# Patient Record
Sex: MALE | Race: WHITE | Employment: OTHER | URBAN - METROPOLITAN AREA
[De-identification: names, ages, dates, MRNs, and addresses within clinical notes are randomized per-mention and may not be internally consistent; named-entity substitution may affect disease eponyms.]

---

## 2021-01-27 ENCOUNTER — APPOINTMENT (OUTPATIENT)
Dept: CT IMAGING | Age: 86
End: 2021-01-27
Attending: EMERGENCY MEDICINE
Payer: OTHER GOVERNMENT

## 2021-01-27 ENCOUNTER — APPOINTMENT (OUTPATIENT)
Dept: MRI IMAGING | Age: 86
End: 2021-01-27
Attending: EMERGENCY MEDICINE
Payer: OTHER GOVERNMENT

## 2021-01-27 ENCOUNTER — HOSPITAL ENCOUNTER (EMERGENCY)
Age: 86
Discharge: HOME | End: 2021-01-28
Attending: EMERGENCY MEDICINE | Admitting: EMERGENCY MEDICINE
Payer: OTHER GOVERNMENT

## 2021-01-27 DIAGNOSIS — R20.0 LIP NUMBNESS: Primary | ICD-10-CM

## 2021-01-27 LAB
ANION GAP SERPL CALC-SCNC: 10 MMOL/L (ref 5–15)
BASOPHILS # BLD: 0.1 K/UL (ref 0–0.1)
BASOPHILS NFR BLD: 1 % (ref 0–1)
BUN SERPL-MCNC: 19 MG/DL (ref 6–20)
BUN/CREAT SERPL: 13 (ref 12–20)
CALCIUM SERPL-MCNC: 9 MG/DL (ref 8.5–10.1)
CHLORIDE SERPL-SCNC: 103 MMOL/L (ref 97–108)
CO2 SERPL-SCNC: 26 MMOL/L (ref 21–32)
CREAT SERPL-MCNC: 1.47 MG/DL (ref 0.7–1.3)
DIFFERENTIAL METHOD BLD: NORMAL
EOSINOPHIL # BLD: 0.3 K/UL (ref 0–0.4)
EOSINOPHIL NFR BLD: 5 % (ref 0–7)
ERYTHROCYTE [DISTWIDTH] IN BLOOD BY AUTOMATED COUNT: 12.9 % (ref 11.5–14.5)
GLUCOSE SERPL-MCNC: 230 MG/DL (ref 65–100)
HCT VFR BLD AUTO: 39.8 % (ref 36.6–50.3)
HGB BLD-MCNC: 13.2 G/DL (ref 12.1–17)
IMM GRANULOCYTES # BLD AUTO: 0 K/UL (ref 0–0.04)
IMM GRANULOCYTES NFR BLD AUTO: 0 % (ref 0–0.5)
LYMPHOCYTES # BLD: 1.7 K/UL (ref 0.8–3.5)
LYMPHOCYTES NFR BLD: 27 % (ref 12–49)
MCH RBC QN AUTO: 30.8 PG (ref 26–34)
MCHC RBC AUTO-ENTMCNC: 33.2 G/DL (ref 30–36.5)
MCV RBC AUTO: 93 FL (ref 80–99)
MONOCYTES # BLD: 0.5 K/UL (ref 0–1)
MONOCYTES NFR BLD: 8 % (ref 5–13)
NEUTS SEG # BLD: 3.9 K/UL (ref 1.8–8)
NEUTS SEG NFR BLD: 60 % (ref 32–75)
NRBC # BLD: 0 K/UL (ref 0–0.01)
NRBC BLD-RTO: 0 PER 100 WBC
PLATELET # BLD AUTO: 257 K/UL (ref 150–400)
PMV BLD AUTO: 9.7 FL (ref 8.9–12.9)
POTASSIUM SERPL-SCNC: 4.2 MMOL/L (ref 3.5–5.1)
RBC # BLD AUTO: 4.28 M/UL (ref 4.1–5.7)
SODIUM SERPL-SCNC: 139 MMOL/L (ref 136–145)
WBC # BLD AUTO: 6.5 K/UL (ref 4.1–11.1)

## 2021-01-27 PROCEDURE — 85025 COMPLETE CBC W/AUTO DIFF WBC: CPT

## 2021-01-27 PROCEDURE — 36415 COLL VENOUS BLD VENIPUNCTURE: CPT

## 2021-01-27 PROCEDURE — 70544 MR ANGIOGRAPHY HEAD W/O DYE: CPT

## 2021-01-27 PROCEDURE — 99285 EMERGENCY DEPT VISIT HI MDM: CPT

## 2021-01-27 PROCEDURE — 80048 BASIC METABOLIC PNL TOTAL CA: CPT

## 2021-01-27 PROCEDURE — 70547 MR ANGIOGRAPHY NECK W/O DYE: CPT

## 2021-01-27 PROCEDURE — 70551 MRI BRAIN STEM W/O DYE: CPT

## 2021-01-27 PROCEDURE — 70450 CT HEAD/BRAIN W/O DYE: CPT

## 2021-01-28 VITALS
WEIGHT: 206.79 LBS | TEMPERATURE: 97.9 F | RESPIRATION RATE: 19 BRPM | HEART RATE: 75 BPM | DIASTOLIC BLOOD PRESSURE: 55 MMHG | SYSTOLIC BLOOD PRESSURE: 104 MMHG | OXYGEN SATURATION: 95 %

## 2021-01-28 NOTE — ED NOTES
The patient was discharged home by Dr. Shane Kelley and Oc Ha rn in stable condition. The patient is alert and oriented, is in no respiratory distress and has vital signs within normal limits . The patient's diagnosis, condition and treatment were explained to patient. The patient expressed understanding. No prescriptions given to pt. No work/school note given to pt. A discharge plan has been developed. A  was not involved in the process. Aftercare instructions were given to the patient. Pt's saline lock removed without complications. Family will transport pt home.

## 2021-01-28 NOTE — ED PROVIDER NOTES
The history is provided by the patient. Numbness  This is a new problem. The current episode started 12 to 24 hours ago. The problem has been resolved (lasted 1 hour). There was left facial (left side of mouth) focality noted. Pertinent negatives include no focal weakness, no loss of balance, no visual change, no mental status change and no disorientation. There has been no fever. Pertinent negatives include no shortness of breath, no chest pain, no vomiting, no altered mental status, no headaches and no nausea. Meds prior to arrival: took prescribed ASA and Plavix. Associated medical issues include CVA. Past Medical History:   Diagnosis Date    Stroke [facility])        Past Surgical History:   Procedure Laterality Date    [place] CARDIAC SURG PROCEDURE UNLIST      bypass         No family history on file.     Social History     Socioeconomic History    Marital status: SINGLE     Spouse name: Not on file    Number of children: Not on file    Years of education: Not on file    Highest education level: Not on file   Occupational History    Not on file   Social Needs    Financial resource strain: Not on file    Food insecurity     Worry: Not on file     Inability: Not on file    Transportation needs     Medical: Not on file     Non-medical: Not on file   Tobacco Use    Smoking status: Not on file   Substance and Sexual Activity    Alcohol use: Not on file    Drug use: Not on file    Sexual activity: Not on file   Lifestyle    Physical activity     Days per week: Not on file     Minutes per session: Not on file    Stress: Not on file   Relationships    Social connections     Talks on phone: Not on file     Gets together: Not on file     Attends Jew service: Not on file     Active member of club or organization: Not on file     Attends meetings of clubs or organizations: Not on file     Relationship status: Not on file    Intimate partner violence     Fear of current or ex partner: Not on file Emotionally abused: Not on file     Physically abused: Not on file     Forced sexual activity: Not on file   Other Topics Concern    Not on file   Social History Narrative    Not on file         ALLERGIES: Patient has no allergy information on record. Review of Systems   Constitutional: Negative for chills and fever. Respiratory: Negative for shortness of breath. Cardiovascular: Negative for chest pain. Gastrointestinal: Negative for abdominal pain, constipation, diarrhea, nausea and vomiting. Neurological: Positive for numbness. Negative for dizziness, focal weakness, light-headedness, headaches and loss of balance. All other systems reviewed and are negative. There were no vitals filed for this visit. Physical Exam  Vitals signs and nursing note reviewed. Constitutional:       Appearance: He is well-developed. HENT:      Head: Normocephalic and atraumatic. Eyes:      General: No scleral icterus. Neck:      Musculoskeletal: Normal range of motion. Cardiovascular:      Rate and Rhythm: Normal rate and regular rhythm. Pulmonary:      Effort: Pulmonary effort is normal.   Abdominal:      General: There is no distension. Tenderness: There is no abdominal tenderness. Skin:     General: Skin is warm and dry. Findings: No erythema or rash. Neurological:      General: No focal deficit present. Mental Status: He is alert and oriented to person, place, and time. Cranial Nerves: No cranial nerve deficit. Sensory: Sensory deficit (has old left sided decreased sensation from previous CVA) present. Motor: No weakness. Psychiatric:         Mood and Affect: Mood normal.         Behavior: Behavior normal.          MDM       Procedures      The patient is resting comfortably and feels better, is alert, talkative, interactive and in no distress. The repeat examination is unremarkable and benign.  The patient is neurologically intact, has a normal mental status and is ambulatory in the ED. The history, exam, diagnostic testing (if any) and the patient's current condition do not suggest seizure, meningitis, stroke, sepsis, subarachnoid hemorrhage, intracranial bleeding, encephalitis or other significant pathology that would warrant further testing, continued ED treatment, admission, or other specialist evaluation at this point. The vital signs have been stable. Case was discussed with Dr. Aramis Dykes who recommends outpatient follow-up. The patient's condition is stable and appropriate for discharge. The patient will pursue further outpatient evaluation with the primary care physician or other designated or consulting physician as indicated in the discharge instructions.

## 2021-02-16 ENCOUNTER — HOSPITAL ENCOUNTER (EMERGENCY)
Age: 86
Discharge: HOME | End: 2021-02-16
Attending: EMERGENCY MEDICINE | Admitting: EMERGENCY MEDICINE
Payer: OTHER GOVERNMENT

## 2021-02-16 ENCOUNTER — APPOINTMENT (OUTPATIENT)
Dept: CT IMAGING | Age: 86
End: 2021-02-16
Attending: EMERGENCY MEDICINE
Payer: OTHER GOVERNMENT

## 2021-02-16 ENCOUNTER — APPOINTMENT (OUTPATIENT)
Dept: GENERAL RADIOLOGY | Age: 86
End: 2021-02-16
Attending: EMERGENCY MEDICINE
Payer: OTHER GOVERNMENT

## 2021-02-16 VITALS
DIASTOLIC BLOOD PRESSURE: 79 MMHG | RESPIRATION RATE: 16 BRPM | HEART RATE: 60 BPM | TEMPERATURE: 98.1 F | SYSTOLIC BLOOD PRESSURE: 143 MMHG | WEIGHT: 207.23 LBS | OXYGEN SATURATION: 95 %

## 2021-02-16 DIAGNOSIS — M79.642 PAIN OF LEFT HAND: ICD-10-CM

## 2021-02-16 DIAGNOSIS — M25.532 LEFT WRIST PAIN: Primary | ICD-10-CM

## 2021-02-16 PROCEDURE — 99283 EMERGENCY DEPT VISIT LOW MDM: CPT

## 2021-02-16 PROCEDURE — 72125 CT NECK SPINE W/O DYE: CPT

## 2021-02-16 PROCEDURE — 70450 CT HEAD/BRAIN W/O DYE: CPT

## 2021-02-16 PROCEDURE — 73130 X-RAY EXAM OF HAND: CPT

## 2021-02-16 PROCEDURE — 73110 X-RAY EXAM OF WRIST: CPT

## 2021-02-16 RX ORDER — CLOPIDOGREL BISULFATE 75 MG/1
TABLET ORAL
COMMUNITY
End: 2021-11-09

## 2021-02-16 RX ORDER — ASPIRIN 81 MG/1
81 TABLET ORAL DAILY
COMMUNITY
End: 2021-11-09

## 2021-02-16 NOTE — ED NOTES
The patient was discharged home by Dr. Letha Jacobo  in stable condition. The patient is alert and oriented, in no respiratory distress and discharge vital signs obtained. The patient's diagnosis, condition and treatment were explained. The patient expressed understanding. No prescriptions given/e-scribed to pharmacy. No work/school note given. A discharge plan has been developed. A  was not involved in the process. Aftercare instructions were given. Pt ambulatory out of the ED.

## 2021-02-16 NOTE — ED PROVIDER NOTES
71-year-old male presents with left hand and wrist pain after a fall. The patient was walking outside when he slipped on some ice at approximately 4 PM yesterday. Himself with his left hand. He did hit his face slightly but complains of no wounds to the face. He does complain of a slight headache now. He denies any neck or back pain. He is on Plavix but denies any blood thinner use. Past Medical History:   Diagnosis Date    Stroke [facility])        Past Surgical History:   Procedure Laterality Date    [place] CARDIAC SURG PROCEDURE UNLIST      bypass         No family history on file.     Social History     Socioeconomic History    Marital status: SINGLE     Spouse name: Not on file    Number of children: Not on file    Years of education: Not on file    Highest education level: Not on file   Occupational History    Not on file   Social Needs    Financial resource strain: Not on file    Food insecurity     Worry: Not on file     Inability: Not on file    Transportation needs     Medical: Not on file     Non-medical: Not on file   Tobacco Use    Smoking status: Not on file   Substance and Sexual Activity    Alcohol use: Not on file    Drug use: Not on file    Sexual activity: Not on file   Lifestyle    Physical activity     Days per week: Not on file     Minutes per session: Not on file    Stress: Not on file   Relationships    Social connections     Talks on phone: Not on file     Gets together: Not on file     Attends Yazdanism service: Not on file     Active member of club or organization: Not on file     Attends meetings of clubs or organizations: Not on file     Relationship status: Not on file    Intimate partner violence     Fear of current or ex partner: Not on file     Emotionally abused: Not on file     Physically abused: Not on file     Forced sexual activity: Not on file   Other Topics Concern    Not on file   Social History Narrative    Not on file         ALLERGIES: Patient has no known allergies. Review of Systems   Constitutional: Negative for fever. HENT: Negative for rhinorrhea. Respiratory: Negative for cough. Cardiovascular: Negative for chest pain. Gastrointestinal: Negative for abdominal pain. Genitourinary: Negative for dysuria. Musculoskeletal: Positive for arthralgias. Negative for back pain. Skin: Negative for wound. Neurological: Negative for headaches. Psychiatric/Behavioral: Negative for confusion. Vitals:    02/16/21 1606   BP: 115/63   Pulse: 71   Resp: 16   Temp: 98.1 °F (36.7 °C)   SpO2: 95%   Weight: 94 kg (207 lb 3.7 oz)            Physical Exam  Vitals signs and nursing note reviewed. Constitutional:       General: He is not in acute distress. Appearance: Normal appearance. He is not ill-appearing, toxic-appearing or diaphoretic. HENT:      Head: Normocephalic. Eyes:      Extraocular Movements: Extraocular movements intact. Neck:      Musculoskeletal: Normal range of motion. Cardiovascular:      Rate and Rhythm: Normal rate. Pulses: Normal pulses. Heart sounds: Normal heart sounds. No murmur. No friction rub. No gallop. Pulmonary:      Effort: Pulmonary effort is normal. No respiratory distress. Breath sounds: Normal breath sounds. No stridor. No wheezing, rhonchi or rales. Abdominal:      General: Abdomen is flat. Bowel sounds are normal. There is no distension. Palpations: Abdomen is soft. Tenderness: There is no abdominal tenderness. There is no guarding. Musculoskeletal: Normal range of motion. Comments: Left snuffbox tenderness. Decreased range of motion of the left wrist due to pain. Skin:     General: Skin is warm and dry. Capillary Refill: Capillary refill takes less than 2 seconds. Neurological:      Mental Status: He is alert and oriented to person, place, and time.    Psychiatric:         Mood and Affect: Mood normal.          MDM  Number of Diagnoses or Management Options  Left wrist pain  Pain of left hand  Diagnosis management comments: 60-year-old male presents with left wrist and hand pain after a fall yesterday. He does complain of a headache and a CT of the head will be obtained to rule out intracranial hemorrhage along with a CT of the cervical spine. Plain film x-rays of the left hand and wrist were obtained. Imaging is unremarkable. The patient does have left snuffbox tenderness and was placed in a thumb spica splint. I recommended that he follow-up with his primary care physician for abril-ray and 1 week. We discussed the possibility of delayed identification of scaphoid fracture. He is comfortable and agreeable with the plan of care and aware of his return precautions.          Procedures

## 2021-02-16 NOTE — ED TRIAGE NOTES
Patient presents to treatment area via wheelchair. Patient states he slipped on the ice yesterday. Patient states he caught himself with his left hand. Today, has persistent left hand/wrist pain and swelling. Denies striking head.   Patient states he has had a headache since the incident

## 2021-04-05 ENCOUNTER — HOSPITAL ENCOUNTER (EMERGENCY)
Age: 86
Discharge: HOME | End: 2021-04-05
Attending: EMERGENCY MEDICINE
Payer: OTHER GOVERNMENT

## 2021-04-05 ENCOUNTER — APPOINTMENT (OUTPATIENT)
Dept: GENERAL RADIOLOGY | Age: 86
End: 2021-04-05
Attending: EMERGENCY MEDICINE
Payer: OTHER GOVERNMENT

## 2021-04-05 VITALS
BODY MASS INDEX: 28.67 KG/M2 | SYSTOLIC BLOOD PRESSURE: 135 MMHG | HEIGHT: 71 IN | DIASTOLIC BLOOD PRESSURE: 72 MMHG | TEMPERATURE: 97.8 F | HEART RATE: 87 BPM | WEIGHT: 204.81 LBS | RESPIRATION RATE: 16 BRPM | OXYGEN SATURATION: 96 %

## 2021-04-05 DIAGNOSIS — L03.019 FELON OF FINGER: Primary | ICD-10-CM

## 2021-04-05 PROCEDURE — 75810000283 HC INJECTION NERVE BLOCK

## 2021-04-05 PROCEDURE — 73140 X-RAY EXAM OF FINGER(S): CPT

## 2021-04-05 PROCEDURE — 74011250637 HC RX REV CODE- 250/637: Performed by: EMERGENCY MEDICINE

## 2021-04-05 PROCEDURE — 74011000250 HC RX REV CODE- 250

## 2021-04-05 PROCEDURE — 99284 EMERGENCY DEPT VISIT MOD MDM: CPT

## 2021-04-05 RX ORDER — METOPROLOL TARTRATE 25 MG/1
12.5 TABLET, FILM COATED ORAL DAILY
Status: ON HOLD | COMMUNITY
End: 2021-12-11

## 2021-04-05 RX ORDER — ISOSORBIDE MONONITRATE 120 MG/1
120 TABLET, EXTENDED RELEASE ORAL
COMMUNITY

## 2021-04-05 RX ORDER — MELATONIN
2000 2 TIMES DAILY
COMMUNITY

## 2021-04-05 RX ORDER — CEPHALEXIN 500 MG/1
500 CAPSULE ORAL 4 TIMES DAILY
Qty: 28 CAP | Refills: 0 | Status: SHIPPED | OUTPATIENT
Start: 2021-04-05 | End: 2021-04-12

## 2021-04-05 RX ORDER — LOSARTAN POTASSIUM 50 MG/1
50 TABLET ORAL DAILY
COMMUNITY
End: 2021-11-09

## 2021-04-05 RX ORDER — CEPHALEXIN 250 MG/1
500 CAPSULE ORAL
Status: COMPLETED | OUTPATIENT
Start: 2021-04-05 | End: 2021-04-05

## 2021-04-05 RX ORDER — LIDOCAINE HYDROCHLORIDE 10 MG/ML
INJECTION, SOLUTION EPIDURAL; INFILTRATION; INTRACAUDAL; PERINEURAL
Status: COMPLETED
Start: 2021-04-05 | End: 2021-04-05

## 2021-04-05 RX ADMIN — CEPHALEXIN 500 MG: 250 CAPSULE ORAL at 06:10

## 2021-04-05 RX ADMIN — LIDOCAINE HYDROCHLORIDE 5 ML: 10 INJECTION, SOLUTION EPIDURAL; INFILTRATION; INTRACAUDAL; PERINEURAL at 05:25

## 2021-04-05 NOTE — ED TRIAGE NOTES
Puncture wound on right middle finger 6 days ago. Noticed swelling and pain while trying to sleep last night.

## 2021-04-05 NOTE — ED PROVIDER NOTES
The history is provided by the patient. Hand Swelling   This is a new problem. Episode onset: 6 days. The problem occurs constantly. The problem has been gradually worsening. The pain is present in the right fingers (middle). The quality of the pain is described as pounding. The pain is severe. Pertinent negatives include no numbness and full range of motion. The symptoms are aggravated by palpation and movement. He has tried nothing for the symptoms. The treatment provided no relief. There has been no history of extremity trauma (but thinks something may have either bitten him or punctured his finger while he was gardening). Past Medical History:   Diagnosis Date    CAD (coronary artery disease)     Hypercholesteremia     Hypertension     Stroke [facility])        Past Surgical History:   Procedure Laterality Date    HX CORONARY STENT PLACEMENT      [place] CARDIAC SURG PROCEDURE UNLIST      bypass         History reviewed. No pertinent family history.     Social History     Socioeconomic History    Marital status: SINGLE     Spouse name: Not on file    Number of children: Not on file    Years of education: Not on file    Highest education level: Not on file   Occupational History    Not on file   Social Needs    Financial resource strain: Not on file    Food insecurity     Worry: Not on file     Inability: Not on file    Transportation needs     Medical: Not on file     Non-medical: Not on file   Tobacco Use    Smoking status: Never Smoker    Smokeless tobacco: Never Used   Substance and Sexual Activity    Alcohol use: Never     Frequency: Never    Drug use: Never    Sexual activity: Not on file   Lifestyle    Physical activity     Days per week: Not on file     Minutes per session: Not on file    Stress: Not on file   Relationships    Social connections     Talks on phone: Not on file     Gets together: Not on file     Attends Restoration service: Not on file     Active member of club or organization: Not on file     Attends meetings of clubs or organizations: Not on file     Relationship status: Not on file    Intimate partner violence     Fear of current or ex partner: Not on file     Emotionally abused: Not on file     Physically abused: Not on file     Forced sexual activity: Not on file   Other Topics Concern    Not on file   Social History Narrative    Not on file         ALLERGIES: Morphine    Review of Systems   Constitutional: Negative for chills and fever. Respiratory: Negative for shortness of breath. Cardiovascular: Negative for chest pain. Gastrointestinal: Negative for abdominal pain, constipation, diarrhea and vomiting. Musculoskeletal: Positive for myalgias. Skin: Positive for color change. Neurological: Negative for dizziness, light-headedness and numbness. All other systems reviewed and are negative. Vitals:    04/05/21 0516   BP: (!) 180/97   Pulse: (!) 115   Resp: 16   Temp: 97.8 °F (36.6 °C)   SpO2: 96%   Weight: 92.9 kg (204 lb 12.9 oz)   Height: 5' 11\" (1.803 m)            Physical Exam  Vitals signs and nursing note reviewed. Constitutional:       Appearance: He is well-developed. HENT:      Head: Normocephalic and atraumatic. Eyes:      General: No scleral icterus. Neck:      Musculoskeletal: Normal range of motion. Cardiovascular:      Rate and Rhythm: Normal rate. Pulmonary:      Effort: Pulmonary effort is normal.   Abdominal:      General: There is no distension. Skin:     General: Skin is warm and dry. Findings: Erythema present. No rash. Comments: Swelling, erythema, tenderness, and an area of pointing at the tip of his finger   Neurological:      General: No focal deficit present. Mental Status: He is alert and oriented to person, place, and time.    Psychiatric:         Mood and Affect: Mood normal.         Behavior: Behavior normal.          MDM  Number of Diagnoses or Management Options  Felon of finger: new and requires workup  Diagnosis management comments: Pt presents with an infection of this fingertip. No signs/symptoms of sepsis, no clear paronychia, no foreign body. Nerve Block    Date/Time: 4/5/2021 5:53 AM  Performed by: Eliud Nagel MD  Authorized by: Eliud Nagel MD     Consent:     Consent obtained:  Verbal    Consent given by:  Patient    Risks discussed:  Pain    Alternatives discussed:  No treatment  Indications:     Indications:  Pain relief  Location:     Body area:  Upper extremity    Upper extremity nerve:  Metacarpal    Laterality:  Right  Pre-procedure details:     Skin preparation:  Alcohol  Skin anesthesia (see MAR for exact dosages):     Skin anesthesia method:  None  Procedure details (see MAR for exact dosages): Block needle gauge:  25 G    Anesthetic injected:  Lidocaine 1% w/o epi    Steroid injected:  None    Injection procedure:  Anatomic landmarks identified, incremental injection, negative aspiration for blood, anatomic landmarks palpated and introduced needle  Post-procedure details:     Dressing:  None    Outcome:  Pain improved    Patient tolerance of procedure: Tolerated well, no immediate complications            5:59 AM   Case discussed with Dr. Aaron Zamora, orthopedic surgery, who recommends Keflex and follow-up with the office. Patient is to call in 2 hours to schedule his appointment. Pt presents with an extremity injury. No evidence of fracture, dislocation, or other significant musculoskeletal injury. Patient was discharged home with an Rx for Keflex and precautions for returning to the emergency department. No evidence of compartment syndrome on evaluation. Patient will be discharged home to follow-up with orthopedic surgery as instructed in discharge paperwork. Patient and family expressed understanding and agreed with plan.

## 2021-10-11 ENCOUNTER — HOSPITAL ENCOUNTER (EMERGENCY)
Age: 86
Discharge: HOME | End: 2021-10-11
Attending: EMERGENCY MEDICINE
Payer: OTHER GOVERNMENT

## 2021-10-11 VITALS
HEART RATE: 104 BPM | SYSTOLIC BLOOD PRESSURE: 156 MMHG | RESPIRATION RATE: 16 BRPM | TEMPERATURE: 98.2 F | BODY MASS INDEX: 28.4 KG/M2 | WEIGHT: 202.82 LBS | DIASTOLIC BLOOD PRESSURE: 89 MMHG | HEIGHT: 71 IN | OXYGEN SATURATION: 97 %

## 2021-10-11 DIAGNOSIS — S40.021A HEMATOMA OF ARM, RIGHT, INITIAL ENCOUNTER: Primary | ICD-10-CM

## 2021-10-11 PROCEDURE — 74011250637 HC RX REV CODE- 250/637: Performed by: EMERGENCY MEDICINE

## 2021-10-11 PROCEDURE — 99283 EMERGENCY DEPT VISIT LOW MDM: CPT

## 2021-10-11 RX ORDER — TRAMADOL HYDROCHLORIDE 50 MG/1
50 TABLET ORAL
Qty: 9 TABLET | Refills: 0 | Status: SHIPPED | OUTPATIENT
Start: 2021-10-11 | End: 2021-10-14

## 2021-10-11 RX ORDER — TRAMADOL HYDROCHLORIDE 50 MG/1
100 TABLET ORAL
Status: COMPLETED | OUTPATIENT
Start: 2021-10-11 | End: 2021-10-11

## 2021-10-11 RX ADMIN — TRAMADOL HYDROCHLORIDE 100 MG: 50 TABLET, FILM COATED ORAL at 05:27

## 2021-10-11 NOTE — ED NOTES
The patient was discharged home by provider in stable condition. The patient is alert and oriented, in no respiratory distress and discharge vital signs obtained. The patient's diagnosis, condition and treatment were explained. The patient expressed understanding. 1 prescriptions given. No work/school note given. A discharge plan has been developed. A  was not involved in the process. Aftercare instructions were given. Pt ambulatory out of the ED.

## 2021-10-11 NOTE — ED PROVIDER NOTES
The history is provided by the patient. Arm Pain   This is a new problem. The current episode started more than 1 week ago. The problem occurs constantly. The problem has not changed since onset. The pain is present in the right arm. The quality of the pain is described as pounding. The pain is severe. Pertinent negatives include no numbness and full range of motion. The symptoms are aggravated by palpation. Treatments tried: tylenol. The treatment provided no relief. There has been a history of trauma (swelling and pain after IV + hematoma). Past Medical History:   Diagnosis Date    CAD (coronary artery disease)     Hypercholesteremia     Hypertension     Stroke [facility])        Past Surgical History:   Procedure Laterality Date    HX CORONARY STENT PLACEMENT      [place] CARDIAC SURG PROCEDURE UNLIST      bypass         History reviewed. No pertinent family history. Social History     Socioeconomic History    Marital status:      Spouse name: Not on file    Number of children: Not on file    Years of education: Not on file    Highest education level: Not on file   Occupational History    Not on file   Tobacco Use    Smoking status: Never Smoker    Smokeless tobacco: Never Used   Substance and Sexual Activity    Alcohol use: Never    Drug use: Never    Sexual activity: Not on file   Other Topics Concern    Not on file   Social History Narrative    Not on file     Social Determinants of Health     Financial Resource Strain:     Difficulty of Paying Living Expenses:    Food Insecurity:     Worried About Running Out of Food in the Last Year:     [address] in the Last Year:    Transportation Needs:     Lack of Transportation (Medical):      Lack of Transportation (Non-Medical):    Physical Activity:     Days of Exercise per Week:     Minutes of Exercise per Session:    Stress:     Feeling of Stress :    Social Connections:     Frequency of Communication with Friends and Family:  Frequency of Social Gatherings with Friends and Family:     Attends Zoroastrianism Services:     Active Member of Clubs or Organizations:     Attends Club or Organization Meetings:     Marital Status:    Intimate Partner Violence:     Fear of Current or Ex-Partner:     Emotionally Abused:     Physically Abused:     Sexually Abused: ALLERGIES: Morphine    Review of Systems   Constitutional: Negative for chills and fever. Respiratory: Negative for shortness of breath. Cardiovascular: Negative for chest pain. Gastrointestinal: Negative for abdominal pain, constipation, diarrhea and vomiting. Musculoskeletal: Positive for myalgias. Skin: Positive for color change. Neurological: Negative for dizziness, light-headedness and numbness. All other systems reviewed and are negative. Vitals:    10/11/21 0502   BP: (!) (P) 156/89   Pulse: (!) (P) 104   Resp: (P) 16   Temp: (P) 98.2 °F (36.8 °C)   SpO2: (P) 97%            Physical Exam  Vitals and nursing note reviewed. Constitutional:       Appearance: He is well-developed. HENT:      Head: Normocephalic and atraumatic. Eyes:      General: No scleral icterus. Cardiovascular:      Rate and Rhythm: Normal rate. Pulmonary:      Effort: Pulmonary effort is normal.   Abdominal:      General: There is no distension. Musculoskeletal:      Right forearm: Swelling present. No edema, deformity, lacerations or bony tenderness. Cervical back: Normal range of motion. Comments: Large tender hematoma along the ulnar aspect of the right forearm, no warmth or induration,    Skin:     General: Skin is warm and dry. Capillary Refill: Capillary refill takes less than 2 seconds. Findings: No erythema or rash. Neurological:      General: No focal deficit present. Mental Status: He is alert and oriented to person, place, and time.    Psychiatric:         Mood and Affect: Mood normal.         Behavior: Behavior normal. MDM       Procedures      Pt presents with an extremity injury. No evidence of fracture, dislocation, or other significant musculoskeletal injury. Patient was discharged home with a plan for pain control as well as instructions on managing his injuries and precautions for returning to the emergency department. No evidence of compartment syndrome on evaluation. Patient will be discharged home to follow-up with primary care provider as instructed in discharge paperwork. Patient and family expressed understanding and agreed with plan.

## 2021-10-21 ENCOUNTER — APPOINTMENT (OUTPATIENT)
Dept: CT IMAGING | Age: 86
End: 2021-10-21
Attending: EMERGENCY MEDICINE
Payer: OTHER GOVERNMENT

## 2021-10-21 ENCOUNTER — HOSPITAL ENCOUNTER (EMERGENCY)
Age: 86
Discharge: HOME | End: 2021-10-21
Attending: EMERGENCY MEDICINE
Payer: OTHER GOVERNMENT

## 2021-10-21 VITALS
OXYGEN SATURATION: 96 % | HEART RATE: 66 BPM | DIASTOLIC BLOOD PRESSURE: 66 MMHG | RESPIRATION RATE: 21 BRPM | WEIGHT: 201.72 LBS | SYSTOLIC BLOOD PRESSURE: 148 MMHG | BODY MASS INDEX: 28.13 KG/M2

## 2021-10-21 DIAGNOSIS — R20.0 NUMBNESS AND TINGLING: Primary | ICD-10-CM

## 2021-10-21 DIAGNOSIS — R51.9 ACUTE NONINTRACTABLE HEADACHE, UNSPECIFIED HEADACHE TYPE: ICD-10-CM

## 2021-10-21 DIAGNOSIS — I62.03 CHRONIC SUBDURAL HEMATOMA (HCC): ICD-10-CM

## 2021-10-21 DIAGNOSIS — R20.2 NUMBNESS AND TINGLING: Primary | ICD-10-CM

## 2021-10-21 LAB
ALBUMIN SERPL-MCNC: 3.7 G/DL (ref 3.5–5)
ALBUMIN/GLOB SERPL: 1.2 {RATIO} (ref 1.1–2.2)
ALP SERPL-CCNC: 62 U/L (ref 45–117)
ALT SERPL-CCNC: 29 U/L (ref 12–78)
ANION GAP SERPL CALC-SCNC: 9 MMOL/L (ref 5–15)
AST SERPL-CCNC: 20 U/L (ref 15–37)
BASOPHILS # BLD: 0 K/UL (ref 0–0.1)
BASOPHILS NFR BLD: 1 % (ref 0–1)
BILIRUB SERPL-MCNC: 0.8 MG/DL (ref 0.2–1)
BUN SERPL-MCNC: 23 MG/DL (ref 6–20)
BUN/CREAT SERPL: 16 (ref 12–20)
CALCIUM SERPL-MCNC: 9 MG/DL (ref 8.5–10.1)
CHLORIDE SERPL-SCNC: 102 MMOL/L (ref 97–108)
CO2 SERPL-SCNC: 27 MMOL/L (ref 21–32)
CREAT SERPL-MCNC: 1.46 MG/DL (ref 0.7–1.3)
DIFFERENTIAL METHOD BLD: ABNORMAL
EOSINOPHIL # BLD: 0.2 K/UL (ref 0–0.4)
EOSINOPHIL NFR BLD: 2 % (ref 0–7)
ERYTHROCYTE [DISTWIDTH] IN BLOOD BY AUTOMATED COUNT: 12.8 % (ref 11.5–14.5)
GLOBULIN SER CALC-MCNC: 3.1 G/DL (ref 2–4)
GLUCOSE BLD STRIP.AUTO-MCNC: 212 MG/DL (ref 65–117)
GLUCOSE SERPL-MCNC: 193 MG/DL (ref 65–100)
HCT VFR BLD AUTO: 37.4 % (ref 36.6–50.3)
HGB BLD-MCNC: 12.4 G/DL (ref 12.1–17)
IMM GRANULOCYTES # BLD AUTO: 0 K/UL (ref 0–0.04)
IMM GRANULOCYTES NFR BLD AUTO: 0 % (ref 0–0.5)
INR PPP: 1.2 (ref 0.9–1.1)
LYMPHOCYTES # BLD: 2.2 K/UL (ref 0.8–3.5)
LYMPHOCYTES NFR BLD: 26 % (ref 12–49)
MCH RBC QN AUTO: 31.2 PG (ref 26–34)
MCHC RBC AUTO-ENTMCNC: 33.2 G/DL (ref 30–36.5)
MCV RBC AUTO: 94.2 FL (ref 80–99)
MONOCYTES # BLD: 0.7 K/UL (ref 0–1)
MONOCYTES NFR BLD: 8 % (ref 5–13)
NEUTS SEG # BLD: 5.6 K/UL (ref 1.8–8)
NEUTS SEG NFR BLD: 64 % (ref 32–75)
NRBC # BLD: 0 K/UL (ref 0–0.01)
NRBC BLD-RTO: 0 PER 100 WBC
PLATELET # BLD AUTO: 252 K/UL (ref 150–400)
PMV BLD AUTO: 10.2 FL (ref 8.9–12.9)
POTASSIUM SERPL-SCNC: 3.7 MMOL/L (ref 3.5–5.1)
PROT SERPL-MCNC: 6.8 G/DL (ref 6.4–8.2)
PROTHROMBIN TIME: 11.6 SEC (ref 9–11.1)
RBC # BLD AUTO: 3.97 M/UL (ref 4.1–5.7)
SERVICE CMNT-IMP: ABNORMAL
SODIUM SERPL-SCNC: 138 MMOL/L (ref 136–145)
TROPONIN-HIGH SENSITIVITY: 17 NG/L (ref 0–76)
WBC # BLD AUTO: 8.8 K/UL (ref 4.1–11.1)

## 2021-10-21 PROCEDURE — 99285 EMERGENCY DEPT VISIT HI MDM: CPT

## 2021-10-21 PROCEDURE — 80053 COMPREHEN METABOLIC PANEL: CPT

## 2021-10-21 PROCEDURE — 85025 COMPLETE CBC W/AUTO DIFF WBC: CPT

## 2021-10-21 PROCEDURE — 70450 CT HEAD/BRAIN W/O DYE: CPT

## 2021-10-21 PROCEDURE — 36415 COLL VENOUS BLD VENIPUNCTURE: CPT

## 2021-10-21 PROCEDURE — 82962 GLUCOSE BLOOD TEST: CPT

## 2021-10-21 PROCEDURE — 84484 ASSAY OF TROPONIN QUANT: CPT

## 2021-10-21 PROCEDURE — 85610 PROTHROMBIN TIME: CPT

## 2021-10-21 PROCEDURE — 93005 ELECTROCARDIOGRAM TRACING: CPT

## 2021-10-22 ENCOUNTER — APPOINTMENT (OUTPATIENT)
Dept: CT IMAGING | Age: 86
DRG: 082 | End: 2021-10-22
Attending: EMERGENCY MEDICINE
Payer: MEDICARE

## 2021-10-22 ENCOUNTER — HOSPITAL ENCOUNTER (INPATIENT)
Age: 86
LOS: 18 days | Discharge: NURSING, REHAB OR LONG-TERM CARE | DRG: 082 | End: 2021-11-09
Attending: EMERGENCY MEDICINE | Admitting: HOSPITALIST
Payer: MEDICARE

## 2021-10-22 DIAGNOSIS — R53.1 WEAKNESS: ICD-10-CM

## 2021-10-22 DIAGNOSIS — R20.0 LEFT SIDED NUMBNESS: Primary | ICD-10-CM

## 2021-10-22 DIAGNOSIS — R56.9 SEIZURE (HCC): ICD-10-CM

## 2021-10-22 DIAGNOSIS — S06.5XAA SUBDURAL HEMATOMA: ICD-10-CM

## 2021-10-22 DIAGNOSIS — G81.94 LEFT HEMIPARESIS (HCC): ICD-10-CM

## 2021-10-22 PROBLEM — I63.9 CVA (CEREBRAL VASCULAR ACCIDENT) (HCC): Status: ACTIVE | Noted: 2021-10-22

## 2021-10-22 LAB
ALBUMIN SERPL-MCNC: 2.6 G/DL (ref 3.5–5)
ALBUMIN/GLOB SERPL: 0.9 {RATIO} (ref 1.1–2.2)
ALP SERPL-CCNC: 50 U/L (ref 45–117)
ALT SERPL-CCNC: 20 U/L (ref 12–78)
ANION GAP SERPL CALC-SCNC: 6 MMOL/L (ref 5–15)
AST SERPL-CCNC: 16 U/L (ref 15–37)
ATRIAL RATE: 67 BPM
BASOPHILS # BLD: 0 K/UL (ref 0–0.1)
BASOPHILS NFR BLD: 1 % (ref 0–1)
BILIRUB SERPL-MCNC: 0.5 MG/DL (ref 0.2–1)
BUN SERPL-MCNC: 19 MG/DL (ref 6–20)
BUN/CREAT SERPL: 18 (ref 12–20)
CALCIUM SERPL-MCNC: 7.6 MG/DL (ref 8.5–10.1)
CALCULATED P AXIS, ECG09: 6 DEGREES
CALCULATED R AXIS, ECG10: -33 DEGREES
CALCULATED T AXIS, ECG11: 106 DEGREES
CHLORIDE SERPL-SCNC: 104 MMOL/L (ref 97–108)
CO2 SERPL-SCNC: 22 MMOL/L (ref 21–32)
COMMENT, HOLDF: NORMAL
CREAT SERPL-MCNC: 1.05 MG/DL (ref 0.7–1.3)
DIAGNOSIS, 93000: NORMAL
DIFFERENTIAL METHOD BLD: ABNORMAL
EOSINOPHIL # BLD: 0.1 K/UL (ref 0–0.4)
EOSINOPHIL NFR BLD: 2 % (ref 0–7)
ERYTHROCYTE [DISTWIDTH] IN BLOOD BY AUTOMATED COUNT: 12.7 % (ref 11.5–14.5)
GLOBULIN SER CALC-MCNC: 2.9 G/DL (ref 2–4)
GLUCOSE SERPL-MCNC: 95 MG/DL (ref 65–100)
HCT VFR BLD AUTO: 30.5 % (ref 36.6–50.3)
HGB BLD-MCNC: 9.9 G/DL (ref 12.1–17)
IMM GRANULOCYTES # BLD AUTO: 0 K/UL (ref 0–0.04)
IMM GRANULOCYTES NFR BLD AUTO: 0 % (ref 0–0.5)
INR PPP: 1.2 (ref 0.9–1.1)
LYMPHOCYTES # BLD: 1.2 K/UL (ref 0.8–3.5)
LYMPHOCYTES NFR BLD: 19 % (ref 12–49)
MCH RBC QN AUTO: 31.3 PG (ref 26–34)
MCHC RBC AUTO-ENTMCNC: 32.5 G/DL (ref 30–36.5)
MCV RBC AUTO: 96.5 FL (ref 80–99)
MONOCYTES # BLD: 0.5 K/UL (ref 0–1)
MONOCYTES NFR BLD: 8 % (ref 5–13)
NEUTS SEG # BLD: 4.3 K/UL (ref 1.8–8)
NEUTS SEG NFR BLD: 70 % (ref 32–75)
NRBC # BLD: 0 K/UL (ref 0–0.01)
NRBC BLD-RTO: 0 PER 100 WBC
P-R INTERVAL, ECG05: 176 MS
PLATELET # BLD AUTO: 209 K/UL (ref 150–400)
PMV BLD AUTO: 9.8 FL (ref 8.9–12.9)
POTASSIUM SERPL-SCNC: 3.2 MMOL/L (ref 3.5–5.1)
PROT SERPL-MCNC: 5.5 G/DL (ref 6.4–8.2)
PROTHROMBIN TIME: 12.2 SEC (ref 9–11.1)
Q-T INTERVAL, ECG07: 392 MS
QRS DURATION, ECG06: 120 MS
QTC CALCULATION (BEZET), ECG08: 414 MS
RBC # BLD AUTO: 3.16 M/UL (ref 4.1–5.7)
SAMPLES BEING HELD,HOLD: NORMAL
SODIUM SERPL-SCNC: 132 MMOL/L (ref 136–145)
TROPONIN-HIGH SENSITIVITY: 12 NG/L (ref 0–76)
VENTRICULAR RATE, ECG03: 67 BPM
WBC # BLD AUTO: 6.1 K/UL (ref 4.1–11.1)

## 2021-10-22 PROCEDURE — 93005 ELECTROCARDIOGRAM TRACING: CPT

## 2021-10-22 PROCEDURE — 36415 COLL VENOUS BLD VENIPUNCTURE: CPT

## 2021-10-22 PROCEDURE — 85025 COMPLETE CBC W/AUTO DIFF WBC: CPT

## 2021-10-22 PROCEDURE — 84484 ASSAY OF TROPONIN QUANT: CPT

## 2021-10-22 PROCEDURE — 74011000636 HC RX REV CODE- 636: Performed by: EMERGENCY MEDICINE

## 2021-10-22 PROCEDURE — 99284 EMERGENCY DEPT VISIT MOD MDM: CPT

## 2021-10-22 PROCEDURE — 70498 CT ANGIOGRAPHY NECK: CPT

## 2021-10-22 PROCEDURE — 0042T CT CODE NEURO PERF W CBF: CPT

## 2021-10-22 PROCEDURE — 65660000000 HC RM CCU STEPDOWN

## 2021-10-22 PROCEDURE — 80053 COMPREHEN METABOLIC PANEL: CPT

## 2021-10-22 PROCEDURE — 70450 CT HEAD/BRAIN W/O DYE: CPT

## 2021-10-22 PROCEDURE — 85610 PROTHROMBIN TIME: CPT

## 2021-10-22 PROCEDURE — 4A03X5D MEASUREMENT OF ARTERIAL FLOW, INTRACRANIAL, EXTERNAL APPROACH: ICD-10-PCS | Performed by: HOSPITALIST

## 2021-10-22 RX ORDER — LEVETIRACETAM 500 MG/1
500 TABLET ORAL 2 TIMES DAILY
Status: DISCONTINUED | OUTPATIENT
Start: 2021-10-23 | End: 2021-10-23

## 2021-10-22 RX ADMIN — IOPAMIDOL 20 ML: 755 INJECTION, SOLUTION INTRAVENOUS at 16:50

## 2021-10-22 RX ADMIN — IOPAMIDOL 100 ML: 755 INJECTION, SOLUTION INTRAVENOUS at 16:50

## 2021-10-22 NOTE — ED NOTES
The patient was discharged home by Dr. Mary Landry and Nikia Stuart rn in stable condition, accompanied by family. The patient is alert and oriented, is in no respiratory distress and has vital signs within normal limits . The patient's diagnosis, condition and treatment were explained to patient. The patient expressed understanding. No prescriptions given to pt. No work/school note given to pt. A discharge plan has been developed. A  was not involved in the process. Aftercare instructions were given to the patient. Pt's saline lock removed without complications. Family will transport pt home.

## 2021-10-22 NOTE — ED TRIAGE NOTES
Pt arrived with EMS from his doctor's office for L sided numbness,tingling, and weakness. 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 LKW 1530. Hx of Htn, CABG, TIAs.  for EMS. Pt on Aspirin and Plavix.

## 2021-10-22 NOTE — ED NOTES
Per Dr. Alice Quintanilla, pt will be discharged to home and a dysphagia screening is not necessary at this point.

## 2021-10-22 NOTE — ED PROVIDER NOTES
Please note that this dictation was completed with Kase[employer], the computer voice recognition software.  Quite often unanticipated grammatical, syntax, homophones, and other interpretive errors are inadvertently transcribed by the computer software.  Please disregard these errors.  Please excuse any errors that have escaped final proofreading. 54-year-old male past medical history markable for \"fell in the bathtub/hit my head lost consciousness had some bleeding in my brain. I am on Plavix and aspirin, and see Dr. Ramo Nicole. She said due to the position of the blood in my age they would not do surgery but they may be able to do something to help with the intermittent pains and facial numbness. I had had episodes of facial numbness since that time. I was at Dr. Shayla Mcneill office earlier today had seen her was leaving head at home and suddenly developed acute onset of left-sided weakness. Now it feels okay but it still feels slightly weak on the L'; patient denies any overt facial droop vision changes said he was having some left-sided numbness left-sided weakness. He states his occurred approximate hour ago lasted maybe 30 minutes. \"  Said he turned right around went back to Dr. Shayla Mcneill office which point she instructed the patient to come here for further evaluation via EMS. Patient is currently states little bit of a headache on the left side \"where the clot is\" denies interval trauma states his symptoms have since abated.     pt denies interval trauma, , vison changes, diff swallowing, CP, SOB, Abd pain, F/Ch, N/V, D/Cons or other current systemic complaints    Social/ PSH reviewed in EMR    EMR Chart Reviewed           Past Medical History:   Diagnosis Date    CAD (coronary artery disease)     Hypercholesteremia     Hypertension     Stroke [facility])        Past Surgical History:   Procedure Laterality Date    HX CORONARY STENT PLACEMENT      [place] CARDIAC SURG PROCEDURE UNLIST      bypass         No family history on file. Social History     Socioeconomic History    Marital status:      Spouse name: Not on file    Number of children: Not on file    Years of education: Not on file    Highest education level: Not on file   Occupational History    Not on file   Tobacco Use    Smoking status: Never Smoker    Smokeless tobacco: Never Used   Substance and Sexual Activity    Alcohol use: Never    Drug use: Never    Sexual activity: Not on file   Other Topics Concern    Not on file   Social History Narrative    Not on file     Social Determinants of Health     Financial Resource Strain:     Difficulty of Paying Living Expenses:    Food Insecurity:     Worried About Running Out of Food in the Last Year:     [address] in the Last Year:    Transportation Needs:     Lack of Transportation (Medical):  Lack of Transportation (Non-Medical):    Physical Activity:     Days of Exercise per Week:     Minutes of Exercise per Session:    Stress:     Feeling of Stress :    Social Connections:     Frequency of Communication with Friends and Family:     Frequency of Social Gatherings with Friends and Family:     Attends Gnosticist Services:     Active Member of Clubs or Organizations:     Attends Club or Organization Meetings:     Marital Status:    Intimate Partner Violence:     Fear of Current or Ex-Partner:     Emotionally Abused:     Physically Abused:     Sexually Abused: ALLERGIES: Morphine    Review of Systems   Constitutional: Negative for chills and fever. HENT: Negative for drooling, trouble swallowing and voice change. Eyes: Negative for visual disturbance. Respiratory: Negative for cough, chest tightness, shortness of breath and stridor. Cardiovascular: Negative for chest pain, palpitations and leg swelling. Gastrointestinal: Negative for abdominal pain, diarrhea, nausea, rectal pain and vomiting. Genitourinary: Negative for flank pain.    Musculoskeletal: Negative for back pain. Skin: Negative for pallor. Neurological: Positive for weakness, numbness and headaches. Negative for facial asymmetry and speech difficulty. Psychiatric/Behavioral: Negative for confusion. All other systems reviewed and are negative. Vitals:    10/22/21 2100 10/22/21 2200 10/23/21 0200 10/23/21 0600   BP: (!) 177/67 (!) 177/67 (!) 131/98 (!) 145/95   Pulse: 72 73 75 77   Resp: 13 13 14    Temp: 97.9 °F (36.6 °C) 98 °F (36.7 °C) 98 °F (36.7 °C) 98 °F (36.7 °C)   SpO2: 96% 97% 97% 97%   Weight:       Height:                Physical Exam  Vitals and nursing note reviewed. Constitutional:       General: He is not in acute distress. Appearance: Normal appearance. He is well-developed. He is obese. He is not ill-appearing, toxic-appearing or diaphoretic. Comments: NAD, AxOx4, speaking in complete sentences    gcs = 15       HENT:      Head: Normocephalic and atraumatic. Comments: Cn intact    ? Min L facial droop     Right Ear: External ear normal.      Left Ear: External ear normal.      Mouth/Throat:      Pharynx: No oropharyngeal exudate. Eyes:      General: No scleral icterus. Right eye: No discharge. Left eye: No discharge. Extraocular Movements: Extraocular movements intact. Conjunctiva/sclera: Conjunctivae normal.      Pupils: Pupils are equal, round, and reactive to light. Cardiovascular:      Rate and Rhythm: Normal rate and regular rhythm. Pulses: Normal pulses. Heart sounds: Normal heart sounds. No murmur heard. No friction rub. No gallop. Pulmonary:      Effort: Pulmonary effort is normal. No respiratory distress. Breath sounds: Normal breath sounds. No stridor. No wheezing, rhonchi or rales. Chest:      Chest wall: No tenderness. Abdominal:      General: Bowel sounds are normal. There is no distension. Palpations: Abdomen is soft. There is no mass. Tenderness: There is no abdominal tenderness.  There is no guarding or rebound. Hernia: No hernia is present. Comments: nttp       Genitourinary:     Comments: Pt denies urinary/ Testicular/ scrotal or penile  complaints  Musculoskeletal:         General: No swelling, tenderness, deformity or signs of injury. Normal range of motion. Cervical back: Normal range of motion and neck supple. No tenderness. Right lower leg: No edema. Left lower leg: No edema. Lymphadenopathy:      Cervical: No cervical adenopathy. Skin:     General: Skin is warm and dry. Capillary Refill: Capillary refill takes less than 2 seconds. Coloration: Skin is not jaundiced or pale. Findings: No bruising, erythema, lesion or rash. Neurological:      General: No focal deficit present. Mental Status: He is alert and oriented to person, place, and time. Cranial Nerves: No cranial nerve deficit. Motor: Weakness present. Coordination: Coordination normal.      Comments: ? L sided weakness on exam; per pt 'feels heavy' on the L side;           MDM       Procedures      No chief complaint on file. 4:35 PM  The patients presenting problems have been discussed, and they are in agreement with the care plan formulated and outlined with them. I have encouraged them to ask questions as they arise throughout their visit. MEDICATIONS GIVEN:  Medications - No data to display    LABS REVIEWED:  Labs Reviewed - No data to display    RADIOLOGY RESULTS:  The following have been ordered and reviewed:  _____________________________________________________________________  _____________________________________________________________________    EKG interpretation:   Rhythm: normal sinus rhythm; and ir-regular due to PVC's. Rate (approx.): 78;  Axis: normal; P wave: normal; QRS interval: normal ; ST/T wave: normal; Negative acute significant segmental elevations/ compared to study dated yesterday;     PROCEDURES:        CONSULTATIONS:       PROGRESS NOTES:      DIAGNOSIS:    1. Left sided numbness    2. Weakness    3. Subdural hematoma (HCC)        PLAN:  1-code S level 1 - no TPA/ admit/ monitor;       ED COURSE: The patients hospital course has been uncomplicated. CONSULT  NOTE  4:43 PM  Jessica Fernandez MD spoke with Dr Mikayla Rivera. Specialty: Teleneurology  Discussed pt's hx, disposition, and available diagnostic and imaging results. Reviewed care plans. Consulting physician agrees with plans as outlined 'I will see him/ No TPA.      5:16 PM  Spoke with Dr Degroot Son, 'would obtain an MRI brain with and without contrast to rule out a mass on the left side, I would also admit the patient. Patient also needs to be evaluated unfortunately by vascular surgery bilateral ICA stenosis, though they do not have anything to do now but once his head bleed resolves they may want to do surgery. \"  Patient told of these plans and agrees. Perfect Serve Consult for Admission  5:21 PM    ED Room Number: QQ07/99  Patient Name and age: Crys Delgado 80 y.o.  male  Working Diagnosis:   1. Left sided numbness    2. Weakness    3. 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  L brain mass/ needs CV surgery to see Pt for bilat ICA stenosis

## 2021-10-22 NOTE — ROUTINE PROCESS
TRANSFER - OUT REPORT:    Verbal report given to [facility] on Dallin Arredondo  being transferred to  for routine progression of care       Report consisted of patients Situation, Background, Assessment and   Recommendations(SBAR). Information from the following report(s) SBAR, Kardex, ED Summary, [facility] and Recent Results was reviewed with the receiving nurse. Lines:   Peripheral IV 10/22/21 Left Antecubital (Active)   Site Assessment Clean, dry, & intact 10/22/21 1651   Phlebitis Assessment 0 10/22/21 1651   Infiltration Assessment 0 10/22/21 1651   Dressing Status Clean, dry, & intact 10/22/21 1651   Hub Color/Line Status Pink 10/22/21 1651   Action Taken Blood drawn 10/22/21 1651        Opportunity for questions and clarification was provided.       Patient transported with:   Registered Nurse Joel Mendenhall RN

## 2021-10-22 NOTE — ED TRIAGE NOTES
Left upper lip tingling at 2 pm with headache. No tingling present. Previous TIA. Pt took 3 baby aspirin prior to arrival to ED.

## 2021-10-22 NOTE — PROGRESS NOTES
Responded to Code in ER 13. Unable to assess due to medical interventions by staff. Collaborated with staff. Advised nurse to contact [facility] for any further referrals. Visited by: Alie Henderson, [address] [place] [address] Road paging Service [phone] ([phone])

## 2021-10-22 NOTE — H&P
History & Physical    Primary Care Provider: Other, MD Tatyana  Source of Information: Patient     History of Presenting Illness: Stephanie Longoria is a 80 y.o. male who presents with left side weakness and numbness     66-year-old male past medical history with CAD, HTN, HLP and CVA. Pt was seen in ER yesterday due episode of left upper lip tingling, which resolved in several hours. She had CT noticed a large chronic subdural hematoma. ER discussed with Dr. Diandra Colon of neurosurgery and arranged pt to be seen in Dr. Radha Gardner  office at 2:30 PM  Today. Today, pt  was at Dr. Radha Gardner office and after pt was seen and was leaving head at home and suddenly developed acute onset of left-sided weakness. Said he turned right around went back to Dr. Radha Gardner office which instructed the patient to come here for further evaluation via EMS. Patient is currently states little bit of a headache on the left side. His left side weakness and numbness symptoms resolved in 30 mins. pt said he had a fall 6 months ago, and hit his back of head. But had headache after the fall, but he did not seek any medical help for this. denied, vison changes, diff swallowing, CP, SOB, Abd pain, F/Ch, N/V, suria. Review of Systems:  General: ramón magnus   HEENT:+headache, no vision changes, no nose discharge, no hearing changes   RES: no wheezing, no cough, no sob  CVS: no cp, no palpitation.   Muscular: no joint swelling, no muscle pain, no leg swelling  Skin: no rash, no itching   GI: no vomiting, no diarrhea  : no dysuria, no hematuria  Hemo: no gum bleeding, no petechial   Neuro: HPI   Endo: no polydipsia   Psych: denied depression     Past Medical History:   Diagnosis Date    CAD (coronary artery disease)     Hypercholesteremia     Hypertension     Stroke [facility])       Past Surgical History:   Procedure Laterality Date    HX CORONARY STENT PLACEMENT      [place] CARDIAC SURG PROCEDURE UNLIST      bypass Prior to Admission medications    Medication Sig Start Date End Date Taking? Authorizing Provider   metoprolol tartrate (LOPRESSOR) 25 mg tablet Take 12.5 mg by mouth daily. Tatyana Light MD   losartan (COZAAR) 50 mg tablet Take 50 mg by mouth daily. Tatyana Light MD   cholecalciferol (Vitamin D3) (1000 Units /25 mcg) tablet Take 5,000 Units by mouth two (2) times a day. Tatyana Light MD   isosorbide mononitrate ER (IMDUR) 120 mg CR tablet Take 120 mg by mouth every morning. Tatyana Light MD   clopidogreL (Plavix) 75 mg tab Take  by mouth. Tatyana Light MD   aspirin delayed-release 81 mg tablet Take 81 mg by mouth daily. Tatyana Light MD     Allergies   Allergen Reactions    Morphine Unknown (comments)      No family history on file. SOCIAL HISTORY:  Patient resides:  Independently x   Assisted Living    SNF    With family care       Smoking history:   None x   Former    Chronic      Alcohol history:   None x   Social    Chronic      Ambulates:   Independently x   w/cane    w/walker    w/wc    CODE STATUS:  DNR    Full x   Other      Objective:     Physical Exam:     Visit Vitals  BP (!) 155/86 (BP Patient Position: Lying)   Pulse 71   Temp 97.5 °F (36.4 °C)   Resp 16   Ht 5' 11\" (1.803 m)   Wt 90.7 kg (200 lb)   SpO2 98%   BMI 27.89 kg/m²      O2 Device: None (Room air)    General:  Alert, cooperative, no distress, appears stated age. Head:  Normocephalic, without obvious abnormality, atraumatic. Eyes:  Conjunctivae/corneas clear. PERRL, EOMs intact. Nose: Nares normal. Septum midline. Mucosa normal. No drainage or sinus tenderness. Throat: Lips, mucosa, and tongue normal. Teeth and gums normal.   Neck: Supple, symmetrical, trachea midline, no adenopathy, thyroid: no enlargement/tenderness/nodules, no carotid bruit and no JVD. Back:   Symmetric, no curvature. ROM normal. No CVA tenderness. Lungs:   Clear to auscultation bilaterally. Chest wall:  No tenderness or deformity. Heart:  Regular rate and rhythm, S1, S2 normal, no murmur, click, rub or gallop. Abdomen:   Soft, non-tender. Bowel sounds normal. No masses,  No organomegaly. Extremities: Extremities normal, atraumatic, no cyanosis or edema. Pulses: 2+ and symmetric all extremities. Skin: Skin color, texture, turgor normal. No rashes or lesions   Neurologic: CNII-XII intact. Data Review:     Recent Days:  Recent Labs     10/22/21  1705 10/21/21  2202   WBC 6.1 8.8   HGB 9.9* 12.4   HCT 30.5* 37.4    252     Recent Labs     10/22/21  1705 10/21/21  2202   NA  --  138   K  --  3.7   CL  --  102   CO2  --  27   GLU  --  193*   BUN  --  23*   CREA  --  1.46*   CA  --  9.0   ALB  --  3.7   ALT  --  29   INR 1.2* 1.2*     No results for input(s): PH, PCO2, PO2, HCO3, FIO2 in the last 72 hours.     24 Hour Results:  Recent Results (from the past 24 hour(s))   GLUCOSE, POC    Collection Time: 10/21/21  9:07 PM   Result Value Ref Range    Glucose (POC) 212 (H) 65 - 117 mg/dL    Performed by Sherri Kennedy ([employer])    EKG, 12 LEAD, INITIAL    Collection Time: 10/21/21  9:20 PM   Result Value Ref Range    Ventricular Rate 67 BPM    Atrial Rate 67 BPM    P-R Interval 176 ms    QRS Duration 120 ms    Q-T Interval 392 ms    QTC Calculation (Bezet) 414 ms    Calculated P Axis 6 degrees    Calculated R Axis -33 degrees    Calculated T Axis 106 degrees    Diagnosis       Sinus rhythm with premature supraventricular complexes  Left axis deviation  Nonspecific intraventricular conduction delay  Minimal voltage criteria for LVH, may be normal variant ( [employer] product )  Nonspecific ST and T wave abnormality  Abnormal ECG  No previous ECGs available     METABOLIC PANEL, COMPREHENSIVE    Collection Time: 10/21/21 10:02 PM   Result Value Ref Range    Sodium 138 136 - 145 mmol/L    Potassium 3.7 3.5 - 5.1 mmol/L    Chloride 102 97 - 108 mmol/L    CO2 27 21 - 32 mmol/L    Anion gap 9 5 - 15 mmol/L    Glucose 193 (H) 65 - 100 mg/dL    BUN 23 (H) 6 - 20 MG/DL    Creatinine 1.46 (H) 0.70 - 1.30 MG/DL    BUN/Creatinine ratio 16 12 - 20      GFR est AA 55 (L) >60 ml/min/1.73m2    GFR est non-AA 46 (L) >60 ml/min/1.73m2    Calcium 9.0 8.5 - 10.1 MG/DL    Bilirubin, total 0.8 0.2 - 1.0 MG/DL    ALT (SGPT) 29 12 - 78 U/L    AST (SGOT) 20 15 - 37 U/L    Alk. phosphatase 62 45 - 117 U/L    Protein, total 6.8 6.4 - 8.2 g/dL    Albumin 3.7 3.5 - 5.0 g/dL    Globulin 3.1 2.0 - 4.0 g/dL    A-G Ratio 1.2 1.1 - 2.2     CBC WITH AUTOMATED DIFF    Collection Time: 10/21/21 10:02 PM   Result Value Ref Range    WBC 8.8 4.1 - 11.1 K/uL    RBC 3.97 (L) 4.10 - 5.70 M/uL    HGB 12.4 12.1 - 17.0 g/dL    HCT 37.4 36.6 - 50.3 %    MCV 94.2 80.0 - 99.0 FL    MCH 31.2 26.0 - 34.0 PG    MCHC 33.2 30.0 - 36.5 g/dL    RDW 12.8 11.5 - 14.5 %    PLATELET 760 984 - 076 K/uL    MPV 10.2 8.9 - 12.9 FL    NRBC 0.0 0.0  WBC    ABSOLUTE NRBC 0.00 0.00 - 0.01 K/uL    NEUTROPHILS 64 32 - 75 %    LYMPHOCYTES 26 12 - 49 %    MONOCYTES 8 5 - 13 %    EOSINOPHILS 2 0 - 7 %    BASOPHILS 1 0 - 1 %    IMMATURE GRANULOCYTES 0 0 - 0.5 %    ABS. NEUTROPHILS 5.6 1.8 - 8.0 K/UL    ABS. LYMPHOCYTES 2.2 0.8 - 3.5 K/UL    ABS. MONOCYTES 0.7 0.0 - 1.0 K/UL    ABS. EOSINOPHILS 0.2 0.0 - 0.4 K/UL    ABS. BASOPHILS 0.0 0.0 - 0.1 K/UL    ABS. IMM.  GRANS. 0.0 0.00 - 0.04 K/UL    DF AUTOMATED     PROTHROMBIN TIME + INR    Collection Time: 10/21/21 10:02 PM   Result Value Ref Range    INR 1.2 (H) 0.9 - 1.1      Prothrombin time 11.6 (H) 9.0 - 11.1 sec   TROPONIN-HIGH SENSITIVITY    Collection Time: 10/21/21 10:02 PM   Result Value Ref Range    Troponin-High Sensitivity 17 0 - 76 ng/L   CBC WITH AUTOMATED DIFF    Collection Time: 10/22/21  5:05 PM   Result Value Ref Range    WBC 6.1 4.1 - 11.1 K/uL    RBC 3.16 (L) 4.10 - 5.70 M/uL    HGB 9.9 (L) 12.1 - 17.0 g/dL    HCT 30.5 (L) 36.6 - 50.3 %    MCV 96.5 80.0 - 99.0 FL    MCH 31.3 26.0 - 34.0 PG    MCHC 32.5 30.0 - 36.5 g/dL    RDW 12.7 11.5 - 14.5 %    PLATELET 828 710 - 935 K/uL    MPV 9.8 8.9 - 12.9 FL    NRBC 0.0 0  WBC    ABSOLUTE NRBC 0.00 0.00 - 0.01 K/uL    NEUTROPHILS 70 32 - 75 %    LYMPHOCYTES 19 12 - 49 %    MONOCYTES 8 5 - 13 %    EOSINOPHILS 2 0 - 7 %    BASOPHILS 1 0 - 1 %    IMMATURE GRANULOCYTES 0 0.0 - 0.5 %    ABS. NEUTROPHILS 4.3 1.8 - 8.0 K/UL    ABS. LYMPHOCYTES 1.2 0.8 - 3.5 K/UL    ABS. MONOCYTES 0.5 0.0 - 1.0 K/UL    ABS. EOSINOPHILS 0.1 0.0 - 0.4 K/UL    ABS. BASOPHILS 0.0 0.0 - 0.1 K/UL    ABS. IMM. GRANS. 0.0 0.00 - 0.04 K/UL    DF AUTOMATED     PROTHROMBIN TIME + INR    Collection Time: 10/22/21  5:05 PM   Result Value Ref Range    INR 1.2 (H) 0.9 - 1.1      Prothrombin time 12.2 (H) 9.0 - 11.1 sec   SAMPLES BEING HELD    Collection Time: 10/22/21  5:05 PM   Result Value Ref Range    SAMPLES BEING HELD 1red     COMMENT        Add-on orders for these samples will be processed based on acceptable specimen integrity and analyte stability, which may vary by analyte. EKG, 12 LEAD, INITIAL    Collection Time: 10/22/21  5:08 PM   Result Value Ref Range    Ventricular Rate 78 BPM    Atrial Rate 78 BPM    P-R Interval 182 ms    QRS Duration 120 ms    Q-T Interval 384 ms    QTC Calculation (Bezet) 437 ms    Calculated P Axis 22 degrees    Calculated R Axis -31 degrees    Calculated T Axis 94 degrees    Diagnosis       Sinus rhythm with frequent premature ventricular complexes  Left axis deviation  Nonspecific intraventricular conduction delay  Minimal voltage criteria for LVH, may be normal variant ( [place] product )  Abnormal QRS-T angle, consider primary T wave abnormality  Abnormal ECG  When compared with ECG of 21-OCT-2021 21:20,  MANUAL COMPARISON REQUIRED, DATA IS UNCONFIRMED           Imaging:   CTA CODE NEURO HEAD AND NECK W CONT    Result Date: 10/22/2021  No large vessel occlusion, aneurysm, dissection, intraluminal thrombus, or significant stenosis. Mild bilateral carotid cervical disease.     CT CODE NEURO HEAD WO CONTRAST    Result Date: 10/22/2021  1. Stable multilocular septated chronic appearing subdural fluid collection over the right posterior parietal convexity with no new hemorrhage or other acute intracranial abnormality. 2. Stable microvascular ischemic and other age-related change. CT CODE NEURO HEAD WO CONTRAST    Result Date: 10/21/2021  Interval moderate to large loculated subdural collection with appearance most likely to represent chronic hematoma. The findings were called to Dr. Jennifer Mejia on 10/21/2021 at 9:29 PM by myself. [address] [facility]    Result Date: 10/22/2021  No significant perfusion abnormality. Assessment:     Active Problems:    CVA (cerebral vascular accident) ([address] (10/22/2021)           Plan:     1. Left side weakness and numbness: symptoms resolved now, ? TIA vs cva vs other mass effect. He had cva in the past which s/p TPA. No acute bleeding/hemorrhage in CT, will continue asa/plavix. Stroke work up including echo. A1c, flp. Neuro consult. Brain MRI pending . 2. Chronic subdural hematoma: he had a fall 6 months ago, \"hit my back of head\" per pt. Unlikely these tow new episodes of left side tingling/numbness/weak related to this. He did not have any neurological symptom after he had the fall. Brain MRI w/wo pending to r/o underlying mass. NS consult. 3. Mild bilateral carotid disease: 40% stenosis, medical management for now.  Defer to NS for further management        Signed By: Mercedez Kirkpatrick MD     October 22, 2021

## 2021-10-22 NOTE — ADVANCED PRACTICE NURSE
Neurocritical Care Code Stroke Documentation      Symptoms:   Left sided weakness and numbness resolved at time of exam   Last Known Well:  230 pm   Medical hx: Active Problems:    CVA (cerebral vascular accident) ([facility] (10/22/2021)       Anticoagulation:  Aspirin and Plavix    VAN:   Negative   NIHSS:   1a-LOC: 0    1b-Month/Age:0    1c-Open/Close Hand:0    2-Best Gaze:0    3-Visual Fields:0    4-Facial Palsy:0    5a-Left Arm:0    5b-Right Arm:0    6a-Left Le    6b-Right Le    7-Limb Ataxia:0    8-Sensory:0    9-Best Language:0    10-Dysarthria:0    11-Extinction/Inattention:0  TOTAL SCORE:0   Imaging:   CT    CTA    CTP   Plan:   TPA Candidate: NO    Mechanical thrombectomy Candidate: NO     Discussed with: Patient     Arrival time:  ( was admitting patient from outside facility via helicopter at time of arrival)  Time spent: 10 minutes.      Loyda Reinoso NP  Neurocritical Care Nurse Practitioner  [phone]

## 2021-10-22 NOTE — ED PROVIDER NOTES
History of hypertension, hyperlipidemia, stroke, coronary disease. He presents as a code stroke with left upper lip tingling. The tingling began approximately 7 hours ago. It lasted several hours and has since resolved. He states that he had similar lip tingling prior to his previous stroke in 1991. At that time, his whole left side was numb. It resolved within 24 hours apparently. He also states he has had a \"terrible headache\" that began earlier today. He took Tylenol 2 hours ago and it has mostly subsided. He states that he seldom gets headaches. His family member reports that his blood pressures have been elevated recently. He is on aspirin and Plavix. Past Medical History:   Diagnosis Date    CAD (coronary artery disease)     Hypercholesteremia     Hypertension     Stroke [facility])        Past Surgical History:   Procedure Laterality Date    HX CORONARY STENT PLACEMENT      ID CARDIAC SURG PROCEDURE UNLIST      bypass         No family history on file. Social History     Socioeconomic History    Marital status:      Spouse name: Not on file    Number of children: Not on file    Years of education: Not on file    Highest education level: Not on file   Occupational History    Not on file   Tobacco Use    Smoking status: Never Smoker    Smokeless tobacco: Never Used   Substance and Sexual Activity    Alcohol use: Never    Drug use: Never    Sexual activity: Not on file   Other Topics Concern    Not on file   Social History Narrative    Not on file     Social Determinants of Health     Financial Resource Strain:     Difficulty of Paying Living Expenses:    Food Insecurity:     Worried About Running Out of Food in the Last Year:     [address] in the Last Year:    Transportation Needs:     Lack of Transportation (Medical):      Lack of Transportation (Non-Medical):    Physical Activity:     Days of Exercise per Week:     Minutes of Exercise per Session: Stress:     Feeling of Stress :    Social Connections:     Frequency of Communication with Friends and Family:     Frequency of Social Gatherings with Friends and Family:     Attends Taoist Services:     Active Member of Clubs or Organizations:     Attends Club or Organization Meetings:     Marital Status:    Intimate Partner Violence:     Fear of Current or Ex-Partner:     Emotionally Abused:     Physically Abused:     Sexually Abused: ALLERGIES: Morphine    Review of Systems   All other systems reviewed and are negative. There were no vitals filed for this visit. Physical Exam  Vitals and nursing note reviewed. Constitutional:       Appearance: He is well-developed. HENT:      Head: Normocephalic and atraumatic. Eyes:      Conjunctiva/sclera: Conjunctivae normal.   Neck:      Trachea: No tracheal deviation. Cardiovascular:      Rate and Rhythm: Normal rate and regular rhythm. Heart sounds: Normal heart sounds. No murmur heard. No friction rub. No gallop. Pulmonary:      Effort: Pulmonary effort is normal.      Breath sounds: Normal breath sounds. Abdominal:      Palpations: Abdomen is soft. Tenderness: There is no abdominal tenderness. Musculoskeletal:         General: No deformity. Cervical back: Neck supple. Skin:     General: Skin is warm and dry. Neurological:      Mental Status: He is alert. Comments: oriented. Normal strength and sensation. No facial droop. MDM       Procedures    EKG: Normal sinus rhythm; PACs; rate of 67; left axis deviation; nonspecific ST, T wave abnormalities. Joby Umana MD  9:28 PM    Consult note: Dr. Zen Davis (neurosurgery). She reviewed the CT scans and agrees with the radiology assessment that the patient has a chronic subdural.  She feels like it has likely been there for a few months.   She can see him tomorrow in the office at 2:30 PM.  Joby Umana MD    Progress Note:  Results, treatment, and follow up plan have been discussed with patient/family. Questions were answered. Francesco Zuniga MD    Assessment/plan: He presented after an episode of left upper lip tingling and headache. 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 I spoke with Dr. Abebe Melendez of neurosurgery who can see him in the office at 2:30 PM tomorrow. She plans to discuss treatment options at that time. He is comfortable with that plan. Reassuring appearance/exam with stable vital signs. CBC, CMP okay or at baseline. Home with close neurosurgery follow-up. Return precautions discussed.   Francesco Zuniga MD

## 2021-10-23 ENCOUNTER — APPOINTMENT (OUTPATIENT)
Dept: CT IMAGING | Age: 86
DRG: 082 | End: 2021-10-23
Attending: NURSE PRACTITIONER
Payer: MEDICARE

## 2021-10-23 ENCOUNTER — APPOINTMENT (OUTPATIENT)
Dept: MRI IMAGING | Age: 86
DRG: 082 | End: 2021-10-23
Attending: HOSPITALIST
Payer: MEDICARE

## 2021-10-23 PROBLEM — S06.5XAA SUBDURAL HEMATOMA: Status: ACTIVE | Noted: 2021-10-22

## 2021-10-23 PROBLEM — G81.94 LEFT HEMIPARESIS (HCC): Status: ACTIVE | Noted: 2021-10-23

## 2021-10-23 LAB
ALBUMIN SERPL-MCNC: 3.4 G/DL (ref 3.5–5)
ALBUMIN/GLOB SERPL: 1.1 {RATIO} (ref 1.1–2.2)
ALP SERPL-CCNC: 68 U/L (ref 45–117)
ALT SERPL-CCNC: 23 U/L (ref 12–78)
ANION GAP SERPL CALC-SCNC: 4 MMOL/L (ref 5–15)
AST SERPL-CCNC: 19 U/L (ref 15–37)
BASOPHILS # BLD: 0.1 K/UL (ref 0–0.1)
BASOPHILS NFR BLD: 1 % (ref 0–1)
BILIRUB SERPL-MCNC: 0.9 MG/DL (ref 0.2–1)
BUN SERPL-MCNC: 17 MG/DL (ref 6–20)
BUN/CREAT SERPL: 14 (ref 12–20)
CALCIUM SERPL-MCNC: 9.3 MG/DL (ref 8.5–10.1)
CHLORIDE SERPL-SCNC: 106 MMOL/L (ref 97–108)
CHOLEST SERPL-MCNC: 169 MG/DL
CO2 SERPL-SCNC: 26 MMOL/L (ref 21–32)
CREAT SERPL-MCNC: 1.21 MG/DL (ref 0.7–1.3)
DIFFERENTIAL METHOD BLD: ABNORMAL
EOSINOPHIL # BLD: 0.2 K/UL (ref 0–0.4)
EOSINOPHIL NFR BLD: 2 % (ref 0–7)
ERYTHROCYTE [DISTWIDTH] IN BLOOD BY AUTOMATED COUNT: 12.7 % (ref 11.5–14.5)
EST. AVERAGE GLUCOSE BLD GHB EST-MCNC: 120 MG/DL
GLOBULIN SER CALC-MCNC: 3.2 G/DL (ref 2–4)
GLUCOSE BLD STRIP.AUTO-MCNC: 122 MG/DL (ref 65–117)
GLUCOSE BLD STRIP.AUTO-MCNC: 228 MG/DL (ref 65–117)
GLUCOSE BLD STRIP.AUTO-MCNC: 96 MG/DL (ref 65–117)
GLUCOSE SERPL-MCNC: 129 MG/DL (ref 65–100)
HBA1C MFR BLD: 5.8 % (ref 4–5.6)
HCT VFR BLD AUTO: 36.5 % (ref 36.6–50.3)
HDLC SERPL-MCNC: 42 MG/DL
HDLC SERPL: 4 {RATIO} (ref 0–5)
HGB BLD-MCNC: 12.7 G/DL (ref 12.1–17)
IMM GRANULOCYTES # BLD AUTO: 0 K/UL (ref 0–0.04)
IMM GRANULOCYTES NFR BLD AUTO: 0 % (ref 0–0.5)
LDLC SERPL CALC-MCNC: 102.6 MG/DL (ref 0–100)
LYMPHOCYTES # BLD: 1.8 K/UL (ref 0.8–3.5)
LYMPHOCYTES NFR BLD: 23 % (ref 12–49)
MAGNESIUM SERPL-MCNC: 1.6 MG/DL (ref 1.6–2.4)
MCH RBC QN AUTO: 32.1 PG (ref 26–34)
MCHC RBC AUTO-ENTMCNC: 34.8 G/DL (ref 30–36.5)
MCV RBC AUTO: 92.2 FL (ref 80–99)
MONOCYTES # BLD: 0.6 K/UL (ref 0–1)
MONOCYTES NFR BLD: 8 % (ref 5–13)
NEUTS SEG # BLD: 5 K/UL (ref 1.8–8)
NEUTS SEG NFR BLD: 65 % (ref 32–75)
NRBC # BLD: 0 K/UL (ref 0–0.01)
NRBC BLD-RTO: 0 PER 100 WBC
PHOSPHATE SERPL-MCNC: 2.4 MG/DL (ref 2.6–4.7)
PLATELET # BLD AUTO: 249 K/UL (ref 150–400)
PMV BLD AUTO: 9.9 FL (ref 8.9–12.9)
POTASSIUM SERPL-SCNC: 3.8 MMOL/L (ref 3.5–5.1)
PROT SERPL-MCNC: 6.6 G/DL (ref 6.4–8.2)
RBC # BLD AUTO: 3.96 M/UL (ref 4.1–5.7)
SERVICE CMNT-IMP: ABNORMAL
SERVICE CMNT-IMP: ABNORMAL
SERVICE CMNT-IMP: NORMAL
SODIUM SERPL-SCNC: 136 MMOL/L (ref 136–145)
TRIGL SERPL-MCNC: 122 MG/DL (ref ?–150)
VLDLC SERPL CALC-MCNC: 24.4 MG/DL
WBC # BLD AUTO: 7.6 K/UL (ref 4.1–11.1)

## 2021-10-23 PROCEDURE — 80053 COMPREHEN METABOLIC PANEL: CPT

## 2021-10-23 PROCEDURE — A9576 INJ PROHANCE MULTIPACK: HCPCS

## 2021-10-23 PROCEDURE — 74011000258 HC RX REV CODE- 258: Performed by: PSYCHIATRY & NEUROLOGY

## 2021-10-23 PROCEDURE — 74011250636 HC RX REV CODE- 250/636

## 2021-10-23 PROCEDURE — 83036 HEMOGLOBIN GLYCOSYLATED A1C: CPT

## 2021-10-23 PROCEDURE — 97165 OT EVAL LOW COMPLEX 30 MIN: CPT

## 2021-10-23 PROCEDURE — 70496 CT ANGIOGRAPHY HEAD: CPT

## 2021-10-23 PROCEDURE — 70450 CT HEAD/BRAIN W/O DYE: CPT

## 2021-10-23 PROCEDURE — 65660000000 HC RM CCU STEPDOWN

## 2021-10-23 PROCEDURE — 74011250636 HC RX REV CODE- 250/636: Performed by: PSYCHIATRY & NEUROLOGY

## 2021-10-23 PROCEDURE — 84100 ASSAY OF PHOSPHORUS: CPT

## 2021-10-23 PROCEDURE — 97530 THERAPEUTIC ACTIVITIES: CPT

## 2021-10-23 PROCEDURE — 85025 COMPLETE CBC W/AUTO DIFF WBC: CPT

## 2021-10-23 PROCEDURE — 74011000636 HC RX REV CODE- 636: Performed by: RADIOLOGY

## 2021-10-23 PROCEDURE — 74011250637 HC RX REV CODE- 250/637: Performed by: HOSPITALIST

## 2021-10-23 PROCEDURE — 97161 PT EVAL LOW COMPLEX 20 MIN: CPT

## 2021-10-23 PROCEDURE — 97535 SELF CARE MNGMENT TRAINING: CPT

## 2021-10-23 PROCEDURE — 82962 GLUCOSE BLOOD TEST: CPT

## 2021-10-23 PROCEDURE — 36415 COLL VENOUS BLD VENIPUNCTURE: CPT

## 2021-10-23 PROCEDURE — 83735 ASSAY OF MAGNESIUM: CPT

## 2021-10-23 PROCEDURE — 80061 LIPID PANEL: CPT

## 2021-10-23 PROCEDURE — 74011636637 HC RX REV CODE- 636/637: Performed by: HOSPITALIST

## 2021-10-23 PROCEDURE — 99223 1ST HOSP IP/OBS HIGH 75: CPT | Performed by: PSYCHIATRY & NEUROLOGY

## 2021-10-23 PROCEDURE — 0042T CT CODE NEURO PERF W CBF: CPT

## 2021-10-23 PROCEDURE — 95816 EEG AWAKE AND DROWSY: CPT | Performed by: PSYCHIATRY & NEUROLOGY

## 2021-10-23 PROCEDURE — 70553 MRI BRAIN STEM W/O & W/DYE: CPT

## 2021-10-23 PROCEDURE — 74011250637 HC RX REV CODE- 250/637: Performed by: SPECIALIST

## 2021-10-23 RX ORDER — METOPROLOL TARTRATE 25 MG/1
12.5 TABLET, FILM COATED ORAL DAILY
Status: DISCONTINUED | OUTPATIENT
Start: 2021-10-23 | End: 2021-10-29

## 2021-10-23 RX ORDER — ASPIRIN 81 MG/1
81 TABLET ORAL DAILY
Status: DISCONTINUED | OUTPATIENT
Start: 2021-10-23 | End: 2021-10-29

## 2021-10-23 RX ORDER — ACETAMINOPHEN 325 MG/1
650 TABLET ORAL
Status: DISCONTINUED | OUTPATIENT
Start: 2021-10-23 | End: 2021-11-09 | Stop reason: HOSPADM

## 2021-10-23 RX ORDER — DEXTROSE 50 % IN WATER (D50W) INTRAVENOUS SYRINGE
25-50 AS NEEDED
Status: DISCONTINUED | OUTPATIENT
Start: 2021-10-23 | End: 2021-11-09 | Stop reason: HOSPADM

## 2021-10-23 RX ORDER — ACETAMINOPHEN 650 MG/1
650 SUPPOSITORY RECTAL
Status: DISCONTINUED | OUTPATIENT
Start: 2021-10-23 | End: 2021-11-09 | Stop reason: HOSPADM

## 2021-10-23 RX ORDER — INSULIN LISPRO 100 [IU]/ML
INJECTION, SOLUTION INTRAVENOUS; SUBCUTANEOUS
Status: DISCONTINUED | OUTPATIENT
Start: 2021-10-23 | End: 2021-11-09 | Stop reason: HOSPADM

## 2021-10-23 RX ORDER — CLOPIDOGREL BISULFATE 75 MG/1
75 TABLET ORAL DAILY
Status: DISCONTINUED | OUTPATIENT
Start: 2021-10-23 | End: 2021-10-29

## 2021-10-23 RX ORDER — MELATONIN
5000 2 TIMES DAILY
Status: DISCONTINUED | OUTPATIENT
Start: 2021-10-23 | End: 2021-11-09 | Stop reason: HOSPADM

## 2021-10-23 RX ORDER — MAGNESIUM SULFATE 100 %
4 CRYSTALS MISCELLANEOUS AS NEEDED
Status: DISCONTINUED | OUTPATIENT
Start: 2021-10-23 | End: 2021-11-09 | Stop reason: HOSPADM

## 2021-10-23 RX ORDER — ISOSORBIDE MONONITRATE 60 MG/1
120 TABLET, EXTENDED RELEASE ORAL
Status: DISCONTINUED | OUTPATIENT
Start: 2021-10-23 | End: 2021-11-05

## 2021-10-23 RX ORDER — LEVETIRACETAM 500 MG/1
1000 TABLET ORAL 2 TIMES DAILY
Status: DISCONTINUED | OUTPATIENT
Start: 2021-10-24 | End: 2021-10-25

## 2021-10-23 RX ORDER — SODIUM CHLORIDE 0.9 % (FLUSH) 0.9 %
10 SYRINGE (ML) INJECTION
Status: COMPLETED | OUTPATIENT
Start: 2021-10-23 | End: 2021-10-23

## 2021-10-23 RX ADMIN — ASPIRIN 81 MG: 81 TABLET, COATED ORAL at 11:03

## 2021-10-23 RX ADMIN — INSULIN LISPRO 2 UNITS: 100 INJECTION, SOLUTION INTRAVENOUS; SUBCUTANEOUS at 12:46

## 2021-10-23 RX ADMIN — ISOSORBIDE MONONITRATE 120 MG: 60 TABLET ORAL at 11:47

## 2021-10-23 RX ADMIN — Medication 10 ML: at 10:00

## 2021-10-23 RX ADMIN — METOPROLOL TARTRATE 12.5 MG: 25 TABLET, FILM COATED ORAL at 11:47

## 2021-10-23 RX ADMIN — IOPAMIDOL 80 ML: 755 INJECTION, SOLUTION INTRAVENOUS at 16:59

## 2021-10-23 RX ADMIN — Medication 5000 UNITS: at 22:15

## 2021-10-23 RX ADMIN — LEVETIRACETAM 500 MG: 500 TABLET, FILM COATED ORAL at 11:03

## 2021-10-23 RX ADMIN — Medication 5000 UNITS: at 11:03

## 2021-10-23 RX ADMIN — CLOPIDOGREL BISULFATE 75 MG: 75 TABLET ORAL at 11:47

## 2021-10-23 RX ADMIN — IOPAMIDOL 40 ML: 755 INJECTION, SOLUTION INTRAVENOUS at 16:57

## 2021-10-23 RX ADMIN — GADOTERIDOL 18 ML: 279.3 INJECTION, SOLUTION INTRAVENOUS at 09:55

## 2021-10-23 RX ADMIN — LEVETIRACETAM 1000 MG: 100 INJECTION, SOLUTION INTRAVENOUS at 17:54

## 2021-10-23 NOTE — PROGRESS NOTES
+                                                                                                                                                                                     Critical Care Documentation    Name: Zulema Yan  YOB: 1935  MRN: [id number]  Admission Date: 10/22/2021  4:43 PM    Date of service: 10/23/2021    Active Diagnoses:    Hospital Problems  Never Reviewed        Codes Class Noted POA    Left hemiparesis ([facility] ICD-10-CM: G81.94  ICD-9-CM: 342.90  10/23/2021 Unknown        Subdural hematoma ([facility] ICD-10-CM: O51.4V8Q  ICD-9-CM: 432.1  10/22/2021               Chief Complaint:  L sided weakness  Code stroke    Clinical Presentation:  79y/o male with pmh of CAD, HTN, CVA with recent diagnosis of subacute R parietal subdural who presented to the ER directly from [address] [place] [address] office for L sided weakness. Pt's initial sympotms did resolve. MRI this am showed unchanged subdural. Neurology evaluated and recommended EEG, as this may be related to possible seizure. This afternoon around 4pm, started having increased L sided weakness again, with pronator drift. Code stroke was called. Physical Exam:   Visit Vitals  /70 (BP 1 Location: Left arm, BP Patient Position: Supine)   Pulse 71   Temp 98.1 °F (36.7 °C) Comment: Back from MRI   Resp 20   Ht 5' 11\" (1.803 m)   Wt 90.7 kg (200 lb)   SpO2 97%   BMI 27.89 kg/m²     Gen: NAD, awake in bed  HEENT: NC/AT, sclera anicteric, PERRL, EOMI  CV: RRR no m/r/g, normal S1 and S2, no pedal edema   Resp: CTA b/l no increased work of breathing, no wheezing or rhonchi, speaking in full sentences   Abd: NT/ND, normal bowel sounds, no rebound or guarding  Ext: 2+ pulses, no edema  Neuro: L sided upper extremity weakness 4/5, + L sided drift. CN otherwise intact. Skin: No rashes or lesions      Data Reviewed: All diagnostic labs and studies have been reviewed.       Assessment and Plan:  79y/o male with pmh of CAD, HTN, CVA, newly diagnosed subacute subdural hematoma experiencing intermittent L sided numbness  - STAT CT head, CTA, and CT perfusion   - NSGY paged, and aware. Follow CT images. Will notify NSGY if there is worsening bleed. - Agree with holding ASA, plavix. Pt last got doses this am. Per family he was previously on plavix prior to admission  - Discussed with Dr. Julio C Jarvis, and with Dr. Leighton Zapata (tele-neurology), we will initiate Keppra load, and increase maintenance   - STAT EEG now     Will follow on studies as above. Medications Administered:   Sedation: [ ] yes [ ] no   Anxiolytics: [ ] yes [ ] no   Antiarrhythmics: [ ] yes [ ] no   Antihypertensives: [ ] yes [ ] no   Pressors: [ ] yes [ ] no   IVF's: [ ] yes [ ] no       Critical Care Attestation: This patient is unstable and critically ill. Due to a high probability of clinically significant, life threatening deterioration, the patient required my highest level of preparedness to intervene emergently and I personally spent this critical care time directly and personally managing the patient. This critical care time included obtaining a history; examining the patient; pulse oximetry; ordering and review of studies; arranging urgent treatment with development of a management plan; evaluation of patient's response to treatment; frequent reassessment; and, discussions with other providers and/or family. This critical care time was performed to assess and manage the high probability of imminent, life-threatening deterioration that could result in multi-organ failure and death. It was exclusive of separately billable procedures, treating other patients, and teaching time. Time Spent:     I personally spent 45 minutes in providing critical care time.     Sincere Gama MD  10/23/2021  4:38 PM

## 2021-10-23 NOTE — PROGRESS NOTES
TRANSFER - IN REPORT:    Verbal report received from [address] [place] [address] (name) on Maia Cuevas  being received from [facility] (unit) for routine progression of care       Report consisted of patients Situation, Background, Assessment and   Recommendations(SBAR). Information from the following report(s) SBAR, Kardex, ED Summary, Intake/Output, MAR, Cardiac Rhythm NSR, Alarm Parameters , Quality Measures and Dual Neuro Assessment was reviewed with the receiving nurse. Opportunity for questions and clarification was provided. Assessment completed upon patients arrival to unit and care assumed.

## 2021-10-23 NOTE — PROGRESS NOTES
Spiritual Care Assessment/Progress Note  [facility]      NAME: Hunter Aguilar      MRN: [id number]  AGE: 80 y.o. SEX: male  Catholic Affiliation: Orthodoxy   Language: English     10/23/2021     Total Time (in minutes): 15     Spiritual Assessment begun in [address] through conversation with:         []Patient        [x] Family    [] Friend(s)        Reason for Consult: Other (comment) (Code Stroke)     Spiritual beliefs: (Please include comment if needed)     [x] Identifies with a louis tradition:         [] Supported by a louis community:            [] Claims no spiritual orientation:           [] Seeking spiritual identity:                [] Adheres to an individual form of spirituality:           [] Not able to assess:                           Identified resources for coping:      [x] Prayer                               [] Music                  [] Guided Imagery     [x] Family/friends                 [] Pet visits     [] Devotional reading                         [] Unknown     [] Other:                                               Interventions offered during this visit: (See comments for more details)    Patient Interventions: Crisis, Initial/Spiritual assessment, patient floor     Family/Friend(s):  Affirmation of emotions/emotional suffering, Catharsis/review of pertinent events in supportive environment, Prayer (assurance of), Initial Assessment     Plan of Care:     [x] Support spiritual and/or cultural needs    [] Support AMD and/or advance care planning process      [] Support grieving process   [] Coordinate Rites and/or Rituals    [] Coordination with community clergy   [] No spiritual needs identified at this time   [] Detailed Plan of Care below (See Comments)  [] Make referral to Music Therapy  [] Make referral to Pet Therapy     [] Make referral to Addiction services  [] Make referral to [facility]  [] Make referral to Spiritual Care Partner  [] No future visits requested [x] Follow up upon further referrals     Comments: Responded to Code Stroke called for Mr Annie Long in room 678. Multiple staff members were attending to patient. Provided emotional support to patient's daughter, who was standing outside his room. Offered active listening as daughter shared about his current health issues and symptoms. Acknowledged her concerns and offered words of support. Daughter accepted 's offer to keep them in prayer; she denied having any other needs/concerns at that time. Assured her of ongoing  availability for support. : . Domingo Middleton.  Cas Carr; [facility], to contact [phone] call: [phone]

## 2021-10-23 NOTE — CONSULTS
Neurocritical Care Code Stroke Documentation      Symptoms:   left arm weakness, slurred speech   Last Known Well: 4:15pm   Medical hx: Active Problems:    Subdural hematoma ([facility] (10/22/2021)      Left hemiparesis ([facility] (10/23/2021)       Anticoagulation: ASA and Plavix at home- last dose 11am today    VAN:   Positive   NIHSS:   1a-LOC: 0    1b-Month/Age:0    1c-Open/Close Hand: 0    2-Best Gaze:0    3-Visual Fields: 0    4-Facial Palsy: 1    5a-Left Arm: 2    5b-Right Arm:0    6a-Left Le    6b-Right Le    7-Limb Ataxia: 0    8-Sensory: 0    9-Best Language: 0    10-Dysarthria:1    11-Extinction/Inattention:2  TOTAL SCORE: 7   Imaging:   CT- Chronic right SDH unchanged. CTA- No LVO. CTP- Perfusion defects in the previously demonstrated chronic right SDH and chronic right cerebellar infarct- no acute perfusion abnormality. Plan:   TPA Candidate: NO    Mechanical thrombectomy Candidate: NO     Discussed with: Dr. Corrine Eng, Dr. Jennifer Mehta,, bedside RN. Stable appearance of chronic right SDH. By the end of code stroke at 1700 he had returned to baseline and was without deficit. He also was very tired at the conclusion of imaging. This event was likely seizure. Stat EEG ordered. Tech on the way in. Load with 1gm keppra now and increase to 1gm BID. Dr. Corrine Eng with hospitalist team speaking with Josesito Ng during code stroke to collaborate.        Abhishek Gaines NP  Neurocritical Care Nurse Practitioner  [phone]

## 2021-10-23 NOTE — PROGRESS NOTES
Problem: Mobility Impaired (Adult and Pediatric)  Goal: *Acute Goals and Plan of Care (Insert Text)  Description:   FUNCTIONAL STATUS PRIOR TO ADMISSION: Patient was independent and active without use of DME.    HOME SUPPORT PRIOR TO ADMISSION: The patient lived with wife but did not require assist.    Physical Therapy Goals  Initiated 10/23/2021  1. Patient will move from supine to sit and sit to supine  in bed with modified independence within 7 day(s). 2.  Patient will transfer from bed to chair and chair to bed with modified independence using the least restrictive device within 7 day(s). 3.  Patient will perform sit to stand with modified independence within 7 day(s). 4.  Patient will ambulate with modified independence for 200 feet with the least restrictive device within 7 day(s). 5.  Patient will ascend/descend 4 stairs with 1 handrail(s) with modified independence within 7 day(s). 6.  Patient will improve Avalos Balance score by 7 points within 7 days. Outcome: Progressing Towards Goal   PHYSICAL THERAPY EVALUATION- NEURO POPULATION  Patient: Sherice Gallegos (25 y.o. male)  Date: 10/23/2021  Primary Diagnosis: CVA (cerebral vascular accident) [facility]) [I63.9]        Precautions:   Fall (a little impulsive)      ASSESSMENT  Based on the objective data described below, the patient presents with decreased balance and mobility after being admitted with L side tingling and weakness with history of SDH after a fall in the bathroom several months ago. His symptoms have largely resolved, though he is still limited with higher level balance tasks and single leg standing. He also reports chronic back pain with radiating pain on the L and some giving way of his L knee for the last 2 years after striking it on a door, with some patellar instability noted and use of a brace ordinarily. Neurosurgery is following and plan for surgery Monday tentatively.   Educated him at length about safety considerations and avoiding any attempts at mobility on his own at this time. We will follow up Monday or post op as ordered  by neurosurgery. Current Level of Function Impacting Discharge (mobility/balance): contact guard assist for standing and walking     Functional Outcome Measure: The patient scored Total: 42/56 on the Avalos Balance Assessment which is indicative of low fall risk. Other factors to consider for discharge: possible OR Monday, wife is also in hospital at this time     Patient will benefit from skilled therapy intervention to address the above noted impairments. PLAN :  Recommendations and Planned Interventions: gait training, therapeutic exercises, neuromuscular re-education, patient and family training/education, and therapeutic activities      Frequency/Duration: Patient will be followed by physical therapy:  5 times a week to address goals. Recommendation for discharge: (in order for the patient to meet his/her long term goals)  To be determined: pending progress    This discharge recommendation:  Has been made in collaboration with the attending provider and/or case management    IF patient discharges home will need the following DME: to be determined (TBD)         SUBJECTIVE:   Patient stated I just needed some food, I was starving.     OBJECTIVE DATA SUMMARY:   HISTORY:    Past Medical History:   Diagnosis Date    CAD (coronary artery disease)     Hypercholesteremia     Hypertension     Stroke [facility])      Past Surgical History:   Procedure Laterality Date    HX CORONARY STENT PLACEMENT      [facility] CARDI[facility] SUR[facility] PROCEDURE UNLIST      bypass       Personal factors and/or comorbidities impacting plan of care: as noted above    Home Situation  Home Environment: Private residence  # Steps to Enter: 4  Rails to Enter: No  One/Two Story Residence: One story  Living Alone: No  Support Systems: Spouse/Significant Other, Other Family Member(s) (son lives with patient and wife)  Patient Expects to be Discharged to[de-identified] House  Current DME Used/Available at Home: Grab bars  Tub or Shower Type: Tub/Shower combination    EXAMINATION/PRESENTATION/DECISION MAKING:   Critical Behavior:  Neurologic State: Alert  Orientation Level: Oriented to person, Oriented to place, Oriented to situation (oriented to month/year/ge, not date)  Cognition: Follows commands  Safety/Judgement: Insight into deficits (slightly impulsive)  Hearing:     Skin:  intact  Edema: none  Range Of Motion:  AROM: Within functional limits           PROM: Within functional limits           Strength:    Strength: Within functional limits                    Tone & Sensation:   Tone: Normal              Sensation: Intact (had tingling in L arm, but this has resolved)               Coordination:  Coordination: Within functional limits  Vision:      Functional Mobility:  Bed Mobility:     Supine to Sit: Modified independent  Sit to Supine: Modified independent     Transfers:  Sit to Stand: Contact guard assistance  Stand to Sit: Contact guard assistance        Bed to Chair: Contact guard assistance              Balance:   Sitting: Intact  Standing: Impaired; Without support  Standing - Static: Good  Standing - Dynamic : Fair;Good  Ambulation/Gait Training:  Distance (ft): 10 Feet (ft)     Ambulation - Level of Assistance: Contact guard assistance; Additional time        Gait Abnormalities: Trunk sway increased (mildly)              Speed/Denisha: Pace decreased (<100 feet/min)  Step Length: Right shortened;Left shortened                     Stairs:               Therapeutic Exercises:       Functional Measure  Avalos Balance Test:    Sitting to Standin  Standing Unsupported: 4  Sitting with Back Unsupported: 4  Standing to Sittin  Transfers: 4  Standing Unsupported with Eyes Closed: 3  Standing Unsupported with Feet Together: 1  Reach Forward with Outstretched Arm: 4   Object: 4  Turn to Look Over Shoulders: 4  Turn 360 Degrees: 4  Alternate Foot on Step/Stool: 1  Standing Unsupported One Foot in Front: 1  Stand on One Le  Total: 42/56         56=Maximum possible score;   0-20=High fall risk  21-40=Moderate fall risk   41-56=Low fall risk        Physical Therapy Evaluation Charge Determination   History Examination Presentation Decision-Making   HIGH Complexity :3+ comorbidities / personal factors will impact the outcome/ POC  MEDIUM Complexity : 3 Standardized tests and measures addressing body structure, function, activity limitation and / or participation in recreation  MEDIUM Complexity : Evolving with changing characteristics  LOW Complexity : FOTO score of       Based on the above components, the patient evaluation is determined to be of the following complexity level: LOW     Pain Rating:  Back pain and tenderness at the L knee suprapatellar region medially    Activity Tolerance:   Good      After treatment patient left in no apparent distress:   Supine in bed, Call bell within reach, Bed / chair alarm activated, and Side rails x 3    COMMUNICATION/EDUCATION:   The patients plan of care was discussed with: Registered nurse and Physician. Patient was educated regarding his deficit(s) of L side weakness and decreased balance as this relates to his diagnosis of SDH. He demonstrated Excellent understanding as evidenced by asking appropriate questions. Fall prevention education was provided and the patient/caregiver indicated understanding., Patient/family have participated as able in goal setting and plan of care. , and Patient/family agree to work toward stated goals and plan of care.     Thank you for this referral.  Leonela Alas, PT   Time Calculation: 25 mins

## 2021-10-23 NOTE — PROGRESS NOTES
Problem: Self Care Deficits Care Plan (Adult)  Goal: *Acute Goals and Plan of Care (Insert Text)  10/23/2021 1248 by ANTHONY Arechiga/L  Outcome: Not Met  10/23/2021 1244 by ANTHONY Arechiga/L  Note:   FUNCTIONAL STATUS PRIOR TO ADMISSION: Patient was independent and active without use of DME. He drives and performs all ADL and I-ADL without AD. He also mows his grass unless it's too hot then he asks son or someone else to mow it. Wife was visiting him in hospital on 10/22/21 and now she is in hospital on same unit. HOME SUPPORT: The patient lived with wife and son but did not require assist.    Occupational Therapy Goals  Initiated 10/23/2021  1. Patient will perform standing bathing task sitting to bathe knees and distal only without LOB with modified independence within 7 day(s). 2.  Patient will perform item retrieval in prep for upper body dressing and lower body dressing with modified independence within 7 day(s). 3.  Patient will perform simple home management with modified independence within 7 day(s). 4.  Patient will perform toilet transfers with modified independence within 7 day(s). 5.  Patient will perform all aspects of toileting with independence within 7 day(s). 6. Patient will complete all functional mobility during OT session without LOB or cues for safety or sequencing tasks within 7 days.   10/23/2021 1244 by ANTHONY Arechiga/L  Outcome: Not Met    OCCUPATIONAL THERAPY EVALUATION  Patient: Feli Chakraborty (52 y.o. male)  Date: 10/23/2021  Primary Diagnosis: CVA (cerebral vascular accident) [facility]) [I63.9]        Precautions:   Fall (a little impulsive)    ASSESSMENT  Based on the objective data described below, the patient presents with close to baseline level for simple ADL with impulsivity, decreased ADL, I-ADL, standing balance, safety, slight issue with sequencing ADL (required cue to pull up underpants after toileting) with symmetrical UE strength and only very slight deficit in L proximal UE strength and coordination as he is R hand dominant. He demonstrates functional strength/coordination for tasks today with very very slight L UE deficits noted, one slight LOB that he self corrected with amb in room without AD. Neurologist came in to assess patient during session, so session was terminated after patient returned to chair. Patient to potentially have chronic SDH removed early next week. Will continue to follow and assess needs. He would benefit from acute care OT currently for stated goals with presumed no needs at home vs [facility] with [facility] OT at d/c pending progress. Current Level of Function Impacting Discharge (ADLs/self-care): see below    Functional Outcome Measure: The patient scored Total: 70/100 on the Barthel Index outcome measure which is indicative of being 30% impaired in basic self-care. Other factors to consider for discharge: pending SDH evacuation     Patient will benefit from skilled therapy intervention to address the above noted impairments. PLAN :  Recommendations and Planned Interventions: self care training, functional mobility training, therapeutic exercise, balance training, therapeutic activities, cognitive retraining, endurance activities, neuromuscular re-education, patient education, home safety training, and family training/education    Frequency/Duration: Patient will be followed by occupational therapy 5 times a week to address goals. Recommendation for discharge: (in order for the patient to meet his/her long term goals)  Occupational therapy at least 2 days/week in the home     This discharge recommendation:  Has not yet been discussed the attending provider and/or case management    IF patient discharges home will need the following DME: needs rail at steps into house, maybe shower chair in tub/shower to decrease fall risk       SUBJECTIVE:   Patient stated I do everything around the house. . 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.    OBJECTIVE DATA SUMMARY:   HISTORY:   Past Medical History:   Diagnosis Date    CAD (coronary artery disease)     Hypercholesteremia     Hypertension     Stroke [facility])      Past Surgical History:   Procedure Laterality Date    HX CORONARY STENT PLACEMENT      MA CARDIAC SURG PROCEDURE UNLIST      bypass       Expanded or extensive additional review of patient history:     Home Situation  Home Environment: Private residence  # Steps to Enter: 4  Rails to Enter: No  One/Two Story Residence: One story  Living Alone: No  Support Systems: Spouse/Significant Other, Other Family Member(s) (son lives with patient and wife)  Patient Expects to be Discharged to[de-identified] House  Current DME Used/Available at Home: Grab bars  Tub or Shower Type: Tub/Shower combination    Hand dominance: Right    EXAMINATION OF PERFORMANCE DEFICITS:  Cognitive/Behavioral Status:     Orientation Level: Oriented to person;Oriented to place;Oriented to situation (oriented to month/year/ge, not date)           Safety/Judgement: Insight into deficits (slightly impulsive)        Hearing:       Vision/Perceptual:                                     Range of Motion:  BUE grossly WFL  AROM: Within functional limits  PROM: Within functional limits                      Strength:  BUE R UE 5/5, L elbow and distal 5/5, L shoulder 4+/5  Strength: Within functional limits                Coordination:  Coordination: Within functional limits  Fine Motor Skills-Upper: Left Intact; Right Intact    Gross Motor Skills-Upper: Left Intact; Right Intact    Tone & Sensation:  Patient reported decreased blood flow in L arm with issues, did not formally assess   Tone: Normal  Sensation:  (DNT, neurologist can in and session terminated)                      Balance:  Sitting: Intact  Standing: Impaired; Without support  Standing - Static: Good  Standing - Dynamic : Fair;Good    Functional Mobility and Transfers for ADLs:  Bed Mobility:  Supine to Sit: Modified independent    Transfers:  Sit to Stand: Contact guard assistance  Stand to Sit: Contact guard assistance  Bed to Chair: Contact guard assistance  Bathroom Mobility: Contact guard assistance  Toilet Transfer : Contact guard assistance    ADL Assessment:  Feeding: Independent    Oral Facial Hygiene/Grooming: Setup    Bathing: Contact guard assistance    Upper Body Dressing: Setup    Lower Body Dressing: Contact guard assistance    Toileting: Contact guard assistance                ADL Intervention and task modifications:       Grooming  Grooming Assistance: Stand-by assistance  Position Performed: Standing  Washing Face: Stand-by assistance  Washing Hands: Stand-by assistance  Brushing Teeth: Stand-by assistance  Brushing/Combing Hair: Stand-by assistance  Cues: Verbal cues provided                   Lower Body Dressing Assistance  Socks: Set-up  Leg Crossed Method Used: Yes  Position Performed: Seated edge of bed    Toileting  Clothing Management: Contact guard assistance (cue to pull up underpants after toilet tx)  Cues: Verbal cues provided    Cognitive Retraining  Safety/Judgement: Insight into deficits (slightly impulsive)    Therapeutic Exercise:     Functional Measure:    Barthel Index:  Bathin  Bladder: 10  Bowels: 10  Groomin  Dressin  Feeding: 10  Mobility: 10  Stairs: 5  Toilet Use: 5  Transfer (Bed to Chair and Back): 10  Total: 70/100      The Barthel ADL Index: Guidelines  1. The index should be used as a record of what a patient does, not as a record of what a patient could do. 2. The main aim is to establish degree of independence from any help, physical or verbal, however minor and for whatever reason. 3. The need for supervision renders the patient not independent. 4. A patient's performance should be established using the best available evidence. Asking the patient, friends/relatives and nurses are the usual sources, but direct observation and common sense are also important.  However direct testing is not needed. 5. Usually the patient's performance over the preceding 24-48 hours is important, but occasionally longer periods will be relevant. 6. Middle categories imply that the patient supplies over 50 per cent of the effort. 7. Use of aids to be independent is allowed. Score Interpretation (from [address])    Independent   60-79 Minimally independent   40-59 Partially dependent   20-39 Very dependent   <20 Totally dependent     -Anshu Martinez., Barthel, DGENE. (1965). Functional evaluation: the Barthel Index. [address] ([address] (1997). The Barthel activities of daily living index: self-reporting versus actual performance in the old (> or = 75 years). Journal [address] 45(7), [address] [facility], J.JDIANA.F, Edward Freeman., Staci Hanson (1999). Measuring the change in disability after inpatient rehabilitation; comparison of the responsiveness of the Barthel Index and Functional [place] Measure. Journal of Neurology, Neurosurgery, and Psychiatry, 66(4), 628-644. Carrillo Jean, N.J.A, MARY JO Rosa, & Lizbet Guan M.A. (2004) Assessment of post-stroke quality of life in cost-effectiveness studies: The usefulness of the Barthel Index and the EuroQoL-5D.  Quality of Life Research, 15, 414-00     Occupational Therapy Evaluation Charge Determination   History Examination Decision-Making   LOW Complexity : Brief history review  LOW Complexity : 1-3 performance deficits relating to physical, cognitive , or psychosocial skils that result in activity limitations and / or participation restrictions  LOW Complexity : No comorbidities that affect functional and no verbal or physical assistance needed to complete eval tasks       Based on the above components, the patient evaluation is determined to be of the following complexity level: LOW   Pain Rating:  No c/o    Activity Tolerance:   Good and cues for impulsivity/sequencing    After treatment patient left in no apparent distress:    Sitting in chair, Call bell within reach, and Caregiver / family present    COMMUNICATION/EDUCATION:   The patients plan of care was discussed with: Registered nurse and Physician. Home safety education was provided and the patient/caregiver indicated understanding., Patient/family have participated as able in goal setting and plan of care. , and Patient/family agree to work toward stated goals and plan of care. This patients plan of care is appropriate for delegation to [facility].     Thank you for this referral.  Jana Hidalgo OTR/L    30 minutes

## 2021-10-23 NOTE — CONSULTS
NEUROLOGY CONSULT NOTE    Name Hunter Aguilar Age 80 y.o. MRN [id number]  1935     Consulting Physician: Emilee Aguilar DO      Chief Complaint:  L-HP/numbness     Assessment/Plan:     Active Problems:    Subdural hematoma ([address] (10/22/2021)      Left hemiparesis ([address] (10/23/2021)      80year old male with a h/o CAD, HTN, HPL, remote R pontine, cerebellar and L parietal infarcts, recently diagnosed R parietal SDH with subacute fall/head injury several weeks ago admitted due to L-HP/paresthesias 10/22/21 lasting approximately 30 minutes with resolution. He is presently back to baseline. MRI Brain this AM revealed multiseptated chronic R SDH without acute ischemia. Presentation possibly related to seizure associated with cortical irritation in the setting of SDH vs TIA. Will continue LEV for empiric seizure prophylaxis. Obtain baseline EEG. Noted plans for possible embolization per NSGY. Thank you very much for this referral. I appreciate the opportunity to participate in this patient's care. History of Present Illness: This is a 80 y.o.   male, we were asked to see for L-HP/numbness. PMH ntoable for CAD, HTN, HPL, remote R pontine, cerebellar and L parietal infarcts, recent syncopal event 10/2/21 admitted due to recurrent L hemisensory deficit and more recent L-HP. Patient reports falling in his bathtub approximately 6 weeks ago with head injury. He was seen in the ED 10/21/21 due to L lip tingling/numbness and headache. Head CT revealed moderate to large loculated R parietal SDH. He was scheduled to f/u with NSGY yesterday in the office. On the drive home from this appointment, the patient developed acute onset LUE/LLE weakness/numbness without associated speech/vision deficits or AMS. This persisted for approximately 30 minutes. He denies recurrence since this time. Head CT was repeated and stable. CTA H/N was also performed without vascular malformation.  MRI Brain this AM revealed multiseptated chronic R SDH without acute ischemia. He is maintained on ASA/Plavix PTA. Allergies   Allergen Reactions    Morphine Unknown (comments)        Prior to Admission medications    Medication Sig Start Date End Date Taking? Authorizing Provider   metoprolol tartrate (LOPRESSOR) 25 mg tablet Take 12.5 mg by mouth daily. Tatyana Light MD   losartan (COZAAR) 50 mg tablet Take 50 mg by mouth daily. Tatyana Light MD   cholecalciferol (Vitamin D3) (1000 Units /25 mcg) tablet Take 5,000 Units by mouth two (2) times a day. Tatyana Light MD   isosorbide mononitrate ER (IMDUR) 120 mg CR tablet Take 120 mg by mouth every morning. Tatyana Light MD   clopidogreL (Plavix) 75 mg tab Take  by mouth. Tatyana Light MD   aspirin delayed-release 81 mg tablet Take 81 mg by mouth daily. Tatyana Light MD       Past Medical History:   Diagnosis Date    CAD (coronary artery disease)     Hypercholesteremia     Hypertension     Stroke [facility])         Past Surgical History:   Procedure Laterality Date    HX CORONARY STENT PLACEMENT      AL CARDIAC SURG PROCEDURE UNLIST      bypass        Social History     Tobacco Use    Smoking status: Never Smoker    Smokeless tobacco: Never Used   Substance Use Topics    Alcohol use: Never      FHx reviewed and non-contributory. Review of Systems:   Comprehensive review of systems performed and negative except for as listed above. Exam:     Visit Vitals  /70 (BP 1 Location: Left arm, BP Patient Position: Supine)   Pulse 71   Temp 98.1 °F (36.7 °C) Comment: Back from [facility]   Resp 20   Ht 5' 11\" (1.803 m)   Wt 200 lb (90.7 kg)   SpO2 97%   BMI 27.89 kg/m²        General: Well developed, well nourished. Patient in no apparent distress   Head: Normocephalic, atraumatic, anicteric sclera   Lungs:  Clear to auscultation bilaterally, No wheezes or rubs   Cardiac: Regular rate and rhythm with no murmurs. Abd: Bowel sounds were audible.  No tenderness on palpation   Ext: No pedal edema   Skin: No overt signs of rash     Neurological Exam:  Mental Status: Alert and oriented to person place and time   Speech: Fluent no aphasia or dysarthria. Cranial Nerves:   Intact visual fields. Facial sensation is normal. Facial movement is symmetric. Palate is midline. Normal sternocleidomastoid strength. Tongue is midline. Hearing is intact bilaterally. Eyes: PERRL, EOM's full, no nystagmus, no ptosis. Motor:  5/5 except for L FDI atrophy 4-/5   Reflexes:   Deep tendon reflexes 3+/4 and symmetrical.  Plantar response is downgoing b/l. Sensory:   Symmetrically intact  with no perceived deficits modalities involving small or large fibers. Gait:  Gait is balanced     Tremor:   No tremor noted. Cerebellar:  Finger to nose and heel over shin to knee was demonstrated competently. Neurovascular: No carotid bruits. Imaging  CT Results (maximum last 3): Results from [place] encounter on 10/22/21    CT CODE NEURO PERF W CBF    Narrative  INDICATION: Stroke    EXAM: CT Perfusion with CBF. TECHNIQUE: During uneventful IV rapid bolus infusion of 40 mL Isovue-370, CT  brain perfusion was performed with generation of hemodynamic maps of multiple  parameters, including cerebral blood flow, cerebral blood volume and MTT (mean  transit time). Also TMAX. CT dose reduction was achieved through use of a  standardized protocol tailored for this examination and automatic exposure  control for dose modulation. This study was analyzed by the [address] ai algorithm. FINDINGS:  There are no significant regional areas of elevated Tmax, decreased cerebral  blood flow or blood volume. A small matched focus on the left corresponds with  remote left cerebral infarct. A small matched focus on the right corresponds  with a stable hypodense subdural hematoma. Tmax >6 s = 5 cc  rCBF <30% = 5 cc. Impression  No significant perfusion abnormality.       CTA CODE NEURO HEAD AND NECK W CONT    Narrative  INDICATION:  Code Stroke    CTA Head and CTA Neck performed with helical axial imaging with bolus IV  injection of 80 mL Isovue 370 contrast with 3D post processing performed, with  sagittal and coronal MIPS provided. Postenhanced Head CT images provided. CT  dose reduction was achieved through the use of a standardized protocol tailored  for this examination and automatic exposure control for dose modulation. This study was analyzed by the [address] ai algorithm. NECK:  Common carotid arteries show no significant stenosis. External carotid arteries are patent. ICA Stenosis Assessment (NASCET criteria) Right 40%   Left:40%    Origin of the major brachiocephalic arteries from the aortic arch show no  significant stenosis. Vertebral arteries are patent, left dominant and without  dissection or significant stenosis. Thyroid and neck soft tissues are  unremarkable for age. HEAD:  Intracranial circulation shows no major vessel occlusion, intraluminal thrombus,  aneurysm, AVM or evidence of irregularity suggestive of vasculitis. 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 Images of the brain show no bleed, mass, shift, hydrocephalus, or abnormal  enhancement. Impression  No large vessel occlusion, aneurysm, dissection, intraluminal  thrombus, or significant stenosis. Mild bilateral carotid cervical disease. CT CODE NEURO HEAD WO CONTRAST    Narrative  EXAM: CT CODE NEURO HEAD WO CONTRAST    INDICATION: Code Stroke. New onset left arm and leg numbness and tingling since previous visit  10/21/2021, where assessment revealed a probably chronic subdural collection  over the right parietal convexity. Under the care of Dr. Zen Davis. .    COMPARISON: 10/21/2021    CONTRAST: None. TECHNIQUE: Unenhanced CT of the head was performed using 5 mm images. Brain and  bone windows were generated. Coronal and sagittal reformats.  CT dose reduction  was achieved through use of a standardized protocol tailored for this  examination and automatic exposure control for dose modulation. FINDINGS:  Generalized prominence of cerebral sulci and ventricles is increased but stable  and commensurate with age. . Periventricular white matter low-density is  moderately severe and also stable. Focal low densities in the basal ganglia  regions may represent old lacunar ischemia. Focal encephalomalacia in the left  posterior parietal convexity is stable and a small area suggesting old ischemia. The previously described loculated subdural collection over the right posterior  parietal convexity is stable in appearance, with septations, hyper attenuating  and hypoattenuating areas which have not changed. . There is no new intracranial  hemorrhage, new extra-axial collection, or mass effect. The basilar cisterns are  open. No CT evidence of acute infarct. The bone windows demonstrate no abnormalities. The visualized portions of the  paranasal sinuses and mastoid air cells are clear. Impression  1. Stable multilocular septated chronic appearing subdural fluid collection over  the right posterior parietal convexity with no new hemorrhage or other acute  intracranial abnormality. 2. Stable microvascular ischemic and other age-related change. MRI Results (maximum last 3): Results from [place] encounter on 10/22/21    MRI BRAIN W WO CONT    Narrative  EXAM:  MRI BRAIN W WO CONT    INDICATION:    ? L sided tumor per teleneurology    COMPARISON:  None. CONTRAST: 18 cc IV ProHance. TECHNIQUE:  Multiplanar multisequence acquisition without and with contrast of the brain. FINDINGS:  There is a septated multiloculated extra-axial likely subdural fluid collection  on the right with some mild mass effect on the adjacent brain but no definite  shift of the midline. This is likely a chronic subdural with some chronic,  subacute and possibly acute components. Maximal width is 24 mm. Ventricles are  normal in size with no significant shift. No enhancing intracranial lesion or  masses. There is some mild atrophy and nonspecific white matter changes. Impression  impression: Multiseptated  chronic right subdural hematoma. Results from [facility] encounter on 01/27/21    MRA NECK WO CONT    Narrative  INDICATION: left lip numbness now resolved similar to previous CVA    COMPARISON: CT head earlier today    TECHNIQUE:  Multiplanar MR imaging of the brain performed without IV contrast.  3-D time-of-flight MRA of the brain was performed. Multiplanar reconstructions  were obtained. 2D time of flight MRA of the neck was performed. Multiplanar  reconstructions were obtained. FINDINGS:    MRI Brain:    Ventricles: Midline, no hydrocephalus. Brain Parenchyma/Brainstem: Extensive chronic white matter disease in the  supratentorial brain. Small chronic right pontine and cerebellar infarctions. Focal area of encephalomalacia left parietal lobe. No acute infarction. Intracranial Hemorrhage: None. Basal Cisterns: Normal.  Flow Voids: Normal.  Additional Comments: N/A. MRA NECK:    Carotid Arteries: No significant stenosis by NASCET criteria. Vertebral Arteries: Left vertebral artery is patent and dominant, with at least  mild areas of irregularity versus artifact. Hypoplastic but patent right  vertebral artery  Additional Comments: N/A. Carotid stenosis determined using NASCET criteria. MRA HEAD:    Posterior Circulation: No flow limiting stenosis or occlusion. Mild diffuse  irregularity bilateral posterior cerebral arteries. Anterior Circulation: No flow limiting stenosis or occlusion. Additional Comments: No evidence of aneurysm or vascular malformation. Persistent left trigeminal artery, a congenital variant. Impression  1. Chronic white matter disease and areas of remote infarction as above, with no  acute process.   2. No flow limiting stenosis or arterial occlusion. MRA BRAIN WO CONT    Narrative  INDICATION: left lip numbness now resolved similar to previous CVA    COMPARISON: CT head earlier today    TECHNIQUE:  Multiplanar MR imaging of the brain performed without IV contrast.  3-D time-of-flight MRA of the brain was performed. Multiplanar reconstructions  were obtained. 2D time of flight MRA of the neck was performed. Multiplanar  reconstructions were obtained. FINDINGS:    MRI Brain:    Ventricles: Midline, no hydrocephalus. Brain Parenchyma/Brainstem: Extensive chronic white matter disease in the  supratentorial brain. Small chronic right pontine and cerebellar infarctions. Focal area of encephalomalacia left parietal lobe. No acute infarction. Intracranial Hemorrhage: None. Basal Cisterns: Normal.  Flow Voids: Normal.  Additional Comments: N/A. MRA NECK:    Carotid Arteries: No significant stenosis by NASCET criteria. Vertebral Arteries: Left vertebral artery is patent and dominant, with at least  mild areas of irregularity versus artifact. Hypoplastic but patent right  vertebral artery  Additional Comments: N/A. Carotid stenosis determined using NASCET criteria. MRA HEAD:    Posterior Circulation: No flow limiting stenosis or occlusion. Mild diffuse  irregularity bilateral posterior cerebral arteries. Anterior Circulation: No flow limiting stenosis or occlusion. Additional Comments: No evidence of aneurysm or vascular malformation. Persistent left trigeminal artery, a congenital variant. Impression  1. Chronic white matter disease and areas of remote infarction as above, with no  acute process. 2. No flow limiting stenosis or arterial occlusion.       Lab Review  Lab Results   Component Value Date/Time    WBC 7.6 10/23/2021 08:16 AM    HCT 36.5 (L) 10/23/2021 08:16 AM    HGB 12.7 10/23/2021 08:16 AM    PLATELET 261 68/61/2050 08:16 AM     Lab Results   Component Value Date/Time    Sodium 136 10/23/2021 08:16 AM    Potassium 3.8 10/23/2021 08:16 AM    Chloride 106 10/23/2021 08:16 AM    CO2 26 10/23/2021 08:16 AM    Glucose 129 (H) 10/23/2021 08:16 AM    BUN 17 10/23/2021 08:16 AM    Creatinine 1.21 10/23/2021 08:16 AM    Calcium 9.3 10/23/2021 08:16 AM     No components found for: TROPQUANT  No results found for: NIRAV    Signed:  Yung Sebastian.  Crow Wellington DO  10/23/2021  4:07 PM

## 2021-10-23 NOTE — PROGRESS NOTES
[facility]  Hospitalist Group                                                                                          Hospitalist Progress Note  Bhargavi OlveraDO  Answering service: [phone] OR [phone] from in house phone        Date of Service:  10/23/2021  NAME:  Eros Langley  :  1935  MRN:  [id number]      Admission Summary:   80 y.o. male who presents with left side weakness and numbness   80year-old male past medical history with CAD, HTN, HLP and CVA. Pt was seen in ER yesterday due episode of left upper lip tingling, which resolved in several hours. She had CT noticed a large chronic subdural hematoma.   ER discussed with Dr. René Hernandez of neurosurgery and arranged pt to be seen in Dr. Harjit Simeon  office at 2:30 PM  Today. Today, pt  was at Dr. Harjit Simeon office and after pt was seen and was leaving head at home and suddenly developed acute onset of left-sided weakness.   Said he turned right around went back to Dr. Harjit Simeon office which instructed the patient to come here for further evaluation via EMS. Guero Fnotaine is currently states little bit of a headache on the left side. His left side weakness and numbness symptoms resolved in 30 mins. Interval history / Subjective: Follow up chronic subdural. Patient seen and examined. Denies left sided symptoms. MRI consistent with chronic changes. Assessment & Plan:     Chronic subdural hematoma:  Intermittent left sided weakness:   -MRI redemonstrated chronic left subdural  -keppra added  -neurosurgery following  -suspect will need intervention in hospital due to symptoms recurring    Mild bilateral carotid disease: 40% stenosis, medical management for now.  Defer to NS for further management     HTN: home imdur, metoprolol     Hold home asa, plavix     Code status: full   DVT prophylaxis: SCDs    Care Plan discussed with: Patient/Family  Anticipated Disposition: Home w/Family  Anticipated Discharge: Greater than 48 hours Hospital Problems  Never Reviewed        Codes Class Noted POA    CVA (cerebral vascular accident) [facility]) ICD-10-CM: I63.9  ICD-9-CM: 434.91  10/22/2021 Unknown                Review of Systems:   Negative unless stated above       Vital Signs:    Last 24hrs VS reviewed since prior progress note. Most recent are:  Visit Vitals  /70 (BP 1 Location: Left arm, BP Patient Position: Supine)   Pulse 71   Temp 98.1 °F (36.7 °C)   Resp 20   Ht 5' 11\" (1.803 m)   Wt 90.7 kg (200 lb)   SpO2 97%   BMI 27.89 kg/m²       No intake or output data in the 24 hours ending 10/23/21 1507     Physical Examination:     I had a face to face encounter with this patient and independently examined them on 10/23/2021 as outlined below:          Constitutional:  No acute distress, cooperative, pleasant    ENT:  Oral mucosa moist, oropharynx benign. Resp:  CTA bilaterally. No wheezing/rhonchi/rales. No accessory muscle use   CV:  Regular rhythm, normal rate, no murmurs, gallops, rubs    GI:  Soft, non distended, non tender.  normoactive bowel sounds, no hepatosplenomegaly     Musculoskeletal:  No edema, warm, 2+ pulses throughout    Neurologic:  Moves all extremities            Data Review:    Review and/or order of clinical lab test  Review and/or order of tests in the radiology section of CPT  Review and/or order of tests in the medicine section of CPT      Labs:     Recent Labs     10/23/21  0816 10/22/21  1705   WBC 7.6 6.1   HGB 12.7 9.9*   HCT 36.5* 30.5*    209     Recent Labs     10/23/21  0816 10/22/21  1705 10/21/21  2202    132* 138   K 3.8 3.2* 3.7    104 102   CO2 26 22 27   BUN 17 19 23*   CREA 1.21 1.05 1.46*   * 95 193*   CA 9.3 7.6* 9.0   MG 1.6  --   --    PHOS 2.4*  --   --      Recent Labs     10/23/21  0816 10/22/21  1705 10/21/21  2202   ALT 23 20 29   AP 68 50 62   TBILI 0.9 0.5 0.8   TP 6.6 5.5* 6.8   ALB 3.4* 2.6* 3.7   GLOB 3.2 2.9 3.1     Recent Labs     10/22/21  2155 10/21/21  2202   INR 1.2* 1.2*   PTP 12.2* 11.6*      No results for input(s): FE, TIBC, PSAT, FERR in the last 72 hours. No results found for: FOL, RBCF   No results for input(s): PH, PCO2, PO2 in the last 72 hours. No results for input(s): CPK, CKNDX, TROIQ in the last 72 hours.     No lab exists for component: CPKMB  Lab Results   Component Value Date/Time    Cholesterol, total 169 10/23/2021 08:16 AM    HDL Cholesterol 42 10/23/2021 08:16 AM    LDL, calculated 102.6 (H) 10/23/2021 08:16 AM    Triglyceride 122 10/23/2021 08:16 AM    CHOL/HDL Ratio 4.0 10/23/2021 08:16 AM     Lab Results   Component Value Date/Time    Glucose (POC) 228 (H) 10/23/2021 12:42 PM    Glucose (POC) 212 (H) 10/21/2021 09:07 PM     No results found for: COLOR, APPRN, SPGRU, REFSG, ALFONSO, PROTU, GLUCU, KETU, BILU, UROU, AMALIA, LEUKU, GLUKE, EPSU, BACTU, WBCU, RBCU, CASTS, UCRY      Medications Reviewed:     Current Facility-Administered Medications   Medication Dose Route Frequency    aspirin delayed-release tablet 81 mg  81 mg Oral DAILY    cholecalciferol (VITAMIN D3) (1000 Units /25 mcg) tablet 5,000 Units  5,000 Units Oral BID    clopidogreL (PLAVIX) tablet 75 mg  75 mg Oral DAILY    isosorbide mononitrate ER (IMDUR) tablet 120 mg  120 mg Oral 7am    metoprolol tartrate (LOPRESSOR) tablet 12.5 mg  12.5 mg Oral DAILY    glucose chewable tablet 16 g  4 Tablet Oral PRN    dextrose (D50W) injection syrg 12.5-25 g  25-50 mL IntraVENous PRN    glucagon (GLUCAGEN) injection 1 mg  1 mg IntraMUSCular PRN    insulin lispro (HUMALOG) injection   SubCUTAneous AC&HS    acetaminophen (TYLENOL) tablet 650 mg  650 mg Oral Q4H PRN    Or    acetaminophen (TYLENOL) solution 650 mg  650 mg Per NG tube Q4H PRN    Or    acetaminophen (TYLENOL) suppository 650 mg  650 mg Rectal Q4H PRN    levETIRAcetam (KEPPRA) tablet 500 mg  500 mg Oral BID     ______________________________________________________________________  EXPECTED LENGTH OF STAY: - - -  ACTUAL LENGTH OF STAY:          [address] [facility], DO

## 2021-10-23 NOTE — PROGRESS NOTES
Neurosurgery    Episode of speech arrest and tingling/numbness of arm    No completely resolved. Speech fluent, ox3, no drift     MRI pending reading, no obvious stroke. Discussed with Dr. Crow Aaron about options. Possible embolization of MMA was discussed     Will see how he does   I have added keppra for brain irritation.     If worsens, repeat scan and discuss surgery, o/w watch closely

## 2021-10-24 LAB
ATRIAL RATE: 78 BPM
CALCULATED P AXIS, ECG09: 22 DEGREES
CALCULATED R AXIS, ECG10: -31 DEGREES
CALCULATED T AXIS, ECG11: 94 DEGREES
DIAGNOSIS, 93000: NORMAL
GLUCOSE BLD STRIP.AUTO-MCNC: 114 MG/DL (ref 65–117)
GLUCOSE BLD STRIP.AUTO-MCNC: 117 MG/DL (ref 65–117)
GLUCOSE BLD STRIP.AUTO-MCNC: 149 MG/DL (ref 65–117)
GLUCOSE BLD STRIP.AUTO-MCNC: 226 MG/DL (ref 65–117)
P-R INTERVAL, ECG05: 182 MS
Q-T INTERVAL, ECG07: 384 MS
QRS DURATION, ECG06: 120 MS
QTC CALCULATION (BEZET), ECG08: 437 MS
SERVICE CMNT-IMP: ABNORMAL
SERVICE CMNT-IMP: ABNORMAL
SERVICE CMNT-IMP: NORMAL
SERVICE CMNT-IMP: NORMAL
VENTRICULAR RATE, ECG03: 78 BPM

## 2021-10-24 PROCEDURE — 82962 GLUCOSE BLOOD TEST: CPT

## 2021-10-24 PROCEDURE — 99232 SBSQ HOSP IP/OBS MODERATE 35: CPT | Performed by: PSYCHIATRY & NEUROLOGY

## 2021-10-24 PROCEDURE — 95816 EEG AWAKE AND DROWSY: CPT | Performed by: PSYCHIATRY & NEUROLOGY

## 2021-10-24 PROCEDURE — 74011250637 HC RX REV CODE- 250/637: Performed by: PSYCHIATRY & NEUROLOGY

## 2021-10-24 PROCEDURE — 74011636637 HC RX REV CODE- 636/637: Performed by: HOSPITALIST

## 2021-10-24 PROCEDURE — 65660000000 HC RM CCU STEPDOWN

## 2021-10-24 PROCEDURE — 74011250637 HC RX REV CODE- 250/637: Performed by: HOSPITALIST

## 2021-10-24 RX ADMIN — ACETAMINOPHEN 650 MG: 325 TABLET ORAL at 22:47

## 2021-10-24 RX ADMIN — LEVETIRACETAM 1000 MG: 500 TABLET, FILM COATED ORAL at 06:41

## 2021-10-24 RX ADMIN — Medication 5000 UNITS: at 22:43

## 2021-10-24 RX ADMIN — ACETAMINOPHEN 650 MG: 325 TABLET ORAL at 02:22

## 2021-10-24 RX ADMIN — LEVETIRACETAM 1000 MG: 500 TABLET, FILM COATED ORAL at 17:44

## 2021-10-24 RX ADMIN — ISOSORBIDE MONONITRATE 120 MG: 60 TABLET ORAL at 06:41

## 2021-10-24 RX ADMIN — Medication 5000 UNITS: at 08:27

## 2021-10-24 RX ADMIN — INSULIN LISPRO 2 UNITS: 100 INJECTION, SOLUTION INTRAVENOUS; SUBCUTANEOUS at 11:53

## 2021-10-24 NOTE — PROGRESS NOTES
[facility] Adult  Hospitalist Group                                                                                          Hospitalist Progress Note  Renetta Corrales MD  Answering service: [phone] OR [phone] from in house phone        Date of Service:  10/24/2021  NAME:  Brad Lemos  :  1935  MRN:  [id number]      Admission Summary:   80 y.o. male who presents with left side weakness and numbness   80year-old male past medical history with CAD, HTN, HLP and CVA. Pt was seen in ER yesterday due episode of left upper lip tingling, which resolved in several hours. She had CT noticed a large chronic subdural hematoma.   ER discussed with Dr. Dwaine Smith of neurosurgery and arranged pt to be seen in Dr. Renzo Santizo  office at 2:30 PM  Today. Today, pt  was at Dr. Renzo Santizo office and after pt was seen and was leaving head at home and suddenly developed acute onset of left-sided weakness.   Said he turned right around went back to Dr. Renzo Santizo office which instructed the patient to come here for further evaluation via EMS. Elvia Koo is currently states little bit of a headache on the left side. His left side weakness and numbness symptoms resolved in 30 mins. Interval history / Subjective: Follow up chronic subdural. Yesterday had recurrence of symptoms. Code stroke was called. Went for [employer]. Unchanged SDH. Neurology and tele-neurology consulted. Recommended keppra load and increased maintannce. Pt had no recurrence of symptoms. Today he is anxious to go to surgery tomorrow     Assessment & Plan:     Chronic subdural hematoma:  Intermittent left sided weakness:   -MRI redemonstrated chronic left subdural  -Holding ASA, plavix  -keppra loaded 10/23, increased keppra maintenance 1gm BID  -neurosurgery following, neurology following  -plan for possible OR tomorrow with NSGY     Mild bilateral carotid disease: 40% stenosis, medical management for now.  Defer to NS for further management HTN: home imdur, metoprolol     Hold home asa, plavix     Code status: full   DVT prophylaxis: SCDs    Care Plan discussed with: Patient/Family  Anticipated Disposition: Home w/Family  Anticipated Discharge: Greater than 48 hours     Hospital Problems  Never Reviewed        Codes Class Noted POA    Left hemiparesis ([facility] [address] ICD-10-CM: G81.94  ICD-9-CM: 342.90  10/23/2021 Unknown        Subdural hematoma ([facility] [address] ICD-10-CM: F26.6I8H  ICD-9-CM: 432.1  10/22/2021                 Review of Systems:   Negative unless stated above       Vital Signs:    Last 24hrs VS reviewed since prior progress note. Most recent are:  Visit Vitals  /79   Pulse 74   Temp 97.7 °F (36.5 °C)   Resp 23   Ht 5' 11\" (1.803 m)   Wt 90.7 kg (200 lb)   SpO2 95%   BMI 27.89 kg/m²       No intake or output data in the 24 hours ending 10/24/21 1530     Physical Examination:     I had a face to face encounter with this patient and independently examined them on 10/24/2021 as outlined below:          Constitutional:  No acute distress, cooperative, pleasant    ENT:  Oral mucosa moist, oropharynx benign. Resp:  CTA bilaterally. No wheezing/rhonchi/rales. No accessory muscle use   CV:  Regular rhythm, normal rate, no murmurs, gallops, rubs    GI:  Soft, non distended, non tender.  normoactive bowel sounds, no hepatosplenomegaly     Musculoskeletal:  No edema, warm, 2+ pulses throughout    Neurologic:  Moves all extremities            Data Review:    Review and/or order of clinical lab test  Review and/or order of tests in the radiology section of CPT  Review and/or order of tests in the medicine section of CPT      Labs:     Recent Labs     10/23/21  0816 10/22/21  1705   WBC 7.6 6.1   HGB 12.7 9.9*   HCT 36.5* 30.5*    209     Recent Labs     10/23/21  0816 10/22/21  1705 10/21/21  2202    132* 138   K 3.8 3.2* 3.7    104 102   CO2 26 22 27   BUN 17 19 23*   CREA 1.21 1.05 1.46*   * 95 193*   CA 9.3 7.6* 9.0   MG 1.6  -- --    PHOS 2.4*  --   --      Recent Labs     10/23/21  0816 10/22/21  1705 10/21/21  2202   ALT 23 20 29   AP 68 50 62   TBILI 0.9 0.5 0.8   TP 6.6 5.5* 6.8   ALB 3.4* 2.6* 3.7   GLOB 3.2 2.9 3.1     Recent Labs     10/22/21  1705 10/21/21  2202   INR 1.2* 1.2*   PTP 12.2* 11.6*      No results for input(s): FE, TIBC, PSAT, FERR in the last 72 hours. No results found for: FOL, RBCF   No results for input(s): PH, PCO2, PO2 in the last 72 hours. No results for input(s): CPK, CKNDX, TROIQ in the last 72 hours.     No lab exists for component: CPKMB  Lab Results   Component Value Date/Time    Cholesterol, total 169 10/23/2021 08:16 AM    HDL Cholesterol 42 10/23/2021 08:16 AM    LDL, calculated 102.6 (H) 10/23/2021 08:16 AM    Triglyceride 122 10/23/2021 08:16 AM    CHOL/HDL Ratio 4.0 10/23/2021 08:16 AM     Lab Results   Component Value Date/Time    Glucose (POC) 226 (H) 10/24/2021 11:11 AM    Glucose (POC) 114 10/24/2021 07:45 AM    Glucose (POC) 122 (H) 10/23/2021 10:12 PM    Glucose (POC) 96 10/23/2021 04:20 PM    Glucose (POC) 228 (H) 10/23/2021 12:42 PM     No results found for: COLOR, APPRN, SPGRU, REFSG, ALFONSO, PROTU, GLUCU, KETU, BILU, UROU, AMALIA, LEUKU, GLUKE, EPSU, BACTU, WBCU, RBCU, CASTS, UCRY      Medications Reviewed:     Current Facility-Administered Medications   Medication Dose Route Frequency    [Held by provider] aspirin delayed-release tablet 81 mg  81 mg Oral DAILY    cholecalciferol (VITAMIN D3) (1000 Units /25 mcg) tablet 5,000 Units  5,000 Units Oral BID    [Held by provider] clopidogreL (PLAVIX) tablet 75 mg  75 mg Oral DAILY    isosorbide mononitrate ER (IMDUR) tablet 120 mg  120 mg Oral 7am    metoprolol tartrate (LOPRESSOR) tablet 12.5 mg  12.5 mg Oral DAILY    glucose chewable tablet 16 g  4 Tablet Oral PRN    dextrose (D50W) injection syrg 12.5-25 g  25-50 mL IntraVENous PRN    glucagon (GLUCAGEN) injection 1 mg  1 mg IntraMUSCular PRN    insulin lispro (HUMALOG) injection SubCUTAneous AC&HS    acetaminophen (TYLENOL) tablet 650 mg  650 mg Oral Q4H PRN    Or    acetaminophen (TYLENOL) solution 650 mg  650 mg Per NG tube Q4H PRN    Or    acetaminophen (TYLENOL) suppository 650 mg  650 mg Rectal Q4H PRN    levETIRAcetam (KEPPRA) tablet 1,000 mg  1,000 mg Oral BID     ______________________________________________________________________  EXPECTED LENGTH OF STAY: - - -  ACTUAL LENGTH OF STAY:          2                 Bruna Zamora MD

## 2021-10-24 NOTE — PROGRESS NOTES
Transition of Care Plan  RUR 10%    Neurology following  Neurosurgery following   Possible Surgery tomorrow (embolization)    Disposition   TBD pending medical and therapy progress and recommendations     Transportation  TBD pending medical and therapy recommendations    Home Health/ Rehab-- Cm will follow and make referrals as appropriate. Patient has no history of rehab or [facility] follow up PCP [facility]) and specialist    Contact  Wife Roslyn Page [phone]  Nils Collins [phone]  Daughter Casie Rubin [phone]    Reason for Admission:  Left hemiparesis   Subdural hematoma   Hx of CVA, CAD, hypertension                     RUR Score:    10%                 Plan for utilizing home health:     TBD  Will arrange if needed     PCP: First and Last name:  Other, MD DR Rj Joe      Name of Practice: [facility]    Are you a current patient: Yes/No: yes   Approximate date of last visit: this past week    Can you participate in a virtual visit with your PCP: no                    Current Advanced Directive/Advance Care Plan: Full Code      Healthcare Decision Maker:   Click here to complete [address] including selection of the Healthcare Decision Maker Relationship (ie \"Primary\")                           Transition of Care Plan:  TBD pending medical and therapy progress and recommendations       CM met with patient in his room to introduce self and explain role. Patient was alert and oriented. Confirmed demographics, PCP (Dr Rj Wynne at [facility]) and insurance Medicare and [employer]  Secures medications at [facility].       Patient lives in one level home with his wife, Roslyn Page. .(she is in [place] [employer] [place] as well)   Patient and wife have two adult children-- son lives with patient and wife  Daughter, Casie Rubin lives  close by. Good support. Son does work during the day. Patient was self care, independent driving and using no dme prior to admission.       Patient is retired from [employer] where he worked for 30 years. He is a . Patient has no experience with [facility] nor Rehab   CM discussed transition of care options and he said he would rather go home when discharged but will be open to recommendations. Medicare pt has received, reviewed, and signed 1st IM letter informing them of their right to appeal the discharge. Signed copy has been placed on pt bedside chart. CM will follow patient's medical and therapy progress and assist with transition of care planning. Care Management Interventions  PCP Verified by CM: Yes  Mode of Transport at Discharge:  (TBD)  Transition of Care Consult (CM Consult):  Other  Discharge Durable Medical Equipment: No  Physical Therapy Consult: Yes  Occupational Therapy Consult: Yes  Speech Therapy Consult: Yes  Support Systems: Spouse/Significant Other, Child(kim) (lives at home with wife and son  self care and independent prior to admission    No AMD)  Confirm Follow Up Transport: Family  Discharge Location  Discharge Placement:  (TBD pending medical and therapy progress and recommendations)

## 2021-10-24 NOTE — PROGRESS NOTES
Bedside shift change report given to [facility]  (oncoming nurse) by Christopher (offgoing nurse). Report included the following information SBAR, Kardex, Procedure Summary, Intake/Output, Cardiac Rhythm NSR, Quality Measures and Dual Neuro Assessment.

## 2021-10-24 NOTE — PROGRESS NOTES
RN was returning patient to room after he was visiting his wife in another room. Pt returned to room at 1415 and started to develop L arm numbness, tingling and weakness, and L facial droop. Code stroke was called at [phone]. NIH 7. RN accompanied pt to CT, Telenurology was called but signed off. Symptoms resolved by the time he exited the CT scanner. NIH 0 upon return to the floor. RN received orders for a loading dose of keppra. Will continue to monitor.

## 2021-10-24 NOTE — PROGRESS NOTES
Neurosurgery    Another episode of aphasia, eft sided numbness, completely resolved. Awake, alert, talkative, no drift. CT unchanged. Appreciate neurology input. Keppra load, EEG.     Will discuss with Dr. Chaitanya Jay re: plans

## 2021-10-24 NOTE — PROGRESS NOTES
Problem: Hemorrhagic Stroke:  3-24 hours  Goal: Consults, if ordered  Outcome: Progressing Towards Goal  Goal: Diagnostic Test/Procedures  Outcome: Progressing Towards Goal  Goal: Discharge Planning  Outcome: Progressing Towards Goal  Goal: Medications  Outcome: Progressing Towards Goal  Goal: *Verbalizes anxiety and depression are reduced or absent  Outcome: Progressing Towards Goal  Goal: *Absence of aspiration  Outcome: Progressing Towards Goal  Goal: *Absence of signs and symptoms of DVT  Outcome: Progressing Towards Goal

## 2021-10-24 NOTE — CONSULTS
NEUROLOGY CONSULT NOTE    Name Amanda Rowe Age 80 y.o. MRN [id number]  1935     Consulting Physician: Romeo LAURNET      Chief Complaint:  L-HP/numbness     Assessment/Plan:     Active Problems:    Subdural hematoma ([address] (10/22/2021)      Left hemiparesis ([address] (10/23/2021)      80year old male with a h/o CAD, HTN, HPL, remote R pontine, cerebellar and L parietal infarcts, recently diagnosed R parietal SDH with subacute fall/head injury several weeks ago admitted due to L-HP/paresthesias 10/22/21 lasting approximately 30 minutes with resolution. He had a recurrent event 10/23 in the afternoon with return to baseline shortly thereafter. CTH/CTA were repeated and largely unchanged with stable chronic R parietal SDH. MRI Brain revealed multiseptated chronic R SDH without acute ischemia. Suspect possible seizure associated with cortical irritation in the setting of SDH. Will continue LEV for empiric seizure prophylaxis. No further events observed after titration of LEV 10/23. Thank you very much for this referral. I appreciate the opportunity to participate in this patient's care. History of Present Illness: This is a 80 y.o.   male, we were asked to see for L-HP/numbness. PMH ntoable for CAD, HTN, HPL, remote R pontine, cerebellar and L parietal infarcts, recent syncopal event 10/2/21 admitted due to recurrent L hemisensory deficit and more recent L-HP. Patient reports falling in his bathtub approximately 6 weeks ago with head injury. He was seen in the ED 10/21/21 due to L lip tingling/numbness and headache. Head CT revealed moderate to large loculated R parietal SDH. He was scheduled to f/u with NSGY yesterday in the office. On the drive home from this appointment, the patient developed acute onset LUE/LLE weakness/numbness without associated speech/vision deficits or AMS. This persisted for approximately 30 minutes. He denies recurrence since this time.  Head CT was repeated and stable. CTA H/N was also performed without vascular malformation. MRI Brain this AM revealed multiseptated chronic R SDH without acute ischemia. He is maintained on ASA/Plavix PTA. Interval History:    Recurrent episode of transient LUE weakness, slurred speech yesterday afternoon s/p loading dose of LEV and titration to 1g BID. Allergies   Allergen Reactions    Morphine Unknown (comments)        Prior to Admission medications    Medication Sig Start Date End Date Taking? Authorizing Provider   metoprolol tartrate (LOPRESSOR) 25 mg tablet Take 12.5 mg by mouth daily. Tatyana Light MD   losartan (COZAAR) 50 mg tablet Take 50 mg by mouth daily. Tatyana Light MD   cholecalciferol (Vitamin D3) (1000 Units /25 mcg) tablet Take 5,000 Units by mouth two (2) times a day. Tatyana Light MD   isosorbide mononitrate ER (IMDUR) 120 mg CR tablet Take 120 mg by mouth every morning. Tatyana Light MD   clopidogreL (Plavix) 75 mg tab Take  by mouth. Tatyana Light MD   aspirin delayed-release 81 mg tablet Take 81 mg by mouth daily. Tatyana Light MD       Past Medical History:   Diagnosis Date    CAD (coronary artery disease)     Hypercholesteremia     Hypertension     Stroke [facility])         Past Surgical History:   Procedure Laterality Date    HX CORONARY STENT PLACEMENT      NM CARDIAC SURG PROCEDURE UNLIST      bypass        Social History     Tobacco Use    Smoking status: Never Smoker    Smokeless tobacco: Never Used   Substance Use Topics    Alcohol use: Never      FHx reviewed and non-contributory. Review of Systems:   Comprehensive review of systems performed and negative except for as listed above. Exam:     Visit Vitals  /74   Pulse 76   Temp 97.5 °F (36.4 °C)   Resp 16   Ht 5' 11\" (1.803 m)   Wt 200 lb (90.7 kg)   SpO2 97%   BMI 27.89 kg/m²        General: Well developed, well nourished.  Patient in no apparent distress   Head: Normocephalic, atraumatic, anicteric sclera   Lungs:  Clear to auscultation bilaterally, No wheezes or rubs   Cardiac: Regular rate and rhythm with no murmurs. Abd: Bowel sounds were audible. No tenderness on palpation   Ext: No pedal edema   Skin: No overt signs of rash     Neurological Exam:  Mental Status: Alert and oriented to person place and time   Speech: Fluent no aphasia or dysarthria. Cranial Nerves:   Intact visual fields. Facial sensation is normal. Facial movement is symmetric. Palate is midline. Normal sternocleidomastoid strength. Tongue is midline. Hearing is intact bilaterally. Eyes: PERRL, EOM's full, no nystagmus, no ptosis. Motor:  5/5 except for L FDI atrophy 4-/5   Reflexes:   Deep tendon reflexes 3+/4 and symmetrical.  Plantar response is downgoing b/l. Sensory:   Symmetrically intact  with no perceived deficits modalities involving small or large fibers. Gait:  Gait is balanced     Tremor:   No tremor noted. Cerebellar:  Finger to nose and heel over shin to knee was demonstrated competently. Neurovascular: No carotid bruits. Imaging  CT Results (maximum last 3): Results from [place] encounter on 10/22/21    CTA CODE NEURO HEAD AND NECK W CONT    Narrative  EXAM: CTA CODE NEURO HEAD AND NECK W CONT    INDICATION: slurred speech    COMPARISON: October 22. CONTRAST: 100 mL of Isovue-370. TECHNIQUE:  Unenhanced  images were obtained to localize the volume for  acquisition. Multislice helical axial CT angiography was performed from the  aortic arch to the top of the head during uneventful rapid bolus intravenous  contrast administration. Coronal and sagittal reformations and 3D post  processing was performed. CT dose reduction was achieved through use of a  standardized protocol tailored for this examination and automatic exposure  control for dose modulation. This study was analyzed by the [address] ai algorithm.     FINDINGS:    Head CT obtained after intravenous contrast show no enhancing lesion. NASCET  method was utilized for calculating stenosis. Vertebral arteries in the neck are patent. Carotid bifurcation show no change. Basilar artery is patent. There is no large vessel occlusion intracranially. Next    Impression  No change since yesterday's study without acute findings. CT CODE NEURO HEAD WO CONTRAST    Narrative  EXAM: CT CODE NEURO HEAD WO CONTRAST    INDICATION: left sided weakness, dysarthria    COMPARISON: October 22    CONTRAST: None. TECHNIQUE: Unenhanced CT of the head was performed using 5 mm images. Brain and  bone windows were generated. Coronal and sagittal reformats. CT dose reduction  was achieved through use of a standardized protocol tailored for this  examination and automatic exposure control for dose modulation. FINDINGS:  Chronic right subdural unchanged, diffuse atrophy and white matter disease. Impression  No acute changes. CT CODE NEURO PERF W CBF    Narrative  INDICATION: Stroke    EXAM: CT Perfusion with CBF. TECHNIQUE: During uneventful IV rapid bolus infusion of 40 mL Isovue-370, CT  brain perfusion was performed with generation of hemodynamic maps of multiple  parameters, including cerebral blood flow, cerebral blood volume and MTT (mean  transit time). Also TMAX. CT dose reduction was achieved through use of a  standardized protocol tailored for this examination and automatic exposure  control for dose modulation. This study was analyzed by the [address] ai algorithm. FINDINGS:  There are no significant regional areas of elevated Tmax, decreased cerebral  blood flow or blood volume. A small matched focus on the left corresponds with  remote left cerebral infarct. A small matched focus on the right corresponds  with a stable hypodense subdural hematoma. Tmax >6 s = 5 cc  rCBF <30% = 5 cc. Impression  No significant perfusion abnormality. MRI Results (maximum last 3):   Results from [place] encounter on 10/22/21    MRI BRAIN W WO CONT    Narrative  EXAM:  MRI BRAIN W WO CONT    INDICATION:    ? L sided tumor per teleneurology    COMPARISON:  None. CONTRAST: 18 cc IV ProHance. TECHNIQUE:  Multiplanar multisequence acquisition without and with contrast of the brain. FINDINGS:  There is a septated multiloculated extra-axial likely subdural fluid collection  on the right with some mild mass effect on the adjacent brain but no definite  shift of the midline. This is likely a chronic subdural with some chronic,  subacute and possibly acute components. Maximal width is 24 mm. Ventricles are  normal in size with no significant shift. No enhancing intracranial lesion or  masses. There is some mild atrophy and nonspecific white matter changes. Impression  impression: Multiseptated  chronic right subdural hematoma. Results from [place] encounter on 01/27/21    MRA NECK WO CONT    Narrative  INDICATION: left lip numbness now resolved similar to previous CVA    COMPARISON: CT head earlier today    TECHNIQUE:  Multiplanar MR imaging of the brain performed without IV contrast.  3-D time-of-flight MRA of the brain was performed. Multiplanar reconstructions  were obtained. 2D time of flight MRA of the neck was performed. Multiplanar  reconstructions were obtained. FINDINGS:    MRI Brain:    Ventricles: Midline, no hydrocephalus. Brain Parenchyma/Brainstem: Extensive chronic white matter disease in the  supratentorial brain. Small chronic right pontine and cerebellar infarctions. Focal area of encephalomalacia left parietal lobe. No acute infarction. Intracranial Hemorrhage: None. Basal Cisterns: Normal.  Flow Voids: Normal.  Additional Comments: N/A. MRA NECK:    Carotid Arteries: No significant stenosis by NASCET criteria. Vertebral Arteries: Left vertebral artery is patent and dominant, with at least  mild areas of irregularity versus artifact.  Hypoplastic but patent right  vertebral artery  Additional Comments: N/A. Carotid stenosis determined using NASCET criteria. MRA HEAD:    Posterior Circulation: No flow limiting stenosis or occlusion. Mild diffuse  irregularity bilateral posterior cerebral arteries. Anterior Circulation: No flow limiting stenosis or occlusion. Additional Comments: No evidence of aneurysm or vascular malformation. Persistent left trigeminal artery, a congenital variant. Impression  1. Chronic white matter disease and areas of remote infarction as above, with no  acute process. 2. No flow limiting stenosis or arterial occlusion. MRA BRAIN WO CONT    Narrative  INDICATION: left lip numbness now resolved similar to previous CVA    COMPARISON: CT head earlier today    TECHNIQUE:  Multiplanar MR imaging of the brain performed without IV contrast.  3-D time-of-flight MRA of the brain was performed. Multiplanar reconstructions  were obtained. 2D time of flight MRA of the neck was performed. Multiplanar  reconstructions were obtained. FINDINGS:    MRI Brain:    Ventricles: Midline, no hydrocephalus. Brain Parenchyma/Brainstem: Extensive chronic white matter disease in the  supratentorial brain. Small chronic right pontine and cerebellar infarctions. Focal area of encephalomalacia left parietal lobe. No acute infarction. Intracranial Hemorrhage: None. Basal Cisterns: Normal.  Flow Voids: Normal.  Additional Comments: N/A. MRA NECK:    Carotid Arteries: No significant stenosis by NASCET criteria. Vertebral Arteries: Left vertebral artery is patent and dominant, with at least  mild areas of irregularity versus artifact. Hypoplastic but patent right  vertebral artery  Additional Comments: N/A. Carotid stenosis determined using NASCET criteria. MRA HEAD:    Posterior Circulation: No flow limiting stenosis or occlusion. Mild diffuse  irregularity bilateral posterior cerebral arteries.   Anterior Circulation: No flow limiting stenosis or occlusion. Additional Comments: No evidence of aneurysm or vascular malformation. Persistent left trigeminal artery, a congenital variant. Impression  1. Chronic white matter disease and areas of remote infarction as above, with no  acute process. 2. No flow limiting stenosis or arterial occlusion. Lab Review  Lab Results   Component Value Date/Time    WBC 7.6 10/23/2021 08:16 AM    HCT 36.5 (L) 10/23/2021 08:16 AM    HGB 12.7 10/23/2021 08:16 AM    PLATELET 385 98/51/5674 08:16 AM     Lab Results   Component Value Date/Time    Sodium 136 10/23/2021 08:16 AM    Potassium 3.8 10/23/2021 08:16 AM    Chloride 106 10/23/2021 08:16 AM    CO2 26 10/23/2021 08:16 AM    Glucose 129 (H) 10/23/2021 08:16 AM    BUN 17 10/23/2021 08:16 AM    Creatinine 1.21 10/23/2021 08:16 AM    Calcium 9.3 10/23/2021 08:16 AM     No components found for: TROPQUANT  No results found for: NIRAV    Signed:  Piter Grijalva.  Jone Douglas DO  10/24/2021

## 2021-10-24 NOTE — PROCEDURES
Patient Name: Sherin Streeter  : 1935  Age: 80 y.o. Ordering physician: No ref. provider found  Interpreting physician: Jessee Mills DO      ELECTROENCEPHALOGRAM REPORT     PROCEDURE: EEG. CLINICAL INDICATION: The patient is a 80 y.o. male who is being evaluated for baseline electro cerebral activities and to rule out seizure focus. DESCRIPTION OF THE RECORD:   There is no dominant background rhythm during this recording. Throughout the recording, there were no clear areas of focal slowing nor spike or spike-and-wave discharges seen. Hyperventilation was not performed. Photic stimulation did not produce a significant driving response in the posterior head regions. During the recording the patient did not achieve stage II sleep    INTERPRETATION: This is a normal electroencephalogram with the patient awake, showing no clear focal abnormalities or epileptiform   activity.        Jessee Mills DO  Diplomate, [employer] (Neurology)

## 2021-10-24 NOTE — PROGRESS NOTES
Problem: Falls - Risk of  Goal: *Absence of Falls  Description: Document Parris Agudelo Fall Risk and appropriate interventions in the flowsheet.   Outcome: Progressing Towards Goal  Note: Fall Risk Interventions:            Medication Interventions: Bed/chair exit alarm, Patient to call before getting OOB    Elimination Interventions: Call light in reach, Patient to call for help with toileting needs    History of Falls Interventions: Consult care management for discharge planning, Bed/chair exit alarm         Problem: Patient Education: Go to Patient Education Activity  Goal: Patient/Family Education  Outcome: Progressing Towards Goal     Problem: Patient Education: Go to Patient Education Activity  Goal: Patient/Family Education  Outcome: Progressing Towards Goal     Problem: Patient Education: Go to Patient Education Activity  Goal: Patient/Family Education  Outcome: Progressing Towards Goal     Problem: Patient Education: Go to Patient Education Activity  Goal: Patient/Family Education  Outcome: Progressing Towards Goal     Problem: Hemorrhagic Stroke: Admission Day (0-3 hours)  Goal: Off Pathway (Use only if patient is Off Pathway)  Outcome: Progressing Towards Goal  Goal: Activity/Safety  Outcome: Progressing Towards Goal  Goal: Consults, if ordered  Outcome: Progressing Towards Goal  Goal: Diagnostic Test/Procedures  Outcome: Progressing Towards Goal  Goal: Nutrition/Diet  Outcome: Progressing Towards Goal  Goal: Medications  Outcome: Progressing Towards Goal  Goal: Respiratory  Outcome: Progressing Towards Goal  Goal: Treatments/Interventions/Procedures  Outcome: Progressing Towards Goal  Goal: Psychosocial  Outcome: Progressing Towards Goal  Goal: *Establish/diagnose type of stroke  Outcome: Progressing Towards Goal  Goal: *Patient maintains clear airway/free of aspiration  Outcome: Progressing Towards Goal  Goal: *Hemodynamically stable  Outcome: Progressing Towards Goal     Problem: Hemorrhagic Stroke:  3-24 hours  Goal: Off Pathway (Use only if patient is Off Pathway)  Outcome: Progressing Towards Goal  Goal: Activity/Safety  Outcome: Progressing Towards Goal  Goal: Consults, if ordered  Outcome: Progressing Towards Goal  Goal: Diagnostic Test/Procedures  Outcome: Progressing Towards Goal  Goal: Nutrition/Diet  Outcome: Progressing Towards Goal  Goal: Discharge Planning  Outcome: Progressing Towards Goal  Goal: Medications  Outcome: Progressing Towards Goal  Goal: Respiratory  Outcome: Progressing Towards Goal  Goal: Treatments/Interventions/Procedures  Outcome: Progressing Towards Goal  Goal: Psychosocial  Outcome: Progressing Towards Goal  Goal: *Hemodynamically stable  Outcome: Progressing Towards Goal  Goal: *Verbalizes anxiety and depression are reduced or absent  Outcome: Progressing Towards Goal  Goal: *Absence of aspiration  Outcome: Progressing Towards Goal  Goal: *Absence of signs and symptoms of DVT  Outcome: Progressing Towards Goal  Goal: *Optimal pain control at patient's stated goal  Outcome: Progressing Towards Goal  Goal: *Tolerating diet  Outcome: Progressing Towards Goal  Goal: *Progressive mobility and function (eg: ADL's)  Outcome: Progressing Towards Goal  Goal: *Rehabilitation readiness  Outcome: Progressing Towards Goal     Problem: Hemorrhagic Stroke: Day 2  Goal: Off Pathway (Use only if patient is Off Pathway)  Outcome: Progressing Towards Goal  Goal: Activity/Safety  Outcome: Progressing Towards Goal  Goal: Consults, if ordered  Outcome: Progressing Towards Goal  Goal: Diagnostic Test/Procedures  Outcome: Progressing Towards Goal  Goal: Nutrition/Diet  Outcome: Progressing Towards Goal  Goal: Medications  Outcome: Progressing Towards Goal  Goal: Respiratory  Outcome: Progressing Towards Goal  Goal: Treatments/Interventions/Procedures  Outcome: Progressing Towards Goal  Goal: Psychosocial  Outcome: Progressing Towards Goal  Goal: *Hemodynamically stable  Outcome: Progressing Towards Goal  Goal: *Verbalizes anxiety and depression are reduced or absent  Outcome: Progressing Towards Goal  Goal: *Absence of aspiration  Outcome: Progressing Towards Goal  Goal: *Absence of signs and symptoms of DVT  Outcome: Progressing Towards Goal  Goal: *Optimal pain control at patient's stated goal  Outcome: Progressing Towards Goal  Goal: *Tolerating diet  Outcome: Progressing Towards Goal  Goal: *Progressive mobility and function  Outcome: Progressing Towards Goal  Goal: *Rehabilitation readiness  Outcome: Progressing Towards Goal     Problem: Hemorrhagic Stroke: Day 3  Goal: Off Pathway (Use only if patient is Off Pathway)  Outcome: Progressing Towards Goal  Goal: Activity/Safety  Outcome: Progressing Towards Goal  Goal: Consults, if ordered  Outcome: Progressing Towards Goal  Goal: Diagnostic Test/Procedures  Outcome: Progressing Towards Goal  Goal: Nutrition/Diet  Outcome: Progressing Towards Goal  Goal: Medications  Outcome: Progressing Towards Goal  Goal: Respiratory  Outcome: Progressing Towards Goal  Goal: Treatments/Interventions/Procedures  Outcome: Progressing Towards Goal  Goal: Psychosocial  Outcome: Progressing Towards Goal  Goal: *Hemodynamically stable  Outcome: Progressing Towards Goal  Goal: *Verbalizes anxiety and depression are reduced or absent  Outcome: Progressing Towards Goal  Goal: *Absence of aspiration  Outcome: Progressing Towards Goal  Goal: *Absence of signs and symptoms of DVT  Outcome: Progressing Towards Goal  Goal: *Optimal pain control at patient's stated goal  Outcome: Progressing Towards Goal  Goal: *Tolerating diet  Outcome: Progressing Towards Goal  Goal: *Progressive mobility and function  Outcome: Progressing Towards Goal  Goal: *Rehabilitation readiness  Outcome: Progressing Towards Goal     Problem: Hemorrhagic Stroke: Day 4  Goal: Off Pathway (Use only if patient is Off Pathway)  Outcome: Progressing Towards Goal  Goal: Activity/Safety  Outcome: Progressing Towards Goal  Goal: Consults, if ordered  Outcome: Progressing Towards Goal  Goal: Diagnostic Test/Procedures  Outcome: Progressing Towards Goal  Goal: Nutrition/Diet  Outcome: Progressing Towards Goal  Goal: Medications  Outcome: Progressing Towards Goal  Goal: Respiratory  Outcome: Progressing Towards Goal  Goal: Treatments/Interventions/Procedures  Outcome: Progressing Towards Goal  Goal: Psychosocial  Outcome: Progressing Towards Goal  Goal: *Hemodynamically stable  Outcome: Progressing Towards Goal  Goal: *Verbalizes anxiety and depression are reduced or absent  Outcome: Progressing Towards Goal  Goal: *Absence of aspiration  Outcome: Progressing Towards Goal  Goal: *Absence of signs and symptoms of DVT  Outcome: Progressing Towards Goal  Goal: *Optimal pain control at patient's stated goal  Outcome: Progressing Towards Goal  Goal: *Tolerating diet  Outcome: Progressing Towards Goal  Goal: *Progressive mobility and function  Outcome: Progressing Towards Goal  Goal: *Rehabilitation readiness  Outcome: Progressing Towards Goal     Problem: Hemorrhagic Stroke: Day 5 through Discharge  Goal: Off Pathway (Use only if patient is Off Pathway)  Outcome: Progressing Towards Goal  Goal: Activity/Safety  Outcome: Progressing Towards Goal  Goal: Consults, if ordered  Outcome: Progressing Towards Goal  Goal: Diagnostic Test/Procedures  Outcome: Progressing Towards Goal  Goal: Nutrition/Diet  Outcome: Progressing Towards Goal  Goal: Medications  Outcome: Progressing Towards Goal  Goal: Respiratory  Outcome: Progressing Towards Goal  Goal: Treatments/Interventions/Procedures  Outcome: Progressing Towards Goal  Goal: Psychosocial  Outcome: Progressing Towards Goal  Goal: *Hemodynamically stable  Outcome: Progressing Towards Goal  Goal: *Verbalizes anxiety and depression are reduced or absent  Outcome: Progressing Towards Goal  Goal: *Absence of aspiration  Outcome: Progressing Towards Goal  Goal: *Absence of signs and symptoms of DVT  Outcome: Progressing Towards Goal  Goal: *Optimal pain control at patient's stated goal  Outcome: Progressing Towards Goal  Goal: *Tolerating diet  Outcome: Progressing Towards Goal  Goal: *Progressive mobility and function  Outcome: Progressing Towards Goal  Goal: *Rehabilitation readiness  Outcome: Progressing Towards Goal     Problem: Hemorrhagic Stroke: Discharge Outcomes  Goal: *Verbalizes anxiety and depression are reduced or absent  Outcome: Progressing Towards Goal  Goal: *Verbalize understanding of risk factor modification(Stroke Metric)  Outcome: Progressing Towards Goal  Goal: *Optimal pain control at patient's stated goal  Outcome: Progressing Towards Goal  Goal: *Hemodynamically stable  Outcome: Progressing Towards Goal  Goal: *Absence of aspiration pneumonia  Outcome: Progressing Towards Goal  Goal: *Aware of needed dietary changes  Outcome: Progressing Towards Goal  Goal: *Verbalizes understanding and describes medication purposes and frequencies  Outcome: Progressing Towards Goal  Goal: *Tolerating diet  Outcome: Progressing Towards Goal  Goal: *Absence of signs and symptoms of DVT  Outcome: Progressing Towards Goal  Goal: *Absence of aspiration  Outcome: Progressing Towards Goal  Goal: *Progressive mobility and function  Outcome: Progressing Towards Goal  Goal: *Home safety concerns addressed  Outcome: Progressing Towards Goal

## 2021-10-25 ENCOUNTER — APPOINTMENT (OUTPATIENT)
Dept: NON INVASIVE DIAGNOSTICS | Age: 86
DRG: 082 | End: 2021-10-25
Attending: HOSPITALIST
Payer: MEDICARE

## 2021-10-25 ENCOUNTER — APPOINTMENT (OUTPATIENT)
Dept: CT IMAGING | Age: 86
DRG: 082 | End: 2021-10-25
Payer: MEDICARE

## 2021-10-25 LAB
ANION GAP SERPL CALC-SCNC: 8 MMOL/L (ref 5–15)
BUN SERPL-MCNC: 22 MG/DL (ref 6–20)
BUN/CREAT SERPL: 18 (ref 12–20)
CALCIUM SERPL-MCNC: 9 MG/DL (ref 8.5–10.1)
CHLORIDE SERPL-SCNC: 106 MMOL/L (ref 97–108)
CO2 SERPL-SCNC: 23 MMOL/L (ref 21–32)
COMMENT, HOLDF: NORMAL
CREAT SERPL-MCNC: 1.21 MG/DL (ref 0.7–1.3)
ECHO AO ROOT DIAM: 3.81 CM
ECHO AV AREA PEAK VELOCITY: 2.65 CM2
ECHO AV AREA/BSA PEAK VELOCITY: 1.3 CM2/M2
ECHO AV PEAK GRADIENT: 7.53 MMHG
ECHO AV PEAK VELOCITY: 137.2 CM/S
ECHO LA MAJOR AXIS: 4.82 CM
ECHO LA MINOR AXIS: 2.28 CM
ECHO LV INTERNAL DIMENSION DIASTOLIC: 3.53 CM (ref 4.2–5.9)
ECHO LV INTERNAL DIMENSION SYSTOLIC: 3.58 CM
ECHO LV IVSD: 1.48 CM (ref 0.6–1)
ECHO LV MASS 2D: 193.5 G (ref 88–224)
ECHO LV MASS INDEX 2D: 91.7 G/M2 (ref 49–115)
ECHO LV POSTERIOR WALL DIASTOLIC: 1.5 CM (ref 0.6–1)
ECHO LVOT DIAM: 2.43 CM
ECHO LVOT PEAK GRADIENT: 2.46 MMHG
ECHO LVOT PEAK VELOCITY: 78.43 CM/S
ECHO MV A VELOCITY: 90.76 CM/S
ECHO MV AREA PHT: 1.85 CM2
ECHO MV E DECELERATION TIME (DT): 410.38 MS
ECHO MV E VELOCITY: 52.62 CM/S
ECHO MV E/A RATIO: 0.58
ECHO MV PRESSURE HALF TIME (PHT): 119.01 MS
ECHO PV PEAK INSTANTANEOUS GRADIENT SYSTOLIC: 2.19 MMHG
ECHO TV REGURGITANT MAX VELOCITY: 189.97 CM/S
ECHO TV REGURGITANT PEAK GRADIENT: 14.44 MMHG
GLUCOSE BLD STRIP.AUTO-MCNC: 105 MG/DL (ref 65–117)
GLUCOSE BLD STRIP.AUTO-MCNC: 116 MG/DL (ref 65–117)
GLUCOSE BLD STRIP.AUTO-MCNC: 122 MG/DL (ref 65–117)
GLUCOSE BLD STRIP.AUTO-MCNC: 128 MG/DL (ref 65–117)
GLUCOSE BLD STRIP.AUTO-MCNC: 137 MG/DL (ref 65–117)
GLUCOSE SERPL-MCNC: 120 MG/DL (ref 65–100)
MAGNESIUM SERPL-MCNC: 1.9 MG/DL (ref 1.6–2.4)
PHOSPHATE SERPL-MCNC: 3.3 MG/DL (ref 2.6–4.7)
POTASSIUM SERPL-SCNC: 3.9 MMOL/L (ref 3.5–5.1)
SAMPLES BEING HELD,HOLD: NORMAL
SERVICE CMNT-IMP: ABNORMAL
SERVICE CMNT-IMP: NORMAL
SERVICE CMNT-IMP: NORMAL
SODIUM SERPL-SCNC: 137 MMOL/L (ref 136–145)

## 2021-10-25 PROCEDURE — 74011250637 HC RX REV CODE- 250/637: Performed by: HOSPITALIST

## 2021-10-25 PROCEDURE — 65660000000 HC RM CCU STEPDOWN

## 2021-10-25 PROCEDURE — 97535 SELF CARE MNGMENT TRAINING: CPT

## 2021-10-25 PROCEDURE — 83735 ASSAY OF MAGNESIUM: CPT

## 2021-10-25 PROCEDURE — 74011250637 HC RX REV CODE- 250/637: Performed by: NURSE PRACTITIONER

## 2021-10-25 PROCEDURE — 84100 ASSAY OF PHOSPHORUS: CPT

## 2021-10-25 PROCEDURE — 82962 GLUCOSE BLOOD TEST: CPT

## 2021-10-25 PROCEDURE — 99233 SBSQ HOSP IP/OBS HIGH 50: CPT | Performed by: NURSE PRACTITIONER

## 2021-10-25 PROCEDURE — 36415 COLL VENOUS BLD VENIPUNCTURE: CPT

## 2021-10-25 PROCEDURE — 74011250637 HC RX REV CODE- 250/637: Performed by: PSYCHIATRY & NEUROLOGY

## 2021-10-25 PROCEDURE — 80048 BASIC METABOLIC PNL TOTAL CA: CPT

## 2021-10-25 PROCEDURE — 92610 EVALUATE SWALLOWING FUNCTION: CPT

## 2021-10-25 PROCEDURE — 93306 TTE W/DOPPLER COMPLETE: CPT

## 2021-10-25 PROCEDURE — 70450 CT HEAD/BRAIN W/O DYE: CPT

## 2021-10-25 RX ORDER — LEVETIRACETAM 500 MG/1
1500 TABLET ORAL 2 TIMES DAILY
Status: DISCONTINUED | OUTPATIENT
Start: 2021-10-25 | End: 2021-11-09 | Stop reason: HOSPADM

## 2021-10-25 RX ORDER — LEVETIRACETAM 500 MG/1
500 TABLET ORAL ONCE
Status: COMPLETED | OUTPATIENT
Start: 2021-10-25 | End: 2021-10-25

## 2021-10-25 RX ORDER — POLYETHYLENE GLYCOL 3350 17 G/17G
17 POWDER, FOR SOLUTION ORAL DAILY
Status: DISCONTINUED | OUTPATIENT
Start: 2021-10-25 | End: 2021-11-05

## 2021-10-25 RX ADMIN — METOPROLOL TARTRATE 12.5 MG: 25 TABLET, FILM COATED ORAL at 09:22

## 2021-10-25 RX ADMIN — LEVETIRACETAM 500 MG: 500 TABLET, FILM COATED ORAL at 05:41

## 2021-10-25 RX ADMIN — LEVETIRACETAM 1500 MG: 500 TABLET, FILM COATED ORAL at 18:48

## 2021-10-25 RX ADMIN — ACETAMINOPHEN 650 MG: 325 TABLET ORAL at 02:06

## 2021-10-25 RX ADMIN — LEVETIRACETAM 1000 MG: 500 TABLET, FILM COATED ORAL at 05:05

## 2021-10-25 RX ADMIN — Medication 5000 UNITS: at 21:07

## 2021-10-25 RX ADMIN — Medication 5000 UNITS: at 14:48

## 2021-10-25 RX ADMIN — POLYETHYLENE GLYCOL 3350 17 G: 17 POWDER, FOR SOLUTION ORAL at 14:45

## 2021-10-25 RX ADMIN — ISOSORBIDE MONONITRATE 120 MG: 60 TABLET ORAL at 08:15

## 2021-10-25 NOTE — PROGRESS NOTES
Overnight Hospitalist Progress Note    Name: Kelly Bedoya  YOB: 1935  MRN: [id number]  Admission Date: 10/22/2021    Date of service: 10/25/21, 5:25 AM          ____________________________________________________________________________    Sadiq Mingle stroke\" called and 0451 for patient complaint of sudden onset of worsening numbness on the left side of his face at approximately 0420. Patient was originally admitted 10/22/2021 with left-sided weakness and numbness in his left upper lip that resolved several hours after admission. He had a similar episode on 10/23 around 4 PM but returned to baseline shortly thereafter. 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 MRI showed unchanged subdural hematoma. Neurology evaluated and recommended EEG. · On exam, patient reports decreased sensation on the left side of his face. PERRLA, EOMI. Face symmetrical and tongue midline  · Moves all extremities, decreased  strength L hand w some arm weakness  · Chest clear, no rales, rhonchi or wheezing  · Abdomen soft, nontender not distended  · Extremities as above, no edema    NIHSS 4: L facial palsy, L arm weakness, sensory, dysarthria    Recurrence left sensory deficit, slight left-sided weakness  · Symptoms with relatively rapid resolution.  NIHSS 4  · CT repeated per neurology recs: No acute changes  · Increase Keppra to 1.5 g twice daily  · Cancel culture, patient is not a candidate for TPA  · Continue current plan of care    Appreciate evaluation, input and assistance by neuro NP  ____________________________________________________________________________    Jovanna Roy, LEEANNE, RN, NP-C  [phone] or via [address] [place] [address]

## 2021-10-25 NOTE — PROGRESS NOTES
Problem: Self Care Deficits Care Plan (Adult)  Goal: *Acute Goals and Plan of Care (Insert Text)  Description: FUNCTIONAL STATUS PRIOR TO ADMISSION: Patient was independent and active without use of DME. He drives and performs all ADL and I-ADL without AD. He also mows his grass unless it's too hot then he asks son or someone else to mow it. Wife was visiting him in hospital on 10/22/21 and now she is in hospital on same unit. HOME SUPPORT: The patient lived with wife and son but did not require assist.     Occupational Therapy Goals  Initiated 10/23/2021  1. Patient will perform standing bathing task sitting to bathe knees and distal only without LOB with modified independence within 7 day(s). 2.  Patient will perform item retrieval in prep for upper body dressing and lower body dressing with modified independence within 7 day(s). 3.  Patient will perform simple home management with modified independence within 7 day(s). 4.  Patient will perform toilet transfers with modified independence within 7 day(s). 5.  Patient will perform all aspects of toileting with independence within 7 day(s). 6. Patient will complete all functional mobility during OT session without LOB or cues for safety or sequencing tasks within 7 days. 10/25/2021 1257 by Aminah Rodrigues OT  Outcome: Progressing Towards Goal     OCCUPATIONAL THERAPY TREATMENT  Patient: Magda eMrcer (24 y.o. male)  Date: 10/25/2021  Diagnosis: CVA (cerebral vascular accident) [facility]) [I63.9] <principal problem not specified>       Precautions: Fall (a little impulsive)  Chart, occupational therapy assessment, plan of care, and goals were reviewed. ASSESSMENT  Patient continues with skilled OT services and is progressing towards goals however remains limited by decreased balance, safety awareness, attention to task and L environment, and strength noted with grooming, OOB mobility, and lower body dressing tasks this session.  He is mobilizing constant CGA, however did have 1 episode of running into the bed on the L side despite cues, requiring Min A to steady. Cognition is quite limiting requiring near-constant cues for redirection to task with fair carryover, would benefit from [facility] to further assess cognition. Per chart review and neurology & neurosurgery NPs, potential for removal of SDH but plans unclear at this time (hopeful for conservative management). Considering his current level of function & cognition, and that his wife is also hospitalized on the same unit, he would require assist and 24/7 supervision/assist if he were to return home with [address] [place] [address]. However, pending progress and surgical plans, he may benefit from a brief IPR stay to maximize safety & functional independence if he doesn't have the appropriate supports at home. Current Level of Function Impacting Discharge (ADLs): CGA-Min A for ADLs and mobility    Other factors to consider for discharge: fall risk, acute neuro deficits, ?family able to provide 24/7 for both patient and his spouse? PLAN :  Patient continues to benefit from skilled intervention to address the above impairments. Continue treatment per established plan of care to address goals. Recommend with staff: Recommend with nursing, ADLs with assist, OOB to chair 3x/day and toileting via functional mobility to and from bathroom. Thank you for completing as able in order to maintain patient strength, endurance and independence. Recommend next OT session: MOCA, standing ADLs & environmental awareness    Recommendation for discharge: (in order for the patient to meet his/her long term goals)  To be determined: HHOT with 24/7 vs IPR    This discharge recommendation:  Has been made in collaboration with the attending provider and/or case management    IF patient discharges home will need the following DME:  To be determined (TBD)--shower chair at the least         SUBJECTIVE:   Patient stated Gabino Carpio had a real big stroke this morning and they told me I was having surgery today.  re: seizure this morning    OBJECTIVE DATA SUMMARY:   Cognitive/Behavioral Status:  Neurologic State: Alert  Orientation Level: Oriented X4 (periods of confusion)  Cognition: Decreased attention/concentration; Follows commands; Impaired decision making; Impulsive;Poor safety awareness  Perception: Cues to attend left visual field  Perseveration: Perseverates during conversation  Safety/Judgement: Decreased awareness of environment;Decreased awareness of need for assistance;Decreased awareness of need for safety;Decreased insight into deficits    Functional Mobility and Transfers for ADLs:  Bed Mobility:  Supine to Sit: Modified independent  Scooting: Stand-by assistance    Transfers:  Sit to Stand: Contact guard assistance  Functional Transfers  Bathroom Mobility: Minimum assistance (1 major LOB requiring Min A, missed obstacle on L side)  Cues: Physical assistance; Tactile cues provided;Verbal cues provided;Visual cues provided  Bed to Chair: Contact guard assistance    Balance:  Sitting: Intact  Standing: Impaired; Without support  Standing - Static: Good  Standing - Dynamic : Fair    ADL Intervention:       Grooming  Grooming Assistance: Contact guard assistance  Position Performed: Standing (at sink)  Washing Face: Contact guard assistance  Cues: Verbal cues provided (in cues for attention to L)      Lower Body Dressing Assistance  Dressing Assistance: Contact guard assistance  Pants With Elastic Waist: Contact guard assistance (simulated)  Socks: Set-up  Slip on Shoes with Back: Set-up  Leg Crossed Method Used: Yes  Position Performed: Seated edge of bed;Standing  Cues: Tactile cues provided;Verbal cues provided;Visual cues provided         Cognitive Retraining  Safety/Judgement: Decreased awareness of environment;Decreased awareness of need for assistance;Decreased awareness of need for safety;Decreased insight into deficits    Pain:  None    Activity Tolerance:   Good    After treatment patient left in no apparent distress:   Sitting in chair, Call bell within reach, and Bed / chair alarm activated    COMMUNICATION/COLLABORATION:   The patients plan of care was discussed with: Physical therapist and Registered nurse.      NICOLE Price, OTR/L  Time Calculation: 23 mins

## 2021-10-25 NOTE — PROGRESS NOTES
HD 4  Continues to have partial seizures  afeb  VSS  Alert  Oriented  Face symmetric  Fluent speech  DUARTE  A/P: 79 yo with right loculated subdural collection. He is adamant that he does not want surgery and I agree that at his age surgery comes with significant risk  In addition, given that this is a loculated collection with membranes surgery cannot be a estefania hole but would need to be a craniotomy   He continues to have complex partial seizures related to this collection.    The keppra dose was increased to 1500 BID this am  We will see if this is effective in stopping the seizures and if he tolerates this dosage  Recommend keppra level in a few days  If he continues to have seizures perhaps other medications directed to complex partial seizures could be tried ( tegretol, lamictal, depakote, trileptal, topamax)  Will follow with you  Please continue to hold plavix and ASA

## 2021-10-25 NOTE — PROGRESS NOTES
Neurosurgery Progress Note  Glo Wallace ACNP-BC          Admit Date: 10/22/2021   LOS: 3 days        Daily Progress Note: 10/25/2021      Subjective: The patient has a history of a stroke in  and is on ASA and Plavix. The patient had a fall in his bathtub 6 months ago and hit his head. He has had recurrent numbness and tingling events on the left side of his body. He has been followed by Dr. Saida Srinivasan with CT scans for a chronic right-sided SDH. He was at Dr. Marlon Novoa office on Friday when he developed sudden onset of left sided weakness and numbness when walking to his car. He went back into the office and EMS was called. He presented to [facility] and has been evaluated by neurology. He numbness and tingling resolved. He has had multiple episodes of these sensory changes with weakness and it is thought they are complex partial seizures. He had another on this morning around 0400. His keppra was increased to 1500 mg bid. Dr. Saida Srinivasan spoke with him about surgery, but due to his age and medical co-morbidities, we are planning currently for medical treatment. Denies chest pain, leg pain, nausea, vomiting, difficulty swallowing, and dyspnea. Objective:     Vital signs  Temp (24hrs), Av.2 °F (36.8 °C), Min:97.7 °F (36.5 °C), Max:98.5 °F (36.9 °C)   No intake/output data recorded. No intake/output data recorded. Visit Vitals  BP (!) 143/71   Pulse 68   Temp 98.2 °F (36.8 °C)   Resp 16   Ht 5' 11\" (1.803 m)   Wt 90.7 kg (200 lb)   SpO2 94%   BMI 27.89 kg/m²      O2 Device: None (Room air)     Pain control  Pain Assessment  Pain Scale 1: Numeric (0 - 10)  Pain Intensity 1: 0    PT/OT  Gait     Gait  Speed/Denisha: Pace decreased (<100 feet/min)  Step Length: Right shortened, Left shortened  Gait Abnormalities: Trunk sway increased (mildly)  Ambulation - Level of Assistance: Contact guard assistance, Additional time  Distance (ft): 10 Feet (ft)           Physical Exam:  Gen:NAD. Neuro: A&Ox3.  Follows commands. Speech clear. Affect normal.  PERRL. EOMI. Face symmetric. Tongue midline. DUARTE. Strength 5/5 in UE and LE BL. Negative drift. Gait deferred. CT head without contrast on 10/21/21 shows interval moderate to large loculated subdural collection with appearance most likely to represent chronic hematoma    CT head without contrast on 10/25/21 shows overall no significant change. 24 hour results:    Recent Results (from the past 24 hour(s))   GLUCOSE, POC    Collection Time: 10/24/21  4:12 PM   Result Value Ref Range    Glucose (POC) 117 65 - 117 mg/dL    Performed by Sena Wyatt    GLUCOSE, POC    Collection Time: 10/24/21 10:36 PM   Result Value Ref Range    Glucose (POC) 149 (H) 65 - 117 mg/dL    Performed by [place]    Collection Time: 10/25/21  2:07 AM   Result Value Ref Range    Magnesium 1.9 1.6 - 2.4 mg/dL   METABOLIC PANEL, BASIC    Collection Time: 10/25/21  2:07 AM   Result Value Ref Range    Sodium 137 136 - 145 mmol/L    Potassium 3.9 3.5 - 5.1 mmol/L    Chloride 106 97 - 108 mmol/L    CO2 23 21 - 32 mmol/L    Anion gap 8 5 - 15 mmol/L    Glucose 120 (H) 65 - 100 mg/dL    BUN 22 (H) 6 - 20 MG/DL    Creatinine 1.21 0.70 - 1.30 MG/DL    BUN/Creatinine ratio 18 12 - 20      GFR est AA >60 >60 ml/min/1.73m2    GFR est non-AA 57 (L) >60 ml/min/1.73m2    Calcium 9.0 8.5 - 10.1 MG/DL   PHOSPHORUS    Collection Time: 10/25/21  2:07 AM   Result Value Ref Range    Phosphorus 3.3 2.6 - 4.7 MG/DL   SAMPLES BEING HELD    Collection Time: 10/25/21  2:07 AM   Result Value Ref Range    SAMPLES BEING HELD 1lav     COMMENT        Add-on orders for these samples will be processed based on acceptable specimen integrity and analyte stability, which may vary by analyte.    GLUCOSE, POC    Collection Time: 10/25/21  4:53 AM   Result Value Ref Range    Glucose (POC) 128 (H) 65 - 117 mg/dL    Performed by Debbi Galvin    GLUCOSE, POC    Collection Time: 10/25/21  8:59 AM   Result Value Ref Range    Glucose (POC) 137 (H) 65 - 117 mg/dL    Performed by Nicole Scruggs    ECHO ADULT COMPLETE    Collection Time: 10/25/21 10:52 AM   Result Value Ref Range    IVSd 1.48 (A) 0.60 - 1.00 cm    LVIDd 3.53 (A) 4.20 - 5.90 cm    LVIDs 3.58 cm    LVOT d 2.43 cm    LVPWd 1.50 (A) 0.60 - 1.00 cm    LVOT Peak Gradient 2.46 mmHg    LVOT Peak Velocity 78.43 cm/s    Left Atrium Major Axis 4.82 cm    Aortic Valve Area by Continuity of Peak Velocity 2.65 cm2    AoV PG 7.53 mmHg    Aortic Valve Systolic Peak Velocity 350.74 cm/s    MV A Juan Manuel 90.76 cm/s    Mitral Valve E Wave Deceleration Time 410.38 ms    MV E Juan Manuel 52.62 cm/s    Mitral Valve Pressure Half-time 119.01 ms    MVA (PHT) 1.85 cm2    Pulmonic Valve Systolic Peak Instantaneous Gradient 2.19 mmHg    Triscuspid Valve Regurgitation Peak Gradient 14.44 mmHg    TR Max Velocity 189.97 cm/s    Ao Root D 3.81 cm    MV E/A 0.58     LV Mass .5 88.0 - 224.0 g    LV Mass AL Index 91.7 49.0 - 115.0 g/m2    Left Atrium Minor Axis 2.28 cm    AMBER/BSA Pk Juan Manuel 1.3 cm2/m2   GLUCOSE, POC    Collection Time: 10/25/21 11:15 AM   Result Value Ref Range    Glucose (POC) 105 65 - 117 mg/dL    Performed by Emigdio Yang           Assessment:     Active Problems:    Subdural hematoma ([address] (10/22/2021)      Left hemiparesis ([address] (10/23/2021)        Plan:   1. Right chronic subdural hematoma   - no plans for surgical intervention at this time as it would require a full craniotomy and not just a estefania hole   - neuro checks   - ok to eat today   - PT/OT evals  2. Brain compression   - due to #1   - plans as above  3. Partial complex seizures   - due to #1, 2   - Keppra 1500 mg bid   - Neurology following   - May end up needing an additional agent  4. Hx of CAD, CVA   - ASA and Plavix on hold   - Last dose was given on Saturday am 10/23     Activity: up with assist  DVT ppx: SCDs  Dispo: tbd    Plan d/w Dr. Diandra Colon, hospitalist, neurology NP, nurse.  Wife is currently hospitalized with stroke on the same floor.       Jared Vo NP

## 2021-10-25 NOTE — PROGRESS NOTES
[address] [facility] Adult  Hospitalist Group                                                                                          Hospitalist Progress Note  Martin De La Garza MD  Answering service: [phone] OR 36 from in house phone        Date of Service:  10/25/2021  NAME:  Dallin Arredondo  :  1935  MRN:  [id number]      Admission Summary:   80 y.o. male who presents with left side weakness and numbness   80year-old male past medical history with CAD, HTN, HLP and CVA. Pt was seen in ER yesterday due episode of left upper lip tingling, which resolved in several hours. She had CT noticed a large chronic subdural hematoma.   ER discussed with Dr. Baron Alexis of neurosurgery and arranged pt to be seen in Dr. Aracelis Manrique  office at 2:30 PM  Today. Today, pt  was at Dr. Aracelis Manrique office and after pt was seen and was leaving head at home and suddenly developed acute onset of left-sided weakness.   Said he turned right around went back to Dr. Aracelis Manrique office which instructed the patient to come here for further evaluation via EMS. Anisha Barton is currently states little bit of a headache on the left side. His left side weakness and numbness symptoms resolved in 30 mins. Interval history / Subjective: Follow up chronic subdural.     He had another episode of seizure this morning. During my eval, he was sitting in the chair-alert and oriented X 3,  States that he is feeling better compared to morning. Understands that surgery is cancelled today         Assessment & Plan:     Chronic right subdural hematoma:  Intermittent left sided weakness:   Complex partial Seizure  -MRI redemonstrated Multiseptated  chronic right subdural hematoma  -Holding ASA, plavix  -keppra dose increased  -neurosurgery following, neurology following  -surgery held for now    Mild bilateral carotid disease: 40% stenosis, medical management for now.  Defer to NS for further management     HTN: home imdur, metoprolol     Hold home asa, plavix     Code status: full   DVT prophylaxis: SCDs    Care Plan discussed with: Patient/Family  Anticipated Disposition: Home w/Family  Anticipated Discharge: Greater than 48 hours     Hospital Problems  Never Reviewed        Codes Class Noted POA    Left hemiparesis ([facility] ICD-10-CM: G81.94  ICD-9-CM: 342.90  10/23/2021 Unknown        Subdural hematoma ([facility] [facility] ICD-10-CM: Z73.3H0A  ICD-9-CM: 432.1  10/22/2021                 Review of Systems:   Negative unless stated above       Vital Signs:    Last 24hrs VS reviewed since prior progress note. Most recent are:  Visit Vitals  BP (!) 106/57 (BP 1 Location: Right upper arm, BP Patient Position: At rest)   Pulse 79   Temp 98.7 °F (37.1 °C)   Resp 19   Ht 5' 11\" (1.803 m)   Wt 90.7 kg (200 lb)   SpO2 94%   BMI 27.89 kg/m²       No intake or output data in the 24 hours ending 10/25/21 1707     Physical Examination:     I had a face to face encounter with this patient and independently examined them on 10/25/2021 as outlined below:          Constitutional:  No acute distress, cooperative, pleasant    ENT:  Oral mucosa moist, oropharynx benign. Resp:  CTA bilaterally. No wheezing/rhonchi/rales. No accessory muscle use   CV:  Regular rhythm, normal rate, no murmurs, gallops, rubs    GI:  Soft, non distended, non tender.  normoactive bowel sounds, no hepatosplenomegaly     Musculoskeletal:  No edema, warm, 2+ pulses throughout    Neurologic:  Moves all extremities            Data Review:    Review and/or order of clinical lab test  Review and/or order of tests in the radiology section of CPT  Review and/or order of tests in the medicine section of CPT      Labs:     Recent Labs     10/23/21  0816   WBC 7.6   HGB 12.7   HCT 36.5*        Recent Labs     10/25/21  0207 10/23/21  0816    136   K 3.9 3.8    106   CO2 23 26   BUN 22* 17   CREA 1.21 1.21   * 129*   CA 9.0 9.3   MG 1.9 1.6   PHOS 3.3 2.4*     Recent Labs     10/23/21  0816   ALT 23   AP 68 TBILI 0.9   TP 6.6   ALB 3.4*   GLOB 3.2     No results for input(s): INR, PTP, APTT, INREXT, INREXT in the last 72 hours. No results for input(s): FE, TIBC, PSAT, FERR in the last 72 hours. No results found for: FOL, RBCF   No results for input(s): PH, PCO2, PO2 in the last 72 hours. No results for input(s): CPK, CKNDX, TROIQ in the last 72 hours.     No lab exists for component: CPKMB  Lab Results   Component Value Date/Time    Cholesterol, total 169 10/23/2021 08:16 AM    HDL Cholesterol 42 10/23/2021 08:16 AM    LDL, calculated 102.6 (H) 10/23/2021 08:16 AM    Triglyceride 122 10/23/2021 08:16 AM    CHOL/HDL Ratio 4.0 10/23/2021 08:16 AM     Lab Results   Component Value Date/Time    Glucose (POC) 116 10/25/2021 04:43 PM    Glucose (POC) 105 10/25/2021 11:15 AM    Glucose (POC) 137 (H) 10/25/2021 08:59 AM    Glucose (POC) 128 (H) 10/25/2021 04:53 AM    Glucose (POC) 149 (H) 10/24/2021 10:36 PM     No results found for: COLOR, APPRN, SPGRU, REFSG, ALFONSO, PROTU, GLUCU, KETU, BILU, UROU, AMALIA, LEUKU, GLUKE, EPSU, BACTU, WBCU, RBCU, CASTS, UCRY      Medications Reviewed:     Current Facility-Administered Medications   Medication Dose Route Frequency    levETIRAcetam (KEPPRA) tablet 1,500 mg  1,500 mg Oral BID    polyethylene glycol (MIRALAX) packet 17 g  17 g Oral DAILY    [Held by provider] aspirin delayed-release tablet 81 mg  81 mg Oral DAILY    cholecalciferol (VITAMIN D3) (1000 Units /25 mcg) tablet 5,000 Units  5,000 Units Oral BID    [Held by provider] clopidogreL (PLAVIX) tablet 75 mg  75 mg Oral DAILY    isosorbide mononitrate ER (IMDUR) tablet 120 mg  120 mg Oral 7am    metoprolol tartrate (LOPRESSOR) tablet 12.5 mg  12.5 mg Oral DAILY    glucose chewable tablet 16 g  4 Tablet Oral PRN    dextrose (D50W) injection syrg 12.5-25 g  25-50 mL IntraVENous PRN    glucagon (GLUCAGEN) injection 1 mg  1 mg IntraMUSCular PRN    insulin lispro (HUMALOG) injection   SubCUTAneous AC&HS    acetaminophen (TYLENOL) tablet 650 mg  650 mg Oral Q4H PRN    Or    acetaminophen (TYLENOL) solution 650 mg  650 mg Per NG tube Q4H PRN    Or    acetaminophen (TYLENOL) suppository 650 mg  650 mg Rectal Q4H PRN     ______________________________________________________________________  EXPECTED LENGTH OF STAY: 3d 7h  ACTUAL LENGTH OF STAY:          3                 Anayeli Benedict MD

## 2021-10-25 NOTE — PROGRESS NOTES
SPEECH PATHOLOGY BEDSIDE SWALLOW EVALUATION/DISCHARGE  Patient: Magda Mercer (35 y.o. male)  Date: 10/25/2021  Primary Diagnosis: CVA (cerebral vascular accident) [facility]) [I63.9]       Precautions:   Fall (a little impulsive)    ASSESSMENT :  Based on the objective data described below, the patient presents with functional oropharyngeal swallow with no difficulties or s/s of aspiration appreciated at bedside with any consistencies. Patient presents with R sided SDH after a fall he reports happened \"over a week ago\" and now with seizures. He has been tolerating a regular diet and thin liquids and denies any speech or swallowing concerns. Given strong bedside presentation and diet tolerance, recommend continue regular diet/thin liquids with general aspiration precautions. Hold PO with any seizure activity or decrease in mentation. Suspect pt is at baseline swallow function and therefore, skilled acute therapy provided by a speech-language pathologist is not indicated at this time.     Speech and language were informally assessed during this evaluation. Patient able to carry on fluent and appropriate conversation with SLP. Patient presents with functional language to access environment and express wants and needs. Therefore full language evaluation not indicated at this time. SLP will sign off. Please reconsult with any changes or if SLP can be of any further assistance.      PLAN :  Recommendations:  -- regular diet/ thin liquids  -- general aspiration precautions  -- Hold PO with any seizure activity or decrease in mentation  -- SLP will sign off     Discharge Recommendations: None for swallowing     SUBJECTIVE:   Patient stated You won't believe the craziest thing. My wife was coming to visit me when she started to feel bad and weak on one side. Now she's just across the marks!  What are the odds of that!?.    OBJECTIVE:     Past Medical History:   Diagnosis Date    CAD (coronary artery disease)     Hypercholesteremia     Hypertension     Stroke [facility])      Past Surgical History:   Procedure Laterality Date    HX CORONARY STENT PLACEMENT      GA CARDIAC SURG PROCEDURE UNLIST      bypass     Prior Level of Function/Home Situation:   Home Situation  Home Environment: Private residence  # Steps to Enter: 4  Rails to Enter: No  One/Two Story Residence: One story  Living Alone: No  Support Systems: Spouse/Significant Other, Child(kim) (lives at home with wife and son  self care and independent prior to admission    No AMD)  Patient Expects to be Discharged to[de-identified] House  Current DME Used/Available at Home: Grab bars  Tub or Shower Type: Tub/Shower combination  Diet prior to admission: regular diet/thin liquids  Current Diet:  Regular diet/thin liquids   Cognitive and Communication Status:  Neurologic State: Alert  Orientation Level: Oriented X4  Cognition: Follows commands, Appropriate for age attention/concentration  Perception: Appears intact  Perseveration: No perseveration noted  Safety/Judgement: Awareness of environment  Oral Assessment:  Oral Assessment  Labial: No impairment  Dentition: Intact; Limited  Oral Hygiene: moist oral mucosa free of secretions  Lingual: No impairment  Velum: No impairment  Mandible: No impairment  P.O. Trials:  Patient Position: upright in bed  Vocal quality prior to P.O.: No impairment  Consistency Presented: Thin liquid;Puree; Solid  How Presented: Self-fed/presented     Bolus Acceptance: No impairment  Bolus Formation/Control: No impairment     Propulsion: No impairment  Oral Residue: None  Initiation of Swallow: No impairment  Laryngeal Elevation: Functional  Aspiration Signs/Symptoms: None  Pharyngeal Phase Characteristics: No impairment, issues, or problems              Oral Phase Severity: No impairment  Pharyngeal Phase Severity : No impairment  NOMS:   The NOMS functional outcome measure was used to quantify this patient's level of swallowing impairment.   Based on the NOMS, the patient was determined to be at level 7  for swallow function     NOMS Swallowing Levels:  Level 1 (CN): NPO  Level 2 (CM): NPO but takes consistency in therapy  Level 3 (CL): Takes less than 50% of nutrition p.o. and continues with nonoral feedings; and/or safe with mod cues; and/or max diet restriction  Level 4 (CK): Safe swallow but needs mod cues; and/or mod diet restriction; and/or still requires some nonoral feeding/supplements  Level 5 (CJ): Safe swallow with min diet restriction; and/or needs min cues  Level 6 (CI): Independent with p.o.; rare cues; usually self cues; may need to avoid some foods or needs extra time  Level 7 ([facility]): Independent for all p.o.  KIANNA. (2003). National Outcomes Measurement System (NOMS): Adult Speech-Language Pathology User's Guide. After treatment:   Patient left in no apparent distress in bed, Call bell within reach and Nursing notified    COMMUNICATION/EDUCATION:   The patient's plan of care including recommendations, planned interventions, and recommended diet changes were discussed with: Registered nurse.      Thank you for this referral.    Marianna Trujillo M.S. CF-SLP   Speech Language Pathologist     Time Calculation: 21 mins

## 2021-10-25 NOTE — CONSULTS
Neurocritical Care Code Stroke Documentation      Symptoms:   Left sensory deficit, slurring speech, perceived left sided weakness   Last Known Well: [address] hx: Active Problems:    Subdural hematoma ([facility] [address] (10/22/2021)      Left hemiparesis ([facility] [address] (10/23/2021)       Anticoagulation: ASA/Plavix last given 10/23   VAN:   Positive   NIHSS:   1a-LOC: 0    1b-Month/Age: 0    1c-Open/Close Hand: 0    2-Best Gaze: 0    3-Visual Fields: 0    4-Facial Palsy: 1    5a-Left Arm: 1    5b-Right Arm: 0    6a-Left Le    6b-Right Le    7-Limb Ataxia: 0    8-Sensory: 1    9-Best Language: 0    10-Dysarthria: 1    11-Extinction/Inattention: 0  TOTAL SCORE: 4   Imaging:   CTH shows no interval difference in SDH size or midline shift    Plan:   TPA Candidate: NO    Mechanical thrombectomy Candidate: NO     Recommendations: This is likely again a seizure event given the quick resolution of symptoms and again are in setting of SDH. Plan to increase Keppra to 1500mg from 1000mg-give now. Cancel full code stroke protocol given not a tPA candidate and already managed with in hospital neurologist.    Discussed with: Dr. Sofya Chiang, primary RN and patient    Arrival time: 0510  Time spent: 45 minutes.      Tasha Dumont NP  Neurocritical Care Nurse Practitioner  [phone]

## 2021-10-25 NOTE — PROGRESS NOTES
RN called Code Stroke on patient due to sudden onset of L-sided weakness and numbness in face and LUE. Symptoms began improving rapidly but did not return back to baseline. Took patient for STAT Head CT that showed no acute changes and received orders to administer 1500 mg of Keppra. All symptoms have resolved since returning back to CT. Will continue to monitor any changes and will notify provider.

## 2021-10-25 NOTE — PROGRESS NOTES
Neurology Progress Note  Jhonny Fallon NP      Date of admission: 10/22/2021    Patient: Brad Lemos MRN: [id number]  SSN: [id number]    YOB: 1935  Age: 80 y.o. Sex: male        Subjective:     HPI: Brad Lemos is a 80 y.o. male we were asked to see for L-HP/numbness. PMH ntoable for CAD, HTN, HPL, remote R pontine, cerebellar and L parietal infarcts, recent syncopal event 10/2/21 admitted due to recurrent L hemisensory deficit and more recent L-HP. Patient reports falling in his bathtub approximately 6 weeks ago with head injury. He was seen in the ED 10/21/21 due to L lip tingling/numbness and headache. Head CT revealed moderate to large loculated R parietal SDH. He was scheduled to f/u with NSGY yesterday in the office. On the drive home from this appointment, the patient developed acute onset LUE/LLE weakness/numbness without associated speech/vision deficits or AMS. This persisted for approximately 30 minutes. He denies recurrence since this time. Head CT was repeated and stable. CTA H/N was also performed without vascular malformation. MRI Brain this AM revealed multiseptated chronic R SDH without acute ischemia. He is maintained on ASA/Plavix PTA.          Interval 10/25/21:   Around 1390 he developed Left sensory deficit, slurring speech, perceived left sided weakness. Thought to be seizure Keppra was increased to 1500mg twice daily. Since the increase no other seizure-like activity. Repeat CTH no significant change. Overall he denies headache, dizziness, chest pain, chest tightness, paresthesia.       Review of systems  Review of systems negative except as detailed in the HPI, interval, PMH and A&P    Past Medical History:   Diagnosis Date    CAD (coronary artery disease)     Hypercholesteremia     Hypertension     Stroke [facility])      Past Surgical History:   Procedure Laterality Date    HX CORONARY STENT PLACEMENT      SC CARDIAC SURG PROCEDURE UNLIST      bypass      No family history on file. Social History     Tobacco Use    Smoking status: Never Smoker    Smokeless tobacco: Never Used   Substance Use Topics    Alcohol use: Never      Prior to Admission medications    Medication Sig Start Date End Date Taking? Authorizing Provider   metoprolol tartrate (LOPRESSOR) 25 mg tablet Take 12.5 mg by mouth daily. Tatyana Light MD   losartan (COZAAR) 50 mg tablet Take 50 mg by mouth daily. Tatyana Light MD   cholecalciferol (Vitamin D3) (1000 Units /25 mcg) tablet Take 5,000 Units by mouth two (2) times a day. Tatyana Light MD   isosorbide mononitrate ER (IMDUR) 120 mg CR tablet Take 120 mg by mouth every morning. Tatyana Light MD   clopidogreL (Plavix) 75 mg tab Take  by mouth. Tatyana Light MD   aspirin delayed-release 81 mg tablet Take 81 mg by mouth daily.     Tatyana Light MD     Current Facility-Administered Medications   Medication Dose Route Frequency Provider Last Rate Last Admin    levETIRAcetam (KEPPRA) tablet 1,500 mg  1,500 mg Oral BID Harvinder Shearer NP       [facility] [Held by provider] aspirin delayed-release tablet 81 mg  81 mg Oral DAILY Ngoc Caputo MD   81 mg at 10/23/21 1103    cholecalciferol (VITAMIN D3) (1000 Units /25 mcg) tablet 5,000 Units  5,000 Units Oral BID Ngoc Caputo MD   5,000 Units at 10/24/21 2243    [Held by provider] clopidogreL (PLAVIX) tablet 75 mg  75 mg Oral DAILY Ngoc Caputo MD   75 mg at 10/23/21 1147    isosorbide mononitrate ER (IMDUR) tablet 120 mg  120 mg Oral 7am Nancy Joshi MD   120 mg at 10/25/21 0815    metoprolol tartrate (LOPRESSOR) tablet 12.5 mg  12.5 mg Oral DAILY Ngoc Caputo MD   12.5 mg at 10/25/21 2203    glucose chewable tablet 16 g  4 Tablet Oral PRN Ngoc Caputo MD        dextrose (D50W) injection syrg 12.5-25 g  25-50 mL IntraVENous PRN Ngoc Caputo MD        glucagon [facility]) injection 1 mg  1 mg IntraMUSCular PRN Ngoc Caputo MD        insulin lispro (HUMALOG) injection   SubCUTAneous AC&HS Ngoc Caputo MD   2 Units at 10/24/21 4083    acetaminophen (TYLENOL) tablet 650 mg  650 mg Oral Q4H PRN Han Taylor MD   650 mg at 10/25/21 0206    Or    acetaminophen (TYLENOL) solution 650 mg  650 mg Per NG tube Q4H PRN Han Taylor MD        Or   [facility] acetaminophen (TYLENOL) suppository 650 mg  650 mg Rectal Q4H PRN Han Taylor MD            Allergies   Allergen Reactions    Morphine Unknown (comments)       Objective:     Vitals:    10/25/21 0557 10/25/21 0815 10/25/21 1000 10/25/21 1035   BP: (!) 157/86 (!) 143/71  (!) 143/71   Pulse: 75 76 73    Resp: 16      Temp: 98.2 °F (36.8 °C)      SpO2: 94%           Temp (24hrs), Av.2 °F (36.8 °C), Min:97.7 °F (36.5 °C), Max:98.5 °F (36.9 °C)        O2 Device: None (Room air)       No intake or output data in the 24 hours ending 10/25/21 1106    General: In NAD. Cardiac: RRR  Lungs: Unlabored breathing. Abdomen: Soft/NT/non-distended. Extremities. No edema. Neurologic Exam:  Mental Status:  Alert and oriented x 4. Language:    Grossly intact fluency and comprehension. No dysarthria. Cranial Nerves:   Pupils equal, round and reactive to light. Visual fields intact. Extraocular movements intact w/o nystagmus      Facial sensation intact to LT     Facial activation symmetric. Hearing grossly intact. Motor:    Bulk and tone normal.      5/5 strength in all extremities. No pronator drift. No involuntary movements. Sensation:    Sensation intact throughout to light touch. Coordination & Gait: No ataxia with finger to nose.  Gait deferred    LABS:  Recent Labs     10/23/21  0816 10/22/21  1705   WBC 7.6 6.1   HGB 12.7 9.9*   HCT 36.5* 30.5*    209     Recent Labs     10/25/21  0207 10/23/21  0816 10/22/21  1705    136 132*   K 3.9 3.8 3.2*    106 104   CO2 23 26 22   BUN 22* 17 19   CREA 1.21 1.21 1.05   * 129* 95   CA 9.0 9.3 7.6*   MG 1.9 1.6  --    PHOS 3.3 2.4*  --      Recent Labs     10/23/21  0816 10/22/21  1705   ALT 23 20   AP 68 50   TBILI 0.9 0.5   TP 6.6 5.5*   ALB 3.4* 2.6*   GLOB 3.2 2.9     Recent Labs     10/22/21  1705   INR 1.2*   PTP 12.2*        No results for input(s): PHI, PCO2I, PO2I, HCO3I in the last 72 hours. No results for input(s): CPK, CKNDX, TROIQ in the last 72 hours. No lab exists for component: CPKMB  Lab Results   Component Value Date/Time    Cholesterol, total 169 10/23/2021 08:16 AM    HDL Cholesterol 42 10/23/2021 08:16 AM    LDL, calculated 102.6 (H) 10/23/2021 08:16 AM    Triglyceride 122 10/23/2021 08:16 AM    CHOL/HDL Ratio 4.0 10/23/2021 08:16 AM     Lab Results   Component Value Date/Time    Glucose (POC) 137 (H) 10/25/2021 08:59 AM    Glucose (POC) 128 (H) 10/25/2021 04:53 AM    Glucose (POC) 149 (H) 10/24/2021 10:36 PM    Glucose (POC) 117 10/24/2021 04:12 PM    Glucose (POC) 226 (H) 10/24/2021 11:11 AM     No results found for: COLOR, APPRN, SPGRU, REFSG, ALFONSO, PROTU, GLUCU, KETU, BILU, UROU, AMALIA, LEUKU, GLUKE, EPSU, BACTU, WBCU, RBCU, CASTS, UCRY    No results for input(s): FE, TIBC, PSAT, FERR in the last 72 hours. No results found for: FOL, RBCF   No results for input(s): PH, PCO2, PO2 in the last 72 hours. No results for input(s): CPK, CKNDX, TROIQ in the last 72 hours. No lab exists for component: CPKMB    Imaging:  CT CODE NEURO HEAD WO CONTRAST    Result Date: 10/25/2021  Overall no significant change. CT Results:  Results from Hospital Encounter encounter on 10/22/21    CT CODE NEURO HEAD WO CONTRAST    Narrative  EXAM: CT CODE NEURO HEAD WO CONTRAST    INDICATION: Left sided weakness    COMPARISON: 10/23/2021. CONTRAST: None. TECHNIQUE: Unenhanced CT of the head was performed using 5 mm images. Brain and  bone windows were generated. Coronal and sagittal reformats. CT dose reduction  was achieved through use of a standardized protocol tailored for this  examination and automatic exposure control for dose modulation.     FINDINGS:  Chronic right subdural complex collection is unchanged with areas of  predominantly chronic subdural hemorrhage with possible subacute components  posteriorly and inferiorly. Chronic small vessel ischemic disease is again noted  with more focal encephalomalacia in the left frontal parietal region. Right  pontine lacunar infarct. Impression  Overall no significant change. CTA CODE NEURO HEAD AND NECK W CONT    Narrative  EXAM: CTA CODE NEURO HEAD AND NECK W CONT    INDICATION: slurred speech    COMPARISON: October 22. CONTRAST: 100 mL of Isovue-370. TECHNIQUE:  Unenhanced  images were obtained to localize the volume for  acquisition. Multislice helical axial CT angiography was performed from the  aortic arch to the top of the head during uneventful rapid bolus intravenous  contrast administration. Coronal and sagittal reformations and 3D post  processing was performed. CT dose reduction was achieved through use of a  standardized protocol tailored for this examination and automatic exposure  control for dose modulation. This study was analyzed by the [address] ai algorithm. FINDINGS:    Head CT obtained after intravenous contrast show no enhancing lesion. NASCET  method was utilized for calculating stenosis. Vertebral arteries in the neck are patent. Carotid bifurcation show no change. Basilar artery is patent. There is no large vessel occlusion intracranially. Next    Impression  No change since yesterday's study without acute findings. CT CODE NEURO HEAD WO CONTRAST    Narrative  EXAM: CT CODE NEURO HEAD WO CONTRAST    INDICATION: left sided weakness, dysarthria    COMPARISON: October 22    CONTRAST: None. TECHNIQUE: Unenhanced CT of the head was performed using 5 mm images. Brain and  bone windows were generated. Coronal and sagittal reformats. CT dose reduction  was achieved through use of a standardized protocol tailored for this  examination and automatic exposure control for dose modulation.     FINDINGS:  Chronic right subdural unchanged, diffuse atrophy and white matter disease. Impression  No acute changes. MRI Results:  Results from [place] encounter on 10/22/21    MRI BRAIN W WO CONT    Narrative  EXAM:  MRI BRAIN W WO CONT    INDICATION:    ? L sided tumor per teleneurology    COMPARISON:  None. CONTRAST: 18 cc IV ProHance. TECHNIQUE:  Multiplanar multisequence acquisition without and with contrast of the brain. FINDINGS:  There is a septated multiloculated extra-axial likely subdural fluid collection  on the right with some mild mass effect on the adjacent brain but no definite  shift of the midline. This is likely a chronic subdural with some chronic,  subacute and possibly acute components. Maximal width is 24 mm. Ventricles are  normal in size with no significant shift. No enhancing intracranial lesion or  masses. There is some mild atrophy and nonspecific white matter changes. Impression  impression: Multiseptated  chronic right subdural hematoma. Results from [place] encounter on 01/27/21    MRA NECK WO CONT    Narrative  INDICATION: left lip numbness now resolved similar to previous CVA    COMPARISON: CT head earlier today    TECHNIQUE:  Multiplanar MR imaging of the brain performed without IV contrast.  3-D time-of-flight MRA of the brain was performed. Multiplanar reconstructions  were obtained. 2D time of flight MRA of the neck was performed. Multiplanar  reconstructions were obtained. FINDINGS:    MRI Brain:    Ventricles: Midline, no hydrocephalus. Brain Parenchyma/Brainstem: Extensive chronic white matter disease in the  supratentorial brain. Small chronic right pontine and cerebellar infarctions. Focal area of encephalomalacia left parietal lobe. No acute infarction. Intracranial Hemorrhage: None. Basal Cisterns: Normal.  Flow Voids: Normal.  Additional Comments: N/A. MRA NECK:    Carotid Arteries: No significant stenosis by NASCET criteria.   Vertebral Arteries: Left vertebral artery is patent and dominant, with at least  mild areas of irregularity versus artifact. Hypoplastic but patent right  vertebral artery  Additional Comments: N/A. Carotid stenosis determined using NASCET criteria. MRA HEAD:    Posterior Circulation: No flow limiting stenosis or occlusion. Mild diffuse  irregularity bilateral posterior cerebral arteries. Anterior Circulation: No flow limiting stenosis or occlusion. Additional Comments: No evidence of aneurysm or vascular malformation. Persistent left trigeminal artery, a congenital variant. Impression  1. Chronic white matter disease and areas of remote infarction as above, with no  acute process. 2. No flow limiting stenosis or arterial occlusion. MRA BRAIN WO CONT    Narrative  INDICATION: left lip numbness now resolved similar to previous CVA    COMPARISON: CT head earlier today    TECHNIQUE:  Multiplanar MR imaging of the brain performed without IV contrast.  3-D time-of-flight MRA of the brain was performed. Multiplanar reconstructions  were obtained. 2D time of flight MRA of the neck was performed. Multiplanar  reconstructions were obtained. FINDINGS:    MRI Brain:    Ventricles: Midline, no hydrocephalus. Brain Parenchyma/Brainstem: Extensive chronic white matter disease in the  supratentorial brain. Small chronic right pontine and cerebellar infarctions. Focal area of encephalomalacia left parietal lobe. No acute infarction. Intracranial Hemorrhage: None. Basal Cisterns: Normal.  Flow Voids: Normal.  Additional Comments: N/A. MRA NECK:    Carotid Arteries: No significant stenosis by NASCET criteria. Vertebral Arteries: Left vertebral artery is patent and dominant, with at least  mild areas of irregularity versus artifact. Hypoplastic but patent right  vertebral artery  Additional Comments: N/A. Carotid stenosis determined using NASCET criteria.     MRA HEAD:    Posterior Circulation: No flow limiting stenosis or occlusion. Mild diffuse  irregularity bilateral posterior cerebral arteries. Anterior Circulation: No flow limiting stenosis or occlusion. Additional Comments: No evidence of aneurysm or vascular malformation. Persistent left trigeminal artery, a congenital variant. Impression  1. Chronic white matter disease and areas of remote infarction as above, with no  acute process. 2. No flow limiting stenosis or arterial occlusion. XR Results   Results from [place] encounter on 04/05/21    XR 3RD FINGER RT MIN 2 V    Impression  No evidence of fracture or foreign body. .      Results from [place] encounter on 02/16/21    XR WRIST LT AP/LAT/OBL MIN 3V    Impression  No acute abnormality. XR HAND LT MIN 3 V    Impression  No acute abnormality. VAS/US Results (maximum last 3): No results found for this or any previous visit.       TTE        EKG  Results for orders placed or performed during the hospital encounter of 10/22/21   EKG, 12 LEAD, INITIAL   Result Value Ref Range    Ventricular Rate 78 BPM    Atrial Rate 78 BPM    P-R Interval 182 ms    QRS Duration 120 ms    Q-T Interval 384 ms    QTC Calculation (Bezet) 437 ms    Calculated P Axis 22 degrees    Calculated R Axis -31 degrees    Calculated T Axis 94 degrees    Diagnosis       Sinus rhythm with frequent premature ventricular complexes  Left axis deviation  Nonspecific intraventricular conduction delay  Minimal voltage criteria for LVH, may be normal variant ( Raad product )  Abnormal QRS-T angle, consider primary T wave abnormality  Abnormal ECG  Confirmed by Kaitlynn Walker MD. ([id number]) on 10/24/2021 11:53:09 PM         Hospital Problems  Never Reviewed        Codes Class Noted POA    Left hemiparesis ([address] ICD-10-CM: G81.94  ICD-9-CM: 342.90  10/23/2021 Unknown        Subdural hematoma ([address] ICD-10-CM: U64.6M5Y  ICD-9-CM: 432.1  10/22/2021               Assessment/Plan:     Gabe Beltran is a 80 y.o. male with a h/o CAD, HTN, HPL, remote R pontine, cerebellar and L parietal infarcts, recently diagnosed R parietal SDH with subacute fall/head injury several weeks ago admitted due to L-HP/paresthesias 10/22/21 lasting approximately 30 minutes with resolution. He has had 2 recurrent event 10/23 in the afternoon and 10/25 with return to baseline shortly thereafter. Events is more than likely a seizure. Continue increased dose of Keppra. Plan    -EEG normal    -Keppra 1500 mg BID: if another Seizure will need to add additional ASD     Thank you for allowing the Neurology Service the pleasure of participating in the care of your patient. This patient will be discussed with my collaborating care team physician  and she may have further recommendations regarding this patient's care. Thank you for this consult.     Signed By: Adam Fuchs NP     October 25, 2021 11:06 AM

## 2021-10-25 NOTE — PROGRESS NOTES
Physical Therapy: Defer    Chart reviewed and attempted to see pt for PT treatment. Pt currently undergoing bedside testing and is unavailable at this time. Also note that pt is scheduled for a procedure later today. Will defer and continue to follow.     Shreya Briscoe, PT, DPT

## 2021-10-26 LAB
GLUCOSE BLD STRIP.AUTO-MCNC: 108 MG/DL (ref 65–117)
GLUCOSE BLD STRIP.AUTO-MCNC: 110 MG/DL (ref 65–117)
GLUCOSE BLD STRIP.AUTO-MCNC: 141 MG/DL (ref 65–117)
GLUCOSE BLD STRIP.AUTO-MCNC: 96 MG/DL (ref 65–117)
SERVICE CMNT-IMP: ABNORMAL
SERVICE CMNT-IMP: NORMAL

## 2021-10-26 PROCEDURE — C9254 INJECTION, LACOSAMIDE: HCPCS | Performed by: NURSE PRACTITIONER

## 2021-10-26 PROCEDURE — 97530 THERAPEUTIC ACTIVITIES: CPT

## 2021-10-26 PROCEDURE — 99232 SBSQ HOSP IP/OBS MODERATE 35: CPT | Performed by: NURSE PRACTITIONER

## 2021-10-26 PROCEDURE — 74011250637 HC RX REV CODE- 250/637: Performed by: NURSE PRACTITIONER

## 2021-10-26 PROCEDURE — 74011250637 HC RX REV CODE- 250/637: Performed by: HOSPITALIST

## 2021-10-26 PROCEDURE — 82962 GLUCOSE BLOOD TEST: CPT

## 2021-10-26 PROCEDURE — 65660000000 HC RM CCU STEPDOWN

## 2021-10-26 PROCEDURE — 74011250636 HC RX REV CODE- 250/636: Performed by: NURSE PRACTITIONER

## 2021-10-26 PROCEDURE — 74011000258 HC RX REV CODE- 258: Performed by: NURSE PRACTITIONER

## 2021-10-26 RX ORDER — CALCIUM CARBONATE 200(500)MG
200 TABLET,CHEWABLE ORAL
Status: DISCONTINUED | OUTPATIENT
Start: 2021-10-26 | End: 2021-11-09 | Stop reason: HOSPADM

## 2021-10-26 RX ORDER — LACOSAMIDE 50 MG/1
100 TABLET ORAL 2 TIMES DAILY
Status: DISCONTINUED | OUTPATIENT
Start: 2021-10-26 | End: 2021-10-29

## 2021-10-26 RX ADMIN — LACOSAMIDE 100 MG: 50 TABLET, FILM COATED ORAL at 21:17

## 2021-10-26 RX ADMIN — METOPROLOL TARTRATE 12.5 MG: 25 TABLET, FILM COATED ORAL at 08:18

## 2021-10-26 RX ADMIN — LEVETIRACETAM 1500 MG: 500 TABLET, FILM COATED ORAL at 17:53

## 2021-10-26 RX ADMIN — CALCIUM CARBONATE (ANTACID) CHEW TAB 500 MG 200 MG: 500 CHEW TAB at 01:01

## 2021-10-26 RX ADMIN — Medication 5000 UNITS: at 21:17

## 2021-10-26 RX ADMIN — SODIUM CHLORIDE 200 MG: 9 INJECTION, SOLUTION INTRAVENOUS at 11:03

## 2021-10-26 RX ADMIN — POLYETHYLENE GLYCOL 3350 17 G: 17 POWDER, FOR SOLUTION ORAL at 08:20

## 2021-10-26 RX ADMIN — ISOSORBIDE MONONITRATE 120 MG: 60 TABLET ORAL at 06:04

## 2021-10-26 RX ADMIN — LEVETIRACETAM 1500 MG: 500 TABLET, FILM COATED ORAL at 06:04

## 2021-10-26 NOTE — PROGRESS NOTES
Neurosurgery Progress Note  Uyen Siddiqui, [facility]          Admit Date: 10/22/2021   LOS: 4 days        Daily Progress Note: 10/26/2021    HPI: The patient has a history of a stroke in  and is on ASA and Plavix. The patient had a fall in his bathtub 6 months ago and hit his head. He has had recurrent numbness and tingling events on the left side of his body. He has been followed by Dr. Gloria Castro with CT scans for a chronic right-sided SDH. He was at Dr. Wilber Low office on Friday when he developed sudden onset of left sided weakness and numbness when walking to his car. He went back into the office and EMS was called. He presented to [facility] and has been evaluated by neurology. He numbness and tingling resolved. He has had multiple episodes of these sensory changes with weakness and it is thought they are complex partial seizures. He had another on this morning around 0400. His keppra was increased to 1500 mg bid. Dr. Gloria Castro spoke with him about surgery, but due to his age and medical co-morbidities, we are planning currently for medical treatment. Subjective: The patient had another focal seizure this morning where his left arm was numb and weak and his left upper lip was numb. It resolved after he received a loading dose of Vimpat and is almost back to normal this afternoon. He states it did not affect his leg. Denies chest pain, leg pain, nausea, vomiting, difficulty swallowing, and dyspnea. Objective:     Vital signs  Temp (24hrs), Av °F (36.7 °C), Min:97.4 °F (36.3 °C), Max:98.3 °F (36.8 °C)   No intake/output data recorded. No intake/output data recorded.     Visit Vitals  /78 (BP 1 Location: Right upper arm, BP Patient Position: At rest)   Pulse 68   Temp 97.7 °F (36.5 °C)   Resp 16   Ht 5' 11\" (1.803 m)   Wt 90.7 kg (200 lb)   SpO2 93%   BMI 27.89 kg/m²      O2 Device: None (Room air)     Pain control  Pain Assessment  Pain Scale 1: Numeric (0 - 10)  Pain Intensity 1: 0    PT/OT  Gait     Gait  Speed/Denisha: Pace decreased (<100 feet/min)  Step Length: Right shortened, Left shortened  Gait Abnormalities: Trunk sway increased (mildly)  Ambulation - Level of Assistance: Contact guard assistance, Additional time  Distance (ft): 10 Feet (ft)           Physical Exam:  Gen:NAD. Neuro: A&Ox3. Follows commands. Speech clear. Affect normal.  PERRL. EOMI. Face symmetric. Tongue midline. DUARTE. Strength 5/5 in RUE/RLE, 5-/5 LUE/LLE  Left pronator drift  Gait deferred. CT head without contrast on 10/21/21 shows interval moderate to large loculated subdural collection with appearance most likely to represent chronic hematoma    CT head without contrast on 10/25/21 shows overall no significant change. 24 hour results:    Recent Results (from the past 24 hour(s))   GLUCOSE, POC    Collection Time: 10/25/21  4:43 PM   Result Value Ref Range    Glucose (POC) 116 65 - 117 mg/dL    Performed by Devon Sim, POC    Collection Time: 10/25/21  9:06 PM   Result Value Ref Range    Glucose (POC) 122 (H) 65 - 117 mg/dL    Performed by Bhavana Aguillon (TARA RN)    GLUCOSE, POC    Collection Time: 10/26/21  7:18 AM   Result Value Ref Range    Glucose (POC) 110 65 - 117 mg/dL    Performed by Bhavana Aguillon (TARA RN)    GLUCOSE, POC    Collection Time: 10/26/21 12:12 PM   Result Value Ref Range    Glucose (POC) 108 65 - 117 mg/dL    Performed by Pepe Seo           Assessment:     Active Problems:    Subdural hematoma ([address] (10/22/2021)      Left hemiparesis ([address] (10/23/2021)        Plan:   1. Right chronic subdural hematoma   - no plans for surgical intervention at this time as it would require a full craniotomy and not just a estefania hole   - neuro checks   - PT/OT evals  2. Brain compression   - due to #1   - plans as above  3. Partial complex seizures   - due to #1, 2   - Keppra 1500 mg bid   - Neurology following   - Vimpat added today by neurology  4.  Hx of CAD, CVA   - ASA and Plavix on hold   - Last dose was given on Saturday am 10/23     Activity: up with assist  DVT ppx: SCDs  Dispo: tbd    Plan d/w Dr. Edwina Buitrago, nurse, granddaughter at bedside, hospitalist. Would like to try to control the seizures with medication for now.       Angela Gomez NP

## 2021-10-26 NOTE — PROGRESS NOTES
Problem: Falls - Risk of  Goal: *Absence of Falls  Description: Document Jayden Bolton Fall Risk and appropriate interventions in the flowsheet. Outcome: Progressing Towards Goal  Note: Fall Risk Interventions:            Medication Interventions: Teach patient to arise slowly    Elimination Interventions:  Toileting schedule/hourly rounds    History of Falls Interventions: Consult care management for discharge planning, Bed/chair exit alarm         Problem: Patient Education: Go to Patient Education Activity  Goal: Patient/Family Education  Outcome: Progressing Towards Goal     Problem: Patient Education: Go to Patient Education Activity  Goal: Patient/Family Education  Outcome: Progressing Towards Goal     Problem: Patient Education: Go to Patient Education Activity  Goal: Patient/Family Education  Outcome: Progressing Towards Goal     Problem: Patient Education: Go to Patient Education Activity  Goal: Patient/Family Education  Outcome: Progressing Towards Goal     Problem: Hemorrhagic Stroke: Day 3  Goal: Off Pathway (Use only if patient is Off Pathway)  Outcome: Progressing Towards Goal  Goal: Activity/Safety  Outcome: Progressing Towards Goal  Goal: Consults, if ordered  Outcome: Progressing Towards Goal  Goal: Diagnostic Test/Procedures  Outcome: Progressing Towards Goal  Goal: Nutrition/Diet  Outcome: Progressing Towards Goal  Goal: Medications  Outcome: Progressing Towards Goal  Goal: Respiratory  Outcome: Progressing Towards Goal  Goal: Treatments/Interventions/Procedures  Outcome: Progressing Towards Goal  Goal: Psychosocial  Outcome: Progressing Towards Goal  Goal: *Hemodynamically stable  Outcome: Progressing Towards Goal  Goal: *Verbalizes anxiety and depression are reduced or absent  Outcome: Progressing Towards Goal  Goal: *Absence of aspiration  Outcome: Progressing Towards Goal  Goal: *Absence of signs and symptoms of DVT  Outcome: Progressing Towards Goal  Goal: *Optimal pain control at patient's stated goal  Outcome: Progressing Towards Goal  Goal: *Tolerating diet  Outcome: Progressing Towards Goal  Goal: *Progressive mobility and function  Outcome: Progressing Towards Goal  Goal: *Rehabilitation readiness  Outcome: Progressing Towards Goal

## 2021-10-26 NOTE — PROGRESS NOTES
[facility] (formerly [facility]) Adult  Hospitalist Group                                                                                          Hospitalist Progress Note  Clement Chamberlain MD  Answering service: [phone] from in house phone        Date of Service:  10/26/2021  NAME:  Giulia Rangel  :  1935  MRN:  [id number]    Admission Summary:   80 y.o. male who presents with left side weakness and numbness   80year-old male past medical history with CAD, HTN, HLP and CVA. Pt was seen in ER yesterday due episode of left upper lip tingling, which resolved in several hours. She had CT noticed a large chronic subdural hematoma.   ER discussed with Dr. Dora Kern of neurosurgery and arranged pt to be seen in Dr. Geno Centeno  office at 2:30 PM  Today. Today, pt  was at Dr. Geno Centeno office and after pt was seen and was leaving head at home and suddenly developed acute onset of left-sided weakness.   Said he turned right around went back to Dr. Geno Centeno office which instructed the patient to come here for further evaluation via EMS. [place] Moncada is currently states little bit of a headache on the left side. His left side weakness and numbness symptoms resolved in 30 mins. Interval history / Subjective: Follow up chronic SDH. Wife at bedside, both seem to have some degree of dementia and keep repeating same statements/questions. Reassured the wife will not plan to discharge while he is having frequent seizures. Assessment & Plan:     Chronic right subdural hematoma:  Intermittent left sided weakness:   Complex partial Seizure  -MRI redemonstrated Multiseptated  chronic right subdural hematoma  -Holding ASA, plavix-keppra dose increased  -neurosurgery following, neurology following-surgery held for now    Mild bilateral carotid disease: 40% stenosis, medical mg for now.  Defer to NS for further management     HTN: home imdur, metoprolol   Hold home asa, plavix     Code status: full   DVT prophylaxis: SCDs  Care Plan discussed with: Patient/Family  Disposition: Home w/Family: Greater than 48 hours     Hospital Problems  Never Reviewed        Codes Class Noted POA    Left hemiparesis ([facility] [address] ICD-10-CM: G81.94  ICD-9-CM: 342.90  10/23/2021 Unknown        Subdural hematoma ([facility] [address] ICD-10-CM: Y35.9Q7Q  ICD-9-CM: 432.1  10/22/2021                 Review of Systems:   Negative unless stated above       Vital Signs:    Last 24hrs VS reviewed since prior progress note. Most recent are:  Visit Vitals  BP (!) 114/57 (BP 1 Location: Right upper arm, BP Patient Position: At rest)   Pulse 77   Temp 97.4 °F (36.3 °C)   Resp 17   Ht 5' 11\" (1.803 m)   Wt 90.7 kg (200 lb)   SpO2 93%   BMI 27.89 kg/m²       No intake or output data in the 24 hours ending 10/26/21 1138     Physical Examination:     I had a face to face encounter with this patient and independently examined them on 10/26/2021 as outlined below:          Constitutional:  No acute distress, cooperative, pleasant, somewhat confused. ENT:  Oral mucosa moist, oropharynx benign. Resp:  CTA bilaterally. No wheezing/rhonchi/rales. No accessory muscle use   CV:  Regular rhythm, normal rate, no murmurs, gallops, rubs    GI:  Soft, non distended, non tender. normoactive bowel sounds, no hepatosplenomegaly     Musculoskeletal:  No edema, warm, 2+ pulses throughout    Neurologic:  Moves all extremities      Data Review:    Review and/or order of clinical lab test  Review and/or order of tests in the radiology section of CPT  Review and/or order of tests in the medicine section of CPT    Labs:     No results for input(s): WBC, HGB, HCT, PLT, HGBEXT, HCTEXT, PLTEXT, HGBEXT, HCTEXT, PLTEXT in the last 72 hours. Recent Labs     10/25/21  0207      K 3.9      CO2 23   BUN 22*   CREA 1.21   *   CA 9.0   MG 1.9   PHOS 3.3     No results for input(s): ALT, AP, TBIL, TBILI, TP, ALB, GLOB, GGT, AML, LPSE in the last 72 hours.     No lab exists for component: SGOT, GPT, AMYP, HLPSE  No results for input(s): INR, PTP, APTT, INREXT, INREXT in the last 72 hours. No results for input(s): FE, TIBC, PSAT, FERR in the last 72 hours. No results found for: FOL, RBCF   No results for input(s): PH, PCO2, PO2 in the last 72 hours. No results for input(s): CPK, CKNDX, TROIQ in the last 72 hours.     No lab exists for component: CPKMB  Lab Results   Component Value Date/Time    Cholesterol, total 169 10/23/2021 08:16 AM    HDL Cholesterol 42 10/23/2021 08:16 AM    LDL, calculated 102.6 (H) 10/23/2021 08:16 AM    Triglyceride 122 10/23/2021 08:16 AM    CHOL/HDL Ratio 4.0 10/23/2021 08:16 AM     Lab Results   Component Value Date/Time    Glucose (POC) 110 10/26/2021 07:18 AM    Glucose (POC) 122 (H) 10/25/2021 09:06 PM    Glucose (POC) 116 10/25/2021 04:43 PM    Glucose (POC) 105 10/25/2021 11:15 AM    Glucose (POC) 137 (H) 10/25/2021 08:59 AM     No results found for: COLOR, APPRN, SPGRU, REFSG, ALFONSO, PROTU, GLUCU, KETU, BILU, UROU, AAMLIA, LEUKU, GLUKE, EPSU, BACTU, WBCU, RBCU, CASTS, UCRY      Medications Reviewed:     Current Facility-Administered Medications   Medication Dose Route Frequency    calcium carbonate (TUMS) chewable tablet 200 mg [elemental]  200 mg Oral TID PRN    lacosamide (VIMPAT) tablet 100 mg  100 mg Oral BID    levETIRAcetam (KEPPRA) tablet 1,500 mg  1,500 mg Oral BID    polyethylene glycol (MIRALAX) packet 17 g  17 g Oral DAILY    [Held by provider] aspirin delayed-release tablet 81 mg  81 mg Oral DAILY    cholecalciferol (VITAMIN D3) (1000 Units /25 mcg) tablet 5,000 Units  5,000 Units Oral BID    [Held by provider] clopidogreL (PLAVIX) tablet 75 mg  75 mg Oral DAILY    isosorbide mononitrate ER (IMDUR) tablet 120 mg  120 mg Oral 7am    metoprolol tartrate (LOPRESSOR) tablet 12.5 mg  12.5 mg Oral DAILY    glucose chewable tablet 16 g  4 Tablet Oral PRN    dextrose (D50W) injection syrg 12.5-25 g  25-50 mL IntraVENous PRN    glucagon (GLUCAGEN) injection 1 mg  1 mg IntraMUSCular PRN    insulin lispro (HUMALOG) injection   SubCUTAneous AC&HS    acetaminophen (TYLENOL) tablet 650 mg  650 mg Oral Q4H PRN    Or    acetaminophen (TYLENOL) solution 650 mg  650 mg Per NG tube Q4H PRN    Or    acetaminophen (TYLENOL) suppository 650 mg  650 mg Rectal Q4H PRN     ______________________________________________________________________  EXPECTED LENGTH OF STAY: 3d 7h  ACTUAL LENGTH OF STAY:          [address], [place]

## 2021-10-26 NOTE — PROGRESS NOTES
Problem: Self Care Deficits Care Plan (Adult)  Goal: *Acute Goals and Plan of Care (Insert Text)  Description: FUNCTIONAL STATUS PRIOR TO ADMISSION: Patient was independent and active without use of DME. He drives and performs all ADL and I-ADL without AD. He also mows his grass unless it's too hot then he asks son or someone else to mow it. Wife was visiting him in hospital on 10/22/21 and now she is in hospital on same unit. HOME SUPPORT: The patient lived with wife and son but did not require assist.     Occupational Therapy Goals  Initiated 10/23/2021  1. Patient will perform standing bathing task sitting to bathe knees and distal only without LOB with modified independence within 7 day(s). 2.  Patient will perform item retrieval in prep for upper body dressing and lower body dressing with modified independence within 7 day(s). 3.  Patient will perform simple home management with modified independence within 7 day(s). 4.  Patient will perform toilet transfers with modified independence within 7 day(s). 5.  Patient will perform all aspects of toileting with independence within 7 day(s). 6. Patient will complete all functional mobility during OT session without LOB or cues for safety or sequencing tasks within 7 days. Outcome: Progressing Towards Goal     OCCUPATIONAL THERAPY TREATMENT  Patient: Rose Goldsmith (71 y.o. male)  Date: 10/26/2021  Diagnosis: CVA (cerebral vascular accident) [facility]) [I63.9] <principal problem not specified>       Precautions: Fall (a little impulsive)  Chart, occupational therapy assessment, plan of care, and goals were reviewed. ASSESSMENT  Patient continues with skilled OT services and is progressing towards goals however remains limited by significant cognitive impairments, decreased attention to the L environment, safety awareness, and physical & cognitive activity tolerance with lower body dressing, OOB mobility, and cognitive assessment completed this session.  He continues to require constant CGA for EOB & standing ADLs with min cues to attend to chair on the L side. [facility]) this session, patient scoring well below normal cognition but demo'd significant cognitive impairments, especially in delayed recall, executive function, attention, language, & abstraction, requiring increased time and mod cues to complete. Considering his cognition, PMH, PLOF, current medical status, and function, anticipate he would be best in a familiar setting with HHOT and 24/7 SPV & assist PRN sv transition to [facility]. If rehab is the goal, he is more appropriate for SNF rehab at this time. Current Level of Function Impacting Discharge (ADLs): CGA for ADLs and mobility, mod-max cognitive cues    Other factors to consider for discharge: fall risk, cognitive deficit, wife also hospitalized on unit, enough family support? PLAN :  Patient continues to benefit from skilled intervention to address the above impairments. Continue treatment per established plan of care to address goals. Recommend with staff: Recommend with nursing, ADLs with assist, OOB to chair 3x/day, and toileting via functional mobility to and from bathroom. Thank you for completing as able in order to maintain patient strength, endurance and independence. Recommendation for discharge: (in order for the patient to meet his/her long term goals)  Occupational therapy at least 2 days/week in the home AND ensure assist and/or supervision for safety with all ADLs/IADL and mobility   Considering social stressors with rest of the family may be more appropriate for transition to [facility] with 24/7 SPV & assist?  If rehab is the goal, recommend SNF    This discharge recommendation:  Has been made in collaboration with the attending provider and/or case management    IF patient discharges home will need the following DME:  To be determined (TBD)--anticipate shower chair & reacher at the least with assist for all activity       SUBJECTIVE:   Patient stated That's about it for now.  repeated x2 during [facility], cognitive fatigue noted    OBJECTIVE DATA SUMMARY:   Cognitive/Behavioral Status:  Neurologic State: Alert;Confused  Orientation Level: Oriented to person;Oriented to place;Oriented to situation;Disoriented to time  Cognition: Decreased attention/concentration; Follows commands; Impulsive;Memory loss;Poor safety awareness  Perception: Cues to attend left visual field  Perseveration: No perseveration noted  Safety/Judgement: Decreased awareness of environment;Decreased awareness of need for assistance;Decreased awareness of need for safety;Decreased insight into deficits    Functional Mobility and Transfers for ADLs:  Bed Mobility:  Supine to Sit: Modified independent  Scooting: Contact guard assistance    Transfers:  Sit to Stand: Contact guard assistance     Bed to Chair: Contact guard assistance    Balance:  Sitting: Impaired  Sitting - Static: Good (unsupported)  Sitting - Dynamic: Fair (occasional)  Standing: Impaired; With support  Standing - Static: Good  Standing - Dynamic : Fair    ADL Intervention:     Lower Body Dressing Assistance  Dressing Assistance: Contact guard assistance  Slip on Shoes with Back: Contact guard assistance (fatigue and decreased balance limiting)  Leg Crossed Method Used: No  Position Performed: Bending forward method;Seated edge of bed  Cues: Tactile cues provided;Verbal cues provided;Visual cues provided         Cognitive Retraining  Orientation Retraining: Awareness of environment  Problem Solving: Awareness of environment  Executive Functions: Executing cognitive plans  Organizing/Sequencing: Breaking task down  Attention to Task: Single task  Following Commands: Awareness of environment  Safety/Judgement: Decreased awareness of environment;Decreased awareness of need for assistance;Decreased awareness of need for safety;Decreased insight into deficits  Cues: Verbal cues provided; Tactile cues provided;Visual cues provided    [facility] Cognitive Assessment [facility]):    Patient evaluated with [facility]) to assess cognition in areas of visuospatial, executive, naming, memory, attention, language, abstraction, delayed recall, orientation. Patient scored 12/30 (with 1 point added for education only to 8th grade). Normal score is greater than or equal to 26/30. Patient with low score in all areas except naming and orientation. Confounding factor(s): hospital environment, reading glasses not present (however clarified patient with no trouble seeing items 1-4), medical complexity & medications, outside stressors (wife also in hospital). Visuaospatial/Executive: 1/5  Naming: 3/3  Attention: 1/6  Language: 0/3  Abstraction: 1/2  Delayed Recall: 0/5  Orientation: 5/6         The [facility] Cognitive Assessment [facility]) is designed to assess cognition in areas of visuospatial, executive, naming, memory, attention, language, abstraction, delayed recall, and orientation. Score of greater than or equal to 26/30 is considered normal cognition. Score of less than 26 indicates mild cognitive impairment. Score of 16 or lower indicates severe cognitive impairment. Claudia Leavitt I., Khloe Arriaza., ZHENG Goode (2005). The [facility] Cognitive Assessment, [place]: A brief screening tool for mild cognitive impairment. Journal of the [address] [place] [address], 43, [phone]. Pain:  None reported    Activity Tolerance:   Fair    After treatment patient left in no apparent distress:   Sitting in chair, Call bell within reach, and Bed / chair alarm activated    COMMUNICATION/COLLABORATION:   The patients plan of care was discussed with: Physical therapist and Registered nurse.      Altphilip Ramos, OTD, OTR/L  Time Calculation: 35 mins

## 2021-10-26 NOTE — PROGRESS NOTES
Bedside shift change report given to Anibal Vargas RN (oncoming nurse) by [facility], RN (offgoing nurse). Report included the following information SBAR and Kardex.

## 2021-10-26 NOTE — PROGRESS NOTES
Problem: Mobility Impaired (Adult and Pediatric)  Goal: *Acute Goals and Plan of Care (Insert Text)  Description:   FUNCTIONAL STATUS PRIOR TO ADMISSION: Patient was independent and active without use of DME.    HOME SUPPORT PRIOR TO ADMISSION: The patient lived with wife but did not require assist.    Physical Therapy Goals  Initiated 10/23/2021  1. Patient will move from supine to sit and sit to supine  in bed with modified independence within 7 day(s). 2.  Patient will transfer from bed to chair and chair to bed with modified independence using the least restrictive device within 7 day(s). 3.  Patient will perform sit to stand with modified independence within 7 day(s). 4.  Patient will ambulate with modified independence for 200 feet with the least restrictive device within 7 day(s). 5.  Patient will ascend/descend 4 stairs with 1 handrail(s) with modified independence within 7 day(s). 6.  Patient will improve Avalos Balance score by 7 points within 7 days. Outcome: Progressing Towards Goal  PHYSICAL THERAPY TREATMENT  Patient: Valerie Reyes (1 y.o. male)  Date: 10/26/2021  Diagnosis: CVA (cerebral vascular accident) [facility]) [I63.9] <principal problem not specified>       Precautions: Fall (a little impulsive)  Chart, physical therapy assessment, plan of care and goals were reviewed. ASSESSMENT  Patient continues with skilled PT services and is progressing towards goals - presents with impulsivity, impaired balance, decreased insight into deficits, mild L inattention, decreased safety awareness, and overall decline in functional mobility. Pt received in chair, asking to get back to bed and declined further mobility despite therapists offering multiple tasks (walk, use bathroom etc). He transferred sit<>stand and took steps bed>chair with CGA, though impulsive and required verbal cuing for safety awareness. If rehab is the goal, recommending SNF.  If d/c home, will require [facility] PT and 24/7 supervision and assistance for safety vs transition to [facility]. Current Level of Function Impacting Discharge (mobility/balance): CGA for transfers     Other factors to consider for discharge: impaired cognition, wife also in the hospital         PLAN :  Patient continues to benefit from skilled intervention to address the above impairments. Continue treatment per established plan of care. to address goals. Recommendation for discharge: (in order for the patient to meet his/her long term goals)  Therapy up to 5 days/week in SNF setting  If d/c home, will required [facility] PT and 24/7 supervision/assistance for safety vs transition to [facility]    This discharge recommendation:  Has been made in collaboration with the attending provider and/or case management    IF patient discharges home will need the following DME: none       SUBJECTIVE:   Patient stated I need to get back to bed.     OBJECTIVE DATA SUMMARY:   Critical Behavior:  Neurologic State: Alert, Confused  Orientation Level: Oriented to person, Oriented to place, Oriented to situation, Disoriented to time  Cognition: Decreased attention/concentration, Follows commands, Impulsive, Memory loss, Poor safety awareness  Safety/Judgement: Decreased awareness of environment, Decreased awareness of need for assistance, Decreased awareness of need for safety, Decreased insight into deficits  Functional Mobility Training:  Bed Mobility:  Supine to Sit: Modified independent  Sit to Supine: Modified independent  Scooting: Contact guard assistance    Transfers:  Sit to Stand: Contact guard assistance  Stand to Sit: Contact guard assistance  Bed to Chair: Contact guard assistance    Balance:  Sitting: Impaired  Sitting - Static: Good (unsupported)  Sitting - Dynamic: Fair (occasional)  Standing: Impaired; With support  Standing - Static: Good  Standing - Dynamic : Fair    Activity Tolerance:   Fair    After treatment patient left in no apparent distress:   Supine in bed, Call bell within reach, Bed / chair alarm activated, and Caregiver / family present    COMMUNICATION/COLLABORATION:   The patients plan of care was discussed with: Occupational therapist and Registered nurse.      Abelardo Cain PT, DPT   Time Calculation: 8 mins

## 2021-10-26 NOTE — PROGRESS NOTES
Neurology Progress Note  Jhonny Fallon NP      Date of admission: 10/22/2021    Patient: Brad Lemos MRN: [id number]  SSN: [id number]    YOB: 1935  Age: 80 y.o. Sex: male        Subjective:     HPI: Brad Lemos is a 80 y.o. male we were asked to see for L-HP/numbness. PMH ntoable for CAD, HTN, HPL, remote R pontine, cerebellar and L parietal infarcts, recent syncopal event 10/2/21 admitted due to recurrent L hemisensory deficit and more recent L-HP. Patient reports falling in his bathtub approximately 6 weeks ago with head injury. He was seen in the ED 10/21/21 due to L lip tingling/numbness and headache. Head CT revealed moderate to large loculated R parietal SDH. He was scheduled to f/u with NSGY yesterday in the office. On the drive home from this appointment, the patient developed acute onset LUE/LLE weakness/numbness without associated speech/vision deficits or AMS. This persisted for approximately 30 minutes. He denies recurrence since this time. Head CT was repeated and stable. CTA H/N was also performed without vascular malformation. MRI Brain this AM revealed multiseptated chronic R SDH without acute ischemia. He is maintained on ASA/Plavix PTA. Interval 10/26/21:   Around 10 am patient had another seizures resulting in left side weakness. Review of systems  Review of systems negative except as detailed in the HPI, interval, PMH and A&P    Past Medical History:   Diagnosis Date    CAD (coronary artery disease)     Hypercholesteremia     Hypertension     Stroke [facility])      Past Surgical History:   Procedure Laterality Date    HX CORONARY STENT PLACEMENT      MI CARDIAC SURG PROCEDURE UNLIST      bypass      No family history on file. Social History     Tobacco Use    Smoking status: Never Smoker    Smokeless tobacco: Never Used   Substance Use Topics    Alcohol use: Never      Prior to Admission medications    Medication Sig Start Date End Date Taking?  Authorizing Provider   metoprolol tartrate (LOPRESSOR) 25 mg tablet Take 12.5 mg by mouth daily. Tatyana Light MD   losartan (COZAAR) 50 mg tablet Take 50 mg by mouth daily. Tatyana Light MD   cholecalciferol (Vitamin D3) (1000 Units /25 mcg) tablet Take 5,000 Units by mouth two (2) times a day. Tatyana Light MD   isosorbide mononitrate ER (IMDUR) 120 mg CR tablet Take 120 mg by mouth every morning. Tatyana Light MD   clopidogreL (Plavix) 75 mg tab Take  by mouth. Tatyana Light MD   aspirin delayed-release 81 mg tablet Take 81 mg by mouth daily.     Tatyana Light MD     Current Facility-Administered Medications   Medication Dose Route Frequency Provider Last Rate Last Admin    calcium carbonate (TUMS) chewable tablet 200 mg [elemental]  200 mg Oral TID PRN Enedelia Rob NP   200 mg at 10/26/21 0101    lacosamide (VIMPAT) 200 mg in 0.9% sodium chloride 100 mL IVPB  200 mg IntraVENous ONCE Elen Bonilla NP        lacosamide (VIMPAT) tablet 100 mg  100 mg Oral BID Elen Bonilla NP        levETIRAcetam (KEPPRA) tablet 1,500 mg  1,500 mg Oral BID Arnaldo Bella NP   1,500 mg at 10/26/21 0604    polyethylene glycol (MIRALAX) packet 17 g  17 g Oral DAILY Tyson Lemus MD   17 g at 10/26/21 0820    [Held by provider] aspirin delayed-release tablet 81 mg  81 mg Oral DAILY Rodger Tavarez MD   81 mg at 10/23/21 1103    cholecalciferol (VITAMIN D3) (1000 Units /25 mcg) tablet 5,000 Units  5,000 Units Oral BID Rodger Tavarez MD   5,000 Units at 10/25/21 2107    [Held by provider] clopidogreL (PLAVIX) tablet 75 mg  75 mg Oral DAILY Rodger Tavarez MD   75 mg at 10/23/21 1147    isosorbide mononitrate ER (IMDUR) tablet 120 mg  120 mg Oral 7am Nancy Joshi MD   120 mg at 10/26/21 0604    metoprolol tartrate (LOPRESSOR) tablet 12.5 mg  12.5 mg Oral DAILY Rodger Tavarez MD   12.5 mg at 10/26/21 0818    glucose chewable tablet 16 g  4 Tablet Oral PRN Rodger Tavarez MD        dextrose (D50W) injection syrg 12.5-25 g  25-50 mL IntraVENous PRN Sun, Paula Bledsoe MD        glucagon [facility]) injection 1 mg  1 mg IntraMUSCular PRN Jyoti John MD        insulin lispro (HUMALOG) injection   SubCUTAneous AC&HS Jyoti John MD   2 Units at 10/24/21 1153    acetaminophen (TYLENOL) tablet 650 mg  650 mg Oral Q4H PRN Jyoti John MD   650 mg at 10/25/21 0206    Or    acetaminophen (TYLENOL) solution 650 mg  650 mg Per NG tube Q4H PRN Jyoti John MD        Or   [facility] acetaminophen (TYLENOL) suppository 650 mg  650 mg Rectal Q4H PRN Jyoti John MD            Allergies   Allergen Reactions    Morphine Unknown (comments)       Objective:     Vitals:    10/26/21 0405 10/26/21 0604 10/26/21 0818 10/26/21 0950   BP:  120/69 (!) 150/84 (!) 114/57   Pulse: 68 79 88 75   Resp:  19  17   Temp:  98.2 °F (36.8 °C)  97.4 °F (36.3 °C)   SpO2:  93%          Temp (24hrs), Av.2 °F (36.8 °C), Min:97.4 °F (36.3 °C), Max:98.7 °F (37.1 °C)        O2 Device: None (Room air)       No intake or output data in the 24 hours ending 10/26/21 1009    General: In NAD. Cardiac: RRR  Lungs: Unlabored breathing. Abdomen: Soft/NT/non-distended. Extremities. No edema. Neurologic Exam:  Mental Status:  Alert and oriented x 4. Language:    Grossly intact fluency and comprehension. No dysarthria. Cranial Nerves:   Pupils equal, round and reactive to light. Visual fields intact. Extraocular movements intact w/o nystagmus      Facial sensation intact to LT     Facial activation symmetric. Hearing grossly intact. Motor:    Bulk and tone normal.      5/5 strength in all extremities. No pronator drift. No involuntary movements. Sensation:    Sensation intact throughout to light touch. Coordination & Gait: No ataxia with finger to nose. Gait deferred    LABS:  No results for input(s): WBC, HGB, HCT, PLT, HGBEXT, HCTEXT, PLTEXT, HGBEXT, HCTEXT, PLTEXT in the last 72 hours.   Recent Labs     10/25/21  0207      K 3.9      CO2 23   BUN 22*   CREA 1.21   *   CA 9.0   MG 1. 9   PHOS 3.3     No results for input(s): ALT, AP, TBIL, TBILI, TP, ALB, GLOB, GGT, AML, LPSE in the last 72 hours. No lab exists for component: SGOT, GPT, AMYP, HLPSE  No results for input(s): INR, PTP, APTT, INREXT, INREXT in the last 72 hours. No results for input(s): PHI, PCO2I, PO2I, HCO3I in the last 72 hours. No results for input(s): CPK, CKNDX, TROIQ in the last 72 hours. No lab exists for component: CPKMB  Lab Results   Component Value Date/Time    Cholesterol, total 169 10/23/2021 08:16 AM    HDL Cholesterol 42 10/23/2021 08:16 AM    LDL, calculated 102.6 (H) 10/23/2021 08:16 AM    Triglyceride 122 10/23/2021 08:16 AM    CHOL/HDL Ratio 4.0 10/23/2021 08:16 AM     Lab Results   Component Value Date/Time    Glucose (POC) 110 10/26/2021 07:18 AM    Glucose (POC) 122 (H) 10/25/2021 09:06 PM    Glucose (POC) 116 10/25/2021 04:43 PM    Glucose (POC) 105 10/25/2021 11:15 AM    Glucose (POC) 137 (H) 10/25/2021 08:59 AM     No results found for: COLOR, APPRN, SPGRU, REFSG, ALFONSO, PROTU, GLUCU, KETU, BILU, UROU, AMALIA, LEUKU, GLUKE, EPSU, BACTU, WBCU, RBCU, CASTS, UCRY    No results for input(s): FE, TIBC, PSAT, FERR in the last 72 hours. No results found for: FOL, RBCF   No results for input(s): PH, PCO2, PO2 in the last 72 hours. No results for input(s): CPK, CKNDX, TROIQ in the last 72 hours. No lab exists for component: CPKMB    Imaging:  No results found. CT Results:  Results from Hospital Encounter encounter on 10/22/21    CT CODE NEURO HEAD WO CONTRAST    Narrative  EXAM: CT CODE NEURO HEAD WO CONTRAST    INDICATION: Left sided weakness    COMPARISON: 10/23/2021. CONTRAST: None. TECHNIQUE: Unenhanced CT of the head was performed using 5 mm images. Brain and  bone windows were generated. Coronal and sagittal reformats.  CT dose reduction  was achieved through use of a standardized protocol tailored for this  examination and automatic exposure control for dose modulation. FINDINGS:  Chronic right subdural complex collection is unchanged with areas of  predominantly chronic subdural hemorrhage with possible subacute components  posteriorly and inferiorly. Chronic small vessel ischemic disease is again noted  with more focal encephalomalacia in the left frontal parietal region. Right  pontine lacunar infarct. Impression  Overall no significant change. CTA CODE NEURO HEAD AND NECK W CONT    Narrative  EXAM: CTA CODE NEURO HEAD AND NECK W CONT    INDICATION: slurred speech    COMPARISON: October 22. CONTRAST: 100 mL of Isovue-370. TECHNIQUE:  Unenhanced  images were obtained to localize the volume for  acquisition. Multislice helical axial CT angiography was performed from the  aortic arch to the top of the head during uneventful rapid bolus intravenous  contrast administration. Coronal and sagittal reformations and 3D post  processing was performed. CT dose reduction was achieved through use of a  standardized protocol tailored for this examination and automatic exposure  control for dose modulation. This study was analyzed by the [address] ai algorithm. FINDINGS:    Head CT obtained after intravenous contrast show no enhancing lesion. NASCET  method was utilized for calculating stenosis. Vertebral arteries in the neck are patent. Carotid bifurcation show no change. Basilar artery is patent. There is no large vessel occlusion intracranially. Next    Impression  No change since yesterday's study without acute findings. CT CODE NEURO HEAD WO CONTRAST    Narrative  EXAM: CT CODE NEURO HEAD WO CONTRAST    INDICATION: left sided weakness, dysarthria    COMPARISON: October 22    CONTRAST: None. TECHNIQUE: Unenhanced CT of the head was performed using 5 mm images. Brain and  bone windows were generated. Coronal and sagittal reformats.  CT dose reduction  was achieved through use of a standardized protocol tailored for this  examination and automatic exposure control for dose modulation. FINDINGS:  Chronic right subdural unchanged, diffuse atrophy and white matter disease. Impression  No acute changes. MRI Results:  Results from [place] encounter on 10/22/21    MRI BRAIN W WO CONT    Narrative  EXAM:  MRI BRAIN W WO CONT    INDICATION:    ? L sided tumor per teleneurology    COMPARISON:  None. CONTRAST: 18 cc IV ProHance. TECHNIQUE:  Multiplanar multisequence acquisition without and with contrast of the brain. FINDINGS:  There is a septated multiloculated extra-axial likely subdural fluid collection  on the right with some mild mass effect on the adjacent brain but no definite  shift of the midline. This is likely a chronic subdural with some chronic,  subacute and possibly acute components. Maximal width is 24 mm. Ventricles are  normal in size with no significant shift. No enhancing intracranial lesion or  masses. There is some mild atrophy and nonspecific white matter changes. Impression  impression: Multiseptated  chronic right subdural hematoma. Results from [place] encounter on 01/27/21    MRA NECK WO CONT    Narrative  INDICATION: left lip numbness now resolved similar to previous CVA    COMPARISON: CT head earlier today    TECHNIQUE:  Multiplanar MR imaging of the brain performed without IV contrast.  3-D time-of-flight MRA of the brain was performed. Multiplanar reconstructions  were obtained. 2D time of flight MRA of the neck was performed. Multiplanar  reconstructions were obtained. FINDINGS:    MRI Brain:    Ventricles: Midline, no hydrocephalus. Brain Parenchyma/Brainstem: Extensive chronic white matter disease in the  supratentorial brain. Small chronic right pontine and cerebellar infarctions. Focal area of encephalomalacia left parietal lobe. No acute infarction. Intracranial Hemorrhage: None. Basal Cisterns: Normal.  Flow Voids: Normal.  Additional Comments: N/A.     MRA NECK:    Carotid Arteries: No significant stenosis by NASCET criteria. Vertebral Arteries: Left vertebral artery is patent and dominant, with at least  mild areas of irregularity versus artifact. Hypoplastic but patent right  vertebral artery  Additional Comments: N/A. Carotid stenosis determined using NASCET criteria. MRA HEAD:    Posterior Circulation: No flow limiting stenosis or occlusion. Mild diffuse  irregularity bilateral posterior cerebral arteries. Anterior Circulation: No flow limiting stenosis or occlusion. Additional Comments: No evidence of aneurysm or vascular malformation. Persistent left trigeminal artery, a congenital variant. Impression  1. Chronic white matter disease and areas of remote infarction as above, with no  acute process. 2. No flow limiting stenosis or arterial occlusion. MRA BRAIN WO CONT    Narrative  INDICATION: left lip numbness now resolved similar to previous CVA    COMPARISON: CT head earlier today    TECHNIQUE:  Multiplanar MR imaging of the brain performed without IV contrast.  3-D time-of-flight MRA of the brain was performed. Multiplanar reconstructions  were obtained. 2D time of flight MRA of the neck was performed. Multiplanar  reconstructions were obtained. FINDINGS:    MRI Brain:    Ventricles: Midline, no hydrocephalus. Brain Parenchyma/Brainstem: Extensive chronic white matter disease in the  supratentorial brain. Small chronic right pontine and cerebellar infarctions. Focal area of encephalomalacia left parietal lobe. No acute infarction. Intracranial Hemorrhage: None. Basal Cisterns: Normal.  Flow Voids: Normal.  Additional Comments: N/A. MRA NECK:    Carotid Arteries: No significant stenosis by NASCET criteria. Vertebral Arteries: Left vertebral artery is patent and dominant, with at least  mild areas of irregularity versus artifact. Hypoplastic but patent right  vertebral artery  Additional Comments: N/A.   Carotid stenosis determined using NASCET criteria. MRA HEAD:    Posterior Circulation: No flow limiting stenosis or occlusion. Mild diffuse  irregularity bilateral posterior cerebral arteries. Anterior Circulation: No flow limiting stenosis or occlusion. Additional Comments: No evidence of aneurysm or vascular malformation. Persistent left trigeminal artery, a congenital variant. Impression  1. Chronic white matter disease and areas of remote infarction as above, with no  acute process. 2. No flow limiting stenosis or arterial occlusion. XR Results   Results from [place] encounter on 04/05/21    XR 3RD FINGER RT MIN 2 V    Impression  No evidence of fracture or foreign body. .      Results from [place] encounter on 02/16/21    XR WRIST LT AP/LAT/OBL MIN 3V    Impression  No acute abnormality. XR HAND LT MIN 3 V    Impression  No acute abnormality. VAS/US Results (maximum last 3): No results found for this or any previous visit.       TTE        EKG  Results for orders placed or performed during the hospital encounter of 10/22/21   EKG, 12 LEAD, INITIAL   Result Value Ref Range    Ventricular Rate 78 BPM    Atrial Rate 78 BPM    P-R Interval 182 ms    QRS Duration 120 ms    Q-T Interval 384 ms    QTC Calculation (Bezet) 437 ms    Calculated P Axis 22 degrees    Calculated R Axis -31 degrees    Calculated T Axis 94 degrees    Diagnosis       Sinus rhythm with frequent premature ventricular complexes  Left axis deviation  Nonspecific intraventricular conduction delay  Minimal voltage criteria for LVH, may be normal variant ( [place] product )  Abnormal QRS-T angle, consider primary T wave abnormality  Abnormal ECG  Confirmed by Cathie Baptiste MD. ([id number]) on 10/24/2021 11:53:09 PM         Hospital Problems  Never Reviewed        Codes Class Noted POA    Left hemiparesis ([facility] [address] ICD-10-CM: G81.94  ICD-9-CM: 342.90  10/23/2021 Unknown        Subdural hematoma ([facility] [address] ICD-10-CM: J92.2M3G  ICD-9-CM: 432.1 10/22/2021               Assessment/Plan:     Valerie Reyes is a 80 y.o. male with a h/o CAD, HTN, HPL, remote R pontine, cerebellar and L parietal infarcts, recently diagnosed R parietal SDH with subacute fall/head injury several weeks ago admitted due to L-HP/paresthesias 10/22/21 lasting approximately 30 minutes with resolution. He has had 3 recurrent event 10/23 in the afternoon 10/25  And 10/26 morning with return to baseline shortly thereafter. Events is more than likely a seizure. Will load with vimpat 200mg IV now, follow by Vimpat 100 mg BID   Plan    -EEG normal    -Keppra 1500 mg BID   -Load with Vimpat 200 mg IV now    -Continue Vimpat 100 mg BID     Thank you for allowing the Neurology Service the pleasure of participating in the care of your patient. This patient will be discussed with my collaborating care team physician  and she may have further recommendations regarding this patient's care. Thank you for this consult.     Signed By: Vel Ga NP     October 26, 2021 11:06 AM

## 2021-10-26 NOTE — PROGRESS NOTES
Problem: Falls - Risk of  Goal: *Absence of Falls  Description: Document Jayden Bolton Fall Risk and appropriate interventions in the flowsheet.   10/26/2021 0301 by Hazel Page  Outcome: Progressing Towards Goal  Note: Fall Risk Interventions:            Medication Interventions: Bed/chair exit alarm, Patient to call before getting OOB, Teach patient to arise slowly    Elimination Interventions: Urinal in reach, Call light in reach    History of Falls Interventions: Bed/chair exit alarm      10/26/2021 0300 by Hazel Page  Outcome: Progressing Towards Goal  Note: Fall Risk Interventions:            Medication Interventions: Bed/chair exit alarm, Patient to call before getting OOB, Teach patient to arise slowly    Elimination Interventions: Urinal in reach, Call light in reach    History of Falls Interventions: Bed/chair exit alarm         Problem: Seizure Disorder (Adult)  Goal: *STG: Remains free of seizure activity  Outcome: Progressing Towards Goal  Goal: *STG: Remains safe in hospital  Outcome: Progressing Towards Goal

## 2021-10-26 NOTE — ROUTINE PROCESS
Bedside and Verbal shift change report given to [employer], RN (oncoming nurse) by Rose Roldan RN (offgoing nurse). Report included the following information SBAR, Kardex, MAR, Cardiac Rhythm NSR, SA and Dual Neuro Assessment.

## 2021-10-27 ENCOUNTER — APPOINTMENT (OUTPATIENT)
Dept: CT IMAGING | Age: 86
DRG: 082 | End: 2021-10-27
Attending: NURSE PRACTITIONER
Payer: MEDICARE

## 2021-10-27 LAB
GLUCOSE BLD STRIP.AUTO-MCNC: 107 MG/DL (ref 65–117)
GLUCOSE BLD STRIP.AUTO-MCNC: 114 MG/DL (ref 65–117)
GLUCOSE BLD STRIP.AUTO-MCNC: 125 MG/DL (ref 65–117)
GLUCOSE BLD STRIP.AUTO-MCNC: 195 MG/DL (ref 65–117)
SERVICE CMNT-IMP: ABNORMAL
SERVICE CMNT-IMP: ABNORMAL
SERVICE CMNT-IMP: NORMAL
SERVICE CMNT-IMP: NORMAL

## 2021-10-27 PROCEDURE — 97116 GAIT TRAINING THERAPY: CPT

## 2021-10-27 PROCEDURE — 74011250637 HC RX REV CODE- 250/637: Performed by: NURSE PRACTITIONER

## 2021-10-27 PROCEDURE — 74011250637 HC RX REV CODE- 250/637: Performed by: HOSPITALIST

## 2021-10-27 PROCEDURE — 99232 SBSQ HOSP IP/OBS MODERATE 35: CPT | Performed by: NURSE PRACTITIONER

## 2021-10-27 PROCEDURE — 70450 CT HEAD/BRAIN W/O DYE: CPT

## 2021-10-27 PROCEDURE — 82962 GLUCOSE BLOOD TEST: CPT

## 2021-10-27 PROCEDURE — 65660000000 HC RM CCU STEPDOWN

## 2021-10-27 PROCEDURE — 97535 SELF CARE MNGMENT TRAINING: CPT

## 2021-10-27 RX ADMIN — LACOSAMIDE 100 MG: 50 TABLET, FILM COATED ORAL at 21:17

## 2021-10-27 RX ADMIN — LACOSAMIDE 100 MG: 50 TABLET, FILM COATED ORAL at 08:28

## 2021-10-27 RX ADMIN — CALCIUM CARBONATE (ANTACID) CHEW TAB 500 MG 200 MG: 500 CHEW TAB at 21:17

## 2021-10-27 RX ADMIN — Medication 5000 UNITS: at 22:33

## 2021-10-27 RX ADMIN — METOPROLOL TARTRATE 12.5 MG: 25 TABLET, FILM COATED ORAL at 08:28

## 2021-10-27 RX ADMIN — Medication 5000 UNITS: at 08:27

## 2021-10-27 RX ADMIN — POLYETHYLENE GLYCOL 3350 17 G: 17 POWDER, FOR SOLUTION ORAL at 08:27

## 2021-10-27 RX ADMIN — ISOSORBIDE MONONITRATE 120 MG: 60 TABLET ORAL at 06:48

## 2021-10-27 RX ADMIN — LEVETIRACETAM 1500 MG: 500 TABLET, FILM COATED ORAL at 06:48

## 2021-10-27 RX ADMIN — LEVETIRACETAM 1500 MG: 500 TABLET, FILM COATED ORAL at 17:23

## 2021-10-27 NOTE — PROGRESS NOTES
Problem: Self Care Deficits Care Plan (Adult)  Goal: *Acute Goals and Plan of Care (Insert Text)  Description: FUNCTIONAL STATUS PRIOR TO ADMISSION: Patient was independent and active without use of DME. He drives and performs all ADL and I-ADL without AD. He also mows his grass unless it's too hot then he asks son or someone else to mow it. Wife was visiting him in hospital on 10/22/21 and now she is in hospital on same unit. HOME SUPPORT: The patient lived with wife and son but did not require assist.     Occupational Therapy Goals  Initiated 10/23/2021  1. Patient will perform standing bathing task sitting to bathe knees and distal only without LOB with modified independence within 7 day(s). 2.  Patient will perform item retrieval in prep for upper body dressing and lower body dressing with modified independence within 7 day(s). 3.  Patient will perform simple home management with modified independence within 7 day(s). 4.  Patient will perform toilet transfers with modified independence within 7 day(s). 5.  Patient will perform all aspects of toileting with independence within 7 day(s). 6. Patient will complete all functional mobility during OT session without LOB or cues for safety or sequencing tasks within 7 days. Outcome: Progressing Towards Goal   OCCUPATIONAL THERAPY TREATMENT  Patient: Kelly Bedoya (89 y.o. male)  Date: 10/27/2021  Diagnosis: CVA (cerebral vascular accident) [facility]) [I63.9] Subdural hematoma ([facility] [address]       Precautions: Fall (a little impulsive)  Chart, occupational therapy assessment, plan of care, and goals were reviewed. ASSESSMENT  Patient continues with skilled OT services and is progressing towards goals. Patient received bedlevel, amenable to session. Completed light grooming and dressing at EOB, deferring OOB after reporting he just got back into bed.  Educated on benefits of being out of bed, at least for meals and patient verbalized understanding and agreed to do so for dinner. Continues to benefit from safety cues during transfers and ADL because patient can be impulsive. Left at bedlevel, all needs in reach and in NAD. Current Level of Function Impacting Discharge (ADLs): up to Min A ADL    Other factors to consider for discharge: below baseline, impulsive, fall risk         PLAN :  Patient continues to benefit from skilled intervention to address the above impairments. Continue treatment per established plan of care to address goals. Recommend with staff: OOB 3x daily for meals, functional mobility to bathroom    Recommend next OT session: OOB ADL, safety, cog     Recommendation for discharge: (in order for the patient to meet his/her long term goals)  Occupational therapy at least 2 days/week in the home AND ensure assist and/or supervision for safety with all ADLs/IADL and mobility   Considering social stressors with rest of the family may be more appropriate for transition to nursing home with 24/7 SPV & assist?  If rehab is the goal, recommend SNF    This discharge recommendation:  Has been made in collaboration with the attending provider and/or case management    IF patient discharges home will need the following DME: TBD pending progress       SUBJECTIVE:   Patient stated I haven't had a seizure today so I feel a little better.     OBJECTIVE DATA SUMMARY:   Cognitive/Behavioral Status:  Neurologic State: Alert  Orientation Level: Oriented X4  Cognition: Appropriate decision making; Follows commands  Perception: Cues to attend left visual field  Perseveration: No perseveration noted       Functional Mobility and Transfers for ADLs:  Bed Mobility:  Supine to Sit:  (received in chair)  Sit to Supine:  (ended session in chair)    Transfers:  Sit to Stand: Contact guard assistance          Balance:  Sitting: Impaired  Sitting - Static: Good (unsupported)  Sitting - Dynamic: Fair (occasional)  Standing: Impaired  Standing - Static: Good  Standing - Dynamic : Fair    ADL Intervention:       Grooming  Grooming Assistance: Contact guard assistance  Position Performed: Seated edge of bed  Washing Face: Contact guard assistance  Washing Hands: Stand-by assistance                   Lower Body Dressing Assistance  Socks: Set-up; Supervision  Leg Crossed Method Used: No  Position Performed: Bending forward method              Neuro Re-Education:               Pain:  None reported    Activity Tolerance:   Good and tolerates ADLs without rest breaks    After treatment patient left in no apparent distress:   Supine in bed, Call bell within reach, and Side rails x 3    COMMUNICATION/COLLABORATION:   The patients plan of care was discussed with: Physical therapist and Registered nurse. Patient and/or family was verbally educated on the BE FAST acronym for signs/symptoms of CVA and TIA. BE FAST was written on patient's communication board  for visual education and reinforcement. All questions answered with patient indicating fair understanding.      Candida Del Rosario OT  Time Calculation: 24 mins

## 2021-10-27 NOTE — PROGRESS NOTES
Neurosurgery Progress Note  [facility]          Admit Date: 10/22/2021   LOS: 5 days        Daily Progress Note: 10/27/2021    HPI: The patient has a history of a stroke in  and is on ASA and Plavix. The patient had a fall in his bathtub 6 months ago and hit his head. He has had recurrent numbness and tingling events on the left side of his body. He has been followed by Dr. Dora Kern with CT scans for a chronic right-sided SDH. He was at Dr. Geno Centeno office on Friday when he developed sudden onset of left sided weakness and numbness when walking to his car. He went back into the office and EMS was called. He presented to [facility] and has been evaluated by neurology. He numbness and tingling resolved. He has had multiple episodes of these sensory changes with weakness and it is thought they are complex partial seizures. He had another on this morning around 0400. His keppra was increased to 1500 mg bid. Dr. Dora Kern spoke with him about surgery, but due to his age and medical co-morbidities, we are planning currently for medical treatment. Subjective: The patient has not had any other seizures since Vimpat was added. Still with some residual left arm weakness. ASA and Plavix currently on hold. Pt wondering if he would be a candidate for the MMA embolization. Denies chest pain, leg pain, nausea, vomiting, difficulty swallowing, and dyspnea. Objective:     Vital signs  Temp (24hrs), Av °F (36.7 °C), Min:97.5 °F (36.4 °C), Max:98.4 °F (36.9 °C)   No intake/output data recorded. No intake/output data recorded.     Visit Vitals  BP (!) 148/67   Pulse 72   Temp 97.5 °F (36.4 °C)   Resp 16   Ht 5' 11\" (1.803 m)   Wt 89.7 kg (197 lb 12 oz)   SpO2 95%   BMI 27.58 kg/m²      O2 Device: None (Room air)     Pain control  Pain Assessment  Pain Scale 1: Numeric (0 - 10)  Pain Intensity 1: 0    PT/OT  Gait     Gait  Speed/Denisha: Pace decreased (<100 feet/min)  Step Length: Right shortened, Left shortened  Gait Abnormalities: Trunk sway increased (mildly)  Ambulation - Level of Assistance: Contact guard assistance, Additional time  Distance (ft): 10 Feet (ft)           Physical Exam:  Gen:NAD. Neuro: A&Ox3. Follows commands. Speech mild dysarthria. Affect normal.  PERRL. EOMI. Face symmetric. Tongue midline. DUARTE. Strength 5/5 in RUE/RLE, 5-/5 LLE, 4+/5 LUE  Positive left pronator drift  Gait deferred. CT head without contrast on 10/21/21 shows interval moderate to large loculated subdural collection with appearance most likely to represent chronic hematoma    CT head without contrast on 10/25/21 shows overall no significant change. 24 hour results:    Recent Results (from the past 24 hour(s))   GLUCOSE, POC    Collection Time: 10/26/21 12:12 PM   Result Value Ref Range    Glucose (POC) 108 65 - 117 mg/dL    Performed by [address], POC    Collection Time: 10/26/21  4:49 PM   Result Value Ref Range    Glucose (POC) 96 65 - 117 mg/dL    Performed by Derick Davey    GLUCOSE, POC    Collection Time: 10/26/21  8:26 PM   Result Value Ref Range    Glucose (POC) 141 (H) 65 - 117 mg/dL    Performed by [address], POC    Collection Time: 10/27/21  7:44 AM   Result Value Ref Range    Glucose (POC) 107 65 - 117 mg/dL    Performed by Luana Sharp, POC    Collection Time: 10/27/21 11:15 AM   Result Value Ref Range    Glucose (POC) 195 (H) 65 - 117 mg/dL    Performed by Luis Enrique Rodriguez PCT           Assessment:     Principal Problem:    Subdural hematoma ([address] (10/22/2021)    Active Problems:    Left hemiparesis ([address] (10/23/2021)        Plan:   1. Right chronic subdural hematoma   - no plans for surgical intervention at this time as it would require a full craniotomy and not just a estefania hole   - Dr. Mp Galvez will be by this afternoon to see patient.  Pt would like to know if there is any intervention that can be done to stop the bleeding as he is worried about the continued seizures. - neuro checks   - PT/OT evals  2. Brain compression   - due to #1   - plans as above  3. Partial complex seizures   - due to #1, 2   - Keppra 1500 mg bid   - Vimpat 100 mg bid   - Neurology following  4. Hx of CAD, CVA   - ASA and Plavix on hold. Neurology asking when we would be able to restart ASA for stroke prevention   - Last dose was given on Saturday am 10/23     Activity: up with assist  DVT ppx: SCDs  Dispo: SNF    Plan d/w nurse, therapist, hospitalist. Dr. Patricia Dooley to see patient later today and formulate plan. If no intervention, then will ask when pt can discharge to SNF for rehab from our standpoint. Troy Liu NP   10/27/21 1523  Pt to stay off antiplatelets until it has radiographically improved. Ok to go to SNF when accepted.   Huang 3

## 2021-10-27 NOTE — PROGRESS NOTES
Transition of Care Plan: Likely SNF     RUR: 10%     PCP F/U:     Disposition: likely SNF     Transportation: BLS/Family     Main Contact: Daughter Ryanne: [phone]  Mea-Xfon-[phone]: Spoke with son Red Rawls about SNF choices. Have requested referrals sent to [facility]. Have requested family provide more choices for more options to choose from. Will continue to follow.      Moira Barbour RN, CRM

## 2021-10-27 NOTE — PROGRESS NOTES
Bedside and Verbal shift change report given to Isabel Ibrahim (oncoming nurse) by Edmond Lucero (offgoing nurse). Report included the following information SBAR, Kardex, ED Summary, Procedure Summary, MAR, Recent Results, Cardiac Rhythm NSR and Dual Neuro Assessment.

## 2021-10-27 NOTE — PROGRESS NOTES
Neurology Progress Note  Consuelo Yi NP      Date of admission: 10/22/2021    Patient: Stephanie Longoria MRN: [id number]  SSN: [id number]    YOB: 1935  Age: 80 y.o. Sex: male        Subjective:     HPI: Stephanie Longoria is a 80 y.o. male we were asked to see for L-HP/numbness. PMH ntoable for CAD, HTN, HPL, remote R pontine, cerebellar and L parietal infarcts, recent syncopal event 10/2/21 admitted due to recurrent L hemisensory deficit and more recent L-HP. Patient reports falling in his bathtub approximately 6 weeks ago with head injury. He was seen in the ED 10/21/21 due to L lip tingling/numbness and headache. Head CT revealed moderate to large loculated R parietal SDH. He was scheduled to f/u with NSGY yesterday in the office. On the drive home from this appointment, the patient developed acute onset LUE/LLE weakness/numbness without associated speech/vision deficits or AMS. This persisted for approximately 30 minutes. He denies recurrence since this time. Head CT was repeated and stable. CTA H/N was also performed without vascular malformation. MRI Brain this AM revealed multiseptated chronic R SDH without acute ischemia. He is maintained on ASA/Plavix PTA. Interval 10/27/21:   No events overnight. He remains with left side weakness since last seizure      Review of systems  Review of systems negative except as detailed in the HPI, interval, PMH and A&P    Past Medical History:   Diagnosis Date    CAD (coronary artery disease)     Hypercholesteremia     Hypertension     Stroke [facility])      Past Surgical History:   Procedure Laterality Date    HX CORONARY STENT PLACEMENT      SC CARDIAC SURG PROCEDURE UNLIST      bypass      No family history on file. Social History     Tobacco Use    Smoking status: Never Smoker    Smokeless tobacco: Never Used   Substance Use Topics    Alcohol use: Never      Prior to Admission medications    Medication Sig Start Date End Date Taking?  Authorizing Provider   metoprolol tartrate (LOPRESSOR) 25 mg tablet Take 12.5 mg by mouth daily. Tatyana Light MD   losartan (COZAAR) 50 mg tablet Take 50 mg by mouth daily. Tatyana Light MD   cholecalciferol (Vitamin D3) (1000 Units /25 mcg) tablet Take 5,000 Units by mouth two (2) times a day. Tatyana Light MD   isosorbide mononitrate ER (IMDUR) 120 mg CR tablet Take 120 mg by mouth every morning. Tatyana Light MD   clopidogreL (Plavix) 75 mg tab Take  by mouth. Tatyana Light MD   aspirin delayed-release 81 mg tablet Take 81 mg by mouth daily.     Tatyana Light MD     Current Facility-Administered Medications   Medication Dose Route Frequency Provider Last Rate Last Admin    calcium carbonate (TUMS) chewable tablet 200 mg [elemental]  200 mg Oral TID PRN Arthur Lambert NP   200 mg at 10/26/21 0101    lacosamide (VIMPAT) tablet 100 mg  100 mg Oral BID Gene Guzman NP   100 mg at 10/27/21 6152    levETIRAcetam (KEPPRA) tablet 1,500 mg  1,500 mg Oral BID Giancarlo Olmedo NP   1,500 mg at 10/27/21 0648    polyethylene glycol (MIRALAX) packet 17 g  17 g Oral DAILY Og Penny MD   17 g at 10/27/21 0827    [Held by provider] aspirin delayed-release tablet 81 mg  81 mg Oral DAILY Yuko Gabriel MD   81 mg at 10/23/21 1103    cholecalciferol (VITAMIN D3) (1000 Units /25 mcg) tablet 5,000 Units  5,000 Units Oral BID Yuko Gabriel MD   5,000 Units at 10/27/21 0827    [Held by provider] clopidogreL (PLAVIX) tablet 75 mg  75 mg Oral DAILY Yuko Gabriel MD   75 mg at 10/23/21 1147    isosorbide mononitrate ER (IMDUR) tablet 120 mg  120 mg Oral 7am Nancy Joshi MD   120 mg at 10/27/21 4986    metoprolol tartrate (LOPRESSOR) tablet 12.5 mg  12.5 mg Oral DAILY Yuko Gabriel MD   12.5 mg at 10/27/21 8986    glucose chewable tablet 16 g  4 Tablet Oral PRN Yuko Gabriel MD        dextrose (D50W) injection syrg 12.5-25 g  25-50 mL IntraVENous PRN Yuko Gabriel MD        glucagon [facility]) injection 1 mg  1 mg IntraMUSCular PRN MD Lula Abbasi insulin lispro (HUMALOG) injection   SubCUTAneous AC&HS Enoc Ledbetter MD   2 Units at 10/24/21 1153    acetaminophen (TYLENOL) tablet 650 mg  650 mg Oral Q4H PRN Enoc Ledbetter MD   650 mg at 10/25/21 0206    Or    acetaminophen (TYLENOL) solution 650 mg  650 mg Per NG tube Q4H PRN Enoc Ledbetter MD        Or   [place] acetaminophen (TYLENOL) suppository 650 mg  650 mg Rectal Q4H PRN Enoc Ledbetter MD            Allergies   Allergen Reactions    Morphine Unknown (comments)       Objective:     Vitals:    10/27/21 0828 10/27/21 0957 10/27/21 1000 10/27/21 1200   BP: 109/62 (!) 148/67     Pulse: 86 83 72 71   Resp:  16     Temp:  97.5 °F (36.4 °C)     SpO2:  95%          Temp (24hrs), Av °F (36.7 °C), Min:97.5 °F (36.4 °C), Max:98.4 °F (36.9 °C)        O2 Device: None (Room air)       No intake or output data in the 24 hours ending 10/27/21 1307    General: In NAD. Cardiac: RRR  Extremities. No edema. Neurologic Exam:  Mental Status:  Alert and oriented x 4. Language:    Grossly intact fluency and comprehension. No dysarthria. Cranial Nerves:   Pupils equal, round and reactive to light. Visual fields intact. Extraocular movements intact w/o nystagmus      Facial sensation intact to LT     Facial activation symmetric. Hearing grossly intact. Motor:    Bulk and tone normal.      5/5 strength in all extremities except left arm 4/5 strength      No pronator drift. No involuntary movements. Sensation:    Sensation intact throughout to light touch. Coordination & Gait: No ataxia with finger to nose. Gait deferred    LABS:  No results for input(s): WBC, HGB, HCT, PLT, HGBEXT, HCTEXT, PLTEXT, HGBEXT, HCTEXT, PLTEXT in the last 72 hours. Recent Labs     10/25/21  0207      K 3.9      CO2 23   BUN 22*   CREA 1.21   *   CA 9.0   MG 1.9   PHOS 3.3     No results for input(s): ALT, AP, TBIL, TBILI, TP, ALB, GLOB, GGT, AML, LPSE in the last 72 hours.     No lab exists for component: SGOT, GPT, AMYP, HLPSE  No results for input(s): INR, PTP, APTT, INREXT, INREXT in the last 72 hours. No results for input(s): PHI, PCO2I, PO2I, HCO3I in the last 72 hours. No results for input(s): CPK, CKNDX, TROIQ in the last 72 hours. No lab exists for component: CPKMB  Lab Results   Component Value Date/Time    Cholesterol, total 169 10/23/2021 08:16 AM    HDL Cholesterol 42 10/23/2021 08:16 AM    LDL, calculated 102.6 (H) 10/23/2021 08:16 AM    Triglyceride 122 10/23/2021 08:16 AM    CHOL/HDL Ratio 4.0 10/23/2021 08:16 AM     Lab Results   Component Value Date/Time    Glucose (POC) 195 (H) 10/27/2021 11:15 AM    Glucose (POC) 107 10/27/2021 07:44 AM    Glucose (POC) 141 (H) 10/26/2021 08:26 PM    Glucose (POC) 96 10/26/2021 04:49 PM    Glucose (POC) 108 10/26/2021 12:12 PM     No results found for: COLOR, APPRN, SPGRU, REFSG, ALFONSO, PROTU, GLUCU, KETU, BILU, UROU, AMALIA, LEUKU, GLUKE, EPSU, BACTU, WBCU, RBCU, CASTS, UCRY    No results for input(s): FE, TIBC, PSAT, FERR in the last 72 hours. No results found for: FOL, RBCF   No results for input(s): PH, PCO2, PO2 in the last 72 hours. No results for input(s): CPK, CKNDX, TROIQ in the last 72 hours. No lab exists for component: CPKMB    Imaging:  No results found. CT Results:  Results from Hospital Encounter encounter on 10/22/21    CT CODE NEURO HEAD WO CONTRAST    Narrative  EXAM: CT CODE NEURO HEAD WO CONTRAST    INDICATION: Left sided weakness    COMPARISON: 10/23/2021. CONTRAST: None. TECHNIQUE: Unenhanced CT of the head was performed using 5 mm images. Brain and  bone windows were generated. Coronal and sagittal reformats. CT dose reduction  was achieved through use of a standardized protocol tailored for this  examination and automatic exposure control for dose modulation.     FINDINGS:  Chronic right subdural complex collection is unchanged with areas of  predominantly chronic subdural hemorrhage with possible subacute components  posteriorly and inferiorly. Chronic small vessel ischemic disease is again noted  with more focal encephalomalacia in the left frontal parietal region. Right  pontine lacunar infarct. Impression  Overall no significant change. CTA CODE NEURO HEAD AND NECK W CONT    Narrative  EXAM: CTA CODE NEURO HEAD AND NECK W CONT    INDICATION: slurred speech    COMPARISON: October 22. CONTRAST: 100 mL of Isovue-370. TECHNIQUE:  Unenhanced  images were obtained to localize the volume for  acquisition. Multislice helical axial CT angiography was performed from the  aortic arch to the top of the head during uneventful rapid bolus intravenous  contrast administration. Coronal and sagittal reformations and 3D post  processing was performed. CT dose reduction was achieved through use of a  standardized protocol tailored for this examination and automatic exposure  control for dose modulation. This study was analyzed by the [address] ai algorithm. FINDINGS:    Head CT obtained after intravenous contrast show no enhancing lesion. NASCET  method was utilized for calculating stenosis. Vertebral arteries in the neck are patent. Carotid bifurcation show no change. Basilar artery is patent. There is no large vessel occlusion intracranially. Next    Impression  No change since yesterday's study without acute findings. CT CODE NEURO PERF W CBF    Narrative  EXAM:  CT CODE NEURO PERF W CBF  INDICATION:  slurred speech, left arm weakness  TECHNIQUE:  There are rapid bolus infusion 40 mL Isovue-370 CT perfusion acquisitions were  obtained with color coded mapping reconstruction of blood flow, blood volume,  mean transit time and T-Max. CT dose reduction was achieved through use of a  standardized protocol tailored for this examination and automatic exposure  control for dose modulation. The VIZ A I algorithm was utilized.   COMPARISON: CT, CTA, MRI  FINDINGS:  There is altered flow corresponding to the right posterior frontal/parietal  subdural hematoma. Also diminished perfusion/flow and area of chronic  encephalomalacia in the left temporal parietal region. There is no other altered flow/perfusion to suggest acute area of abnormal flow  or perfusion in the imaged cerebral hemispheres and cerebellum. Impression  1. Right hemisphere subdural hematoma with expected defect in flow/perfusion. 2. No CTV findings to suggest acute abnormal flow or perfusion. MRI Results:  Results from [place] encounter on 10/22/21    MRI BRAIN W WO CONT    Narrative  EXAM:  MRI BRAIN W WO CONT    INDICATION:    ? L sided tumor per teleneurology    COMPARISON:  None. CONTRAST: 18 cc IV ProHance. TECHNIQUE:  Multiplanar multisequence acquisition without and with contrast of the brain. FINDINGS:  There is a septated multiloculated extra-axial likely subdural fluid collection  on the right with some mild mass effect on the adjacent brain but no definite  shift of the midline. This is likely a chronic subdural with some chronic,  subacute and possibly acute components. Maximal width is 24 mm. Ventricles are  normal in size with no significant shift. No enhancing intracranial lesion or  masses. There is some mild atrophy and nonspecific white matter changes. Impression  impression: Multiseptated  chronic right subdural hematoma. Results from [place] encounter on 01/27/21    MRA NECK WO CONT    Narrative  INDICATION: left lip numbness now resolved similar to previous CVA    COMPARISON: CT head earlier today    TECHNIQUE:  Multiplanar MR imaging of the brain performed without IV contrast.  3-D time-of-flight MRA of the brain was performed. Multiplanar reconstructions  were obtained. 2D time of flight MRA of the neck was performed. Multiplanar  reconstructions were obtained. FINDINGS:    MRI Brain:    Ventricles: Midline, no hydrocephalus.   Brain Parenchyma/Brainstem: Extensive chronic white matter disease in the  supratentorial brain. Small chronic right pontine and cerebellar infarctions. Focal area of encephalomalacia left parietal lobe. No acute infarction. Intracranial Hemorrhage: None. Basal Cisterns: Normal.  Flow Voids: Normal.  Additional Comments: N/A. MRA NECK:    Carotid Arteries: No significant stenosis by NASCET criteria. Vertebral Arteries: Left vertebral artery is patent and dominant, with at least  mild areas of irregularity versus artifact. Hypoplastic but patent right  vertebral artery  Additional Comments: N/A. Carotid stenosis determined using NASCET criteria. MRA HEAD:    Posterior Circulation: No flow limiting stenosis or occlusion. Mild diffuse  irregularity bilateral posterior cerebral arteries. Anterior Circulation: No flow limiting stenosis or occlusion. Additional Comments: No evidence of aneurysm or vascular malformation. Persistent left trigeminal artery, a congenital variant. Impression  1. Chronic white matter disease and areas of remote infarction as above, with no  acute process. 2. No flow limiting stenosis or arterial occlusion. MRA BRAIN WO CONT    Narrative  INDICATION: left lip numbness now resolved similar to previous CVA    COMPARISON: CT head earlier today    TECHNIQUE:  Multiplanar MR imaging of the brain performed without IV contrast.  3-D time-of-flight MRA of the brain was performed. Multiplanar reconstructions  were obtained. 2D time of flight MRA of the neck was performed. Multiplanar  reconstructions were obtained. FINDINGS:    MRI Brain:    Ventricles: Midline, no hydrocephalus. Brain Parenchyma/Brainstem: Extensive chronic white matter disease in the  supratentorial brain. Small chronic right pontine and cerebellar infarctions. Focal area of encephalomalacia left parietal lobe. No acute infarction. Intracranial Hemorrhage: None. Basal Cisterns: Normal.  Flow Voids: Normal.  Additional Comments: N/A.     MRA NECK:    Carotid Arteries: No significant stenosis by NASCET criteria. Vertebral Arteries: Left vertebral artery is patent and dominant, with at least  mild areas of irregularity versus artifact. Hypoplastic but patent right  vertebral artery  Additional Comments: N/A. Carotid stenosis determined using NASCET criteria. MRA HEAD:    Posterior Circulation: No flow limiting stenosis or occlusion. Mild diffuse  irregularity bilateral posterior cerebral arteries. Anterior Circulation: No flow limiting stenosis or occlusion. Additional Comments: No evidence of aneurysm or vascular malformation. Persistent left trigeminal artery, a congenital variant. Impression  1. Chronic white matter disease and areas of remote infarction as above, with no  acute process. 2. No flow limiting stenosis or arterial occlusion. XR Results   Results from [place] encounter on 04/05/21    XR 3RD FINGER RT MIN 2 V    Impression  No evidence of fracture or foreign body. .      Results from [place] encounter on 02/16/21    XR WRIST LT AP/LAT/OBL MIN 3V    Impression  No acute abnormality. XR HAND LT MIN 3 V    Impression  No acute abnormality. VAS/US Results (maximum last 3): No results found for this or any previous visit.       TTE        EKG  Results for orders placed or performed during the hospital encounter of 10/22/21   EKG, 12 LEAD, INITIAL   Result Value Ref Range    Ventricular Rate 78 BPM    Atrial Rate 78 BPM    P-R Interval 182 ms    QRS Duration 120 ms    Q-T Interval 384 ms    QTC Calculation (Bezet) 437 ms    Calculated P Axis 22 degrees    Calculated R Axis -31 degrees    Calculated T Axis 94 degrees    Diagnosis       Sinus rhythm with frequent premature ventricular complexes  Left axis deviation  Nonspecific intraventricular conduction delay  Minimal voltage criteria for LVH, may be normal variant ( [employer] product )  Abnormal QRS-T angle, consider primary T wave abnormality  Abnormal ECG  Confirmed by Quynh Lopez MD. ([id number]) on 10/24/2021 11:53:09 PM         Hospital Problems  Date Reviewed: 10/27/2021        Codes Class Noted POA    Left hemiparesis ([facility] [address] [address] ICD-10-CM: G81.94  ICD-9-CM: 342.90  10/23/2021 Unknown        * (Principal) Subdural hematoma ([facility] [address] ICD-10-CM: O86.0Q4W  ICD-9-CM: 432.1  10/22/2021 Yes              Assessment/Plan:     Amanda Rowe is a 80 y.o. male with a h/o CAD, HTN, HPL, remote R pontine, cerebellar and L parietal infarcts, recently diagnosed R parietal SDH with subacute fall/head injury several weeks ago admitted due to L-HP/paresthesias 10/22/21 lasting approximately 30 minutes with resolution. He has had 3 recurrent event 10/23 in the afternoon 10/25  And 10/26 morning with return to baseline shortly thereafter. Events is more than likely a seizure. Continue vimpat 100 mg BID. Plan    -EEG normal    -Keppra 1500 mg BID   -Continue Vimpat 100 mg BID    -CTH due to weakness on the left side    -Hold ASA and Plavix    Thank you very much for this consultation. No further neurologic recommendations at this time. Will sign off but please call with questions or seizure like activity   Thank you for this consult. Signed By: Nivia Islas NP     October 27, 2021 11:06 AM     10/27/2021   CTH reviewed Unchanged chronic right frontoparietal extra-axial collection. No evidence of  acute infarct, intracranial mass, or new intracranial hemorrhage.

## 2021-10-27 NOTE — PROGRESS NOTES
[facility] Adult  Hospitalist Group                                                                                          Hospitalist Progress Note  Radha Mesa MD  Answering service: [phone] OR [phone] from in house phone        Date of Service:  10/27/2021  NAME:  Crys Delgado  :  1935  MRN:  [id number]    Admission Summary:   80 y.o. male who presents with left side weakness and numbness   80year-old male past medical history with CAD, HTN, HLP and CVA. Pt was seen in ER yesterday due episode of left upper lip tingling, which resolved in several hours. She had CT noticed a large chronic subdural hematoma.   ER discussed with Dr. Liam Milner of neurosurgery and arranged pt to be seen in Dr. Dominick Patricio  office at 2:30 PM  Today. Today, pt  was at Dr. Dominick Patricio office and after pt was seen and was leaving head at home and suddenly developed acute onset of left-sided weakness.   Said he turned right around went back to Dr. Dominick Patricio office which instructed the patient to come here for further evaluation via EMS. Dipika Velasco is currently states little bit of a headache on the left side. His left side weakness and numbness symptoms resolved in 30 mins. Interval history / Subjective: Follow up chronic SDH  Documented another episode of seizure 10/26 am when had to be give Vimpat  No more documented/reported seizure since then  The patient is sitting on the chair and feels better     Assessment & Plan:     Chronic right subdural hematoma:  Intermittent left sided weakness:   Complex partial Seizure  -MRI redemonstrated Multiseptated  chronic right subdural hematoma  -Holding ASA, plavix-keppra dose increased  -Appreciate discussion with Neurosurgery team, Dr Liam Milner will be by later today to discuss with the patient about any surgical options. -Appreciate neurology following-Keppra/Vimpat    Mild bilateral carotid disease: 40% stenosis, medical mg for now.  Defer to NS for further management     HTN: home imdur, metoprolol   CAD s/p stents: Hold home asa, plavix     PT/OT SNF vs HHPT    Code status: full   DVT prophylaxis: SCDs  PTA: independent    Plan: Follow Neurosurgery  Care Plan discussed with: Patient/Family  Disposition: Home w/Family: Greater than 48 hours     Hospital Problems  Date Reviewed: 10/27/2021        Codes Class Noted POA    Left hemiparesis ([facility] [address] ICD-10-CM: G81.94  ICD-9-CM: 342.90  10/23/2021 Unknown        * (Principal) Subdural hematoma ([facility] [address] ICD-10-CM: K50.6G6I  ICD-9-CM: 432.1  10/22/2021 Yes                Review of Systems:   Negative unless stated above       Vital Signs:    Last 24hrs VS reviewed since prior progress note. Most recent are:  Visit Vitals  BP (!) 148/67   Pulse 83   Temp 97.5 °F (36.4 °C)   Resp 16   Ht 5' 11\" (1.803 m)   Wt 89.7 kg (197 lb 12 oz)   SpO2 95%   BMI 27.58 kg/m²       No intake or output data in the 24 hours ending 10/27/21 1002     Physical Examination:     I had a face to face encounter with this patient and independently examined them on 10/27/2021 as outlined below:          Constitutional:  No acute distress, cooperative, pleasant, somewhat confused. ENT:  Oral mucosa moist, oropharynx benign. Resp:  CTA bilaterally. No wheezing/rhonchi/rales. No accessory muscle use   CV:  Regular rhythm, normal rate, no murmurs, gallops, rubs    GI:  Soft, non distended, non tender. normoactive bowel sounds, no hepatosplenomegaly     Musculoskeletal:  No edema, warm, 2+ pulses throughout    Neurologic:  Moves all extremities      Data Review:    Review and/or order of clinical lab test  Review and/or order of tests in the radiology section of CPT  Review and/or order of tests in the medicine section of CPT    Labs:     No results for input(s): WBC, HGB, HCT, PLT, HGBEXT, HCTEXT, PLTEXT, HGBEXT, HCTEXT, PLTEXT in the last 72 hours.   Recent Labs     10/25/21  0207      K 3.9      CO2 23   BUN 22*   CREA 1.21   *   CA 9.0   MG 1.9   PHOS 3.3 No results for input(s): ALT, AP, TBIL, TBILI, TP, ALB, GLOB, GGT, AML, LPSE in the last 72 hours. No lab exists for component: SGOT, GPT, AMYP, HLPSE  No results for input(s): INR, PTP, APTT, INREXT, INREXT in the last 72 hours. No results for input(s): FE, TIBC, PSAT, FERR in the last 72 hours. No results found for: FOL, RBCF   No results for input(s): PH, PCO2, PO2 in the last 72 hours. No results for input(s): CPK, CKNDX, TROIQ in the last 72 hours.     No lab exists for component: CPKMB  Lab Results   Component Value Date/Time    Cholesterol, total 169 10/23/2021 08:16 AM    HDL Cholesterol 42 10/23/2021 08:16 AM    LDL, calculated 102.6 (H) 10/23/2021 08:16 AM    Triglyceride 122 10/23/2021 08:16 AM    CHOL/HDL Ratio 4.0 10/23/2021 08:16 AM     Lab Results   Component Value Date/Time    Glucose (POC) 107 10/27/2021 07:44 AM    Glucose (POC) 141 (H) 10/26/2021 08:26 PM    Glucose (POC) 96 10/26/2021 04:49 PM    Glucose (POC) 108 10/26/2021 12:12 PM    Glucose (POC) 110 10/26/2021 07:18 AM     No results found for: COLOR, APPRN, SPGRU, REFSG, ALFONSO, PROTU, GLUCU, KETU, BILU, UROU, AMALIA, LEUKU, GLUKE, EPSU, BACTU, WBCU, RBCU, CASTS, UCRY      Medications Reviewed:     Current Facility-Administered Medications   Medication Dose Route Frequency    calcium carbonate (TUMS) chewable tablet 200 mg [elemental]  200 mg Oral TID PRN    lacosamide (VIMPAT) tablet 100 mg  100 mg Oral BID    levETIRAcetam (KEPPRA) tablet 1,500 mg  1,500 mg Oral BID    polyethylene glycol (MIRALAX) packet 17 g  17 g Oral DAILY    [Held by provider] aspirin delayed-release tablet 81 mg  81 mg Oral DAILY    cholecalciferol (VITAMIN D3) (1000 Units /25 mcg) tablet 5,000 Units  5,000 Units Oral BID    [Held by provider] clopidogreL (PLAVIX) tablet 75 mg  75 mg Oral DAILY    isosorbide mononitrate ER (IMDUR) tablet 120 mg  120 mg Oral 7am    metoprolol tartrate (LOPRESSOR) tablet 12.5 mg  12.5 mg Oral DAILY    glucose chewable tablet 16 g  4 Tablet Oral PRN    dextrose (D50W) injection syrg 12.5-25 g  25-50 mL IntraVENous PRN    glucagon (GLUCAGEN) injection 1 mg  1 mg IntraMUSCular PRN    insulin lispro (HUMALOG) injection   SubCUTAneous AC&HS    acetaminophen (TYLENOL) tablet 650 mg  650 mg Oral Q4H PRN    Or    acetaminophen (TYLENOL) solution 650 mg  650 mg Per NG tube Q4H PRN    Or    acetaminophen (TYLENOL) suppository 650 mg  650 mg Rectal Q4H PRN     ______________________________________________________________________  EXPECTED LENGTH OF STAY: 3d 7h  ACTUAL LENGTH OF STAY:          Ritu Barros MD

## 2021-10-27 NOTE — PROGRESS NOTES
HD 6  No seizures for 24 hours, last seizure yesterday  keppra 1500mg BID  vimpat started yesterday  afeb  VSS  Alert  Oriented  Face symmetric  Fluent speech  No pronator drift  CT 10/27 No change in subdural collection  A/P: 79 yo with right loculated subdural collection  He is having partial seizures due to cortical irritation from this collection  Embolization is not an option for this lesion  I told patient we can consider surgery if he cannot tolerate these seizures  He is adamant that he does not want surgery  Hold Plavix and ASA until subdural collection resolved radiographically

## 2021-10-27 NOTE — PROGRESS NOTES
Problem: Mobility Impaired (Adult and Pediatric)  Goal: *Acute Goals and Plan of Care (Insert Text)  Description:   FUNCTIONAL STATUS PRIOR TO ADMISSION: Patient was independent and active without use of DME.    HOME SUPPORT PRIOR TO ADMISSION: The patient lived with wife but did not require assist.    Physical Therapy Goals  Initiated 10/23/2021  1. Patient will move from supine to sit and sit to supine  in bed with modified independence within 7 day(s). 2.  Patient will transfer from bed to chair and chair to bed with modified independence using the least restrictive device within 7 day(s). 3.  Patient will perform sit to stand with modified independence within 7 day(s). 4.  Patient will ambulate with modified independence for 200 feet with the least restrictive device within 7 day(s). 5.  Patient will ascend/descend 4 stairs with 1 handrail(s) with modified independence within 7 day(s). 6.  Patient will improve Avalos Balance score by 7 points within 7 days. Outcome: Progressing Towards Goal  PHYSICAL THERAPY TREATMENT  Patient: Claire Martinez (52 y.o. male)  Date: 10/27/2021  Diagnosis: CVA (cerebral vascular accident) [facility]) [I63.9] Subdural hematoma ([facility] [address]       Precautions: Fall (a little impulsive)  Chart, physical therapy assessment, plan of care and goals were reviewed. ASSESSMENT  Patient continues with skilled PT services and is progressing towards goals. Pt presents with impaired coordination and strength LUE, mild L inattention, impaired balance, unsteady gait, poor safety awareness, poor insight into deficits, impulsivity, impaired cognition, decreased activity tolerance, and overall decline in functional mobility. Pt received in chair and agreeable to mobilize. He required Nataliya and verbal cuing for safety awareness. Worked on scanning environment however pt demonstrated difficulty with task and required increased assistance and cuing for safety.   Ended session returned to chair and left with all needs met. Recommending SNF upon discharge. Current Level of Function Impacting Discharge (mobility/balance): Nataliya for ambulation     Other factors to consider for discharge: impaired cognition, fall risk, impulsivity, poor safety awareness, poor insight into deficits, LUE deficits, wife also in the hospital         PLAN :  Patient continues to benefit from skilled intervention to address the above impairments. Continue treatment per established plan of care. to address goals. Recommendation for discharge: (in order for the patient to meet his/her long term goals)  Therapy up to 5 days/week in SNF setting  If d/c home, will require HH PT and 24/7 supervision/assistance for safety vs transition to [facility]    This discharge recommendation:  Has been discussed with MD and CM    IF patient discharges home will need the following DME: none       SUBJECTIVE:   Patient stated Jodi Mahan are going to do a surgery going in through the groin.  pt perseverating on repeating this phrase despite NP at bedside discussing that pt will likely not have that procedure     OBJECTIVE DATA SUMMARY:   Critical Behavior:  Neurologic State: Alert  Orientation Level: Oriented X4  Cognition: Appropriate for age attention/concentration, Follows commands  Safety/Judgement: Decreased awareness of environment, Decreased awareness of need for assistance, Decreased awareness of need for safety, Decreased insight into deficits  Functional Mobility Training:  Bed Mobility:  Supine to Sit:  (received in chair)  Sit to Supine:  (ended session in chair)    Transfers:  Sit to Stand: Contact guard assistance  Stand to Sit: Contact guard assistance    Balance:  Sitting: Impaired  Sitting - Static: Good (unsupported)  Sitting - Dynamic: Fair (occasional)  Standing: Impaired  Standing - Static: Good  Standing - Dynamic : Fair    Ambulation/Gait Training:  Distance (ft): 80 Feet (ft)  Ambulation - Level of Assistance: Minimal assistance  Gait Abnormalities: Shuffling gait;Trunk sway increased (forward flexed)  Speed/Denisha: Slow;Shuffled  Step Length: Right shortened;Left shortened    Activity Tolerance:   Fair    After treatment patient left in no apparent distress:   Sitting in chair, Call bell within reach, and Bed / chair alarm activated    COMMUNICATION/COLLABORATION:   The patients plan of care was discussed with: Occupational therapist and Registered nurse.      Benjamin Rand, PT, DPT   Time Calculation: 11 mins

## 2021-10-28 LAB
ANION GAP SERPL CALC-SCNC: 4 MMOL/L (ref 5–15)
BASOPHILS # BLD: 0.1 K/UL (ref 0–0.1)
BASOPHILS NFR BLD: 1 % (ref 0–1)
BUN SERPL-MCNC: 20 MG/DL (ref 6–20)
BUN/CREAT SERPL: 18 (ref 12–20)
CALCIUM SERPL-MCNC: 9.9 MG/DL (ref 8.5–10.1)
CHLORIDE SERPL-SCNC: 108 MMOL/L (ref 97–108)
CO2 SERPL-SCNC: 24 MMOL/L (ref 21–32)
CREAT SERPL-MCNC: 1.1 MG/DL (ref 0.7–1.3)
DIFFERENTIAL METHOD BLD: ABNORMAL
EOSINOPHIL # BLD: 0.3 K/UL (ref 0–0.4)
EOSINOPHIL NFR BLD: 4 % (ref 0–7)
ERYTHROCYTE [DISTWIDTH] IN BLOOD BY AUTOMATED COUNT: 12.5 % (ref 11.5–14.5)
GLUCOSE BLD STRIP.AUTO-MCNC: 109 MG/DL (ref 65–117)
GLUCOSE BLD STRIP.AUTO-MCNC: 116 MG/DL (ref 65–117)
GLUCOSE BLD STRIP.AUTO-MCNC: 136 MG/DL (ref 65–117)
GLUCOSE SERPL-MCNC: 115 MG/DL (ref 65–100)
HCT VFR BLD AUTO: 41.3 % (ref 36.6–50.3)
HGB BLD-MCNC: 12.6 G/DL (ref 12.1–17)
IMM GRANULOCYTES # BLD AUTO: 0 K/UL (ref 0–0.04)
IMM GRANULOCYTES NFR BLD AUTO: 0 % (ref 0–0.5)
LYMPHOCYTES # BLD: 2 K/UL (ref 0.8–3.5)
LYMPHOCYTES NFR BLD: 26 % (ref 12–49)
MCH RBC QN AUTO: 30.9 PG (ref 26–34)
MCHC RBC AUTO-ENTMCNC: 30.5 G/DL (ref 30–36.5)
MCV RBC AUTO: 101.2 FL (ref 80–99)
MONOCYTES # BLD: 0.7 K/UL (ref 0–1)
MONOCYTES NFR BLD: 9 % (ref 5–13)
NEUTS SEG # BLD: 4.5 K/UL (ref 1.8–8)
NEUTS SEG NFR BLD: 60 % (ref 32–75)
NRBC # BLD: 0 K/UL (ref 0–0.01)
NRBC BLD-RTO: 0 PER 100 WBC
PLATELET # BLD AUTO: 221 K/UL (ref 150–400)
PLATELET COMMENTS,PCOM: ABNORMAL
POTASSIUM SERPL-SCNC: 4.4 MMOL/L (ref 3.5–5.1)
RBC # BLD AUTO: 4.08 M/UL (ref 4.1–5.7)
RBC MORPH BLD: ABNORMAL
SERVICE CMNT-IMP: ABNORMAL
SERVICE CMNT-IMP: NORMAL
SERVICE CMNT-IMP: NORMAL
SODIUM SERPL-SCNC: 136 MMOL/L (ref 136–145)
WBC # BLD AUTO: 7.6 K/UL (ref 4.1–11.1)

## 2021-10-28 PROCEDURE — 65660000000 HC RM CCU STEPDOWN

## 2021-10-28 PROCEDURE — 97116 GAIT TRAINING THERAPY: CPT

## 2021-10-28 PROCEDURE — 97530 THERAPEUTIC ACTIVITIES: CPT

## 2021-10-28 PROCEDURE — 80048 BASIC METABOLIC PNL TOTAL CA: CPT

## 2021-10-28 PROCEDURE — 74011250637 HC RX REV CODE- 250/637: Performed by: NURSE PRACTITIONER

## 2021-10-28 PROCEDURE — 74011250637 HC RX REV CODE- 250/637: Performed by: HOSPITALIST

## 2021-10-28 PROCEDURE — 97112 NEUROMUSCULAR REEDUCATION: CPT

## 2021-10-28 PROCEDURE — 36415 COLL VENOUS BLD VENIPUNCTURE: CPT

## 2021-10-28 PROCEDURE — 85025 COMPLETE CBC W/AUTO DIFF WBC: CPT

## 2021-10-28 PROCEDURE — 82962 GLUCOSE BLOOD TEST: CPT

## 2021-10-28 RX ORDER — ATORVASTATIN CALCIUM 40 MG/1
40 TABLET, FILM COATED ORAL DAILY
Status: DISCONTINUED | OUTPATIENT
Start: 2021-10-29 | End: 2021-11-09 | Stop reason: HOSPADM

## 2021-10-28 RX ADMIN — ISOSORBIDE MONONITRATE 120 MG: 60 TABLET ORAL at 07:00

## 2021-10-28 RX ADMIN — CALCIUM CARBONATE (ANTACID) CHEW TAB 500 MG 200 MG: 500 CHEW TAB at 10:34

## 2021-10-28 RX ADMIN — LEVETIRACETAM 1500 MG: 500 TABLET, FILM COATED ORAL at 06:10

## 2021-10-28 RX ADMIN — Medication 5000 UNITS: at 21:32

## 2021-10-28 RX ADMIN — LACOSAMIDE 100 MG: 50 TABLET, FILM COATED ORAL at 21:30

## 2021-10-28 RX ADMIN — POLYETHYLENE GLYCOL 3350 17 G: 17 POWDER, FOR SOLUTION ORAL at 08:49

## 2021-10-28 RX ADMIN — LEVETIRACETAM 1500 MG: 500 TABLET, FILM COATED ORAL at 17:49

## 2021-10-28 RX ADMIN — Medication 5000 UNITS: at 08:48

## 2021-10-28 RX ADMIN — METOPROLOL TARTRATE 12.5 MG: 25 TABLET, FILM COATED ORAL at 08:49

## 2021-10-28 RX ADMIN — LACOSAMIDE 100 MG: 50 TABLET, FILM COATED ORAL at 08:48

## 2021-10-28 NOTE — PROGRESS NOTES
Problem: Self Care Deficits Care Plan (Adult)  Goal: *Acute Goals and Plan of Care (Insert Text)  Description: FUNCTIONAL STATUS PRIOR TO ADMISSION: Patient was independent and active without use of DME. He drives and performs all ADL and I-ADL without AD. He also mows his grass unless it's too hot then he asks son or someone else to mow it. Wife was visiting him in hospital on 10/22/21 and now she is in hospital on same unit. HOME SUPPORT: The patient lived with wife and son but did not require assist.     Occupational Therapy Goals  Initiated 10/23/2021  1. Patient will perform standing bathing task sitting to bathe knees and distal only without LOB with modified independence within 7 day(s). 2.  Patient will perform item retrieval in prep for upper body dressing and lower body dressing with modified independence within 7 day(s). 3.  Patient will perform simple home management with modified independence within 7 day(s). 4.  Patient will perform toilet transfers with modified independence within 7 day(s). 5.  Patient will perform all aspects of toileting with independence within 7 day(s). 6. Patient will complete all functional mobility during OT session without LOB or cues for safety or sequencing tasks within 7 days. Outcome: Progressing Towards Goal     OCCUPATIONAL THERAPY TREATMENT  Patient: Sherin Streeter (82 y.o. male)  Date: 10/28/2021  Diagnosis: CVA (cerebral vascular accident) [facility]) [I63.9] Subdural hematoma ([facility] [address]       Precautions: Fall (a little impulsive)  Chart, occupational therapy assessment, plan of care, and goals were reviewed. ASSESSMENT  Patient continues with skilled OT services and is progressing towards goals. AAOx4, continues to be slightly confused and have decreased short term memory and problem solving skills.  He is able to complete basic ADLs today with CGA, some assistance needed to access distal LEs as well as to help with functional dynamic balance activities (Reaching into high places or bending to access bottom shelves in home). He is unable to recall 0/3 items following 5-7 minutes of delay or appropriately problem solve home safety situations. Recommend SNF placement at [place] to further address mobility, ADLs and safety. Current Level of Function Impacting Discharge (ADLs): min A to CGA    Other factors to consider for discharge: lives with family, wife is currently in rehab         PLAN :  Patient continues to benefit from skilled intervention to address the above impairments. Continue treatment per established plan of care to address goals. Recommend with staff: Nicola Engel, participation with ADLs    Recommend next OT session: safety education and dynamic balance, follow POC    Recommendation for discharge: (in order for the patient to meet his/her long term goals)  Therapy up to 5 days/week in SNF setting    This discharge recommendation:  Has been made in collaboration with the attending provider and/or case management    IF patient discharges home will need the following DME: none       SUBJECTIVE:   Patient stated well I guess I would try to move my arms around and see if it went away.  When asked what he would do with a period of weakness at home     OBJECTIVE DATA SUMMARY:   Cognitive/Behavioral Status:  Neurologic State: Alert  Orientation Level: Oriented X4  Cognition: Decreased attention/concentration; Follows commands             Functional Mobility and Transfers for ADLs:  Bed Mobility:       Transfers:  Sit to Stand: Contact guard assistance     Bed to Chair: Contact guard assistance    Balance:  Sitting: Impaired  Sitting - Static: Good (unsupported)  Sitting - Dynamic: Fair (occasional)  Standing: Impaired  Standing - Static: Good  Standing - Dynamic : Fair;Constant support    ADL Intervention:  Feeding  Feeding Assistance: Set-up    Cognitive Retraining  Orientation Retraining: Awareness of environment;Situation  Problem Solving: Deductive reason; Awareness of environment; Identifying the problem;General alternative solution  Organizing/Sequencing: Prioritizing  Attention to Task: Single task  Cues: Verbal cues provided  Discussion on safety: \"What would you do if this (episode of weakness) happen at home? \"  The patient responded he would take his medication and if not resolved \"move his hands around\". When asked who he would call he was unable to state 911 or other appropriate lines    Therapeutic Exercises:   Dynamic Functional Balance: LOB anterior with any attempts to reach overhead or outside INGE with min A to recover    Pain:  None rated    Activity Tolerance:   Good    After treatment patient left in no apparent distress:   Sitting in chair, Call bell within reach, and Bed / chair alarm activated    COMMUNICATION/COLLABORATION:   The patients plan of care was discussed with: Physical therapist and Registered nurse. Patient was educated regarding His deficit(s) of balance and cognition as this relates to His diagnosis of CVA. He demonstrated Good understanding as evidenced by teach back. Patient and/or family was verbally educated on the BE FAST acronym for signs/symptoms of CVA and TIA. BE FAST was written on patient's communication board  for visual education and reinforcement. All questions answered with patient indicating good understanding.      Jane Crum  Time Calculation: 23 mins

## 2021-10-28 NOTE — PROGRESS NOTES
Problem: Falls - Risk of  Goal: *Absence of Falls  Description: Document Awilda Maharaj Fall Risk and appropriate interventions in the flowsheet.   Outcome: Progressing Towards Goal  Note: Fall Risk Interventions:  Mobility Interventions: Patient to call before getting OOB, Bed/chair exit alarm         Medication Interventions: Bed/chair exit alarm, Patient to call before getting OOB    Elimination Interventions: Bed/chair exit alarm, Call light in reach, Patient to call for help with toileting needs, Toileting schedule/hourly rounds    History of Falls Interventions: Bed/chair exit alarm, Consult care management for discharge planning, Door open when patient unattended, Evaluate medications/consider consulting pharmacy         Problem: Patient Education: Go to Patient Education Activity  Goal: Patient/Family Education  Outcome: Progressing Towards Goal

## 2021-10-28 NOTE — PROGRESS NOTES
Problem: Mobility Impaired (Adult and Pediatric)  Goal: *Acute Goals and Plan of Care (Insert Text)  Description:   FUNCTIONAL STATUS PRIOR TO ADMISSION: Patient was independent and active without use of DME.    HOME SUPPORT PRIOR TO ADMISSION: The patient lived with wife but did not require assist.    Physical Therapy Goals  Initiated 10/23/2021  1. Patient will move from supine to sit and sit to supine  in bed with modified independence within 7 day(s). 2.  Patient will transfer from bed to chair and chair to bed with modified independence using the least restrictive device within 7 day(s). 3.  Patient will perform sit to stand with modified independence within 7 day(s). 4.  Patient will ambulate with modified independence for 200 feet with the least restrictive device within 7 day(s). 5.  Patient will ascend/descend 4 stairs with 1 handrail(s) with modified independence within 7 day(s). 6.  Patient will improve Avalos Balance score by 7 points within 7 days. Outcome: Progressing Towards Goal  PHYSICAL THERAPY TREATMENT  Patient: Flash Chang (59 y.o. male)  Date: 10/28/2021  Diagnosis: CVA (cerebral vascular accident) [facility]) [I63.9] Subdural hematoma ([facility] [address]       Precautions: Fall (a little impulsive)  Chart, physical therapy assessment, plan of care and goals were reviewed. ASSESSMENT  Patient continues with skilled PT services and is progressing towards goals. Pt presents with impaired coordination and strength LUE, mild L inattention, impaired balance, unsteady gait, poor safety awareness, poor insight into deficits, impulsivity, impaired cognition, decreased activity tolerance, and overall decline in functional mobility. Pt received resting in bed but agreeable to mobilize. He mobilized with up to Nataliya as well as verbal cuing for safety awareness. Ended session returned to chair and left with all needs met. Recommending SNF upon discharge.      Current Level of Function Impacting Discharge (mobility/balance): Nataliya for ambulation     Other factors to consider for discharge: fall risk, poor insight into deficits, impaired cognition          PLAN :  Patient continues to benefit from skilled intervention to address the above impairments. Continue treatment per established plan of care. to address goals. Recommendation for discharge: (in order for the patient to meet his/her long term goals)  Therapy up to 5 days/week in SNF setting  If d/c home, will require HH PT and physical assistance/supervision with all mobility [facility]    This discharge recommendation:  Has been made in collaboration with the attending provider and/or case management    IF patient discharges home will need the following DME: none       SUBJECTIVE:   Patient stated I don't have any issues with my leg, only my arm.  pt with poor insight into deficits     OBJECTIVE DATA SUMMARY:   Critical Behavior:  Neurologic State: Alert  Orientation Level: Oriented X4  Cognition: Decreased attention/concentration, Follows commands  Safety/Judgement: Decreased awareness of environment, Decreased awareness of need for assistance, Decreased awareness of need for safety, Decreased insight into deficits  Functional Mobility Training:  Bed Mobility:  Supine to Sit: Stand-by assistance  Sit to Supine:  (ended session in chair)    Transfers:  Sit to Stand: Contact guard assistance  Stand to Sit: Contact guard assistance  Bed to Chair: Contact guard assistance    Balance:  Sitting: Impaired  Sitting - Static: Good (unsupported)  Sitting - Dynamic: Fair (occasional)  Standing: Impaired  Standing - Static: Good  Standing - Dynamic : Fair;Constant support    Ambulation/Gait Training:  Distance (ft): 80 Feet (ft)  Ambulation - Level of Assistance:  (forward flexed)  Gait Abnormalities: Shuffling gait;Trunk sway increased  Speed/Denisha: Shuffled; Slow  Step Length: Right shortened;Left shortened    Activity Tolerance:   Fair    After treatment patient left in no apparent distress:   Sitting in chair, Call bell within reach, Bed / chair alarm activated, and Caregiver / family present    COMMUNICATION/COLLABORATION:   The patients plan of care was discussed with: Occupational therapist and Registered nurse.      Roberto Baez, PT, DPT   Time Calculation: 13 mins

## 2021-10-28 NOTE — PROGRESS NOTES
Neurosurgery Progress Note  Dennis Ray, [facility]          Admit Date: 10/22/2021   LOS: 6 days        Daily Progress Note: 10/28/2021    HPI: The patient has a history of a stroke in  and is on ASA and Plavix. The patient had a fall in his bathtub 6 months ago and hit his head. He has had recurrent numbness and tingling events on the left side of his body. He has been followed by Dr. Thien Olivas with CT scans for a chronic right-sided SDH. He was at Dr. Navjot Roque office on Friday when he developed sudden onset of left sided weakness and numbness when walking to his car. He went back into the office and EMS was called. He presented to [facility] and has been evaluated by neurology. He numbness and tingling resolved. He has had multiple episodes of these sensory changes with weakness and it is thought they are complex partial seizures. He had another on this morning around 0400. His keppra was increased to 1500 mg bid. Dr. Thien Olivas spoke with him about surgery, but due to his age and medical co-morbidities, we are planning currently for medical treatment. Subjective:   No further seizures. Waiting on SNF placement. Denies chest pain, leg pain, nausea, vomiting, difficulty swallowing, and dyspnea. Objective:     Vital signs  Temp (24hrs), Av.9 °F (36.6 °C), Min:97.4 °F (36.3 °C), Max:98.1 °F (36.7 °C)   10/28 0701 - 10/28 1900  In: -   Out: 400 [Urine:400]  No intake/output data recorded.     Visit Vitals  BP (!) 106/58 (BP 1 Location: Right upper arm, BP Patient Position: At rest;Lying left side)   Pulse 78   Temp 98.1 °F (36.7 °C)   Resp 18   Ht 5' 11\" (1.803 m)   Wt 88.7 kg (195 lb 8.8 oz)   SpO2 94%   BMI 27.27 kg/m²      O2 Device: None (Room air)     Pain control  Pain Assessment  Pain Scale 1: Numeric (0 - 10)  Pain Intensity 1: 0    PT/OT  Gait     Gait  Speed/Denisha: Slow, Shuffled  Step Length: Right shortened, Left shortened  Gait Abnormalities: Shuffling gait, Trunk sway increased (forward flexed)  Ambulation - Level of Assistance: Minimal assistance  Distance (ft): 80 Feet (ft)           Physical Exam:  Gen:NAD. Neuro: A&Ox3. Follows commands. Speech mild clear. Affect normal.  PERRL. EOMI. Face symmetric. Tongue midline. DUARTE. Strength 5/5 in RUE/RLE, 5-/5 LLE, 5-/5 LUE  Positive left pronator drift  Gait deferred. CT head without contrast on 10/21/21 shows interval moderate to large loculated subdural collection with appearance most likely to represent chronic hematoma    CT head without contrast on 10/25/21 shows overall no significant change. 24 hour results:    Recent Results (from the past 24 hour(s))   GLUCOSE, POC    Collection Time: 10/27/21  4:47 PM   Result Value Ref Range    Glucose (POC) 125 (H) 65 - 117 mg/dL    Performed by [address] [place] [address], POC    Collection Time: 10/27/21  9:13 PM   Result Value Ref Range    Glucose (POC) 114 65 - 117 mg/dL    Performed by [facility]    CBC WITH AUTOMATED DIFF    Collection Time: 10/28/21  2:22 AM   Result Value Ref Range    WBC 7.6 4.1 - 11.1 K/uL    RBC 4.08 (L) 4.10 - 5.70 M/uL    HGB 12.6 12.1 - 17.0 g/dL    HCT 41.3 36.6 - 50.3 %    .2 (H) 80.0 - 99.0 FL    MCH 30.9 26.0 - 34.0 PG    MCHC 30.5 30.0 - 36.5 g/dL    RDW 12.5 11.5 - 14.5 %    PLATELET 429 704 - 372 K/uL    NRBC 0.0 0  WBC    ABSOLUTE NRBC 0.00 0.00 - 0.01 K/uL    NEUTROPHILS 60 32 - 75 %    LYMPHOCYTES 26 12 - 49 %    MONOCYTES 9 5 - 13 %    EOSINOPHILS 4 0 - 7 %    BASOPHILS 1 0 - 1 %    IMMATURE GRANULOCYTES 0 0.0 - 0.5 %    ABS. NEUTROPHILS 4.5 1.8 - 8.0 K/UL    ABS. LYMPHOCYTES 2.0 0.8 - 3.5 K/UL    ABS. MONOCYTES 0.7 0.0 - 1.0 K/UL    ABS. EOSINOPHILS 0.3 0.0 - 0.4 K/UL    ABS. BASOPHILS 0.1 0.0 - 0.1 K/UL    ABS. IMM.  GRANS. 0.0 0.00 - 0.04 K/UL    DF SMEAR SCANNED      PLATELET COMMENTS Large Platelets      RBC COMMENTS NORMOCYTIC, NORMOCHROMIC     METABOLIC PANEL, BASIC    Collection Time: 10/28/21  6:30 AM   Result Value Ref Range    Sodium 136 136 - 145 mmol/L    Potassium 4.4 3.5 - 5.1 mmol/L    Chloride 108 97 - 108 mmol/L    CO2 24 21 - 32 mmol/L    Anion gap 4 (L) 5 - 15 mmol/L    Glucose 115 (H) 65 - 100 mg/dL    BUN 20 6 - 20 MG/DL    Creatinine 1.10 0.70 - 1.30 MG/DL    BUN/Creatinine ratio 18 12 - 20      GFR est AA >60 >60 ml/min/1.73m2    GFR est non-AA >60 >60 ml/min/1.73m2    Calcium 9.9 8.5 - 10.1 MG/DL   GLUCOSE, POC    Collection Time: 10/28/21 11:38 AM   Result Value Ref Range    Glucose (POC) 136 (H) 65 - 117 mg/dL    Performed by Cresencio Malcolm. (CON)           Assessment:     Principal Problem:    Subdural hematoma ([address] (10/22/2021)    Active Problems:    Left hemiparesis ([address] (10/23/2021)        Plan:   1. Right chronic subdural hematoma   - no plans for surgical intervention at this time as it would require a full craniotomy and not just a estefania hole   - Pt is not a candidate for MMA embolization   - neuro checks   - PT/OT evals   - ok to d/c to facility from [address] [place] [address] standpoint when accepted  2. Brain compression   - due to #1   - plans as above  3. Partial complex seizures   - due to #1, 2   - Keppra 1500 mg bid   - Vimpat 100 mg bid   - Neurology following  4. Hx of CAD, CVA   - ASA and Plavix on hold. Keep patient off anti-platelet medications until SDH radiographically resolves   - Last dose was given on Saturday am 10/23     Activity: up with assist  DVT ppx: SCDs  Dispo: SNF    Plan d/w Dr. Dwaine Smith, neurology NP.       Wendy Shine NP

## 2021-10-28 NOTE — PROGRESS NOTES
Transition of Care Plan: Likely SNF (Referrals sent to [address] [facility] [place], [facility], and [facility] pending) [address] has accepted     RUR: 11%     PCP F/U: Dr. Hartmann March     Disposition: likely SNF     Transportation: BLS/Family     Main Contact: Daughter Ryanne: [phone]  ENU-QGAD-[phone]: Telephone call to son, Unk Confer, to provide for SNF choices. Left message to return call. 36: Spoke with patient's son Unk Confer letting him know that more SNF choices need to be provided and that patient is likely to be discharged today. Son states he is concerned regarding patient's seizures and would like to speak to the clinical team/MD regarding him being discharged. States that he will get back to this CM regarding choices. 5: Spoke with MD. States patient is medically ready. Spoke with the daughter Bandar Rojo to let her know that patient is medically ready and that the family needs to provide SNF choices ASAP. List of facilities near [facility] (where patient's wife is) provided, as well as regular list. Also let daughter know she can appeal discharge with medicare as well if she believes he is being discharged too early. 1117: Attending states she will not be discharging today. [phone]: Spoke with family would like [facility] to re-review notes to see if they can accept. Shanta Cherry said she will re-review. Also provided the option of [address]. Referral sent. Still waiting on [facility]. Reached out to liaison to see if they can provide this CM with a decision. 1332: This CM sent referral to [facility] per family's request.     1500: Left message for son, Unk Confer to return call as [address] has accepted patient. 1612: Spoke with family, they are aware that [address] accepted, still wants to hear back from [facility] and [facility]. Have asked this CM to send referrals [facility] and [address] and [address]. Referrals sent.  Family notified that discharge is imminent and a choice needs to be made regarding placement.        Marlon Mercer RN, CRM

## 2021-10-28 NOTE — PROGRESS NOTES
HD 7  No seizures for 24 hours, last seizure 10/26  keppra 1500mg BID  vimpat started 10/26  afeb  VSS  Stable neuro exam  CT 10/27 No change in subdural collection  A/P: 81 yo with right loculated subdural collection  He is having partial seizures due to cortical irritation from this collection  I told patient we can consider surgery if he cannot tolerate these seizures  He is adamant that he does not want surgery  Hold Plavix and ASA until subdural collection resolved radiographically  Okay for d/c from neurosurgical standpoint  Follow up with me in 4-6 weeks with repeat Head CT without contrast prior to follow up

## 2021-10-28 NOTE — PROGRESS NOTES
Bedside and Verbal shift change report given to [facility] (oncoming nurse) by Chel Haque (offgoing nurse). Report included the following information SBAR, Kardex, Procedure Summary, Intake/Output, MAR, Recent Results, Cardiac Rhythm NSR, Quality Measures and Dual Neuro Assessment.

## 2021-10-28 NOTE — PROGRESS NOTES
Bedside shift change report given to [address] [place] [address] Charis RN (oncoming nurse) by Blanka Perez RN (offgoing nurse). Report included the following information SBAR, Kardex, Intake/Output, MAR, Recent Results, Cardiac Rhythm NSR, Alarm Parameters , Quality Measures and Dual Neuro Assessment.

## 2021-10-28 NOTE — PROGRESS NOTES
[facility] Adult  Hospitalist Group                                                                                          Hospitalist Progress Note  Amelia Ivan MD  Answering service: [phone] from in house phone        Date of Service:  10/28/2021  NAME:  Jamie Ventura  :  1935  MRN:  [id number]    Admission Summary:   \"86 y.o. male who presents with left side weakness and numbness   55-year-old male past medical history with CAD, HTN, HLP and CVA. Pt was seen in ER yesterday due episode of left upper lip tingling, which resolved in several hours. She had CT noticed a large chronic subdural hematoma.   ER discussed with Dr. Thien Olivas of neurosurgery and arranged pt to be seen in Dr. Navjot Roque  office at 2:30 PM  Today. Today, pt  was at Dr. Navjot Roque office and after pt was seen and was leaving head at home and suddenly developed acute onset of left-sided weakness.   Said he turned right around went back to Dr. Navjot Roque office which instructed the patient to come here for further evaluation via EMS. Sayra Nguyen is currently states little bit of a headache on the left side. His left side weakness and numbness symptoms resolved in 30 mins. \"    Interval history / Subjective:   Seen earlier in the day. Conversing, no complaints. Denies any recurrence of symptoms. Assessment & Plan:     Chronic right subdural hematoma:  Intermittent left sided weakness:   Complex partial Seizure  -MRI redemonstrated Multiseptated  chronic right subdural hematoma  -Holding ASA, plavix  -Appreciate neurology following-Keppra/Vimpat    Mild bilateral carotid disease: 40% stenosis, medical mg for now.  Defer to NS for further management of antiplatelett  -plan statin    Hypertension history  -resume meds with holding parameters    CAD s/p stents:  -no cp  -home aspirin/plavix was held for reasons above  -f/u with neurosurgery when/if above meds can be restarted    Code status: full   DVT prophylaxis: scd's  PTA: independent    Dispo: SNF placement     Hospital Problems  Date Reviewed: 10/27/2021        Codes Class Noted POA    Left hemiparesis ([facility]) ICD-10-CM: G81.94  ICD-9-CM: 342.90  10/23/2021 Unknown        * (Principal) Subdural hematoma ([facility]) ICD-10-CM: X48.9P0G  ICD-9-CM: 432.1  10/22/2021 Yes                Review of Systems:   Negative unless stated above       Vital Signs:    Last 24hrs VS reviewed since prior progress note. Most recent are:  Visit Vitals  BP (!) 101/57 (BP 1 Location: Right upper arm, BP Patient Position: At rest)   Pulse 70   Temp 97.6 °F (36.4 °C)   Resp 18   Ht 5' 11\" (1.803 m)   Wt 88.7 kg (195 lb 8.8 oz)   SpO2 95%   BMI 27.27 kg/m²         Intake/Output Summary (Last 24 hours) at 10/28/2021 1430  Last data filed at 10/28/2021 0800  Gross per 24 hour   Intake    Output 400 ml   Net -400 ml        Physical Examination:     I had a face to face encounter with this patient and independently examined them on 10/28/2021 as outlined below:          Constitutional:  No acute distress  Eyes: anicteric   ENT:  Oral mucosa moist, no discharge   Resp:  CTA bilaterally. No wheezing/rhonchi/rales. Chest: No accessory muscle use   CV:  Regular rhythm, normal rate, no rubs    GI:  Soft, non distended, non tender.  normoactive bowel sounds    Musculoskeletal:  No edema, warm, 2+ pulses throughout    Neurologic:  Moves all extremities, awake  Skin: no jaundice  Psych: not agitated, not anxious, does not voice si/hi/hallucinations      Data Review:       Labs:     Recent Labs     10/28/21  0222   WBC 7.6   HGB 12.6   HCT 41.3        Recent Labs     10/28/21  0630      K 4.4      CO2 24   BUN 20   CREA 1.10   *   CA 9.9       Lab Results   Component Value Date/Time    Cholesterol, total 169 10/23/2021 08:16 AM    HDL Cholesterol 42 10/23/2021 08:16 AM    LDL, calculated 102.6 (H) 10/23/2021 08:16 AM    Triglyceride 122 10/23/2021 08:16 AM    CHOL/HDL Ratio 4.0 10/23/2021 08:16 AM     Lab Results   Component Value Date/Time    Glucose (POC) 136 (H) 10/28/2021 11:38 AM    Glucose (POC) 114 10/27/2021 09:13 PM    Glucose (POC) 125 (H) 10/27/2021 04:47 PM    Glucose (POC) 195 (H) 10/27/2021 11:15 AM    Glucose (POC) 107 10/27/2021 07:44 AM         Medications Reviewed:     Current Facility-Administered Medications   Medication Dose Route Frequency    [START ON 10/29/2021] atorvastatin (LIPITOR) tablet 40 mg  40 mg Oral DAILY    calcium carbonate (TUMS) chewable tablet 200 mg [elemental]  200 mg Oral TID PRN    lacosamide (VIMPAT) tablet 100 mg  100 mg Oral BID    levETIRAcetam (KEPPRA) tablet 1,500 mg  1,500 mg Oral BID    polyethylene glycol (MIRALAX) packet 17 g  17 g Oral DAILY    [Held by provider] aspirin delayed-release tablet 81 mg  81 mg Oral DAILY    cholecalciferol (VITAMIN D3) (1000 Units /25 mcg) tablet 5,000 Units  5,000 Units Oral BID    [Held by provider] clopidogreL (PLAVIX) tablet 75 mg  75 mg Oral DAILY    isosorbide mononitrate ER (IMDUR) tablet 120 mg  120 mg Oral 7am    metoprolol tartrate (LOPRESSOR) tablet 12.5 mg  12.5 mg Oral DAILY    glucose chewable tablet 16 g  4 Tablet Oral PRN    dextrose (D50W) injection syrg 12.5-25 g  25-50 mL IntraVENous PRN    glucagon (GLUCAGEN) injection 1 mg  1 mg IntraMUSCular PRN    insulin lispro (HUMALOG) injection   SubCUTAneous AC&HS    acetaminophen (TYLENOL) tablet 650 mg  650 mg Oral Q4H PRN    Or    acetaminophen (TYLENOL) solution 650 mg  650 mg Per NG tube Q4H PRN    Or    acetaminophen (TYLENOL) suppository 650 mg  650 mg Rectal Q4H PRN     ______________________________________________________________________  EXPECTED LENGTH OF STAY: 3d 7h  ACTUAL LENGTH OF STAY:          6                 Danilo Delgado MD

## 2021-10-29 PROBLEM — G81.94 LEFT HEMIPARESIS (HCC): Status: RESOLVED | Noted: 2021-10-23 | Resolved: 2021-10-29

## 2021-10-29 PROBLEM — S06.5XAA SUBDURAL HEMATOMA: Status: RESOLVED | Noted: 2021-10-22 | Resolved: 2021-10-29

## 2021-10-29 LAB
BASOPHILS # BLD: 0.1 K/UL (ref 0–0.1)
BASOPHILS NFR BLD: 1 % (ref 0–1)
DIFFERENTIAL METHOD BLD: ABNORMAL
EOSINOPHIL # BLD: 0.3 K/UL (ref 0–0.4)
EOSINOPHIL NFR BLD: 4 % (ref 0–7)
ERYTHROCYTE [DISTWIDTH] IN BLOOD BY AUTOMATED COUNT: 12.4 % (ref 11.5–14.5)
GLUCOSE BLD STRIP.AUTO-MCNC: 103 MG/DL (ref 65–117)
GLUCOSE BLD STRIP.AUTO-MCNC: 139 MG/DL (ref 65–117)
GLUCOSE BLD STRIP.AUTO-MCNC: 141 MG/DL (ref 65–117)
GLUCOSE BLD STRIP.AUTO-MCNC: 154 MG/DL (ref 65–117)
HCT VFR BLD AUTO: 38.2 % (ref 36.6–50.3)
HGB BLD-MCNC: 12.7 G/DL (ref 12.1–17)
IMM GRANULOCYTES # BLD AUTO: 0 K/UL (ref 0–0.04)
IMM GRANULOCYTES NFR BLD AUTO: 0 % (ref 0–0.5)
LYMPHOCYTES # BLD: 2.1 K/UL (ref 0.8–3.5)
LYMPHOCYTES NFR BLD: 26 % (ref 12–49)
MCH RBC QN AUTO: 31.5 PG (ref 26–34)
MCHC RBC AUTO-ENTMCNC: 33.2 G/DL (ref 30–36.5)
MCV RBC AUTO: 94.8 FL (ref 80–99)
MONOCYTES # BLD: 0.6 K/UL (ref 0–1)
MONOCYTES NFR BLD: 8 % (ref 5–13)
NEUTS SEG # BLD: 4.9 K/UL (ref 1.8–8)
NEUTS SEG NFR BLD: 61 % (ref 32–75)
NRBC # BLD: 0 K/UL (ref 0–0.01)
NRBC BLD-RTO: 0 PER 100 WBC
PLATELET # BLD AUTO: 273 K/UL (ref 150–400)
PMV BLD AUTO: 9.6 FL (ref 8.9–12.9)
RBC # BLD AUTO: 4.03 M/UL (ref 4.1–5.7)
SERVICE CMNT-IMP: ABNORMAL
SERVICE CMNT-IMP: NORMAL
WBC # BLD AUTO: 8 K/UL (ref 4.1–11.1)

## 2021-10-29 PROCEDURE — 74011250637 HC RX REV CODE- 250/637: Performed by: NURSE PRACTITIONER

## 2021-10-29 PROCEDURE — 74011250637 HC RX REV CODE- 250/637: Performed by: INTERNAL MEDICINE

## 2021-10-29 PROCEDURE — 65660000000 HC RM CCU STEPDOWN

## 2021-10-29 PROCEDURE — 74011250637 HC RX REV CODE- 250/637: Performed by: HOSPITALIST

## 2021-10-29 PROCEDURE — 82962 GLUCOSE BLOOD TEST: CPT

## 2021-10-29 PROCEDURE — 74011636637 HC RX REV CODE- 636/637: Performed by: HOSPITALIST

## 2021-10-29 PROCEDURE — 99232 SBSQ HOSP IP/OBS MODERATE 35: CPT | Performed by: NURSE PRACTITIONER

## 2021-10-29 PROCEDURE — 36415 COLL VENOUS BLD VENIPUNCTURE: CPT

## 2021-10-29 PROCEDURE — 85025 COMPLETE CBC W/AUTO DIFF WBC: CPT

## 2021-10-29 RX ORDER — METOPROLOL TARTRATE 25 MG/1
6.25 TABLET, FILM COATED ORAL 2 TIMES DAILY
Qty: 20 TABLET | Refills: 0 | Status: SHIPPED
Start: 2021-10-29 | End: 2021-11-05

## 2021-10-29 RX ORDER — LEVETIRACETAM 750 MG/1
1500 TABLET ORAL 2 TIMES DAILY
Qty: 30 TABLET | Refills: 0 | Status: SHIPPED
Start: 2021-10-29 | End: 2021-11-05 | Stop reason: SDUPTHER

## 2021-10-29 RX ORDER — LACOSAMIDE 50 MG/1
200 TABLET ORAL 2 TIMES DAILY
Status: DISCONTINUED | OUTPATIENT
Start: 2021-10-29 | End: 2021-11-09 | Stop reason: HOSPADM

## 2021-10-29 RX ORDER — ATORVASTATIN CALCIUM 40 MG/1
40 TABLET, FILM COATED ORAL DAILY
Qty: 20 TABLET | Refills: 0 | Status: ON HOLD
Start: 2021-10-30 | End: 2021-12-11

## 2021-10-29 RX ORDER — LACOSAMIDE 100 MG/1
100 TABLET ORAL 2 TIMES DAILY
Qty: 30 TABLET | Refills: 0 | Status: SHIPPED
Start: 2021-10-29 | End: 2021-10-30

## 2021-10-29 RX ORDER — METOPROLOL TARTRATE 25 MG/1
6.25 TABLET, FILM COATED ORAL 2 TIMES DAILY
Status: DISCONTINUED | OUTPATIENT
Start: 2021-10-29 | End: 2021-11-09 | Stop reason: HOSPADM

## 2021-10-29 RX ADMIN — ATORVASTATIN CALCIUM 40 MG: 40 TABLET, FILM COATED ORAL at 09:55

## 2021-10-29 RX ADMIN — POLYETHYLENE GLYCOL 3350 17 G: 17 POWDER, FOR SOLUTION ORAL at 09:55

## 2021-10-29 RX ADMIN — METOPROLOL TARTRATE 12.5 MG: 25 TABLET, FILM COATED ORAL at 09:55

## 2021-10-29 RX ADMIN — LEVETIRACETAM 1500 MG: 500 TABLET, FILM COATED ORAL at 06:13

## 2021-10-29 RX ADMIN — INSULIN LISPRO 2 UNITS: 100 INJECTION, SOLUTION INTRAVENOUS; SUBCUTANEOUS at 11:32

## 2021-10-29 RX ADMIN — LACOSAMIDE 100 MG: 50 TABLET, FILM COATED ORAL at 09:55

## 2021-10-29 RX ADMIN — METOPROLOL TARTRATE 6.25 MG: 25 TABLET, FILM COATED ORAL at 18:00

## 2021-10-29 RX ADMIN — LACOSAMIDE 200 MG: 50 TABLET, FILM COATED ORAL at 22:34

## 2021-10-29 RX ADMIN — Medication 5000 UNITS: at 22:04

## 2021-10-29 RX ADMIN — LEVETIRACETAM 1500 MG: 500 TABLET, FILM COATED ORAL at 18:00

## 2021-10-29 RX ADMIN — Medication 5000 UNITS: at 09:55

## 2021-10-29 RX ADMIN — ISOSORBIDE MONONITRATE 120 MG: 60 TABLET ORAL at 06:14

## 2021-10-29 RX ADMIN — CALCIUM CARBONATE (ANTACID) CHEW TAB 500 MG 200 MG: 500 CHEW TAB at 09:55

## 2021-10-29 NOTE — DISCHARGE SUMMARY
Discharge Summary       PATIENT ID: Magda Mercer  MRN: [id number]   YOB: 1935    DATE OF ADMISSION: 10/22/2021  4:43 PM    DATE OF DISCHARGE: 10/29/2021  PRIMARY CARE PROVIDER: Eleanor Flores MD     ATTENDING PHYSICIAN: Dr. Carlos Alberto [place], Dr. Cely Mujica, Dr. Anatoliy Estrada  DISCHARGING PROVIDER: Malinda Scott MD    To contact this individual call [phone] and ask the  to page. If unavailable ask to be transferred the Adult Hospitalist Department. CONSULTATIONS: IP CONSULT TO NEUROSURGERY  IP CONSULT TO NEUROSURGERY  IP CONSULT TO NEUROLOGY    PROCEDURES/SURGERIES: * No surgery found *    ADMITTING DIAGNOSES & HOSPITAL COURSE:      Admission Summary:   \"86 y. o. male who presents with left side weakness and numbness   80year-old male past medical history with CAD, HTN, HLP and CVA. Pt was seen in ER yesterday due episode of left upper lip tingling, which resolved in several hours. She had CT noticed a large chronic subdural hematoma.   ER discussed with Dr. Stephane Tello of neurosurgery and arranged pt to be seen in Dr. Heath Palacios at 2:30 PM  Today. Today, pt  was at Dr. Ramon Garcia office and after pt was seen Kyree Covarrubias leaving head at home and suddenly developed acute onset of left-sided weakness.   Said he turned right around went back to Dr. Ramon Garcia office which instructed the patient to come here for further evaluation via EMS. Anabel Ozuna is currently states little bit of a headache on the left side. His left side weakness and numbness symptoms resolved in 30 mins. \"           Assessment & Plan:      Chronic right subdural hematoma:  Intermittent left sided weakness:   Complex partial Seizure  -MRI redemonstrated Multiseptated  chronic right subdural hematoma  -Holding ASA, plavix per neurosurgery  -Appreciate neurology following-Keppra/Vimpat  -will need to see both neurology and neurosurgery service as outpatient  -neurosurgery service recommended followup with them in about 4-6 weeks to also include repeat head CT.     Mild bilateral carotid disease: 40% stenosis  -plan statin     Hypertension history  -resume meds with holding parameters     CAD s/p stents:  -no cp  -home aspirin/plavix was held for reasons above  -f/u with neurosurgery when/if above meds can be restarted               FOLLOW UP APPOINTMENTS:    Follow-up Information     Follow up With Specialties Details Why [address] accepting provider  Go today      Renee Sanchez MD Neurosurgery Call Call to schedule appt to be seen in about 4 weeks and will need repeat head CT then [address] [facility] [address], [place] [address], DO Neurology Call Call to schedule followup for seizure control/meds. [address] [place] [address]  [phone]               DIET: as tolerated, easy chew foods    ACTIVITY: as tolerated, for continued therapy at snf        DISCHARGE MEDICATIONS:  Current Discharge Medication List      START taking these medications    Details   atorvastatin (LIPITOR) 40 mg tablet Take 1 Tablet by mouth daily. Qty: 20 Tablet, Refills: 0  Start date: 10/30/2021      lacosamide (VIMPAT) 100 mg tab tablet Take 1 Tablet by mouth two (2) times a day. Max Daily Amount: 200 mg. Qty: 30 Tablet, Refills: 0  Start date: 10/29/2021    Associated Diagnoses: Seizure ([address]      levETIRAcetam (KEPPRA) 750 mg tablet Take 2 Tablets by mouth two (2) times a day. Qty: 30 Tablet, Refills: 0  Start date: 10/29/2021         CONTINUE these medications which have CHANGED    Details   metoprolol tartrate (LOPRESSOR) 25 mg tablet Take 0.5 Tablets by mouth two (2) times a day. Qty: 20 Tablet, Refills: 0  Start date: 10/29/2021         CONTINUE these medications which have NOT CHANGED    Details   cholecalciferol (Vitamin D3) (1000 Units /25 mcg) tablet Take 5,000 Units by mouth two (2) times a day.       isosorbide mononitrate ER (IMDUR) 120 mg CR tablet Take 120 mg by mouth every morning. STOP taking these medications       losartan (COZAAR) 50 mg tablet Comments:   Reason for Stopping:         aspirin delayed-release 81 mg tablet Comments:   Reason for Stopping:                 NOTIFY YOUR PHYSICIAN FOR ANY OF THE FOLLOWING:   Fever over 101 degrees for 24 hours. Chest pain, shortness of breath, fever, chills, nausea, vomiting, diarrhea, change in mentation, falling, weakness, bleeding. Severe pain or pain not relieved by medications. Or, any other signs or symptoms that you may have questions about. DISPOSITION:    Home With:   OT  PT  HH  RN      x Long term SNF/Inpatient Rehab    Independent/assisted living    Hospice    Other:       PATIENT CONDITION AT DISCHARGE:     Functional status    Poor    x Deconditioned     Independent      Cognition     Lucid    x Forgetful     Dementia      Catheters/lines (plus indication)    Bernstein     PICC     PEG    x None      Code status   x  Full code     DNR      PHYSICAL EXAMINATION AT DISCHARGE:  General:          nad  HEENT:          anicteric, no discharge  Neck:               Supple  Lungs:             Clear to auscultation bilaterally. No Wheezing or Rhonchi. Chest wall:      No tenderness  No Accessory muscle use. Heart:              Regular  rhythm. No edema  Abdomen:        Soft, non-tender. Not distended. Bowel sounds normal  Extremities:     No cyanosis. Symmetrical muscle tone.   Skin:                Not Jaundiced    Psych:             Not anxious or agitated, does not voice si/hi/hallucinations  Neurologic:      Awake, moves all extremities      CHRONIC MEDICAL DIAGNOSES:  Problem List as of 10/29/2021 Date Reviewed: 10/27/2021        Codes Class Noted - Resolved    RESOLVED: Left hemiparesis ([facility] ICD-10-CM: G81.94  ICD-9-CM: 342.90  10/23/2021 - 10/29/2021        * (Principal) RESOLVED: Subdural hematoma ([facility] ICD-10-CM: Y54.5H2W  ICD-9-CM: 432.1  10/22/2021 - 10/29/2021              Greater than 30 minutes were spent with the patient on counseling and coordination of care    Signed:   Sherita Godinez MD  10/29/2021  11:05 AM

## 2021-10-29 NOTE — PROGRESS NOTES
Bedside shift change report given to [address] (oncoming nurse) by Jeremy Singh (offgoing nurse). Report included the following information SBAR, ED Summary, Med Rec Status and Cardiac Rhythm NSR.

## 2021-10-29 NOTE — PROGRESS NOTES
Neurology Progress Note  Shayla Wagner NP      Date of admission: 10/22/2021    Patient: Maia Cuevas MRN: [id number]  SSN: [id number]    YOB: 1935  Age: 80 y.o. Sex: male        Subjective:     HPI: Maia Cuevas is a 80 y.o. male we were asked to see for L-HP/numbness. PMH ntoable for CAD, HTN, HPL, remote R pontine, cerebellar and L parietal infarcts, recent syncopal event 10/2/21 admitted due to recurrent L hemisensory deficit and more recent L-HP. Patient reports falling in his bathtub approximately 6 weeks ago with head injury. He was seen in the ED 10/21/21 due to L lip tingling/numbness and headache. Head CT revealed moderate to large loculated R parietal SDH. He was scheduled to f/u with NSGY yesterday in the office. On the drive home from this appointment, the patient developed acute onset LUE/LLE weakness/numbness without associated speech/vision deficits or AMS. This persisted for approximately 30 minutes. He denies recurrence since this time. Head CT was repeated and stable. CTA H/N was also performed without vascular malformation. MRI Brain this AM revealed multiseptated chronic R SDH without acute ischemia. He is maintained on ASA/Plavix PTA. Interval 10/29/21:   He a 2 more seizures on this morning at 0400 and again this afternoon at 1:20pm. He has slowly returning to baseline. He is now requesting the surgery. Review of systems  Review of systems negative except as detailed in the HPI, interval, PMH and A&P    Past Medical History:   Diagnosis Date    CAD (coronary artery disease)     Hypercholesteremia     Hypertension     Stroke [facility])      Past Surgical History:   Procedure Laterality Date    HX CORONARY STENT PLACEMENT      IA CARDIAC SURG PROCEDURE UNLIST      bypass      No family history on file.   Social History     Tobacco Use    Smoking status: Never Smoker    Smokeless tobacco: Never Used   Substance Use Topics    Alcohol use: Never      Prior to Admission medications    Medication Sig Start Date End Date Taking? Authorizing Provider   metoprolol tartrate (LOPRESSOR) 25 mg tablet Take 0.5 Tablets by mouth two (2) times a day. 10/29/21  Yes Jessica Bajwa MD   atorvastatin (LIPITOR) 40 mg tablet Take 1 Tablet by mouth daily. 10/30/21  Yes Jessica Bajwa MD   lacosamide (VIMPAT) 100 mg tab tablet Take 1 Tablet by mouth two (2) times a day. Max Daily Amount: 200 mg. 10/29/21  Yes Jessica Bajwa MD   levETIRAcetam (KEPPRA) 750 mg tablet Take 2 Tablets by mouth two (2) times a day. 10/29/21  Yes Jessica Bajwa MD   metoprolol tartrate (LOPRESSOR) 25 mg tablet Take 12.5 mg by mouth daily. Tatyana Light MD   losartan (COZAAR) 50 mg tablet Take 50 mg by mouth daily. Tatyana Light MD   cholecalciferol (Vitamin D3) (1000 Units /25 mcg) tablet Take 5,000 Units by mouth two (2) times a day. Tatyana Light MD   isosorbide mononitrate ER (IMDUR) 120 mg CR tablet Take 120 mg by mouth every morning. Tatyana Light MD   clopidogreL (Plavix) 75 mg tab Take  by mouth. Tatyana Light MD   aspirin delayed-release 81 mg tablet Take 81 mg by mouth daily.     Tatyana Light MD     Current Facility-Administered Medications   Medication Dose Route Frequency Provider Last Rate Last Admin    metoprolol tartrate (LOPRESSOR) tablet 6.25 mg  6.25 mg Oral BID Jessica Bajwa MD        lacosamide (VIMPAT) tablet 200 mg  200 mg Oral BID Elen Bonilla NP        atorvastatin (LIPITOR) tablet 40 mg  40 mg Oral DAILY Jessica Bajwa MD   40 mg at 10/29/21 0955    calcium carbonate (TUMS) chewable tablet 200 mg [elemental]  200 mg Oral TID PRN Enedelia Rob NP   200 mg at 10/29/21 0955    levETIRAcetam (KEPPRA) tablet 1,500 mg  1,500 mg Oral BID Aranldo Bella NP   1,500 mg at 10/29/21 2953    polyethylene glycol (MIRALAX) packet 17 g  17 g Oral DAILY Tyson Lemus MD   17 g at 10/29/21 0955    cholecalciferol (VITAMIN D3) (1000 Units /25 mcg) tablet 5,000 Units  5,000 Units Oral BID Sebastián Goel MD   5,000 Units at 10/29/21 0955    isosorbide mononitrate ER (IMDUR) tablet 120 mg  120 mg Oral 7am Ct Adame MD   120 mg at 10/29/21 5384    glucose chewable tablet 16 g  4 Tablet Oral PRN Sebastián Goel MD        dextrose (D50W) injection syrg 12.5-25 g  25-50 mL IntraVENous PRN Sebastián Goel MD        glucagon [facility]) injection 1 mg  1 mg IntraMUSCular PRN Sebastián Goel MD        insulin lispro (HUMALOG) injection   SubCUTAneous AC&HS Sebastián Goel MD   2 Units at 10/29/21 1132    acetaminophen (TYLENOL) tablet 650 mg  650 mg Oral Q4H PRN Sebastián Goel MD   650 mg at 10/25/21 0206    Or    acetaminophen (TYLENOL) solution 650 mg  650 mg Per NG tube Q4H PRN Sebastián Goel MD        Or   [employer] acetaminophen (TYLENOL) suppository 650 mg  650 mg Rectal Q4H PRN Sebastián Goel MD            Allergies   Allergen Reactions    Morphine Unknown (comments)       Objective:     Vitals:    10/29/21 1000 10/29/21 1200 10/29/21 1400 10/29/21 1416   BP:    125/72   Pulse: 83 72 69 71   Resp:    17   Temp:    97.7 °F (36.5 °C)   SpO2:            Temp (24hrs), Av.8 °F (36.6 °C), Min:97.4 °F (36.3 °C), Max:98.1 °F (36.7 °C)        O2 Device: None (Room air)         Intake/Output Summary (Last 24 hours) at 10/29/2021 1525  Last data filed at 10/29/2021 0947  Gross per 24 hour   Intake 360 ml   Output 350 ml   Net 10 ml       General: In NAD. Cardiac: RRR  Extremities. No edema. Neurologic Exam:  Mental Status:  Alert and oriented x 4. Language:    Grossly intact fluency and comprehension. No dysarthria. Cranial Nerves:   Pupils equal, round and reactive to light. Visual fields intact. Extraocular movements intact w/o nystagmus      Facial sensation intact to LT     Slight facial droop due to seizure      Hearing grossly intact. Motor:    Bulk and tone normal.      5/5 strength in all extremities except left arm 4/5 strength      No pronator drift. No involuntary movements.     Sensation: Sensation intact throughout to light touch. Coordination & Gait: No ataxia with finger to nose. Gait deferred    LABS:  Recent Labs     10/29/21  0248 10/28/21  0222   WBC 8.0 7.6   HGB 12.7 12.6   HCT 38.2 41.3    221     Recent Labs     10/28/21  0630      K 4.4      CO2 24   BUN 20   CREA 1.10   *   CA 9.9     No results for input(s): ALT, AP, TBIL, TBILI, TP, ALB, GLOB, GGT, AML, LPSE in the last 72 hours. No lab exists for component: SGOT, GPT, AMYP, HLPSE  No results for input(s): INR, PTP, APTT, INREXT, INREXT in the last 72 hours. No results for input(s): PHI, PCO2I, PO2I, HCO3I in the last 72 hours. No results for input(s): CPK, CKNDX, TROIQ in the last 72 hours. No lab exists for component: CPKMB  Lab Results   Component Value Date/Time    Cholesterol, total 169 10/23/2021 08:16 AM    HDL Cholesterol 42 10/23/2021 08:16 AM    LDL, calculated 102.6 (H) 10/23/2021 08:16 AM    Triglyceride 122 10/23/2021 08:16 AM    CHOL/HDL Ratio 4.0 10/23/2021 08:16 AM     Lab Results   Component Value Date/Time    Glucose (POC) 154 (H) 10/29/2021 11:13 AM    Glucose (POC) 103 10/29/2021 07:09 AM    Glucose (POC) 109 10/28/2021 09:23 PM    Glucose (POC) 116 10/28/2021 04:38 PM    Glucose (POC) 136 (H) 10/28/2021 11:38 AM     No results found for: COLOR, APPRN, SPGRU, REFSG, ALFONSO, PROTU, GLUCU, KETU, BILU, UROU, AMALIA, LEUKU, GLUKE, EPSU, BACTU, WBCU, RBCU, CASTS, UCRY    No results for input(s): FE, TIBC, PSAT, FERR in the last 72 hours. No results found for: FOL, RBCF   No results for input(s): PH, PCO2, PO2 in the last 72 hours. No results for input(s): CPK, CKNDX, TROIQ in the last 72 hours. No lab exists for component: CPKMB    Imaging:  No results found. CT Results:  Results from Hospital Encounter encounter on 10/22/21    CT HEAD WO CONT    Narrative  EXAM: CT HEAD WO CONT    INDICATION: left side weakness    COMPARISON: October 25, 2021.     CONTRAST: None.    TECHNIQUE: Unenhanced CT of the head was performed using 5 mm images. Brain and  bone windows were generated. Coronal and sagittal reformats. CT dose reduction  was achieved through use of a standardized protocol tailored for this  examination and automatic exposure control for dose modulation. FINDINGS:  The ventricles and sulci are stable in size, shape and configuration. . There is  unchanged periventricular white matter disease. Old lacunar infarct in the right  midbrain is unchanged. Previously seen chronic right frontoparietal extra-axial  collection is unchanged in size. There is no new intracranial hemorrhage or  significant mass effect. The basilar cisterns are open. No CT evidence of acute  infarct. The bone windows demonstrate no abnormalities. The visualized portions of the  paranasal sinuses and mastoid air cells are clear. Impression  Unchanged chronic right frontoparietal extra-axial collection. No evidence of  acute infarct, intracranial mass, or new intracranial hemorrhage. CT CODE NEURO HEAD WO CONTRAST    Narrative  EXAM: CT CODE NEURO HEAD WO CONTRAST    INDICATION: Left sided weakness    COMPARISON: 10/23/2021. CONTRAST: None. TECHNIQUE: Unenhanced CT of the head was performed using 5 mm images. Brain and  bone windows were generated. Coronal and sagittal reformats. CT dose reduction  was achieved through use of a standardized protocol tailored for this  examination and automatic exposure control for dose modulation. FINDINGS:  Chronic right subdural complex collection is unchanged with areas of  predominantly chronic subdural hemorrhage with possible subacute components  posteriorly and inferiorly. Chronic small vessel ischemic disease is again noted  with more focal encephalomalacia in the left frontal parietal region. Right  pontine lacunar infarct. Impression  Overall no significant change.       CTA CODE NEURO HEAD AND NECK W CONT    Narrative  EXAM: CTA CODE NEURO HEAD AND NECK W CONT    INDICATION: slurred speech    COMPARISON: October 22. CONTRAST: 100 mL of Isovue-370. TECHNIQUE:  Unenhanced  images were obtained to localize the volume for  acquisition. Multislice helical axial CT angiography was performed from the  aortic arch to the top of the head during uneventful rapid bolus intravenous  contrast administration. Coronal and sagittal reformations and 3D post  processing was performed. CT dose reduction was achieved through use of a  standardized protocol tailored for this examination and automatic exposure  control for dose modulation. This study was analyzed by the [address] ai algorithm. FINDINGS:    Head CT obtained after intravenous contrast show no enhancing lesion. NASCET  method was utilized for calculating stenosis. Vertebral arteries in the neck are patent. Carotid bifurcation show no change. Basilar artery is patent. There is no large vessel occlusion intracranially. Next    Impression  No change since yesterday's study without acute findings. MRI Results:  Results from [place] encounter on 10/22/21    MRI BRAIN W WO CONT    Narrative  EXAM:  MRI BRAIN W WO CONT    INDICATION:    ? L sided tumor per teleneurology    COMPARISON:  None. CONTRAST: 18 cc IV ProHance. TECHNIQUE:  Multiplanar multisequence acquisition without and with contrast of the brain. FINDINGS:  There is a septated multiloculated extra-axial likely subdural fluid collection  on the right with some mild mass effect on the adjacent brain but no definite  shift of the midline. This is likely a chronic subdural with some chronic,  subacute and possibly acute components. Maximal width is 24 mm. Ventricles are  normal in size with no significant shift. No enhancing intracranial lesion or  masses. There is some mild atrophy and nonspecific white matter changes.     Impression  impression: Multiseptated  chronic right subdural hematoma. Results from [place] encounter on 01/27/21    MRA NECK WO CONT    Narrative  INDICATION: left lip numbness now resolved similar to previous CVA    COMPARISON: CT head earlier today    TECHNIQUE:  Multiplanar MR imaging of the brain performed without IV contrast.  3-D time-of-flight MRA of the brain was performed. Multiplanar reconstructions  were obtained. 2D time of flight MRA of the neck was performed. Multiplanar  reconstructions were obtained. FINDINGS:    MRI Brain:    Ventricles: Midline, no hydrocephalus. Brain Parenchyma/Brainstem: Extensive chronic white matter disease in the  supratentorial brain. Small chronic right pontine and cerebellar infarctions. Focal area of encephalomalacia left parietal lobe. No acute infarction. Intracranial Hemorrhage: None. Basal Cisterns: Normal.  Flow Voids: Normal.  Additional Comments: N/A. MRA NECK:    Carotid Arteries: No significant stenosis by NASCET criteria. Vertebral Arteries: Left vertebral artery is patent and dominant, with at least  mild areas of irregularity versus artifact. Hypoplastic but patent right  vertebral artery  Additional Comments: N/A. Carotid stenosis determined using NASCET criteria. MRA HEAD:    Posterior Circulation: No flow limiting stenosis or occlusion. Mild diffuse  irregularity bilateral posterior cerebral arteries. Anterior Circulation: No flow limiting stenosis or occlusion. Additional Comments: No evidence of aneurysm or vascular malformation. Persistent left trigeminal artery, a congenital variant. Impression  1. Chronic white matter disease and areas of remote infarction as above, with no  acute process. 2. No flow limiting stenosis or arterial occlusion.       MRA BRAIN WO CONT    Narrative  INDICATION: left lip numbness now resolved similar to previous CVA    COMPARISON: CT head earlier today    TECHNIQUE:  Multiplanar MR imaging of the brain performed without IV contrast.  3-D time-of-flight MRA of the brain was performed. Multiplanar reconstructions  were obtained. 2D time of flight MRA of the neck was performed. Multiplanar  reconstructions were obtained. FINDINGS:    MRI Brain:    Ventricles: Midline, no hydrocephalus. Brain Parenchyma/Brainstem: Extensive chronic white matter disease in the  supratentorial brain. Small chronic right pontine and cerebellar infarctions. Focal area of encephalomalacia left parietal lobe. No acute infarction. Intracranial Hemorrhage: None. Basal Cisterns: Normal.  Flow Voids: Normal.  Additional Comments: N/A. MRA NECK:    Carotid Arteries: No significant stenosis by NASCET criteria. Vertebral Arteries: Left vertebral artery is patent and dominant, with at least  mild areas of irregularity versus artifact. Hypoplastic but patent right  vertebral artery  Additional Comments: N/A. Carotid stenosis determined using NASCET criteria. MRA HEAD:    Posterior Circulation: No flow limiting stenosis or occlusion. Mild diffuse  irregularity bilateral posterior cerebral arteries. Anterior Circulation: No flow limiting stenosis or occlusion. Additional Comments: No evidence of aneurysm or vascular malformation. Persistent left trigeminal artery, a congenital variant. Impression  1. Chronic white matter disease and areas of remote infarction as above, with no  acute process. 2. No flow limiting stenosis or arterial occlusion. XR Results   Results from [place] encounter on 04/05/21    XR 3RD FINGER RT MIN 2 V    Impression  No evidence of fracture or foreign body. .      Results from [place] encounter on 02/16/21    XR WRIST LT AP/LAT/OBL MIN 3V    Impression  No acute abnormality. XR HAND LT MIN 3 V    Impression  No acute abnormality. VAS/US Results (maximum last 3): No results found for this or any previous visit.       TTE        EKG  Results for orders placed or performed during the hospital encounter of 10/22/21   EKG, 12 LEAD, INITIAL   Result Value Ref Range    Ventricular Rate 78 BPM    Atrial Rate 78 BPM    P-R Interval 182 ms    QRS Duration 120 ms    Q-T Interval 384 ms    QTC Calculation (Bezet) 437 ms    Calculated P Axis 22 degrees    Calculated R Axis -31 degrees    Calculated T Axis 94 degrees    Diagnosis       Sinus rhythm with frequent premature ventricular complexes  Left axis deviation  Nonspecific intraventricular conduction delay  Minimal voltage criteria for LVH, may be normal variant ( [place] product )  Abnormal QRS-T angle, consider primary T wave abnormality  Abnormal ECG  Confirmed by Quynh Lopez MD. ([id number]) on 10/24/2021 11:53:09 PM         Hospital Problems  Date Reviewed: 10/27/2021    None          Assessment/Plan:     Amanda Rowe is a 80 y.o. male with a h/o CAD, HTN, HPL, remote R pontine, cerebellar and L parietal infarcts, recently diagnosed R parietal SDH with subacute fall/head injury several weeks ago admitted due to L-HP/paresthesias 10/22/21 lasting approximately 30 minutes with resolution. He has had several recurrent surgery despite the additional medication. Plan to increase Vimpat to 200 mg BID and continue Keppra 1500mg BID. He was previously adamant about not having surgery however now he is adamant about having surgery. Plan    -EEG normal    -Keppra 1500 mg BID   -Increase Vimpat 200 mg BID    -CTH reviewed unchanged chronic right frontoparietal extra-axial collection. No evidence  of acute infarct, intracranial mass, or new intracranial hemorrhage.   -Hold ASA and Plavix   - Recommend NSGY discuss surgical options with patient again. Thank you very much for this consultation. No further neurologic recommendations at this time. Will sign off but please call with questions or seizure like activity   Thank you for this consult.     Signed By: Nivia Islas NP     October 29, 2021 11:06 AM

## 2021-10-29 NOTE — PROGRESS NOTES
Pt reported another episode of seizure activity. Reports L facial numbness and L-arm numbness. Symptoms improved within 10 minutes of Vimpat administration.

## 2021-10-29 NOTE — PROGRESS NOTES
Occupational therapy   10.29.2021    Chart reviewed in prep for OT treatment - patient cleared to be seen. Patient received in bed - phone rang and patient answered it and then nursing student entered room to take vitals. Session aborted, will f/u as able. Mykel Garcia MS, OTR/L

## 2021-10-29 NOTE — PROGRESS NOTES
Transition of Care Plan: Home vs SNF     RUR: 10%     PCP F/U: Dr. Ramez Portillo vs SNF-Family has declined [facility]. All other referrals sent have been declined.      Transportation: BLS/Family     Main Contact: Daughter Ryanne: [phone]  Bug-Kucg-[phone]    0925: [facility] declined-more appropriate for SNF, [facility]-denied, [facility]denied. Left message for [address] to see if they could accept today. Left messages for [facility] to see if they could accept today. Left message for [address] to check the status of their referral.     1115: Left message for son that patient has discharge orders. Bed available at [address]. Patient either to discharge home or to [facility]. Has been notified that he may appeal discharge. 1134: Daughter was notified that discharge orders have been put in and [address] has accepted. Declined [facility] and wants to cancel placement there. Was notified that they would need to take patient home or would need to appeal discharge if they felt like patient was being discharged too soon. Number provided to appeal discharge. Letter placed in patient's room. Family to call back with a reference number. 1152: Notified by [facility] that they have denied patient as well.     9717: Spoke with family. States they are trying to request an appeal, but all they can do is leave a message. Have requested more referrals be sent to [facility], [facility], and [facility].        Isabell Clay RN, CRM

## 2021-10-29 NOTE — PROGRESS NOTES
Transition of Care Plan   RUR- Low  9%   DISPOSITION: SNF - pending choice and medical stability   F/U with PCP/Specialist     Transport: [facility] spoke with patient's son, Shelley Atkinson, who verbalized understanding that patient was being discharged today. He would rather proceed with SNF placement now than Medicare appeal. Open to referrals being sent to  at the [address] of [facility] and [email or web address]. Referrals sent. Per Mark York at the [facility], they are able to accept patient today in private room #403. Message left for patient's son [phone]. Rapid covid test pending.    3:26pm: [facility] unable to accept patient. Family is considering [facility].     3:32pm: Neurology plans to adjust patient's seizure med today, holding DC until tomorrow. Patient now expressing interest in surgery. DC is pending surgery plan. If DC takes place tomorrow, [facility] is able to accept, reach out to [facility] #[phone]. Referrals pending at [facility] at the [facility]. If patient remains in hospital until Monday, [facility] and [address] [address] have bed availability. CM to reach out to patient's son, Shelley Atkinson, and provide update tomorrow. ÁNGEL Lopez.

## 2021-10-30 PROBLEM — G40.109 SIMPLE PARTIAL SEIZURES (HCC): Status: ACTIVE | Noted: 2021-10-30

## 2021-10-30 LAB
GLUCOSE BLD STRIP.AUTO-MCNC: 111 MG/DL (ref 65–117)
GLUCOSE BLD STRIP.AUTO-MCNC: 153 MG/DL (ref 65–117)
GLUCOSE BLD STRIP.AUTO-MCNC: 157 MG/DL (ref 65–117)
GLUCOSE BLD STRIP.AUTO-MCNC: 178 MG/DL (ref 65–117)
SERVICE CMNT-IMP: ABNORMAL
SERVICE CMNT-IMP: NORMAL

## 2021-10-30 PROCEDURE — 82962 GLUCOSE BLOOD TEST: CPT

## 2021-10-30 PROCEDURE — 74011250637 HC RX REV CODE- 250/637: Performed by: HOSPITALIST

## 2021-10-30 PROCEDURE — 74011250637 HC RX REV CODE- 250/637: Performed by: NURSE PRACTITIONER

## 2021-10-30 PROCEDURE — 65660000000 HC RM CCU STEPDOWN

## 2021-10-30 PROCEDURE — 74011250637 HC RX REV CODE- 250/637: Performed by: INTERNAL MEDICINE

## 2021-10-30 PROCEDURE — 74011636637 HC RX REV CODE- 636/637: Performed by: HOSPITALIST

## 2021-10-30 PROCEDURE — 99232 SBSQ HOSP IP/OBS MODERATE 35: CPT | Performed by: PSYCHIATRY & NEUROLOGY

## 2021-10-30 RX ORDER — LACOSAMIDE 200 MG/1
200 TABLET ORAL 2 TIMES DAILY
Qty: 2 TABLET | Refills: 0 | Status: SHIPPED
Start: 2021-10-30 | End: 2021-11-05 | Stop reason: SDUPTHER

## 2021-10-30 RX ADMIN — POLYETHYLENE GLYCOL 3350 17 G: 17 POWDER, FOR SOLUTION ORAL at 10:07

## 2021-10-30 RX ADMIN — LACOSAMIDE 200 MG: 50 TABLET, FILM COATED ORAL at 10:07

## 2021-10-30 RX ADMIN — Medication 5000 UNITS: at 23:24

## 2021-10-30 RX ADMIN — INSULIN LISPRO 2 UNITS: 100 INJECTION, SOLUTION INTRAVENOUS; SUBCUTANEOUS at 11:29

## 2021-10-30 RX ADMIN — ATORVASTATIN CALCIUM 40 MG: 40 TABLET, FILM COATED ORAL at 10:07

## 2021-10-30 RX ADMIN — METOPROLOL TARTRATE 6.25 MG: 25 TABLET, FILM COATED ORAL at 10:08

## 2021-10-30 RX ADMIN — LEVETIRACETAM 1500 MG: 500 TABLET, FILM COATED ORAL at 17:33

## 2021-10-30 RX ADMIN — LACOSAMIDE 200 MG: 50 TABLET, FILM COATED ORAL at 22:10

## 2021-10-30 RX ADMIN — LEVETIRACETAM 1500 MG: 500 TABLET, FILM COATED ORAL at 05:28

## 2021-10-30 RX ADMIN — ISOSORBIDE MONONITRATE 120 MG: 60 TABLET ORAL at 07:07

## 2021-10-30 RX ADMIN — METOPROLOL TARTRATE 6.25 MG: 25 TABLET, FILM COATED ORAL at 17:32

## 2021-10-30 RX ADMIN — Medication 5000 UNITS: at 10:06

## 2021-10-30 RX ADMIN — INSULIN LISPRO 2 UNITS: 100 INJECTION, SOLUTION INTRAVENOUS; SUBCUTANEOUS at 16:26

## 2021-10-30 RX ADMIN — ACETAMINOPHEN 650 MG: 325 TABLET ORAL at 11:29

## 2021-10-30 NOTE — PROGRESS NOTES
Bedside and Verbal shift change report given to Marlon LA (oncoming nurse) by Dimitris Munosn (offgoing nurse). Report included the following information Kardex, MAR, Recent Results and Cardiac Rhythm NSR.

## 2021-10-30 NOTE — PROGRESS NOTES
Awake alert  Persistent seizures despite medication  Moves all extremities  May require surgery no worse so will watch over weekend and defer to Dr Alessandra Brantley

## 2021-10-30 NOTE — PROGRESS NOTES
[address] [facility] Adult  Hospitalist Group                                                                                          Hospitalist Progress Note  Alin Cordero MD  Answering service: [phone] from in house phone        Date of Service:  10/30/2021  NAME:  Eros Langley  :  1935  MRN:  [id number]    Admission Summary:   \"86 y.o. male who presents with left side weakness and numbness   60-year-old male past medical history with CAD, HTN, HLP and CVA. Pt was seen in ER yesterday due episode of left upper lip tingling, which resolved in several hours. She had CT noticed a large chronic subdural hematoma.   ER discussed with Dr. René Hernandez of neurosurgery and arranged pt to be seen in Dr. Harjit Simeon  office at 2:30 PM  Today. Today, pt  was at Dr. Harjit Simeon office and after pt was seen and was leaving head at home and suddenly developed acute onset of left-sided weakness.   Said he turned right around went back to Dr. Harjit Simeon office which instructed the patient to come here for further evaluation via EMS. Guero Fontaine is currently states little bit of a headache on the left side. His left side weakness and numbness symptoms resolved in 30 mins. \"    Interval history / Subjective:   Seen earlier in the day. planned to discharge but then reported of recurrent seizure. Assessment & Plan:     Chronic right subdural hematoma:  Intermittent left sided weakness:   Complex partial Seizure  -MRI redemonstrated Multiseptated  chronic right subdural hematoma  -Holding ASA, plavix  -Appreciate neurology following-Keppra/Vimpat  --had planned discharge on 10/29 but pt had another seizure    Mild bilateral carotid disease: 40% stenosis, medical mg for now.  Defer to NS for further management of antiplatelett  -plan statin    Hypertension history  -resume meds with holding parameters    CAD s/p stents:  -no cp  -home aspirin/plavix was held for reasons above  -f/u with neurosurgery when/if above meds can be restarted    Code status: full   DVT prophylaxis: scd's  PTA: independent    Dispo: SNF placement, --had planned discharge on 10/29 but pt had another seizure     Hospital Problems  Date Reviewed: 10/27/2021    None            Review of Systems:   Negative unless stated above       Vital Signs:    Last 24hrs VS reviewed since prior progress note. Most recent are:  Visit Vitals  /63 (BP 1 Location: Left upper arm, BP Patient Position: At rest)   Pulse 75   Temp 98.3 °F (36.8 °C)   Resp 24   Ht 5' 11\" (1.803 m)   Wt 88.7 kg (195 lb 8.8 oz)   SpO2 98%   BMI 27.27 kg/m²       No intake or output data in the 24 hours ending 10/30/21 1425     Physical Examination:     I had a face to face encounter with this patient and independently examined them on 10/30/2021 as outlined below:          Constitutional:  No acute distress  Eyes: anicteric   ENT:  Oral mucosa moist, no discharge   Resp:  CTA bilaterally. No wheezing/rhonchi/rales. Chest: No accessory muscle use   CV:  Regular rhythm, normal rate, no rubs    GI:  Soft, non distended, non tender.  normoactive bowel sounds    Musculoskeletal:  No edema, warm, 2+ pulses throughout    Neurologic:  Moves all extremities, awake  Skin: no jaundice  Psych: not agitated, not anxious, does not voice si/hi/hallucinations      Data Review:       Labs:     Recent Labs     10/29/21  0248 10/28/21  0222   WBC 8.0 7.6   HGB 12.7 12.6   HCT 38.2 41.3    221     Recent Labs     10/28/21  0630      K 4.4      CO2 24   BUN 20   CREA 1.10   *   CA 9.9       Lab Results   Component Value Date/Time    Cholesterol, total 169 10/23/2021 08:16 AM    HDL Cholesterol 42 10/23/2021 08:16 AM    LDL, calculated 102.6 (H) 10/23/2021 08:16 AM    Triglyceride 122 10/23/2021 08:16 AM    CHOL/HDL Ratio 4.0 10/23/2021 08:16 AM     Lab Results   Component Value Date/Time    Glucose (POC) 157 (H) 10/30/2021 11:25 AM    Glucose (POC) 111 10/30/2021 07:05 AM    Glucose (POC) 141 (H) 10/29/2021 09:56 PM    Glucose (POC) 139 (H) 10/29/2021 04:12 PM    Glucose (POC) 154 (H) 10/29/2021 11:13 AM         Medications Reviewed:     Current Facility-Administered Medications   Medication Dose Route Frequency    metoprolol tartrate (LOPRESSOR) tablet 6.25 mg  6.25 mg Oral BID    lacosamide (VIMPAT) tablet 200 mg  200 mg Oral BID    atorvastatin (LIPITOR) tablet 40 mg  40 mg Oral DAILY    calcium carbonate (TUMS) chewable tablet 200 mg [elemental]  200 mg Oral TID PRN    levETIRAcetam (KEPPRA) tablet 1,500 mg  1,500 mg Oral BID    polyethylene glycol (MIRALAX) packet 17 g  17 g Oral DAILY    cholecalciferol (VITAMIN D3) (1000 Units /25 mcg) tablet 5,000 Units  5,000 Units Oral BID    isosorbide mononitrate ER (IMDUR) tablet 120 mg  120 mg Oral 7am    glucose chewable tablet 16 g  4 Tablet Oral PRN    dextrose (D50W) injection syrg 12.5-25 g  25-50 mL IntraVENous PRN    glucagon (GLUCAGEN) injection 1 mg  1 mg IntraMUSCular PRN    insulin lispro (HUMALOG) injection   SubCUTAneous AC&HS    acetaminophen (TYLENOL) tablet 650 mg  650 mg Oral Q4H PRN    Or    acetaminophen (TYLENOL) solution 650 mg  650 mg Per NG tube Q4H PRN    Or    acetaminophen (TYLENOL) suppository 650 mg  650 mg Rectal Q4H PRN     ______________________________________________________________________  EXPECTED LENGTH OF STAY: 3d 7h  ACTUAL LENGTH OF STAY:          8                 Sherita Godinez MD

## 2021-10-30 NOTE — PROGRESS NOTES
[facility]  [facility]ist [facility]                                                                                          Hospitalist Progress Note  Pradeep Mercedes MD (last day of service on 10/31/2021)  Answering service: [phone] OR [phone] from in house phone        Date of Service:  10/30/2021  NAME:  Brad Lemos  :  1935  MRN:  [id number]    Admission Summary:   \"86 y.o. male who presents with left side weakness and numbness   80year-old male past medical history with CAD, HTN, HLP and CVA. Pt was seen in ER yesterday due episode of left upper lip tingling, which resolved in several hours. She had CT noticed a large chronic subdural hematoma.   ER discussed with Dr. Dwaine Smith of neurosurgery and arranged pt to be seen in Dr. Renzo Santizo  office at 2:30 PM  Today. Today, pt  was at Dr. Renzo Santizo office and after pt was seen and was leaving head at home and suddenly developed acute onset of left-sided weakness.   Said he turned right around went back to Dr. Renzo Santizo office which instructed the patient to come here for further evaluation via EMS. Elvia Koo is currently states little bit of a headache on the left side. His left side weakness and numbness symptoms resolved in 30 mins. \"    Interval history / Subjective:   Seen earlier in the day. Conversing, no complaints. Assessment & Plan:     Chronic right subdural hematoma:  Intermittent left sided weakness:   Complex partial Seizure  -MRI redemonstrated Multiseptated  chronic right subdural hematoma  -Holding ASA, plavix  -Appreciate neurology following-Keppra/Vimpat  -10/29 discontinued discharge to snf due to recurrence of seizures which his dose was adjusted by neurology service. Mild bilateral carotid disease: 40% stenosis, medical mg for now.  Defer to NS for further management of antiplatelet  -plan statin    Hypertension history  -resume meds with holding parameters    CAD s/p stents:  -no cp  -home aspirin/plavix was held for reasons above  -f/u with neurosurgery when/if above meds can be restarted    Code status: full   DVT prophylaxis: scd's  PTA: independent    Dispo: tbd due to recurrent seizure reported on 10/29/2021 which his medication dose was adjusted. Hospital Problems  Date Reviewed: 10/27/2021    None            Review of Systems:   Pt reports recent seizures that comes and goes. No report of headache/vision changes/cp/n/v/abd pain/cp/sob/unilateral weakness/subjective fever/etc. 10 point ROS negative other than noted      Vital Signs:    Last 24hrs VS reviewed since prior progress note. Most recent are:  Visit Vitals  /63 (BP 1 Location: Left upper arm, BP Patient Position: At rest)   Pulse 75   Temp 98.3 °F (36.8 °C)   Resp 24   Ht 5' 11\" (1.803 m)   Wt 88.7 kg (195 lb 8.8 oz)   SpO2 98%   BMI 27.27 kg/m²       No intake or output data in the 24 hours ending 10/30/21 1477     Physical Examination:     I had a face to face encounter with this patient and independently examined them on 10/30/2021 as outlined below:          Constitutional:  No acute distress  Eyes: anicteric   ENT:  Oral mucosa moist, no discharge   Resp:  CTA bilaterally. No wheezing/rhonchi/rales. Chest: No accessory muscle use   CV:  Regular rhythm, normal rate, no rubs    GI:  Soft, non distended, non tender.  normoactive bowel sounds    Musculoskeletal:  No edema, warm, 2+ pulses throughout    Neurologic:  Moves all extremities, awake  Skin: no jaundice  Psych: not agitated, not anxious, does not voice si/hi/hallucinations      Data Review:       Labs:     Recent Labs     10/29/21  0248 10/28/21  0222   WBC 8.0 7.6   HGB 12.7 12.6   HCT 38.2 41.3    221     Recent Labs     10/28/21  0630      K 4.4      CO2 24   BUN 20   CREA 1.10   *   CA 9.9       Lab Results   Component Value Date/Time    Cholesterol, total 169 10/23/2021 08:16 AM    HDL Cholesterol 42 10/23/2021 08:16 AM    LDL, calculated 102.6 (H) 10/23/2021 08:16 AM    Triglyceride 122 10/23/2021 08:16 AM    CHOL/HDL Ratio 4.0 10/23/2021 08:16 AM     Lab Results   Component Value Date/Time    Glucose (POC) 157 (H) 10/30/2021 11:25 AM    Glucose (POC) 111 10/30/2021 07:05 AM    Glucose (POC) 141 (H) 10/29/2021 09:56 PM    Glucose (POC) 139 (H) 10/29/2021 04:12 PM    Glucose (POC) 154 (H) 10/29/2021 11:13 AM         Medications Reviewed:     Current Facility-Administered Medications   Medication Dose Route Frequency    metoprolol tartrate (LOPRESSOR) tablet 6.25 mg  6.25 mg Oral BID    lacosamide (VIMPAT) tablet 200 mg  200 mg Oral BID    atorvastatin (LIPITOR) tablet 40 mg  40 mg Oral DAILY    calcium carbonate (TUMS) chewable tablet 200 mg [elemental]  200 mg Oral TID PRN    levETIRAcetam (KEPPRA) tablet 1,500 mg  1,500 mg Oral BID    polyethylene glycol (MIRALAX) packet 17 g  17 g Oral DAILY    cholecalciferol (VITAMIN D3) (1000 Units /25 mcg) tablet 5,000 Units  5,000 Units Oral BID    isosorbide mononitrate ER (IMDUR) tablet 120 mg  120 mg Oral 7am    glucose chewable tablet 16 g  4 Tablet Oral PRN    dextrose (D50W) injection syrg 12.5-25 g  25-50 mL IntraVENous PRN    glucagon (GLUCAGEN) injection 1 mg  1 mg IntraMUSCular PRN    insulin lispro (HUMALOG) injection   SubCUTAneous AC&HS    acetaminophen (TYLENOL) tablet 650 mg  650 mg Oral Q4H PRN    Or    acetaminophen (TYLENOL) solution 650 mg  650 mg Per NG tube Q4H PRN    Or    acetaminophen (TYLENOL) suppository 650 mg  650 mg Rectal Q4H PRN     ______________________________________________________________________  EXPECTED LENGTH OF STAY: 3d 7h  ACTUAL LENGTH OF STAY:          8                 Samuel Prado MD

## 2021-10-30 NOTE — PROGRESS NOTES
Sunil Oquendo is a 80 y.o. male with CAD, HTN, HPL, remote R pontine, cerebellar and L parietal infarcts, recent syncopal event 10/2/21 admitted due to recurrent L hemisensory deficit and more recent L-HP. Head CT revealed moderate to large loculated R parietal SDH. He was scheduled to f/u with NSGY yesterday in the office. On the drive home from this appointment, the patient developed acute onset LUE/LLE weakness/numbness without associated speech/vision deficits or AMS. This persisted for approximately 30 minutes. Head CT was repeated and stable. CTA H/N was also performed without vascular malformation. MRI Brain this AM revealed multiseptated chronic R SDH without acute ischemia. He reportedly had another episode last night of tingling sensation in the left face and arm without any change in strength  Currently being maintained on Keppra and lacosamide    EXAM  Exam:  Visit Vitals  /63 (BP 1 Location: Left upper arm, BP Patient Position: At rest)   Pulse 75   Temp 98.3 °F (36.8 °C)   Resp 24   Ht 5' 11\" (1.803 m)   Wt 195 lb 8.8 oz (88.7 kg)   SpO2 98%   BMI 27.27 kg/m²     Gen:  CV: RRR  Lungs: non labored breathing  Abd: non distending  Neuro: A&O x 3, no dysarthria or aphasia  CN II-XII: PERRL, EOMI, face symmetric, tongue/palate midline  Motor: strength 5/5 all four ext except trace weakness in the left arm proximally and distally  Sensory: intact to LT  Gait: Deferred    LABS/ IMAGING  CT Results (most recent):  Results from Hospital Encounter encounter on 10/22/21    CT HEAD WO CONT    Narrative  EXAM: CT HEAD WO CONT    INDICATION: left side weakness    COMPARISON: October 25, 2021. CONTRAST: None. TECHNIQUE: Unenhanced CT of the head was performed using 5 mm images. Brain and  bone windows were generated. Coronal and sagittal reformats.  CT dose reduction  was achieved through use of a standardized protocol tailored for this  examination and automatic exposure control for dose modulation. FINDINGS:  The ventricles and sulci are stable in size, shape and configuration. . There is  unchanged periventricular white matter disease. Old lacunar infarct in the right  midbrain is unchanged. Previously seen chronic right frontoparietal extra-axial  collection is unchanged in size. There is no new intracranial hemorrhage or  significant mass effect. The basilar cisterns are open. No CT evidence of acute  infarct. The bone windows demonstrate no abnormalities. The visualized portions of the  paranasal sinuses and mastoid air cells are clear. Impression  Unchanged chronic right frontoparietal extra-axial collection. No evidence of  acute infarct, intracranial mass, or new intracranial hemorrhage. ASSESSMENT  Hospital Problems  Date Reviewed: 10/27/2021        Codes Class Noted POA    Simple partial seizures ([facility] [address] ICD-10-CM: G40.109  ICD-9-CM: 345.50  10/30/2021 Unknown        * (Principal) Subdural hematoma ([facility] [address] ICD-10-CM: Y28.4K5Y  ICD-9-CM: 432.1  10/22/2021 Unknown               PLAN  Continues to have brief breakthrough seizures, mainly simple partial seizures in the form of numbness and tingling sensation on the left face and arm  Currently on max dose of Keppra and lacosamide.   Lacosamide dose was increased yesterday  Recommend maintaining same medications at the same dose for now and follow clinically  Neurosurgery is on the case and there is a plan to potentially decompress the subdural hematoma      Carlene Sykes MD

## 2021-10-31 LAB
GLUCOSE BLD STRIP.AUTO-MCNC: 107 MG/DL (ref 65–117)
GLUCOSE BLD STRIP.AUTO-MCNC: 108 MG/DL (ref 65–117)
GLUCOSE BLD STRIP.AUTO-MCNC: 124 MG/DL (ref 65–117)
GLUCOSE BLD STRIP.AUTO-MCNC: 202 MG/DL (ref 65–117)
SERVICE CMNT-IMP: ABNORMAL
SERVICE CMNT-IMP: ABNORMAL
SERVICE CMNT-IMP: NORMAL
SERVICE CMNT-IMP: NORMAL

## 2021-10-31 PROCEDURE — 74011250637 HC RX REV CODE- 250/637: Performed by: HOSPITALIST

## 2021-10-31 PROCEDURE — 74011250637 HC RX REV CODE- 250/637: Performed by: INTERNAL MEDICINE

## 2021-10-31 PROCEDURE — 82962 GLUCOSE BLOOD TEST: CPT

## 2021-10-31 PROCEDURE — 74011636637 HC RX REV CODE- 636/637: Performed by: HOSPITALIST

## 2021-10-31 PROCEDURE — 74011250637 HC RX REV CODE- 250/637: Performed by: NURSE PRACTITIONER

## 2021-10-31 PROCEDURE — 65660000000 HC RM CCU STEPDOWN

## 2021-10-31 RX ADMIN — ISOSORBIDE MONONITRATE 120 MG: 60 TABLET ORAL at 06:12

## 2021-10-31 RX ADMIN — Medication 5000 UNITS: at 23:30

## 2021-10-31 RX ADMIN — INSULIN LISPRO 2 UNITS: 100 INJECTION, SOLUTION INTRAVENOUS; SUBCUTANEOUS at 11:24

## 2021-10-31 RX ADMIN — LACOSAMIDE 200 MG: 50 TABLET, FILM COATED ORAL at 09:01

## 2021-10-31 RX ADMIN — METOPROLOL TARTRATE 6.25 MG: 25 TABLET, FILM COATED ORAL at 09:01

## 2021-10-31 RX ADMIN — LACOSAMIDE 200 MG: 50 TABLET, FILM COATED ORAL at 21:50

## 2021-10-31 RX ADMIN — METOPROLOL TARTRATE 6.25 MG: 25 TABLET, FILM COATED ORAL at 17:56

## 2021-10-31 RX ADMIN — POLYETHYLENE GLYCOL 3350 17 G: 17 POWDER, FOR SOLUTION ORAL at 09:01

## 2021-10-31 RX ADMIN — LEVETIRACETAM 1500 MG: 500 TABLET, FILM COATED ORAL at 06:11

## 2021-10-31 RX ADMIN — Medication 5000 UNITS: at 09:04

## 2021-10-31 RX ADMIN — LEVETIRACETAM 1500 MG: 500 TABLET, FILM COATED ORAL at 17:55

## 2021-10-31 RX ADMIN — ATORVASTATIN CALCIUM 40 MG: 40 TABLET, FILM COATED ORAL at 09:01

## 2021-10-31 NOTE — PROGRESS NOTES
[address] [facility] Adult  Hospitalist Group                                                                                          Hospitalist Progress Note  Mo Dumont MD (last day of service on 10/31/2021)  Answering service: [phone] from in house phone        Date of Service:  10/31/2021  NAME:  Valerie Reyes  :  1935  MRN:  [id number]    Admission Summary:   \"86 y.o. male who presents with left side weakness and numbness   80year-old male past medical history with CAD, HTN, HLP and CVA. Pt was seen in ER yesterday due episode of left upper lip tingling, which resolved in several hours. She had CT noticed a large chronic subdural hematoma.   ER discussed with Dr. Crow Aaron of neurosurgery and arranged pt to be seen in Dr. Jc Ku  office at 2:30 PM  Today. Today, pt  was at Dr. Jc Ku office and after pt was seen and was leaving head at home and suddenly developed acute onset of left-sided weakness.   Said he turned right around went back to Dr. Jc Ku office which instructed the patient to come here for further evaluation via EMS. Shaan Billy is currently states little bit of a headache on the left side. His left side weakness and numbness symptoms resolved in 30 mins. \"    Interval history / Subjective:   Seen earlier in the day. Conversing, no complaints. He states that his last seizure was yesterday. Assessment & Plan:     Chronic right subdural hematoma:  Intermittent left sided weakness:   Complex partial Seizure  -MRI redemonstrated Multiseptated  chronic right subdural hematoma  -Holding ASA, plavix  -Appreciate neurology following-Keppra/Vimpat  -10/29 discontinued discharge to snf due to recurrence of seizures which his dose was adjusted by neurology service. -f/u with neurology and neurosurgery    Mild bilateral carotid disease: 40% stenosis, medical mg for now.  Defer to NS for further management of antiplatelet  -plan statin    Hypertension history  -resume meds with holding parameters    CAD s/p stents:  -no cp  -home aspirin/plavix was held for reasons above  -f/u with neurosurgery when/if above meds can be restarted    Code status: full   DVT prophylaxis: scd's  PTA: independent    Dispo: tbd due to recurrent seizure reported on 10/29/2021 which his medication dose was adjusted. Hospital Problems  Date Reviewed: 10/27/2021        Codes Class Noted POA    Simple partial seizures ([facility] [address] ICD-10-CM: G40.109  ICD-9-CM: 345.50  10/30/2021 Unknown        * (Principal) Subdural hematoma ([facility] [address] ICD-10-CM: I78.5D6I  ICD-9-CM: 432.1  10/22/2021 Unknown                Review of Systems:   Pt reports recent seizures that comes and goes, most recently yesterday according to the patient. No report of headache/vision changes/cp/n/v/abd pain/cp/sob/unilateral weakness/subjective fever/etc. 10 point ROS negative other than noted      Vital Signs:    Last 24hrs VS reviewed since prior progress note. Most recent are:  Visit Vitals  /69 (BP 1 Location: Left upper arm, BP Patient Position: At rest)   Pulse 68   Temp 97.7 °F (36.5 °C)   Resp 19   Ht 5' 11\" (1.803 m)   Wt 88.7 kg (195 lb 8.8 oz)   SpO2 93%   BMI 27.27 kg/m²       No intake or output data in the 24 hours ending 10/31/21 1346     Physical Examination:     I had a face to face encounter with this patient and independently examined them on 10/31/2021 as outlined below:          Constitutional:  No acute distress  Eyes: anicteric   ENT:  Oral mucosa moist, no discharge   Resp:  CTA bilaterally. No wheezing/rhonchi/rales. Chest: No accessory muscle use   CV:  Regular rhythm, normal rate, no rubs    GI:  Soft, non distended, non tender.  normoactive bowel sounds    Musculoskeletal:  No edema, warm, 2+ pulses throughout    Neurologic:  Moves all extremities, awake  Skin: no jaundice  Psych: not agitated, not anxious, does not voice si/hi/hallucinations      Data Review:       Labs:     Recent Labs     10/29/21  0248   WBC 8.0 HGB 12.7   HCT 38.2          Lab Results   Component Value Date/Time    Cholesterol, total 169 10/23/2021 08:16 AM    HDL Cholesterol 42 10/23/2021 08:16 AM    LDL, calculated 102.6 (H) 10/23/2021 08:16 AM    Triglyceride 122 10/23/2021 08:16 AM    CHOL/HDL Ratio 4.0 10/23/2021 08:16 AM     Lab Results   Component Value Date/Time    Glucose (POC) 202 (H) 10/31/2021 10:16 AM    Glucose (POC) 107 10/31/2021 06:54 AM    Glucose (POC) 178 (H) 10/30/2021 10:12 PM    Glucose (POC) 153 (H) 10/30/2021 04:18 PM    Glucose (POC) 157 (H) 10/30/2021 11:25 AM         Medications Reviewed:     Current Facility-Administered Medications   Medication Dose Route Frequency    metoprolol tartrate (LOPRESSOR) tablet 6.25 mg  6.25 mg Oral BID    lacosamide (VIMPAT) tablet 200 mg  200 mg Oral BID    atorvastatin (LIPITOR) tablet 40 mg  40 mg Oral DAILY    calcium carbonate (TUMS) chewable tablet 200 mg [elemental]  200 mg Oral TID PRN    levETIRAcetam (KEPPRA) tablet 1,500 mg  1,500 mg Oral BID    polyethylene glycol (MIRALAX) packet 17 g  17 g Oral DAILY    cholecalciferol (VITAMIN D3) (1000 Units /25 mcg) tablet 5,000 Units  5,000 Units Oral BID    isosorbide mononitrate ER (IMDUR) tablet 120 mg  120 mg Oral 7am    glucose chewable tablet 16 g  4 Tablet Oral PRN    dextrose (D50W) injection syrg 12.5-25 g  25-50 mL IntraVENous PRN    glucagon (GLUCAGEN) injection 1 mg  1 mg IntraMUSCular PRN    insulin lispro (HUMALOG) injection   SubCUTAneous AC&HS    acetaminophen (TYLENOL) tablet 650 mg  650 mg Oral Q4H PRN    Or    acetaminophen (TYLENOL) solution 650 mg  650 mg Per NG tube Q4H PRN    Or    acetaminophen (TYLENOL) suppository 650 mg  650 mg Rectal Q4H PRN     ______________________________________________________________________  EXPECTED LENGTH OF STAY: 3d 7h  ACTUAL LENGTH OF STAY:          9                 Maria Victoria Smallwood MD

## 2021-10-31 NOTE — PROGRESS NOTES
Bedside and Verbal shift change report given to Selena RN (oncoming nurse) by Lizet Fall (offgoing nurse). Report included the following information Kardex, MAR, Recent Results and Cardiac Rhythm NSR.

## 2021-10-31 NOTE — PROGRESS NOTES
Transitions of Care Plan:  RUR: 9%  Clinical Plan: Neurosurgery - re-evaluate tomorrow; breakthrough/persisent seizures  Consults: Neurology; Neurosurgery  Baseline: independent; resides with wife and son  Disposition: SNF - [facility] accepted over the weekend; beds possibly available at [facility] on Mon/Tues    CM received call from Gabriele with [facility] re patient update for disposition to SNF. CM advised Gabriele that patient is not medically stable 2/2 breakthrough seizures and neurosurgery to evaluate tomorrow for possible intervention.     Sajan Page, MPH  Care Manager l [facility]  Available via [employer] or

## 2021-11-01 LAB
ANION GAP SERPL CALC-SCNC: 5 MMOL/L (ref 5–15)
BASOPHILS # BLD: 0.1 K/UL (ref 0–0.1)
BASOPHILS NFR BLD: 1 % (ref 0–1)
BUN SERPL-MCNC: 22 MG/DL (ref 6–20)
BUN/CREAT SERPL: 20 (ref 12–20)
CALCIUM SERPL-MCNC: 9 MG/DL (ref 8.5–10.1)
CHLORIDE SERPL-SCNC: 108 MMOL/L (ref 97–108)
CO2 SERPL-SCNC: 23 MMOL/L (ref 21–32)
CREAT SERPL-MCNC: 1.11 MG/DL (ref 0.7–1.3)
DIFFERENTIAL METHOD BLD: NORMAL
EOSINOPHIL # BLD: 0.3 K/UL (ref 0–0.4)
EOSINOPHIL NFR BLD: 3 % (ref 0–7)
ERYTHROCYTE [DISTWIDTH] IN BLOOD BY AUTOMATED COUNT: 12.2 % (ref 11.5–14.5)
GLUCOSE BLD STRIP.AUTO-MCNC: 106 MG/DL (ref 65–117)
GLUCOSE BLD STRIP.AUTO-MCNC: 122 MG/DL (ref 65–117)
GLUCOSE BLD STRIP.AUTO-MCNC: 144 MG/DL (ref 65–117)
GLUCOSE BLD STRIP.AUTO-MCNC: 145 MG/DL (ref 65–117)
GLUCOSE SERPL-MCNC: 118 MG/DL (ref 65–100)
HCT VFR BLD AUTO: 38.8 % (ref 36.6–50.3)
HGB BLD-MCNC: 12.7 G/DL (ref 12.1–17)
IMM GRANULOCYTES # BLD AUTO: 0 K/UL (ref 0–0.04)
IMM GRANULOCYTES NFR BLD AUTO: 0 % (ref 0–0.5)
LYMPHOCYTES # BLD: 2 K/UL (ref 0.8–3.5)
LYMPHOCYTES NFR BLD: 21 % (ref 12–49)
MCH RBC QN AUTO: 30.9 PG (ref 26–34)
MCHC RBC AUTO-ENTMCNC: 32.7 G/DL (ref 30–36.5)
MCV RBC AUTO: 94.4 FL (ref 80–99)
MONOCYTES # BLD: 0.7 K/UL (ref 0–1)
MONOCYTES NFR BLD: 8 % (ref 5–13)
NEUTS SEG # BLD: 6.3 K/UL (ref 1.8–8)
NEUTS SEG NFR BLD: 67 % (ref 32–75)
NRBC # BLD: 0 K/UL (ref 0–0.01)
NRBC BLD-RTO: 0 PER 100 WBC
PLATELET # BLD AUTO: 263 K/UL (ref 150–400)
PMV BLD AUTO: 9.2 FL (ref 8.9–12.9)
POTASSIUM SERPL-SCNC: 4.1 MMOL/L (ref 3.5–5.1)
RBC # BLD AUTO: 4.11 M/UL (ref 4.1–5.7)
SERVICE CMNT-IMP: ABNORMAL
SERVICE CMNT-IMP: NORMAL
SODIUM SERPL-SCNC: 136 MMOL/L (ref 136–145)
WBC # BLD AUTO: 9.4 K/UL (ref 4.1–11.1)

## 2021-11-01 PROCEDURE — 74011250636 HC RX REV CODE- 250/636: Performed by: NURSE PRACTITIONER

## 2021-11-01 PROCEDURE — 74011250637 HC RX REV CODE- 250/637: Performed by: INTERNAL MEDICINE

## 2021-11-01 PROCEDURE — 74011250637 HC RX REV CODE- 250/637: Performed by: HOSPITALIST

## 2021-11-01 PROCEDURE — 74011250637 HC RX REV CODE- 250/637: Performed by: NURSE PRACTITIONER

## 2021-11-01 PROCEDURE — 97116 GAIT TRAINING THERAPY: CPT

## 2021-11-01 PROCEDURE — 74011000258 HC RX REV CODE- 258: Performed by: NURSE PRACTITIONER

## 2021-11-01 PROCEDURE — 82962 GLUCOSE BLOOD TEST: CPT

## 2021-11-01 PROCEDURE — 36415 COLL VENOUS BLD VENIPUNCTURE: CPT

## 2021-11-01 PROCEDURE — 65660000000 HC RM CCU STEPDOWN

## 2021-11-01 PROCEDURE — 80048 BASIC METABOLIC PNL TOTAL CA: CPT

## 2021-11-01 PROCEDURE — 85025 COMPLETE CBC W/AUTO DIFF WBC: CPT

## 2021-11-01 RX ORDER — PHENYTOIN 50 MG/1
100 TABLET, CHEWABLE ORAL 3 TIMES DAILY
Status: DISCONTINUED | OUTPATIENT
Start: 2021-11-01 | End: 2021-11-09 | Stop reason: HOSPADM

## 2021-11-01 RX ADMIN — ISOSORBIDE MONONITRATE 120 MG: 60 TABLET ORAL at 06:29

## 2021-11-01 RX ADMIN — Medication 5000 UNITS: at 09:32

## 2021-11-01 RX ADMIN — Medication 5000 UNITS: at 23:09

## 2021-11-01 RX ADMIN — LEVETIRACETAM 1500 MG: 500 TABLET, FILM COATED ORAL at 18:46

## 2021-11-01 RX ADMIN — LEVETIRACETAM 1500 MG: 500 TABLET, FILM COATED ORAL at 06:24

## 2021-11-01 RX ADMIN — LACOSAMIDE 200 MG: 50 TABLET, FILM COATED ORAL at 20:56

## 2021-11-01 RX ADMIN — POLYETHYLENE GLYCOL 3350 17 G: 17 POWDER, FOR SOLUTION ORAL at 09:00

## 2021-11-01 RX ADMIN — SODIUM CHLORIDE 1000 MG PE: 9 INJECTION, SOLUTION INTRAVENOUS at 14:54

## 2021-11-01 RX ADMIN — ATORVASTATIN CALCIUM 40 MG: 40 TABLET, FILM COATED ORAL at 09:33

## 2021-11-01 RX ADMIN — PHENYTOIN 100 MG: 50 TABLET, CHEWABLE ORAL at 20:56

## 2021-11-01 RX ADMIN — LACOSAMIDE 200 MG: 50 TABLET, FILM COATED ORAL at 09:32

## 2021-11-01 NOTE — PROGRESS NOTES
Bedside and Verbal shift change report given to DILCIA GARNICA RN (oncoming nurse) by Meme Rousseau RN (offgoing nurse). Report included the following information SBAR, Kardex, Intake/Output, MAR, Recent Results, Cardiac Rhythm Sinus Rhythm, Alarm Parameters  and Quality Measures.

## 2021-11-01 NOTE — PROGRESS NOTES
HD 10  Last seizure 10/30  keppra 1500mg BID  vimpat started 10/26  Fosphenytoin started today  afeb  VSS  Stable neuro exam  CT 10/27 No change in subdural collection, loculated collection with minimal mass effect  A/P: 81 yo with right loculated subdural collection  He is having partial seizures due to cortical irritation from this collection  We discussed that there is no guarantee seizures will decrease with surgery  Appreciate Dr. Smith Harrison assistance  Plan is to add fosphenytoin today and if seizures continue will consider surgery for Thursday.

## 2021-11-01 NOTE — PROGRESS NOTES
[id number]  Hospitalist Group                                                                                          Hospitalist Progress Note          Date of Service:  2021  NAME:  Bibiana Hood  :  1935  MRN:  [id number]    Admission Summary:   \"86 y.o. male who presents with left side weakness and numbness   80year-old male past medical history with CAD, HTN, HLP and CVA. Pt was seen in ER yesterday due episode of left upper lip tingling, which resolved in several hours. She had CT noticed a large chronic subdural hematoma.   ER discussed with Dr. Fabián Pena of neurosurgery and arranged pt to be seen in Dr. Jann Perez  office at 2:30 PM  Today. Today, pt  was at Dr. Jann Perez office and after pt was seen and was leaving head at home and suddenly developed acute onset of left-sided weakness.   Said he turned right around went back to Dr. Jann Perez office which instructed the patient to come here for further evaluation via EMS. Jeramie Greene is currently states little bit of a headache on the left side. His left side weakness and numbness symptoms resolved in 30 mins. \"    Interval history / Subjective:   No acute events, denies pain, dyspnea, n/v/d, seizure.      Assessment & Plan:     Chronic right subdural hematoma:  Intermittent left sided weakness:   Complex partial Seizure  -MRI redemonstrated Multiseptated  chronic right subdural hematoma  -Holding ASA, plavix  -Appreciate neurology following-Keppra/Vimpat and added fosphenytoin   -plan is to monitor on current regimen and if seizure recurs surgery will be considered    Mild bilateral carotid disease    Hypertension    CAD s/p stents:  -no cp  -home aspirin/plavix was held for reasons above  -f/u with neurosurgery when/if above meds can be restarted    Code status: full   DVT prophylaxis: scd's  PTA: independent    Dispo: TBD     Hospital Problems  Date Reviewed: 10/27/2021        Codes Class Noted POA    Simple partial seizures [facility]) ICD-10-CM: G40.109  ICD-9-CM: 345.50  10/30/2021 Unknown        * (Principal) Subdural hematoma ([facility] [address] ICD-10-CM: X43.3B5I  ICD-9-CM: 432.1  10/22/2021 Unknown                Review of Systems:   As per HPI      Vital Signs:    Last 24hrs VS reviewed since prior progress note. Most recent are:  Visit Vitals  /67 (BP 1 Location: Left upper arm, BP Patient Position: Sitting)   Pulse 76   Temp 98.2 °F (36.8 °C)   Resp 17   Ht 5' 11\" (1.803 m)   Wt 88.7 kg (195 lb 8.8 oz)   SpO2 98%   BMI 27.27 kg/m²       No intake or output data in the 24 hours ending 11/01/21 1746     Physical Examination:     I had a face to face encounter with this patient and independently examined them on 11/1/2021 as outlined below:          Constitutional:  No acute distress  Eyes: anicteric   ENT:  Oral mucosa moist, no discharge   Resp:  CTA bilaterally. No wheezing/rhonchi/rales. Chest: No accessory muscle use   CV:  Regular rhythm, normal rate, no rubs    GI:  Soft, non distended, non tender.  normoactive bowel sounds    Musculoskeletal:  No edema, warm, 2+ pulses throughout    Neurologic:  grossly non-focal  Skin: no jaundice  Psych: not agitated, not anxious      Data Review:       Labs:     Recent Labs     11/01/21  0246   WBC 9.4   HGB 12.7   HCT 38.8          Lab Results   Component Value Date/Time    Cholesterol, total 169 10/23/2021 08:16 AM    HDL Cholesterol 42 10/23/2021 08:16 AM    LDL, calculated 102.6 (H) 10/23/2021 08:16 AM    Triglyceride 122 10/23/2021 08:16 AM    CHOL/HDL Ratio 4.0 10/23/2021 08:16 AM     Lab Results   Component Value Date/Time    Glucose (POC) 144 (H) 11/01/2021 04:49 PM    Glucose (POC) 122 (H) 11/01/2021 11:49 AM    Glucose (POC) 106 11/01/2021 07:11 AM    Glucose (POC) 124 (H) 10/31/2021 09:52 PM    Glucose (POC) 108 10/31/2021 04:19 PM         Medications Reviewed:     Current Facility-Administered Medications   Medication Dose Route Frequency    phenytoin (DILANTIN) chewable tablet 100 mg  100 mg Oral TID    metoprolol tartrate (LOPRESSOR) tablet 6.25 mg  6.25 mg Oral BID    lacosamide (VIMPAT) tablet 200 mg  200 mg Oral BID    atorvastatin (LIPITOR) tablet 40 mg  40 mg Oral DAILY    calcium carbonate (TUMS) chewable tablet 200 mg [elemental]  200 mg Oral TID PRN    levETIRAcetam (KEPPRA) tablet 1,500 mg  1,500 mg Oral BID    polyethylene glycol (MIRALAX) packet 17 g  17 g Oral DAILY    cholecalciferol (VITAMIN D3) (1000 Units /25 mcg) tablet 5,000 Units  5,000 Units Oral BID    isosorbide mononitrate ER (IMDUR) tablet 120 mg  120 mg Oral 7am    glucose chewable tablet 16 g  4 Tablet Oral PRN    dextrose (D50W) injection syrg 12.5-25 g  25-50 mL IntraVENous PRN    glucagon (GLUCAGEN) injection 1 mg  1 mg IntraMUSCular PRN    insulin lispro (HUMALOG) injection   SubCUTAneous AC&HS    acetaminophen (TYLENOL) tablet 650 mg  650 mg Oral Q4H PRN    Or    acetaminophen (TYLENOL) solution 650 mg  650 mg Per NG tube Q4H PRN    Or    acetaminophen (TYLENOL) suppository 650 mg  650 mg Rectal Q4H PRN     ______________________________________________________________________  EXPECTED LENGTH OF STAY: 3d 7h  ACTUAL LENGTH OF STAY:          10                 Perez Mosqueda MD

## 2021-11-01 NOTE — PROGRESS NOTES
Problem: Mobility Impaired (Adult and Pediatric)  Goal: *Acute Goals and Plan of Care (Insert Text)  Description:   FUNCTIONAL STATUS PRIOR TO ADMISSION: Patient was independent and active without use of DME.    HOME SUPPORT PRIOR TO ADMISSION: The patient lived with wife but did not require assist.    Physical Therapy Goals  Initiated 10/23/2021, Goals re-assessed 11/1/2021 and appropriate for carryover. 1.  Patient will move from supine to sit and sit to supine  in bed with modified independence within 7 day(s). 2.  Patient will transfer from bed to chair and chair to bed with modified independence using the least restrictive device within 7 day(s). 3.  Patient will perform sit to stand with modified independence within 7 day(s). 4.  Patient will ambulate with modified independence for 200 feet with the least restrictive device within 7 day(s). 5.  Patient will ascend/descend 4 stairs with 1 handrail(s) with modified independence within 7 day(s). 6.  Patient will improve Avalos Balance score by 7 points within 7 days. Outcome: Progressing Towards Goal  PHYSICAL THERAPY TREATMENT: WEEKLY REASSESSMENT  Patient: Valeria Dietz (32 y.o. male)  Date: 11/1/2021  Primary Diagnosis: CVA (cerebral vascular accident) [facility]) [I63.9]        Precautions: Fall      ASSESSMENT  Patient continues with skilled PT services and is progressing slowly towards goals. He continue so present with impulsivity, poor safety awareness, poor insight into deficits, L weakness, mild L inattention, unsteady gait, and impaired balance. Pt required Nataliya for ambulation d/t anterior lean/LOB. Ended session in bed with all needs met and perseverative with circular logic surrounding potential plan for surgery. Recommending SNF upon discharge.          Current Level of Function Impacting Discharge (mobility/balance): Nataliya for ambulation     Other factors to consider for discharge: fall risk, impaired cognition, poor insight into deficits, poor safety awareness, impulsivity         PLAN :  Goals have been updated based on progression since last assessment. Patient continues to benefit from skilled intervention to address the above impairments. Recommendations and Planned Interventions: bed mobility training, transfer training, gait training, therapeutic exercises, neuromuscular re-education, patient and family training/education, and therapeutic activities      Frequency/Duration: Patient will be followed by physical therapy:  3 times a week to address goals. Recommendation for discharge: (in order for the patient to meet his/her long term goals)  Therapy up to 5 days/week in SNF setting    This discharge recommendation:  Has not yet been discussed the attending provider and/or case management    IF patient discharges home will need the following DME: none     SUBJECTIVE:   Patient stated I don't understand what the point of the surgery is if I still need to be on this new medication for the rest of my life.  pt perseverative     OBJECTIVE DATA SUMMARY:   HISTORY:    Past Medical History:   Diagnosis Date    CAD (coronary artery disease)     Hypercholesteremia     Hypertension     Stroke [facility])      Past Surgical History:   Procedure Laterality Date    HX CORONARY STENT PLACEMENT      ND CARDIAC SURG PROCEDURE UNLIST      bypass     Home Situation  Home Environment: Private residence  # Steps to Enter: 4  Rails to Enter: No  One/Two Story Residence: One story  Living Alone: No  Support Systems: Spouse/Significant Other, Child(kim) (lives at home with wife and son  self care and independent prior to admission    No AMD)  Patient Expects to be Discharged to[de-identified] [place]  Current DME Used/Available at Home: Grab bars  Tub or Shower Type: Tub/Shower combination    EXAMINATION/PRESENTATION/DECISION MAKING:   Critical Behavior:  Neurologic State: Alert  Orientation Level: Oriented X4  Cognition: Appropriate decision making, Follows commands  Safety/Judgement: Decreased awareness of environment, Decreased awareness of need for assistance, Decreased awareness of need for safety, Decreased insight into deficits    Functional Mobility:  Bed Mobility:  Sit to Supine: Stand-by assistance     Transfers:  Sit to Stand: Contact guard assistance  Stand to Sit: Contact guard assistance    Balance:   Sitting: Impaired  Sitting - Static: Good (unsupported)  Sitting - Dynamic: Good (unsupported)  Standing: Impaired; With support  Standing - Static: Fair;Constant support  Standing - Dynamic : Poor;Constant support    Ambulation/Gait Training:  Distance (ft): 80 Feet (ft) (2 reps)  Assistive Device: Gait belt  Ambulation - Level of Assistance: Minimal assistance  Gait Abnormalities: Shuffling gait;Trunk sway increased (anterior lean/LOB)  Base of Support: Center of gravity altered (anterior lean)  Speed/Denisha: Slow;Shuffled  Step Length: Right shortened;Left shortened    Activity Tolerance:   Fair    After treatment patient left in no apparent distress:   Supine in bed, Call bell within reach, Bed / chair alarm activated, and Side rails x 3    COMMUNICATION/EDUCATION:   The patients plan of care was discussed with: Registered nurse. Fall prevention education was provided and the patient/caregiver indicated understanding., Patient/family have participated as able in goal setting and plan of care. , and Patient/family agree to work toward stated goals and plan of care.     Thank you for this referral.  Micha Chaudhary, PT, DPT   Time Calculation: 8 mins

## 2021-11-01 NOTE — PROGRESS NOTES
Transition of Care Plan: SNF (accepted at [facility], [facility], and [facility])     RUR: 9%     PCP F/U: Dr. Osvaldo Bosch     Disposition: SNF (accepted at accepted at [facility], [facility], and [facility])     Transportation: BLS/Family     Main Contact: Michelle Pearl: [phone]  [id number]-[phone]     1114: Patient was set to be discharged on Friday. Family was going to appeal discharge, but ultimately decided to provide more SNF choices for patient to discharge to a SNF. However prior to discharge, neurosurgery was asked to re-evaluate as patient states that he is now interested in surgery to help with his partial seizures. Has been accepted to [facility], [place], and [facility]. Will continue to follow.     Graeme Anand RN, CRM

## 2021-11-02 LAB
GLUCOSE BLD STRIP.AUTO-MCNC: 108 MG/DL (ref 65–117)
GLUCOSE BLD STRIP.AUTO-MCNC: 113 MG/DL (ref 65–117)
GLUCOSE BLD STRIP.AUTO-MCNC: 122 MG/DL (ref 65–117)
GLUCOSE BLD STRIP.AUTO-MCNC: 126 MG/DL (ref 65–117)
PHENYTOIN SERPL-MCNC: 12.8 UG/ML (ref 10–20)
SERVICE CMNT-IMP: ABNORMAL
SERVICE CMNT-IMP: ABNORMAL
SERVICE CMNT-IMP: NORMAL
SERVICE CMNT-IMP: NORMAL

## 2021-11-02 PROCEDURE — 74011250637 HC RX REV CODE- 250/637: Performed by: NURSE PRACTITIONER

## 2021-11-02 PROCEDURE — 74011250637 HC RX REV CODE- 250/637: Performed by: INTERNAL MEDICINE

## 2021-11-02 PROCEDURE — 36415 COLL VENOUS BLD VENIPUNCTURE: CPT

## 2021-11-02 PROCEDURE — 80185 ASSAY OF PHENYTOIN TOTAL: CPT

## 2021-11-02 PROCEDURE — 65660000000 HC RM CCU STEPDOWN

## 2021-11-02 PROCEDURE — 82962 GLUCOSE BLOOD TEST: CPT

## 2021-11-02 PROCEDURE — 74011250637 HC RX REV CODE- 250/637: Performed by: HOSPITALIST

## 2021-11-02 PROCEDURE — 80177 DRUG SCRN QUAN LEVETIRACETAM: CPT

## 2021-11-02 RX ADMIN — PHENYTOIN 100 MG: 50 TABLET, CHEWABLE ORAL at 16:00

## 2021-11-02 RX ADMIN — METOPROLOL TARTRATE 6.25 MG: 25 TABLET, FILM COATED ORAL at 18:29

## 2021-11-02 RX ADMIN — LEVETIRACETAM 1500 MG: 500 TABLET, FILM COATED ORAL at 07:00

## 2021-11-02 RX ADMIN — ISOSORBIDE MONONITRATE 120 MG: 60 TABLET ORAL at 07:00

## 2021-11-02 RX ADMIN — PHENYTOIN 100 MG: 50 TABLET, CHEWABLE ORAL at 21:35

## 2021-11-02 RX ADMIN — ATORVASTATIN CALCIUM 40 MG: 40 TABLET, FILM COATED ORAL at 08:52

## 2021-11-02 RX ADMIN — METOPROLOL TARTRATE 6.25 MG: 25 TABLET, FILM COATED ORAL at 08:51

## 2021-11-02 RX ADMIN — LACOSAMIDE 200 MG: 50 TABLET, FILM COATED ORAL at 08:51

## 2021-11-02 RX ADMIN — CALCIUM CARBONATE (ANTACID) CHEW TAB 500 MG 200 MG: 500 CHEW TAB at 11:25

## 2021-11-02 RX ADMIN — Medication 5000 UNITS: at 22:30

## 2021-11-02 RX ADMIN — Medication 5000 UNITS: at 08:52

## 2021-11-02 RX ADMIN — PHENYTOIN 100 MG: 50 TABLET, CHEWABLE ORAL at 08:51

## 2021-11-02 RX ADMIN — LEVETIRACETAM 1500 MG: 500 TABLET, FILM COATED ORAL at 18:29

## 2021-11-02 RX ADMIN — LACOSAMIDE 200 MG: 50 TABLET, FILM COATED ORAL at 21:35

## 2021-11-02 NOTE — PROGRESS NOTES
Neurosurgery Progress Note  Blaine Moritz, [facility]          Admit Date: 10/22/2021   LOS: 11 days        Daily Progress Note: 2021    HPI: The patient has a history of a stroke in  and is on ASA and Plavix. The patient had a fall in his bathtub 6 months ago and hit his head. He has had recurrent numbness and tingling events on the left side of his body. He has been followed by Dr. Feli Gunter with CT scans for a chronic right-sided SDH. He was at Dr. Magdalena Morgan office on Friday when he developed sudden onset of left sided weakness and numbness when walking to his car. He went back into the office and EMS was called. He presented to [facility] and has been evaluated by neurology. He numbness and tingling resolved. He has had multiple episodes of these sensory changes with weakness and it is thought they are complex partial seizures. He had another on this morning around 0. His keppra was increased to 1500 mg bid. Dr. Feli Gunter spoke with him about surgery, but due to his age and medical co-morbidities, we are planning currently for medical treatment. Subjective:   Phenytoin added yesterday as patient continued with partial complex seizures on . Pt was agitated this morning and pulled out his IV stating that the nurse was going to connect his IV to his wife and transfer his blood to her. He looks sleepy while sitting up in the chair, but is able to talk to me. His main complaint this morning is indigestion, taking he is \"burping fire\" and needs his Tums. Denies leg pain, nausea, vomiting, difficulty swallowing, and dyspnea. Objective:     Vital signs  Temp (24hrs), Av.1 °F (36.7 °C), Min:97.8 °F (36.6 °C), Max:98.6 °F (37 °C)   No intake/output data recorded. No intake/output data recorded.     Visit Vitals  BP (!) 165/83 (BP 1 Location: Left upper arm, BP Patient Position: At rest;Lying)   Pulse 71   Temp 98 °F (36.7 °C)   Resp 16   Ht 5' 11\" (1.803 m)   Wt 88.7 kg (195 lb 8.8 oz)   SpO2 97% BMI 27.27 kg/m²      O2 Device: None (Room air)     Pain control  Pain Assessment  Pain Scale 1: Numeric (0 - 10)  Pain Intensity 1: 0    PT/OT  Gait     Gait  Base of Support: Center of gravity altered (anterior lean)  Speed/Denisha: Slow, Shuffled  Step Length: Right shortened, Left shortened  Gait Abnormalities: Shuffling gait, Trunk sway increased (anterior lean/LOB)  Ambulation - Level of Assistance: Minimal assistance  Distance (ft): 80 Feet (ft) (2 reps)  Assistive Device: Gait belt           Physical Exam:  Gen:NAD. Neuro: Fatigued but awakens easily. Ox3. Follows commands. Speech mild dysarthria. Affect flat. PERRL. EOMI. Face symmetric. Tongue midline. DUARTE. Strength 5/5 in RUE/RLE, 5-/5 LLE, 5-/5 LUE  Mild left pronator drift  Gait deferred. CT head without contrast on 10/21/21 shows interval moderate to large loculated subdural collection with appearance most likely to represent chronic hematoma    CT head without contrast on 10/25/21 shows overall no significant change.     24 hour results:    Recent Results (from the past 24 hour(s))   GLUCOSE, POC    Collection Time: 11/01/21 11:49 AM   Result Value Ref Range    Glucose (POC) 122 (H) 65 - 117 mg/dL    Performed by Ashley Guardado    GLUCOSE, POC    Collection Time: 11/01/21  4:49 PM   Result Value Ref Range    Glucose (POC) 144 (H) 65 - 117 mg/dL    Performed by Berenice Mata ([employer]NA Traveler)    GLUCOSE, POC    Collection Time: 11/01/21  9:00 PM   Result Value Ref Range    Glucose (POC) 145 (H) 65 - 117 mg/dL    Performed by Enzo hawley RN    PHENYTOIN    Collection Time: 11/02/21  3:21 AM   Result Value Ref Range    Phenytoin 12.8 10.0 - 20.0 ug/mL   GLUCOSE, POC    Collection Time: 11/02/21  7:27 AM   Result Value Ref Range    Glucose (POC) 108 65 - 117 mg/dL    Performed by Enzo hawley RN           Assessment:     Principal Problem:    Subdural hematoma ([address] (10/22/2021)    Active Problems:    Simple partial seizures ([facility] [address] (10/30/2021)        Plan:   1. Right chronic subdural hematoma   - no plans for surgical intervention at this time as it would require a full craniotomy and not just a estefania hole   - Pt is not a candidate for MMA embolization   - neuro checks   - PT/OT evals   - Plan is to see how he does with the addition of the phenytoin. If he continues to have seizures and decides he wants to proceed with surgery this could be done on Thurs. 2. Brain compression   - due to #1   - plans as above  3. Partial complex seizures   - due to #1, 2   - Keppra 1500 mg bid   - Vimpat 100 mg bid   - Phenytoin 100 mg tid   - Phenytoin level 12.3 this am   - Neurology following  4. Hx of CAD, CVA   - ASA and Plavix on hold. Keep patient off anti-platelet medications until SDH radiographically resolves   - Last dose was given on Saturday am 10/23   5.  GERD   - Tums PRN    Activity: up with assist  DVT ppx: SCDs  Dispo: SNF    Plan d/w Dr. Wing Currie, nurse, hospitalist, neurologist.      Anabel Christina NP

## 2021-11-02 NOTE — PROGRESS NOTES
Transition of Care Plan: HH vs SNF (accepted at [facility], and [facility])     RUR: 9%     PCP F/U: Dr. Bishop Santos     [facility] vs SNF (accepted at accepted at [facility], and [facility])     Transportation: BLS/Family     Main Contact: Daughter Ryanne: [phone]  [phone]       4441: Left message for son, Real Cramp, to return call. 1230: Spoke with daughter Lesa Moncada. States that she would like this CM to look into [facility]. States that they may decide to use them instead of a facility. Let her know that home health does not come every day. Additionally, insurance will not cover any private aid care. Also let daughter know that this CM needs a main point of contact for relaying information and making decisions. Lesa Moncada states that she will be the main point of contact. Will continue to follow.      Willem Crandall RN, CRM

## 2021-11-02 NOTE — PROGRESS NOTES
NUTRITION  Reason for Screen: LOS        RECOMMENDATIONS:   1. Continue PO diet as ordered. Please consult Nutrition if any concerns arise. Interventions   - none at this time  - continue current diet       Information obtained from:   patient  Past Medical History:   Diagnosis Date    CAD (coronary artery disease)     Hypercholesteremia     Hypertension     Stroke ([facility] [address]      Pt visited for LOS, reports intakes/appetite stable, just report issues with reflux - RN aware. At this time, no additional nutrition intervention identified, and do not possibility for surgery this week and will continue to monitor while inpatient.      Diet:  cardiac  Supplements: None  Meal Intake:   Patient Vitals for the past 168 hrs:   % Diet Eaten   10/29/21 0947 76 - 100%         Weight Changes:   Stable  Wt Readings from Last 10 Encounters:   10/28/21 88.7 kg (195 lb 8.8 oz)   10/21/21 91.5 kg (201 lb 11.5 oz)   10/11/21 92 kg (202 lb 13.2 oz)   04/05/21 92.9 kg (204 lb 12.9 oz)   02/16/21 94 kg (207 lb 3.7 oz)   01/27/21 93.8 kg (206 lb 12.7 oz)       Nutrition-Related Concerns Identified:  None       PLAN:   - Continue current diet  - Rescreen per policy    Will revisit per policy    Celi Burt RD    Contact via [employer]

## 2021-11-02 NOTE — PROGRESS NOTES
Patient had an episode of confusion at 12. He pulled his IV out and chest leads. He stated that staff put an IV on him and connected it to his wife so staff can transfer his blood to his wife. Reorientation provided. Attempted to put a new IV on patient but he refused.

## 2021-11-02 NOTE — PROGRESS NOTES
[facility]                                                                                          Hospitalist Progress Note          Date of Service:  2021  NAME:  Jeff Ayala  :  1935  MRN:  [id number]    Admission Summary:   \"86 y.o. male who presents with left side weakness and numbness   80year-old male past medical history with CAD, HTN, HLP and CVA. Pt was seen in ER yesterday due episode of left upper lip tingling, which resolved in several hours. She had CT noticed a large chronic subdural hematoma.   ER discussed with Dr. Christina Guerrero of neurosurgery and arranged pt to be seen in Dr. Nayak Horse  office at 2:30 PM  Today. Today, pt  was at Dr. Nayak Horse office and after pt was seen and was leaving head at home and suddenly developed acute onset of left-sided weakness.   Said he turned right around went back to Dr. Nayak Horse office which instructed the patient to come here for further evaluation via EMS. Hal Worthy is currently states little bit of a headache on the left side. His left side weakness and numbness symptoms resolved in 30 mins. \"    Interval history / Subjective:   No acute events, denies pain, dyspnea, n/v/d, seizure. He had an episode of agitation in the AM but was amenable to reorientation which he remembers.      Assessment & Plan:     Chronic right subdural hematoma:  Intermittent left sided weakness:   Complex partial Seizure  -MRI redemonstrated Multiseptated  chronic right subdural hematoma  -Holding ASA, plavix  -Appreciate neurology following-Keppra/Vimpat and added fosphenytoin   -plan is to monitor on current regimen and if seizure recurs surgery will be considered    Mild bilateral carotid disease    Hypertension    CAD s/p stents:  -no cp  -home aspirin/plavix was held for reasons above    Code status: full   DVT prophylaxis: scd's  PTA: independent    Dispo: TBD     Hospital Problems  Date Reviewed: 10/27/2021        Codes Class Noted POA    Simple partial seizures ([facility] ICD-10-CM: G40.109  ICD-9-CM: 345.50  10/30/2021 Unknown        * (Principal) Subdural hematoma ([facility] ICD-10-CM: F47.4G2D  ICD-9-CM: 432.1  10/22/2021 Unknown                Review of Systems:   As per HPI      Vital Signs:    Last 24hrs VS reviewed since prior progress note. Most recent are:  Visit Vitals  BP (!) 144/80 (BP 1 Location: Left arm)   Pulse 65   Temp 98 °F (36.7 °C)   Resp 16   Ht 5' 11\" (1.803 m)   Wt 88.7 kg (195 lb 8.8 oz)   SpO2 99%   BMI 27.27 kg/m²         Intake/Output Summary (Last 24 hours) at 11/2/2021 1825  Last data filed at 11/2/2021 1730  Gross per 24 hour   Intake    Output 300 ml   Net -300 ml        Physical Examination:     I had a face to face encounter with this patient and independently examined them on 11/2/2021 as outlined below:          Constitutional:  No acute distress  Eyes: anicteric   ENT:  Oral mucosa moist, no discharge   Resp:  CTA bilaterally. No wheezing/rhonchi/rales. Chest: No accessory muscle use   CV:  Regular rhythm, normal rate, no rubs    GI:  Soft, non distended, non tender.  normoactive bowel sounds    Musculoskeletal:  No edema, warm, 2+ pulses throughout    Neurologic:  grossly non-focal  Skin: no jaundice  Psych: not agitated, not anxious      Data Review:       Labs:     Recent Labs     11/01/21  0246   WBC 9.4   HGB 12.7   HCT 38.8          Lab Results   Component Value Date/Time    Cholesterol, total 169 10/23/2021 08:16 AM    HDL Cholesterol 42 10/23/2021 08:16 AM    LDL, calculated 102.6 (H) 10/23/2021 08:16 AM    Triglyceride 122 10/23/2021 08:16 AM    CHOL/HDL Ratio 4.0 10/23/2021 08:16 AM     Lab Results   Component Value Date/Time    Glucose (POC) 122 (H) 11/02/2021 04:23 PM    Glucose (POC) 126 (H) 11/02/2021 11:22 AM    Glucose (POC) 108 11/02/2021 07:27 AM    Glucose (POC) 145 (H) 11/01/2021 09:00 PM    Glucose (POC) 144 (H) 11/01/2021 04:49 PM         Medications Reviewed:     Current Facility-Administered Medications   Medication Dose Route Frequency    phenytoin (DILANTIN) chewable tablet 100 mg  100 mg Oral TID    metoprolol tartrate (LOPRESSOR) tablet 6.25 mg  6.25 mg Oral BID    lacosamide (VIMPAT) tablet 200 mg  200 mg Oral BID    atorvastatin (LIPITOR) tablet 40 mg  40 mg Oral DAILY    calcium carbonate (TUMS) chewable tablet 200 mg [elemental]  200 mg Oral TID PRN    levETIRAcetam (KEPPRA) tablet 1,500 mg  1,500 mg Oral BID    polyethylene glycol (MIRALAX) packet 17 g  17 g Oral DAILY    cholecalciferol (VITAMIN D3) (1000 Units /25 mcg) tablet 5,000 Units  5,000 Units Oral BID    isosorbide mononitrate ER (IMDUR) tablet 120 mg  120 mg Oral 7am    glucose chewable tablet 16 g  4 Tablet Oral PRN    dextrose (D50W) injection syrg 12.5-25 g  25-50 mL IntraVENous PRN    glucagon (GLUCAGEN) injection 1 mg  1 mg IntraMUSCular PRN    insulin lispro (HUMALOG) injection   SubCUTAneous AC&HS    acetaminophen (TYLENOL) tablet 650 mg  650 mg Oral Q4H PRN    Or    acetaminophen (TYLENOL) solution 650 mg  650 mg Per NG tube Q4H PRN    Or    acetaminophen (TYLENOL) suppository 650 mg  650 mg Rectal Q4H PRN     ______________________________________________________________________  EXPECTED LENGTH OF STAY: 3d 7h  ACTUAL LENGTH OF STAY:          11                 Kapil Minor MD

## 2021-11-02 NOTE — PROGRESS NOTES
Occupational Therapy    Patient chart reviewed up to date and therapy cleared by RN. Attempted visit, however patient reports just returning to bed, hoping to rest. Per request, will follow-up later today as able.     Alta Ewing, TEDR/L

## 2021-11-03 ENCOUNTER — APPOINTMENT (OUTPATIENT)
Dept: CT IMAGING | Age: 86
DRG: 082 | End: 2021-11-03
Attending: NEUROLOGICAL SURGERY
Payer: MEDICARE

## 2021-11-03 LAB
ERYTHROCYTE [DISTWIDTH] IN BLOOD BY AUTOMATED COUNT: 12.3 % (ref 11.5–14.5)
GLUCOSE BLD STRIP.AUTO-MCNC: 103 MG/DL (ref 65–117)
GLUCOSE BLD STRIP.AUTO-MCNC: 108 MG/DL (ref 65–117)
GLUCOSE BLD STRIP.AUTO-MCNC: 82 MG/DL (ref 65–117)
HCT VFR BLD AUTO: 41.3 % (ref 36.6–50.3)
HGB BLD-MCNC: 13.6 G/DL (ref 12.1–17)
MCH RBC QN AUTO: 31.1 PG (ref 26–34)
MCHC RBC AUTO-ENTMCNC: 32.9 G/DL (ref 30–36.5)
MCV RBC AUTO: 94.5 FL (ref 80–99)
NRBC # BLD: 0 K/UL (ref 0–0.01)
NRBC BLD-RTO: 0 PER 100 WBC
PHENYTOIN SERPL-MCNC: 11 UG/ML (ref 10–20)
PLATELET # BLD AUTO: 261 K/UL (ref 150–400)
PMV BLD AUTO: 9.2 FL (ref 8.9–12.9)
RBC # BLD AUTO: 4.37 M/UL (ref 4.1–5.7)
SERVICE CMNT-IMP: NORMAL
WBC # BLD AUTO: 9.3 K/UL (ref 4.1–11.1)

## 2021-11-03 PROCEDURE — 36415 COLL VENOUS BLD VENIPUNCTURE: CPT

## 2021-11-03 PROCEDURE — 97535 SELF CARE MNGMENT TRAINING: CPT

## 2021-11-03 PROCEDURE — 80185 ASSAY OF PHENYTOIN TOTAL: CPT

## 2021-11-03 PROCEDURE — 74011250637 HC RX REV CODE- 250/637: Performed by: INTERNAL MEDICINE

## 2021-11-03 PROCEDURE — 70450 CT HEAD/BRAIN W/O DYE: CPT

## 2021-11-03 PROCEDURE — 85027 COMPLETE CBC AUTOMATED: CPT

## 2021-11-03 PROCEDURE — 97530 THERAPEUTIC ACTIVITIES: CPT

## 2021-11-03 PROCEDURE — 74011250637 HC RX REV CODE- 250/637: Performed by: HOSPITALIST

## 2021-11-03 PROCEDURE — 74011250637 HC RX REV CODE- 250/637: Performed by: NURSE PRACTITIONER

## 2021-11-03 PROCEDURE — 82962 GLUCOSE BLOOD TEST: CPT

## 2021-11-03 PROCEDURE — 65660000000 HC RM CCU STEPDOWN

## 2021-11-03 RX ADMIN — LACOSAMIDE 200 MG: 50 TABLET, FILM COATED ORAL at 21:53

## 2021-11-03 RX ADMIN — METOPROLOL TARTRATE 6.25 MG: 25 TABLET, FILM COATED ORAL at 08:29

## 2021-11-03 RX ADMIN — PHENYTOIN 100 MG: 50 TABLET, CHEWABLE ORAL at 16:14

## 2021-11-03 RX ADMIN — Medication 5000 UNITS: at 21:53

## 2021-11-03 RX ADMIN — LEVETIRACETAM 1500 MG: 500 TABLET, FILM COATED ORAL at 06:02

## 2021-11-03 RX ADMIN — CALCIUM CARBONATE (ANTACID) CHEW TAB 500 MG 200 MG: 500 CHEW TAB at 10:06

## 2021-11-03 RX ADMIN — PHENYTOIN 100 MG: 50 TABLET, CHEWABLE ORAL at 08:29

## 2021-11-03 RX ADMIN — Medication 5000 UNITS: at 08:00

## 2021-11-03 RX ADMIN — ATORVASTATIN CALCIUM 40 MG: 40 TABLET, FILM COATED ORAL at 08:29

## 2021-11-03 RX ADMIN — LACOSAMIDE 200 MG: 50 TABLET, FILM COATED ORAL at 08:30

## 2021-11-03 RX ADMIN — PHENYTOIN 100 MG: 50 TABLET, CHEWABLE ORAL at 21:53

## 2021-11-03 RX ADMIN — LEVETIRACETAM 1500 MG: 500 TABLET, FILM COATED ORAL at 17:43

## 2021-11-03 RX ADMIN — POLYETHYLENE GLYCOL 3350 17 G: 17 POWDER, FOR SOLUTION ORAL at 08:29

## 2021-11-03 NOTE — PROGRESS NOTES
No complaints  Worked with OT  No seizures since adding third medication  No focal deficit  Awaiting keppra level and labs from today  Head CT shows stable lesion with minimal mass effect - high right convexity  Okay for discharge tomorrow from neurosurgery standpoint  Continue all three antiepileptics - I will adjust doses as needed  Avoid anticoagulation until collection resolved  Follow up with me 2 weeks after discharge

## 2021-11-03 NOTE — PROGRESS NOTES
Problem: Self Care Deficits Care Plan (Adult)  Goal: *Acute Goals and Plan of Care (Insert Text)  Description: FUNCTIONAL STATUS PRIOR TO ADMISSION: Patient was independent and active without use of DME. He drives and performs all ADL and I-ADL without AD. He also mows his grass unless it's too hot then he asks son or someone else to mow it. Wife was visiting him in hospital on 10/22/21 and now she is in hospital on same unit. HOME SUPPORT: The patient lived with wife and son but did not require assist.     Occupational Therapy Goals  Initiated 10/23/2021, continued 11/3/2021  1. Patient will perform standing bathing task sitting to bathe knees and distal only without LOB with modified independence within 7 day(s). 2.  Patient will perform item retrieval in prep for upper body dressing and lower body dressing with modified independence within 7 day(s). 3.  Patient will perform simple home management with modified independence within 7 day(s). 4.  Patient will perform toilet transfers with modified independence within 7 day(s). 5.  Patient will perform all aspects of toileting with independence within 7 day(s). 6. Patient will complete all functional mobility during OT session without LOB or cues for safety or sequencing tasks within 7 days. Outcome: Progressing Towards Goal     OCCUPATIONAL THERAPY TREATMENT/WEEKLY RE-ASSESSMENT  Patient: Valerie Reyes (18 y.o. male)  Date: 11/3/2021  Diagnosis: CVA (cerebral vascular accident) [facility]) [I63.9]   Subdural hematoma ([facility]       Precautions: Fall  Chart, occupational therapy assessment, plan of care, and goals were reviewed. ASSESSMENT  Patient continues with skilled OT services and is progressing towards goals (continued). Remains limited by impaired standing balance, impulsivity/ impaired safety awareness, mild L hemiparesis, impaired functional endurance, and mild-moderate FRANKLIN (although SPO2 stable on RA).   Patient required several seated rest breaks but progressed to perform the following with up to minimum assistance: standing grooming, ambulated 20 ft, then ambulated 100 ft. Required frequent steadying assistance and frequent redirection for safety. Practiced tailor sit for LB access with minimum assistance to achieve position with each LE. Continue to recommend SNF at d/c. (If patient d/c home instead, he would benefit from [facility] and would require x1 assistance for all OOB mobility and ADLs as well as close supervision/ monitoring for fall risk). Current Level of Function Impacting Discharge (ADLs): minimum assistance         PLAN :  Goals have been updated based on progression since last assessment. Patient continues to benefit from skilled intervention to address the above impairments. Continue to follow patient 3 times a week to address goals. Recommendation for discharge: (in order for the patient to meet his/her long term goals)  Therapy up to 5 days/week in SNF setting. (If patient d/c home instead, he would benefit from [facility] and would require x1 assistance for all OOB mobility and ADLs as well as close supervision/ monitoring for fall risk). This discharge recommendation:  Has been made in collaboration with the attending provider and/or case management         SUBJECTIVE:   Patient stated It feels good to get up and move.     OBJECTIVE DATA SUMMARY:   Cognitive/Behavioral Status:  Neurologic State: Alert  Orientation Level: Oriented X4  Cognition: Follows commands; Poor safety awareness;  Impulsive  Perception: Appears intact          Functional Mobility and Transfers for ADLs:  Bed Mobility:  Supine to Sit: Stand-by assistance    Transfers:  Sit to Stand: Minimum assistance; Contact guard assistance (initially Nataliya, progressed to CGA)     Bed to Chair: Minimum assistance    Balance:  Sitting: Intact  Standing: Impaired  Standing - Static: Fair  Standing - Dynamic : Fair    ADL Intervention:       Grooming  Washing Hands: Contact guard assistance (standing at sink)  Brushing Teeth: Contact guard assistance (standing at sink)            Lower Body Dressing Assistance  Socks: Minimum assistance (assistance to achieve tailor sit position)    Toileting  Bladder Hygiene: Set-up (seated, using urinal)       Pain:  Patient did not report pain    Activity Tolerance:   VSS, mild-moderate FRANKLIN    After treatment patient left in no apparent distress:   Sitting in chair, Bed / chair alarm activated, and Caregiver / family present    COMMUNICATION/COLLABORATION:   The patients plan of care was discussed with: Registered nurse, Physician, and Case management.      Lanny Santacruz OT  Time Calculation: 26 mins

## 2021-11-03 NOTE — PROGRESS NOTES
[facility]  Hospitalist Group                                                                                          Hospitalist Progress Note          Date of Service:  11/3/2021  NAME:  Hunter Aguilar  :  1935  MRN:  [id number]    Admission Summary:   \"86 y.o. male who presents with left side weakness and numbness   80year-old male past medical history with CAD, HTN, HLP and CVA. Pt was seen in ER yesterday due episode of left upper lip tingling, which resolved in several hours. She had CT noticed a large chronic subdural hematoma.   ER discussed with Dr. Kasia Baca of neurosurgery and arranged pt to be seen in Dr. Huseyin Da Silva  office at 2:30 PM  Today. Today, pt  was at Dr. Huseyin Da Silva office and after pt was seen and was leaving head at home and suddenly developed acute onset of left-sided weakness.   Said he turned right around went back to Dr. Huseyin Da Silva office which instructed the patient to come here for further evaluation via EMS. Steffanie Homans is currently states little bit of a headache on the left side. His left side weakness and numbness symptoms resolved in 30 mins. \"    Interval history / Subjective:   No acute events, denies pain, dyspnea, n/v/d, seizure.      Assessment & Plan:     Chronic right subdural hematoma:  Intermittent left sided weakness:   Complex partial Seizure  -MRI redemonstrated Multiseptated  chronic right subdural hematoma  -Holding ASA, plavix - resume once collection resolves (as an outpatient and when cleared by NSG)  -Appreciate neurology following-Keppra/Vimpat and added [id number]  -repeat CT stable  -f/u as an outpt w/ neurosurgery    Mild bilateral carotid disease    Hypertension    CAD s/p stents:  -no cp  -home aspirin/plavix was held for reasons above    Code status: full   DVT prophylaxis: scd's  PTA: independent    Dispo: TBD     Hospital Problems  Date Reviewed: 10/27/2021          Codes Class Noted POA    Simple partial seizures ([facility] ICD-10-CM: G40.109  ICD-9-CM: 345.50  10/30/2021 Unknown        * (Principal) Subdural hematoma ([facility] ICD-10-CM: W52.5B9N  ICD-9-CM: 432.1  10/22/2021 Unknown                Review of Systems:   As per HPI      Vital Signs:    Last 24hrs VS reviewed since prior progress note. Most recent are:  Visit Vitals  /77 (BP 1 Location: Right arm, BP Patient Position: Lying)   Pulse 62   Temp 98.2 °F (36.8 °C)   Resp 16   Ht 5' 11\" (1.803 m)   Wt 88.7 kg (195 lb 8.8 oz)   SpO2 98%   BMI 27.27 kg/m²         Intake/Output Summary (Last 24 hours) at 11/3/2021 1602  Last data filed at 11/3/2021 1441  Gross per 24 hour   Intake 540 ml   Output 1050 ml   Net -510 ml        Physical Examination:     I had a face to face encounter with this patient and independently examined them on 11/3/2021 as outlined below:          Constitutional:  No acute distress  Eyes: anicteric   ENT:  Oral mucosa moist, no discharge   Resp:  CTA bilaterally. No wheezing/rhonchi/rales. Chest: No accessory muscle use   CV:  Regular rhythm, normal rate, no rubs    GI:  Soft, non distended, non tender.  normoactive bowel sounds    Musculoskeletal:  No edema, warm, 2+ pulses throughout    Neurologic:  grossly non-focal  Skin: no jaundice  Psych: not agitated, not anxious      Data Review:       Labs:     Recent Labs     11/03/21  1417 11/01/21  0246   WBC 9.3 9.4   HGB 13.6 12.7   HCT 41.3 38.8    263       Lab Results   Component Value Date/Time    Cholesterol, total 169 10/23/2021 08:16 AM    HDL Cholesterol 42 10/23/2021 08:16 AM    LDL, calculated 102.6 (H) 10/23/2021 08:16 AM    Triglyceride 122 10/23/2021 08:16 AM    CHOL/HDL Ratio 4.0 10/23/2021 08:16 AM     Lab Results   Component Value Date/Time    Glucose (POC) 103 11/03/2021 08:10 AM    Glucose (POC) 113 11/02/2021 09:18 PM    Glucose (POC) 122 (H) 11/02/2021 04:23 PM    Glucose (POC) 126 (H) 11/02/2021 11:22 AM    Glucose (POC) 108 11/02/2021 07:27 AM         Medications Reviewed:     Current Facility-Administered Medications   Medication Dose Route Frequency    phenytoin (DILANTIN) chewable tablet 100 mg  100 mg Oral TID    metoprolol tartrate (LOPRESSOR) tablet 6.25 mg  6.25 mg Oral BID    lacosamide (VIMPAT) tablet 200 mg  200 mg Oral BID    atorvastatin (LIPITOR) tablet 40 mg  40 mg Oral DAILY    calcium carbonate (TUMS) chewable tablet 200 mg [elemental]  200 mg Oral TID PRN    levETIRAcetam (KEPPRA) tablet 1,500 mg  1,500 mg Oral BID    polyethylene glycol (MIRALAX) packet 17 g  17 g Oral DAILY    cholecalciferol (VITAMIN D3) (1000 Units /25 mcg) tablet 5,000 Units  5,000 Units Oral BID    isosorbide mononitrate ER (IMDUR) tablet 120 mg  120 mg Oral 7am    glucose chewable tablet 16 g  4 Tablet Oral PRN    dextrose (D50W) injection syrg 12.5-25 g  25-50 mL IntraVENous PRN    glucagon (GLUCAGEN) injection 1 mg  1 mg IntraMUSCular PRN    insulin lispro (HUMALOG) injection   SubCUTAneous AC&HS    acetaminophen (TYLENOL) tablet 650 mg  650 mg Oral Q4H PRN    Or    acetaminophen (TYLENOL) solution 650 mg  650 mg Per NG tube Q4H PRN    Or    acetaminophen (TYLENOL) suppository 650 mg  650 mg Rectal Q4H PRN     ______________________________________________________________________  EXPECTED LENGTH OF STAY: 3d 7h  ACTUAL LENGTH OF STAY:          12                 Genet Joshua MD

## 2021-11-03 NOTE — ROUTINE PROCESS
Bedside and Verbal shift change report given to Via Pheed (oncoming nurse) by Moi Murphy (offgoing nurse). Report included the following information SBAR, Kardex, ED Summary, MAR, Cardiac Rhythm NSR, Alarm Parameters  and Dual Neuro Assessment.

## 2021-11-04 LAB
ANION GAP SERPL CALC-SCNC: 7 MMOL/L (ref 5–15)
BUN SERPL-MCNC: 24 MG/DL (ref 6–20)
BUN/CREAT SERPL: 18 (ref 12–20)
CALCIUM SERPL-MCNC: 8.8 MG/DL (ref 8.5–10.1)
CHLORIDE SERPL-SCNC: 107 MMOL/L (ref 97–108)
CO2 SERPL-SCNC: 22 MMOL/L (ref 21–32)
CREAT SERPL-MCNC: 1.37 MG/DL (ref 0.7–1.3)
ERYTHROCYTE [DISTWIDTH] IN BLOOD BY AUTOMATED COUNT: 12.2 % (ref 11.5–14.5)
GLUCOSE BLD STRIP.AUTO-MCNC: 118 MG/DL (ref 65–117)
GLUCOSE BLD STRIP.AUTO-MCNC: 141 MG/DL (ref 65–117)
GLUCOSE BLD STRIP.AUTO-MCNC: 143 MG/DL (ref 65–117)
GLUCOSE BLD STRIP.AUTO-MCNC: 179 MG/DL (ref 65–117)
GLUCOSE BLD STRIP.AUTO-MCNC: 59 MG/DL (ref 65–117)
GLUCOSE BLD STRIP.AUTO-MCNC: 95 MG/DL (ref 65–117)
GLUCOSE SERPL-MCNC: 195 MG/DL (ref 65–100)
HCT VFR BLD AUTO: 41.6 % (ref 36.6–50.3)
HGB BLD-MCNC: 13.7 G/DL (ref 12.1–17)
MAGNESIUM SERPL-MCNC: 2.1 MG/DL (ref 1.6–2.4)
MCH RBC QN AUTO: 31.1 PG (ref 26–34)
MCHC RBC AUTO-ENTMCNC: 32.9 G/DL (ref 30–36.5)
MCV RBC AUTO: 94.5 FL (ref 80–99)
NRBC # BLD: 0 K/UL (ref 0–0.01)
NRBC BLD-RTO: 0 PER 100 WBC
PLATELET # BLD AUTO: 293 K/UL (ref 150–400)
PMV BLD AUTO: 9 FL (ref 8.9–12.9)
POTASSIUM SERPL-SCNC: 3.8 MMOL/L (ref 3.5–5.1)
RBC # BLD AUTO: 4.4 M/UL (ref 4.1–5.7)
SERVICE CMNT-IMP: ABNORMAL
SERVICE CMNT-IMP: NORMAL
SODIUM SERPL-SCNC: 136 MMOL/L (ref 136–145)
WBC # BLD AUTO: 13.7 K/UL (ref 4.1–11.1)

## 2021-11-04 PROCEDURE — 80048 BASIC METABOLIC PNL TOTAL CA: CPT

## 2021-11-04 PROCEDURE — 74011250637 HC RX REV CODE- 250/637: Performed by: INTERNAL MEDICINE

## 2021-11-04 PROCEDURE — 74011250636 HC RX REV CODE- 250/636: Performed by: HOSPITALIST

## 2021-11-04 PROCEDURE — 82962 GLUCOSE BLOOD TEST: CPT

## 2021-11-04 PROCEDURE — 85027 COMPLETE CBC AUTOMATED: CPT

## 2021-11-04 PROCEDURE — 74011250637 HC RX REV CODE- 250/637: Performed by: NURSE PRACTITIONER

## 2021-11-04 PROCEDURE — 74011250637 HC RX REV CODE- 250/637: Performed by: HOSPITALIST

## 2021-11-04 PROCEDURE — 36415 COLL VENOUS BLD VENIPUNCTURE: CPT

## 2021-11-04 PROCEDURE — 65660000000 HC RM CCU STEPDOWN

## 2021-11-04 PROCEDURE — 83735 ASSAY OF MAGNESIUM: CPT

## 2021-11-04 RX ORDER — SODIUM CHLORIDE 9 MG/ML
100 INJECTION, SOLUTION INTRAVENOUS CONTINUOUS
Status: DISCONTINUED | OUTPATIENT
Start: 2021-11-04 | End: 2021-11-05

## 2021-11-04 RX ADMIN — SODIUM CHLORIDE 100 ML/HR: 9 INJECTION, SOLUTION INTRAVENOUS at 09:50

## 2021-11-04 RX ADMIN — SODIUM CHLORIDE 100 ML/HR: 9 INJECTION, SOLUTION INTRAVENOUS at 10:04

## 2021-11-04 RX ADMIN — Medication 5000 UNITS: at 09:45

## 2021-11-04 RX ADMIN — LACOSAMIDE 200 MG: 50 TABLET, FILM COATED ORAL at 21:50

## 2021-11-04 RX ADMIN — PHENYTOIN 100 MG: 50 TABLET, CHEWABLE ORAL at 16:23

## 2021-11-04 RX ADMIN — ISOSORBIDE MONONITRATE 120 MG: 60 TABLET ORAL at 06:09

## 2021-11-04 RX ADMIN — LEVETIRACETAM 1500 MG: 500 TABLET, FILM COATED ORAL at 18:01

## 2021-11-04 RX ADMIN — ATORVASTATIN CALCIUM 40 MG: 40 TABLET, FILM COATED ORAL at 09:39

## 2021-11-04 RX ADMIN — Medication 16 G: at 22:02

## 2021-11-04 RX ADMIN — LACOSAMIDE 200 MG: 50 TABLET, FILM COATED ORAL at 09:36

## 2021-11-04 RX ADMIN — LEVETIRACETAM 1500 MG: 500 TABLET, FILM COATED ORAL at 06:09

## 2021-11-04 RX ADMIN — PHENYTOIN 100 MG: 50 TABLET, CHEWABLE ORAL at 21:50

## 2021-11-04 RX ADMIN — PHENYTOIN 100 MG: 50 TABLET, CHEWABLE ORAL at 09:36

## 2021-11-04 RX ADMIN — SODIUM CHLORIDE 1000 ML: 9 INJECTION, SOLUTION INTRAVENOUS at 09:41

## 2021-11-04 RX ADMIN — Medication 5000 UNITS: at 21:49

## 2021-11-04 NOTE — PROGRESS NOTES
RN entered room to toilet pt. Patient had episode of vomiting. Patient cool, clammy, and dizzy. BP 88/53. Pageroger Harmon MD.     Patient stated, \"I'm going to pass out\". Minimally responsive. Called RR. Britney Degree aided pt to bed. 1L NS bolus given. Patient started on 100ml/hr maintinence fluids. Labs drawn and sent. Patient resting comfortably. BP 99/67 0947.

## 2021-11-04 NOTE — PROGRESS NOTES
Bedside and Verbal shift change report given to Braden Sanford RN (oncoming nurse) by Sonya Cotto RN (offgoing nurse). Report included the following information SBAR, Kardex, Procedure Summary, Intake/Output, MAR, Recent Results, Cardiac Rhythm Normal Sinus, Alarm Parameters , Quality Measures and Dual Neuro Assessment.

## 2021-11-04 NOTE — PROGRESS NOTES
Neurosurgery Progress Note  Jessica Rajan, [facility]          Admit Date: 10/22/2021   LOS: 13 days        Daily Progress Note: 2021    HPI: The patient has a history of a stroke in  and is on ASA and Plavix. The patient had a fall in his bathtub 6 months ago and hit his head. He has had recurrent numbness and tingling events on the left side of his body. He has been followed by Dr. Roberta Florentino with CT scans for a chronic right-sided SDH. He was at Dr. Audley Hamman office on Friday when he developed sudden onset of left sided weakness and numbness when walking to his car. He went back into the office and EMS was called. He presented to [facility] and has been evaluated by neurology. He numbness and tingling resolved. He has had multiple episodes of these sensory changes with weakness and it is thought they are complex partial seizures. He had another on this morning around 0400. His keppra was increased to 1500 mg bid. Dr. Roberta Florentino spoke with him about surgery, but due to his age and medical co-morbidities, we are planning currently for medical treatment. Subjective:   Pt had a hypotensive episode this morning while he was on the toilet. His BP was 55/45. He was returned to bed and given a liter of fluid with labs drawn. His neuro status came around with the normalization of his blood pressure. He states he feels much better. He says this happened to him once before when he was dehydrated and not eating as much. He has not had any other seizure episodes since the phenytoin was added. Denies leg pain, nausea, vomiting, difficulty swallowing, and dyspnea. Objective:     Vital signs  Temp (24hrs), Av.7 °F (36.5 °C), Min:97.3 °F (36.3 °C), Max:98.2 °F (36.8 °C)   No intake/output data recorded.    1901 -  0700  In: 540 [P.O.:540]  Out: 750 [Urine:750]    Visit Vitals  BP (!) 114/57   Pulse 73   Temp 97.4 °F (36.3 °C)   Resp 25   Ht 5' 11\" (1.803 m)   Wt 88.7 kg (195 lb 8.8 oz)   SpO2 97%   BMI 27.27 kg/m²      O2 Device: None (Room air)     Pain control  Pain Assessment  Pain Scale 1: Numeric (0 - 10)  Pain Intensity 1: 0    PT/OT  Gait     Gait  Base of Support: Center of gravity altered (anterior lean)  Speed/Denisha: Slow, Shuffled  Step Length: Right shortened, Left shortened  Gait Abnormalities: Shuffling gait, Trunk sway increased (anterior lean/LOB)  Ambulation - Level of Assistance: Minimal assistance  Distance (ft): 80 Feet (ft) (2 reps)  Assistive Device: Gait belt           Physical Exam:  Gen:NAD. Neuro: Awake, alert. Ox3. Follows commands. Speech mild dysarthria. Affect flat. PERRL. EOMI. Face symmetric. Tongue midline. DUARTE. Strength 5/5 in RUE/RLE, 5/5 LLE, 5/5 LUE  Negative drift  Gait deferred. CT head without contrast on 10/21/21 shows interval moderate to large loculated subdural collection with appearance most likely to represent chronic hematoma    CT head without contrast on 10/25/21 shows overall no significant change. CT head without contrast on 11/03/21 shows stable chronic right frontoparietal extra-axial fluid collection likely a chronic subdural hematoma. Stable periventricular white matter disease and right midbrain lacunar infarct.     24 hour results:    Recent Results (from the past 24 hour(s))   CBC W/O DIFF    Collection Time: 11/03/21  2:17 PM   Result Value Ref Range    WBC 9.3 4.1 - 11.1 K/uL    RBC 4.37 4.10 - 5.70 M/uL    HGB 13.6 12.1 - 17.0 g/dL    HCT 41.3 36.6 - 50.3 %    MCV 94.5 80.0 - 99.0 FL    MCH 31.1 26.0 - 34.0 PG    MCHC 32.9 30.0 - 36.5 g/dL    RDW 12.3 11.5 - 14.5 %    PLATELET 816 676 - 807 K/uL    MPV 9.2 8.9 - 12.9 FL    NRBC 0.0 0  WBC    ABSOLUTE NRBC 0.00 0.00 - 0.01 K/uL   PHENYTOIN    Collection Time: 11/03/21  2:17 PM   Result Value Ref Range    Phenytoin 11.0 10.0 - 20.0 ug/mL   GLUCOSE, POC    Collection Time: 11/03/21  4:17 PM   Result Value Ref Range    Glucose (POC) 82 65 - 117 mg/dL    Performed by Dallin Mata POC Collection Time: 11/03/21  9:52 PM   Result Value Ref Range    Glucose (POC) 108 65 - 117 mg/dL    Performed by [address] [place] [address], POC    Collection Time: 11/04/21  7:10 AM   Result Value Ref Range    Glucose (POC) 118 (H) 65 - 117 mg/dL    Performed by [address] [place] [address], POC    Collection Time: 11/04/21  8:58 AM   Result Value Ref Range    Glucose (POC) 179 (H) 65 - 117 mg/dL    Performed by Quincy Palmer    CBC W/O DIFF    Collection Time: 11/04/21  9:07 AM   Result Value Ref Range    WBC 13.7 (H) 4.1 - 11.1 K/uL    RBC 4.40 4. 10 - 5.70 M/uL    HGB 13.7 12.1 - 17.0 g/dL    HCT 41.6 36.6 - 50.3 %    MCV 94.5 80.0 - 99.0 FL    MCH 31.1 26.0 - 34.0 PG    MCHC 32.9 30.0 - 36.5 g/dL    RDW 12.2 11.5 - 14.5 %    PLATELET 600 826 - 772 K/uL    MPV 9.0 8.9 - 12.9 FL    NRBC 0.0 0  WBC    ABSOLUTE NRBC 0.00 0.00 - 9.93 K/uL   METABOLIC PANEL, BASIC    Collection Time: 11/04/21  9:07 AM   Result Value Ref Range    Sodium 136 136 - 145 mmol/L    Potassium 3.8 3.5 - 5.1 mmol/L    Chloride 107 97 - 108 mmol/L    CO2 22 21 - 32 mmol/L    Anion gap 7 5 - 15 mmol/L    Glucose 195 (H) 65 - 100 mg/dL    BUN 24 (H) 6 - 20 MG/DL    Creatinine 1.37 (H) 0.70 - 1.30 MG/DL    BUN/Creatinine ratio 18 12 - 20      GFR est AA 60 (L) >60 ml/min/1.73m2    GFR est non-AA 49 (L) >60 ml/min/1.73m2    Calcium 8.8 8.5 - 10.1 MG/DL   MAGNESIUM    Collection Time: 11/04/21  9:07 AM   Result Value Ref Range    Magnesium 2.1 1.6 - 2.4 mg/dL   GLUCOSE, POC    Collection Time: 11/04/21 12:04 PM   Result Value Ref Range    Glucose (POC) 143 (H) 65 - 117 mg/dL    Performed by Salma Michael (DENISE)           Assessment:     Principal Problem:    Subdural hematoma ([facility] (10/22/2021)    Active Problems:    Simple partial seizures ([facility] (10/30/2021)        Plan:   1.  Right chronic subdural hematoma   - no plans for surgical intervention at this time as it would require a full craniotomy and not just a estefania hole   - Pt is not a candidate for MMA embolization   - neuro checks   - PT/OT evals   - F/U with Dr. Christina Guerrero in the office in 2 weeks  2. Brain compression   - due to #1   - plans as above  3. Partial complex seizures   - due to #1, 2   - Keppra 1500 mg bid   - Vimpat 100 mg bid   - Phenytoin 100 mg tid   - Phenytoin level 11    - Neurology following  4. Hx of CAD, CVA   - ASA and Plavix on hold. Keep patient off anti-platelet medications until SDH radiographically resolves   - Last dose was given on Saturday am 10/23   5. GERD   - Tums PRN  6.  Vasovagal syncope   - treated with 1L IV bolus   - on IVF   - hospitalist following    Activity: up with assist  DVT ppx: SCDs  Dispo: SNF    Plan d/w Dr. Christina Guerrero, nurse, hospitalist.      Rafal Cano NP

## 2021-11-04 NOTE — PROGRESS NOTES
[facility] Adult  Hospitalist Group                                                                                          Hospitalist Progress Note          Date of Service:  2021  NAME:  Dallin Arredondo  :  1935  MRN:  [id number]    Admission Summary:   \"86 y.o. male who presents with left side weakness and numbness   80year-old male past medical history with CAD, HTN, HLP and CVA. Pt was seen in ER yesterday due episode of left upper lip tingling, which resolved in several hours. She had CT noticed a large chronic subdural hematoma.   ER discussed with Dr. Baron Alexis of neurosurgery and arranged pt to be seen in Dr. Aracelis Manrique  office at 2:30 PM  Today. Today, pt  was at Dr. Aracelis Manrique office and after pt was seen and was leaving head at home and suddenly developed acute onset of left-sided weakness.   Said he turned right around went back to Dr. Aracelis Manrique office which instructed the patient to come here for further evaluation via EMS. Anisha Barton is currently states little bit of a headache on the left side. His left side weakness and numbness symptoms resolved in 30 mins. \"    Interval history / Subjective:   No events overnight. This morning after I saw him, he was on the toilet and became unresponsive and was noted to be hypotensive. His nurse today tells me that he's been having diarrhea for at least 2 days which I was not informed about prior. Before this episode, the patient offers no complaints, he specifically denied pain, n/v/d, dyspnea. He was insisting that he is going home today. Assessment & Plan:     Hypotension: noted today while on the toilet, after diarrhea, which he's reportedly been having for at least 2 days.    -IVNS bolus now and maintenance fluids  -hold antihypertensives  -hold scheduled miralax    Chronic right subdural hematoma:  Intermittent left sided weakness:   Complex partial Seizure  -MRI redemonstrated Multiseptated  chronic right subdural hematoma  -Holding ASA, plavix - resume once collection resolves (as an outpatient and when cleared by NSG)  -Appreciate neurology following-Keppra/Vimpat and added [id number]  -repeat CT stable  -f/u as an outpt w/ neurosurgery    Mild bilateral carotid disease    Hypertension  -holding meds as above    CAD s/p stents:  -no cp  -home aspirin/plavix was held for reasons above    Code status: full   DVT prophylaxis: scd's  PTA: independent    Dispo: was stable for discharge to rehab however will need to monitor today due to hypotension     Hospital Problems  Date Reviewed: 10/27/2021          Codes Class Noted POA    Simple partial seizures ([facility] [address] ICD-10-CM: G40.109  ICD-9-CM: 345.50  10/30/2021 Unknown        * (Principal) Subdural hematoma ([facility] [address] ICD-10-CM: V73.8M0M  ICD-9-CM: 432.1  10/22/2021 Unknown                Review of Systems:   As per HPI      Vital Signs:    Last 24hrs VS reviewed since prior progress note. Most recent are:  Visit Vitals  BP (!) 159/92   Pulse 77   Temp 97.3 °F (36.3 °C)   Resp 20   Ht 5' 11\" (1.803 m)   Wt 88.7 kg (195 lb 8.8 oz)   SpO2 98%   BMI 27.27 kg/m²         Intake/Output Summary (Last 24 hours) at 11/4/2021 8585  Last data filed at 11/3/2021 1441  Gross per 24 hour   Intake 540 ml   Output 750 ml   Net -210 ml        Physical Examination:     I had a face to face encounter with this patient and independently examined them on 11/4/2021 as outlined below:          Constitutional:  No acute distress  Eyes: anicteric   ENT:  Oral mucosa moist, no discharge   Resp:  CTA bilaterally. No wheezing/rhonchi/rales. Chest: No accessory muscle use   CV:  Regular rhythm, normal rate, no rubs    GI:  Soft, non distended, non tender.  normoactive bowel sounds    Musculoskeletal:  No edema, warm    Neurologic:  grossly non-focal  Skin: no jaundice  Psych: not agitated, not anxious      Data Review:       Labs:     Recent Labs     11/03/21  1417   WBC 9.3   HGB 13.6   HCT 41.3          Lab Results   Component Value Date/Time    Cholesterol, total 169 10/23/2021 08:16 AM    HDL Cholesterol 42 10/23/2021 08:16 AM    LDL, calculated 102.6 (H) 10/23/2021 08:16 AM    Triglyceride 122 10/23/2021 08:16 AM    CHOL/HDL Ratio 4.0 10/23/2021 08:16 AM     Lab Results   Component Value Date/Time    Glucose (POC) 179 (H) 11/04/2021 08:58 AM    Glucose (POC) 118 (H) 11/04/2021 07:10 AM    Glucose (POC) 108 11/03/2021 09:52 PM    Glucose (POC) 82 11/03/2021 04:17 PM    Glucose (POC) 103 11/03/2021 08:10 AM         Medications Reviewed:     Current Facility-Administered Medications   Medication Dose Route Frequency    sodium chloride 0.9 % bolus infusion 1,000 mL  1,000 mL IntraVENous ONCE    0.9% sodium chloride infusion  100 mL/hr IntraVENous CONTINUOUS    phenytoin (DILANTIN) chewable tablet 100 mg  100 mg Oral TID    [Held by provider] metoprolol tartrate (LOPRESSOR) tablet 6.25 mg  6.25 mg Oral BID    lacosamide (VIMPAT) tablet 200 mg  200 mg Oral BID    atorvastatin (LIPITOR) tablet 40 mg  40 mg Oral DAILY    calcium carbonate (TUMS) chewable tablet 200 mg [elemental]  200 mg Oral TID PRN    levETIRAcetam (KEPPRA) tablet 1,500 mg  1,500 mg Oral BID    [Held by provider] polyethylene glycol (MIRALAX) packet 17 g  17 g Oral DAILY    cholecalciferol (VITAMIN D3) (1000 Units /25 mcg) tablet 5,000 Units  5,000 Units Oral BID    [Held by provider] isosorbide mononitrate ER (IMDUR) tablet 120 mg  120 mg Oral 7am    glucose chewable tablet 16 g  4 Tablet Oral PRN    dextrose (D50W) injection syrg 12.5-25 g  25-50 mL IntraVENous PRN    glucagon (GLUCAGEN) injection 1 mg  1 mg IntraMUSCular PRN    insulin lispro (HUMALOG) injection   SubCUTAneous AC&HS    acetaminophen (TYLENOL) tablet 650 mg  650 mg Oral Q4H PRN    Or    acetaminophen (TYLENOL) solution 650 mg  650 mg Per NG tube Q4H PRN    Or    acetaminophen (TYLENOL) suppository 650 mg  650 mg Rectal Q4H PRN ______________________________________________________________________  EXPECTED LENGTH OF STAY: 3d 7h  ACTUAL LENGTH OF STAY:          13                 Henry Mccracken MD

## 2021-11-04 NOTE — PROGRESS NOTES
Transition of Care Plan: HH vs SNF (accepted at [facility], and [facility])     RUR: 9%     PCP F/U: Dr. Deepthi Hauser 44 vs SNF (accepted at accepted at [facility], and [facility])     Transportation: BLS/Family     Main Contact: Daughter Ryanne: [phone]  [id number]-[phone]       1010: This CM contacted the accepting facilities to see if they can accept today if medically ready. 36: Spoke with RN who states he likely will not go today as they had to call a rapid on him. Will continue to follow.      Isabell Clay RN, CRM

## 2021-11-04 NOTE — PROGRESS NOTES
Occupational Therapy: defer    Chart reviewed in preparation for OT treatment session. Responded to rapid response called by RN as patient was minimally responsive and hypotensive on toilet. OT assisted with dependent transfer to recliner and to bed. Patient not appropriate to participate in therapy at this time. Will follow-up as able and appropriate.     Kali Prieto OTR/L

## 2021-11-04 NOTE — PROGRESS NOTES
Bedside and Verbal shift change report given to Selena RN (oncoming nurse) by Rubi Swift (offgoing nurse). Report included the following information Kardex, MAR, Recent Results and Cardiac Rhythm NSR.

## 2021-11-04 NOTE — PROGRESS NOTES
Physical Therapy    Spoke with OT and reviewed chart. Note RR called due to patient with hypotensive episode. Not appropriate for therapy intervention at this time. Will follow as appropriate.     Lashon Sahu MS, PT

## 2021-11-05 LAB
ANION GAP SERPL CALC-SCNC: 6 MMOL/L (ref 5–15)
BUN SERPL-MCNC: 20 MG/DL (ref 6–20)
BUN/CREAT SERPL: 20 (ref 12–20)
CALCIUM SERPL-MCNC: 8.5 MG/DL (ref 8.5–10.1)
CHLORIDE SERPL-SCNC: 111 MMOL/L (ref 97–108)
CO2 SERPL-SCNC: 21 MMOL/L (ref 21–32)
COVID-19 RAPID TEST, COVR: NOT DETECTED
CREAT SERPL-MCNC: 1 MG/DL (ref 0.7–1.3)
ERYTHROCYTE [DISTWIDTH] IN BLOOD BY AUTOMATED COUNT: 12.5 % (ref 11.5–14.5)
GLUCOSE BLD STRIP.AUTO-MCNC: 104 MG/DL (ref 65–117)
GLUCOSE BLD STRIP.AUTO-MCNC: 121 MG/DL (ref 65–117)
GLUCOSE BLD STRIP.AUTO-MCNC: 126 MG/DL (ref 65–117)
GLUCOSE BLD STRIP.AUTO-MCNC: 98 MG/DL (ref 65–117)
GLUCOSE SERPL-MCNC: 111 MG/DL (ref 65–100)
HCT VFR BLD AUTO: 36.4 % (ref 36.6–50.3)
HGB BLD-MCNC: 12.5 G/DL (ref 12.1–17)
LEVETIRACETAM SERPL-MCNC: 32.4 UG/ML (ref 10–40)
MAGNESIUM SERPL-MCNC: 1.8 MG/DL (ref 1.6–2.4)
MCH RBC QN AUTO: 32 PG (ref 26–34)
MCHC RBC AUTO-ENTMCNC: 34.3 G/DL (ref 30–36.5)
MCV RBC AUTO: 93.1 FL (ref 80–99)
NRBC # BLD: 0 K/UL (ref 0–0.01)
NRBC BLD-RTO: 0 PER 100 WBC
PLATELET # BLD AUTO: 238 K/UL (ref 150–400)
PMV BLD AUTO: 9 FL (ref 8.9–12.9)
POTASSIUM SERPL-SCNC: 3.8 MMOL/L (ref 3.5–5.1)
RBC # BLD AUTO: 3.91 M/UL (ref 4.1–5.7)
SERVICE CMNT-IMP: ABNORMAL
SERVICE CMNT-IMP: ABNORMAL
SERVICE CMNT-IMP: NORMAL
SERVICE CMNT-IMP: NORMAL
SODIUM SERPL-SCNC: 138 MMOL/L (ref 136–145)
SOURCE, COVRS: NORMAL
WBC # BLD AUTO: 10.4 K/UL (ref 4.1–11.1)

## 2021-11-05 PROCEDURE — 74011250637 HC RX REV CODE- 250/637: Performed by: NURSE PRACTITIONER

## 2021-11-05 PROCEDURE — 82962 GLUCOSE BLOOD TEST: CPT

## 2021-11-05 PROCEDURE — 80048 BASIC METABOLIC PNL TOTAL CA: CPT

## 2021-11-05 PROCEDURE — 65660000000 HC RM CCU STEPDOWN

## 2021-11-05 PROCEDURE — 85027 COMPLETE CBC AUTOMATED: CPT

## 2021-11-05 PROCEDURE — 74011250637 HC RX REV CODE- 250/637: Performed by: HOSPITALIST

## 2021-11-05 PROCEDURE — 83735 ASSAY OF MAGNESIUM: CPT

## 2021-11-05 PROCEDURE — 74011250636 HC RX REV CODE- 250/636: Performed by: HOSPITALIST

## 2021-11-05 PROCEDURE — 74011250637 HC RX REV CODE- 250/637: Performed by: INTERNAL MEDICINE

## 2021-11-05 PROCEDURE — 87635 SARS-COV-2 COVID-19 AMP PRB: CPT

## 2021-11-05 PROCEDURE — 36415 COLL VENOUS BLD VENIPUNCTURE: CPT

## 2021-11-05 RX ORDER — LACOSAMIDE 200 MG/1
200 TABLET ORAL 2 TIMES DAILY
Qty: 60 TABLET | Refills: 0 | Status: SHIPPED | OUTPATIENT
Start: 2021-11-05 | End: 2021-11-09 | Stop reason: SDUPTHER

## 2021-11-05 RX ORDER — ISOSORBIDE MONONITRATE 60 MG/1
120 TABLET, EXTENDED RELEASE ORAL
Status: DISCONTINUED | OUTPATIENT
Start: 2021-11-05 | End: 2021-11-09 | Stop reason: HOSPADM

## 2021-11-05 RX ORDER — LOSARTAN POTASSIUM 50 MG/1
50 TABLET ORAL DAILY
Status: DISCONTINUED | OUTPATIENT
Start: 2021-11-05 | End: 2021-11-09 | Stop reason: HOSPADM

## 2021-11-05 RX ORDER — SODIUM CHLORIDE 9 MG/ML
100 INJECTION, SOLUTION INTRAVENOUS CONTINUOUS
Status: DISCONTINUED | OUTPATIENT
Start: 2021-11-05 | End: 2021-11-06

## 2021-11-05 RX ORDER — PHENYTOIN 50 MG/1
100 TABLET, CHEWABLE ORAL 3 TIMES DAILY
Qty: 90 TABLET | Refills: 0 | Status: ON HOLD
Start: 2021-11-05 | End: 2021-12-11

## 2021-11-05 RX ORDER — LEVETIRACETAM 750 MG/1
1500 TABLET ORAL 2 TIMES DAILY
Qty: 120 TABLET | Refills: 0 | Status: SHIPPED
Start: 2021-11-05

## 2021-11-05 RX ADMIN — ATORVASTATIN CALCIUM 40 MG: 40 TABLET, FILM COATED ORAL at 08:40

## 2021-11-05 RX ADMIN — LEVETIRACETAM 1500 MG: 500 TABLET, FILM COATED ORAL at 06:20

## 2021-11-05 RX ADMIN — LACOSAMIDE 200 MG: 50 TABLET, FILM COATED ORAL at 21:07

## 2021-11-05 RX ADMIN — Medication 5000 UNITS: at 21:08

## 2021-11-05 RX ADMIN — LOSARTAN POTASSIUM 50 MG: 50 TABLET, FILM COATED ORAL at 14:25

## 2021-11-05 RX ADMIN — CALCIUM CARBONATE (ANTACID) CHEW TAB 500 MG 200 MG: 500 CHEW TAB at 12:33

## 2021-11-05 RX ADMIN — PHENYTOIN 100 MG: 50 TABLET, CHEWABLE ORAL at 16:36

## 2021-11-05 RX ADMIN — PHENYTOIN 100 MG: 50 TABLET, CHEWABLE ORAL at 08:39

## 2021-11-05 RX ADMIN — PHENYTOIN 100 MG: 50 TABLET, CHEWABLE ORAL at 21:07

## 2021-11-05 RX ADMIN — ACETAMINOPHEN 650 MG: 325 TABLET ORAL at 12:33

## 2021-11-05 RX ADMIN — ISOSORBIDE MONONITRATE 120 MG: 60 TABLET, EXTENDED RELEASE ORAL at 14:25

## 2021-11-05 RX ADMIN — LACOSAMIDE 200 MG: 50 TABLET, FILM COATED ORAL at 08:39

## 2021-11-05 RX ADMIN — METOPROLOL TARTRATE 6.25 MG: 25 TABLET, FILM COATED ORAL at 08:40

## 2021-11-05 RX ADMIN — SODIUM CHLORIDE 100 ML/HR: 9 INJECTION, SOLUTION INTRAVENOUS at 21:07

## 2021-11-05 RX ADMIN — Medication 5000 UNITS: at 08:39

## 2021-11-05 RX ADMIN — LEVETIRACETAM 1500 MG: 500 TABLET, FILM COATED ORAL at 18:11

## 2021-11-05 NOTE — PROGRESS NOTES
Bedside shift change report given to [facility],[address] 100 (oncoming nurse) by Dee Ribera (offgoing nurse). Report included the following information SBAR, ED Summary, Procedure Summary, Intake/Output, Med Rec Status and Cardiac Rhythm NSR.

## 2021-11-05 NOTE — PROGRESS NOTES
Transition of Care Plan: [facility]-accepted today and has a bed     RUR: 9%     PCP F/U: Dr. Jonathan Ibanez     Disposition: [facility] a bed     Transportation: BLS     Main Contact: Daughter Ryanne: [phone]  [id number]-[phone]    1403: Patient medically ready to discharge today. [facility] and has a bed available. Daughter was notified, however, was unhappy with accepting facility in regards to their visitation policy as patient is not vaccinated and patient would need to be quarantined during his stay there. Also would like a closer facility. Daughter asked  student Nell Olivera if she would send referral to [facility]. States that have a bed today. Instructed to call [facility] at [phone]. Faxed clinicals and sent referral via [employer]. 1516: Spoke with LAUREEN at [facility] who states that they cannot accept patient today as they do not feel patient is medically ready. Escalated to their  and it was still denied. Will re-review case on Monday 11/08 if patient is still here. Attending has put in discharge orders. Spoke with daughter who states she is appealing discharge. MD has been notified. Appeal reference number# [id number]. Will continue to follow.      Viet Coronado RN, CRM

## 2021-11-05 NOTE — PROGRESS NOTES
Bedside and Verbal shift change report given to Marlon LA (oncoming nurse) by Meryl Hardy (offgoing nurse). Report included the following information Kardex, MAR, Recent Results and Cardiac Rhythm NSR.

## 2021-11-05 NOTE — PROGRESS NOTES
T.O.C.    RUR: 10%  Disposition: HH vs SNF  Follow up with PCP  Transport: BLS/Family    10:40 am: CM contacted [facility], they have a male medicaid bed available today. Will call back after speaking with the family    10:48 am: CM contacted [facility], message left for admissions department. 10:52 am: CM contacted [facility], message left for admissions. 10:55 am: CM contacted [address] [address], no bed available until Tuesday or Wednesday. 11:00 am: CM called patient's daughter to inform her of discharge today and ask for a placement decision. Daughter to call back. 11:50 am: received a call from patient's daughter indicating that she would like [facility] to try contacting [address] [place] [address] in [place] to see if they have a bed available. CM called, [phone], and faxed over a referral to [phone].

## 2021-11-05 NOTE — DISCHARGE SUMMARY
Discharge Summary     Patient: Navjot Chacon MRN: [id number]  SSN: [id number]    YOB: 1935  Age: 80 y.o. Sex: male       Admit Date: 10/22/2021    Discharge Date: 11/5/2021      Admission Diagnoses: CVA (cerebral vascular accident) [facility]) [I63.9]    Discharge Diagnoses:   Subdural hematoma  Complex partial seizure  Hypertension  Episode of hypotension  History of CAD s/p stenting  History of CVA     Discharge Condition: Stable    Hospital Course: 80 y. o. male who presented with left side weakness and numbness. He has a history of CAD, HTN, HLP and CVA. He was found to have a large chronic subdural hematoma. He was discharged to follow-up with neurosurgery. He saw the neurosurgeon and at that time developed left-sided weakness, so he was sent to the ED. MRI brain revealed a multi-septated SDH. It was felt that his symptoms may be related to seizures from cortical irritation. Chronic right subdural hematoma:  Intermittent left sided weakness:   Complex partial Seizure  -MRI redemonstrated multiseptated  chronic right subdural hematoma  -Holding ASA, plavix - resume once collection resolves (as an outpatient and when cleared by NSG)  -repeat CT stable  Per neurosurgery:  \"Continue all three antiepileptics - I will adjust doses as needed  Avoid anticoagulation until collection resolved  Follow up with me 2 weeks after discharge\"     Hypotension: noted today while on the toilet on 11/4, after diarrhea, which he's reportedly been having for at least 2 days. Resolved with IV fluids. Antihypertensives resumed and tolerated. He had diarrhea likely due to scheduled Miralax. This has resolved.     Mild bilateral carotid disease     Hypertension     CAD s/p stents      Significant Diagnostic Studies: see above    Disposition: SNF    S: no complaints, wants to leave the hospital, no acute overnight events, no diarrhea or seizure    O:   Visit Vitals  BP (!) 177/86   Pulse 71   Temp 97.4 °F (36.3 °C)   Resp 18   Ht 5' 11\" (1.803 m)   Wt 88.7 kg (195 lb 8.8 oz)   SpO2 (!) 16%   BMI 27.27 kg/m²   SpO2 is inaccurate and BP is prior to resumption of antihypertensives    NAD  Heart RRR  Lungs CTAB  Abd soft NT      Discharge Medications:   Current Discharge Medication List      START taking these medications    Details   phenytoin (DILANTIN) 50 mg chewable tablet Take 2 Tablets by mouth three (3) times daily. Qty: 90 Tablet, Refills: 0      levETIRAcetam (KEPPRA) 750 mg tablet Take 2 Tablets by mouth two (2) times a day. Qty: 120 Tablet, Refills: 0      lacosamide (VIMPAT) 200 mg tab tablet Take 1 Tablet by mouth two (2) times a day. Max Daily Amount: 400 mg. Followup with neurologist  Qty: 2 Tablet, Refills: 0    Associated Diagnoses: Seizure ([address]      atorvastatin (LIPITOR) 40 mg tablet Take 1 Tablet by mouth daily. Qty: 20 Tablet, Refills: 0         CONTINUE these medications which have NOT CHANGED    Details   metoprolol tartrate (LOPRESSOR) 25 mg tablet Take 12.5 mg by mouth daily. losartan (COZAAR) 50 mg tablet Take 50 mg by mouth daily. cholecalciferol (Vitamin D3) (1000 Units /25 mcg) tablet Take 5,000 Units by mouth two (2) times a day. isosorbide mononitrate ER (IMDUR) 120 mg CR tablet Take 120 mg by mouth every morning. STOP taking these medications       clopidogreL (Plavix) 75 mg tab Comments:   Reason for Stopping:         aspirin delayed-release 81 mg tablet Comments:   Reason for Stopping:              Follow-up Information     Follow up With Specialties Details Why [address], Virl Schilder, DO Neurology Call  [address] [facility]  [phone]      Tatyana Light MD    Patient can only remember the practice name and not the physician      Petey Rodrigues MD Neurosurgery Schedule an appointment as soon as possible for a visit in 2 weeks  [address]  [phone]          Signed By: Sachin Light MD November 5, 2021      Greater than 30 minutes spent on discharge management.

## 2021-11-05 NOTE — PROGRESS NOTES
Problem: Falls - Risk of  Goal: *Absence of Falls  Description: Document Jayden Bolton Fall Risk and appropriate interventions in the flowsheet.   Outcome: Progressing Towards Goal  Note: Fall Risk Interventions:  Mobility Interventions: Assess mobility with egress test, Bed/chair exit alarm, Patient to call before getting OOB         Medication Interventions: Assess postural VS orthostatic hypotension, Bed/chair exit alarm, Patient to call before getting OOB    Elimination Interventions: Bed/chair exit alarm, Call light in reach, Stay With Me (per policy)    History of Falls Interventions: Bed/chair exit alarm, Door open when patient unattended, Investigate reason for fall         Problem: Patient Education: Go to Patient Education Activity  Goal: Patient/Family Education  Outcome: Progressing Towards Goal     Problem: Patient Education: Go to Patient Education Activity  Goal: Patient/Family Education  Outcome: Progressing Towards Goal     Problem: Hemorrhagic Stroke: Admission Day (0-3 hours)  Goal: Off Pathway (Use only if patient is Off Pathway)  Outcome: Progressing Towards Goal  Goal: Activity/Safety  Outcome: Progressing Towards Goal  Goal: Consults, if ordered  Outcome: Progressing Towards Goal  Goal: Diagnostic Test/Procedures  Outcome: Progressing Towards Goal  Goal: Nutrition/Diet  Outcome: Progressing Towards Goal  Goal: Medications  Outcome: Progressing Towards Goal  Goal: Respiratory  Outcome: Progressing Towards Goal  Goal: Treatments/Interventions/Procedures  Outcome: Progressing Towards Goal  Goal: Psychosocial  Outcome: Progressing Towards Goal  Goal: *Establish/diagnose type of stroke  Outcome: Progressing Towards Goal  Goal: *Patient maintains clear airway/free of aspiration  Outcome: Progressing Towards Goal  Goal: *Hemodynamically stable  Outcome: Progressing Towards Goal     Problem: Hemorrhagic Stroke:  3-24 hours  Goal: Off Pathway (Use only if patient is Off Pathway)  Outcome: Progressing Towards Goal  Goal: Activity/Safety  Outcome: Progressing Towards Goal  Goal: Consults, if ordered  Outcome: Progressing Towards Goal  Goal: Diagnostic Test/Procedures  Outcome: Progressing Towards Goal  Goal: Nutrition/Diet  Outcome: Progressing Towards Goal  Goal: Discharge Planning  Outcome: Progressing Towards Goal  Goal: Medications  Outcome: Progressing Towards Goal  Goal: Respiratory  Outcome: Progressing Towards Goal  Goal: Treatments/Interventions/Procedures  Outcome: Progressing Towards Goal  Goal: Psychosocial  Outcome: Progressing Towards Goal  Goal: *Hemodynamically stable  Outcome: Progressing Towards Goal  Goal: *Verbalizes anxiety and depression are reduced or absent  Outcome: Progressing Towards Goal  Goal: *Absence of aspiration  Outcome: Progressing Towards Goal  Goal: *Absence of signs and symptoms of DVT  Outcome: Progressing Towards Goal  Goal: *Optimal pain control at patient's stated goal  Outcome: Progressing Towards Goal  Goal: *Tolerating diet  Outcome: Progressing Towards Goal  Goal: *Progressive mobility and function (eg: ADL's)  Outcome: Progressing Towards Goal  Goal: *Rehabilitation readiness  Outcome: Progressing Towards Goal     Problem: Hemorrhagic Stroke: Day 2  Goal: Off Pathway (Use only if patient is Off Pathway)  Outcome: Progressing Towards Goal  Goal: Activity/Safety  Outcome: Progressing Towards Goal  Goal: Consults, if ordered  Outcome: Progressing Towards Goal  Goal: Diagnostic Test/Procedures  Outcome: Progressing Towards Goal  Goal: Nutrition/Diet  Outcome: Progressing Towards Goal  Goal: Medications  Outcome: Progressing Towards Goal  Goal: Respiratory  Outcome: Progressing Towards Goal  Goal: Treatments/Interventions/Procedures  Outcome: Progressing Towards Goal  Goal: Psychosocial  Outcome: Progressing Towards Goal  Goal: *Hemodynamically stable  Outcome: Progressing Towards Goal  Goal: *Verbalizes anxiety and depression are reduced or absent  Outcome: Progressing Towards Goal  Goal: *Absence of aspiration  Outcome: Progressing Towards Goal  Goal: *Absence of signs and symptoms of DVT  Outcome: Progressing Towards Goal  Goal: *Optimal pain control at patient's stated goal  Outcome: Progressing Towards Goal  Goal: *Tolerating diet  Outcome: Progressing Towards Goal  Goal: *Progressive mobility and function  Outcome: Progressing Towards Goal  Goal: *Rehabilitation readiness  Outcome: Progressing Towards Goal     Problem: Hemorrhagic Stroke: Day 5 through Discharge  Goal: Off Pathway (Use only if patient is Off Pathway)  Outcome: Progressing Towards Goal  Goal: Activity/Safety  Outcome: Progressing Towards Goal  Goal: Consults, if ordered  Outcome: Progressing Towards Goal  Goal: Diagnostic Test/Procedures  Outcome: Progressing Towards Goal  Goal: Nutrition/Diet  Outcome: Progressing Towards Goal  Goal: Medications  Outcome: Progressing Towards Goal  Goal: Respiratory  Outcome: Progressing Towards Goal  Goal: Treatments/Interventions/Procedures  Outcome: Progressing Towards Goal  Goal: Psychosocial  Outcome: Progressing Towards Goal  Goal: *Hemodynamically stable  Outcome: Progressing Towards Goal  Goal: *Verbalizes anxiety and depression are reduced or absent  Outcome: Progressing Towards Goal  Goal: *Absence of aspiration  Outcome: Progressing Towards Goal  Goal: *Absence of signs and symptoms of DVT  Outcome: Progressing Towards Goal  Goal: *Optimal pain control at patient's stated goal  Outcome: Progressing Towards Goal  Goal: *Tolerating diet  Outcome: Progressing Towards Goal  Goal: *Progressive mobility and function  Outcome: Progressing Towards Goal  Goal: *Rehabilitation readiness  Outcome: Progressing Towards Goal     Problem: Hemorrhagic Stroke: Discharge Outcomes  Goal: *Verbalizes anxiety and depression are reduced or absent  Outcome: Progressing Towards Goal  Goal: *Verbalize understanding of risk factor modification(Stroke Metric)  Outcome: Progressing Towards Goal  Goal: *Optimal pain control at patient's stated goal  Outcome: Progressing Towards Goal  Goal: *Hemodynamically stable  Outcome: Progressing Towards Goal  Goal: *Absence of aspiration pneumonia  Outcome: Progressing Towards Goal  Goal: *Aware of needed dietary changes  Outcome: Progressing Towards Goal  Goal: *Verbalizes understanding and describes medication purposes and frequencies  Outcome: Progressing Towards Goal  Goal: *Tolerating diet  Outcome: Progressing Towards Goal  Goal: *Absence of signs and symptoms of DVT  Outcome: Progressing Towards Goal  Goal: *Absence of aspiration  Outcome: Progressing Towards Goal  Goal: *Progressive mobility and function  Outcome: Progressing Towards Goal  Goal: *Home safety concerns addressed  Outcome: Progressing Towards Goal

## 2021-11-06 LAB
GLUCOSE BLD STRIP.AUTO-MCNC: 126 MG/DL (ref 65–117)
GLUCOSE BLD STRIP.AUTO-MCNC: 132 MG/DL (ref 65–117)
GLUCOSE BLD STRIP.AUTO-MCNC: 138 MG/DL (ref 65–117)
GLUCOSE BLD STRIP.AUTO-MCNC: 96 MG/DL (ref 65–117)
SERVICE CMNT-IMP: ABNORMAL
SERVICE CMNT-IMP: NORMAL

## 2021-11-06 PROCEDURE — 74011250637 HC RX REV CODE- 250/637: Performed by: HOSPITALIST

## 2021-11-06 PROCEDURE — 65660000000 HC RM CCU STEPDOWN

## 2021-11-06 PROCEDURE — 74011250637 HC RX REV CODE- 250/637: Performed by: NURSE PRACTITIONER

## 2021-11-06 PROCEDURE — 74011250637 HC RX REV CODE- 250/637: Performed by: INTERNAL MEDICINE

## 2021-11-06 PROCEDURE — 82962 GLUCOSE BLOOD TEST: CPT

## 2021-11-06 RX ADMIN — ISOSORBIDE MONONITRATE 120 MG: 60 TABLET, EXTENDED RELEASE ORAL at 07:01

## 2021-11-06 RX ADMIN — Medication 5000 UNITS: at 21:58

## 2021-11-06 RX ADMIN — PHENYTOIN 100 MG: 50 TABLET, CHEWABLE ORAL at 21:58

## 2021-11-06 RX ADMIN — LEVETIRACETAM 1500 MG: 500 TABLET, FILM COATED ORAL at 07:01

## 2021-11-06 RX ADMIN — METOPROLOL TARTRATE 6.25 MG: 25 TABLET, FILM COATED ORAL at 17:35

## 2021-11-06 RX ADMIN — Medication 5000 UNITS: at 09:01

## 2021-11-06 RX ADMIN — ATORVASTATIN CALCIUM 40 MG: 40 TABLET, FILM COATED ORAL at 09:02

## 2021-11-06 RX ADMIN — METOPROLOL TARTRATE 6.25 MG: 25 TABLET, FILM COATED ORAL at 09:01

## 2021-11-06 RX ADMIN — PHENYTOIN 100 MG: 50 TABLET, CHEWABLE ORAL at 09:01

## 2021-11-06 RX ADMIN — PHENYTOIN 100 MG: 50 TABLET, CHEWABLE ORAL at 17:35

## 2021-11-06 RX ADMIN — LACOSAMIDE 200 MG: 50 TABLET, FILM COATED ORAL at 21:58

## 2021-11-06 RX ADMIN — LEVETIRACETAM 1500 MG: 500 TABLET, FILM COATED ORAL at 17:35

## 2021-11-06 RX ADMIN — LACOSAMIDE 200 MG: 50 TABLET, FILM COATED ORAL at 09:02

## 2021-11-06 NOTE — PROGRESS NOTES
[address]  Hospitalist Group                                                                                          Hospitalist Progress Note          Date of Service:  2021  NAME:  Zulema Yan  :  1935  MRN:  [id number]    Admission Summary:   \"86 y.o. male who presents with left side weakness and numbness   80year-old male past medical history with CAD, HTN, HLP and CVA. Pt was seen in ER yesterday due episode of left upper lip tingling, which resolved in several hours. She had CT noticed a large chronic subdural hematoma.   ER discussed with Dr. Chin Uribe of neurosurgery and arranged pt to be seen in Dr. Emir Chaudhary  office at 2:30 PM  Today. Today, pt  was at Dr. Emir Chaudhary office and after pt was seen and was leaving head at home and suddenly developed acute onset of left-sided weakness.   Said he turned right around went back to Dr. Emir Chaudhary office which instructed the patient to come here for further evaluation via EMS. Arleen Sorto is currently states little bit of a headache on the left side. His left side weakness and numbness symptoms resolved in 30 mins. \"    Interval history / Subjective:   No events overnight. No diarrhea. Appealed discharge last night to go to a different rehab facility. Assessment & Plan:     Hypotension: noted today while on the toilet, after diarrhea. He was on scheduled Miralax. Hypotension resolved after IV fluids.  -restarted antihypertensives except losartan (BP got too low when losartan was restarted).      Chronic right subdural hematoma:  Intermittent left sided weakness:   Complex partial Seizure  -MRI redemonstrated Multiseptated  chronic right subdural hematoma  -Holding ASA, plavix - resume once collection resolves (as an outpatient and when cleared by NSG)  -Appreciate neurology following-Keppra/Vimpat and added [id number]/1  -repeat CT stable  -f/u as an outpt w/ neurosurgery    Mild bilateral carotid disease    Hypertension  -holding meds as above    CAD s/p stents:  -no cp  -home aspirin/plavix was held for reasons above    Code status: full   DVT prophylaxis: scd's  PTA: independent    Dispo: ready for DC to rehab however family appealed d/c due to wanting another facility. Hospital Problems  Date Reviewed: 10/27/2021          Codes Class Noted POA    Simple partial seizures ([facility] [address] ICD-10-CM: G40.109  ICD-9-CM: 345.50  10/30/2021 Unknown        * (Principal) Subdural hematoma ([facility] [address] ICD-10-CM: O82.7C7F  ICD-9-CM: 432.1  10/22/2021 Unknown                Review of Systems:   As per HPI      Vital Signs:    Last 24hrs VS reviewed since prior progress note. Most recent are:  Visit Vitals  BP (!) 146/79   Pulse 72   Temp 97.4 °F (36.3 °C)   Resp 20   Ht 5' 11\" (1.803 m)   Wt 88.7 kg (195 lb 8.8 oz)   SpO2 98%   BMI 27.27 kg/m²         Intake/Output Summary (Last 24 hours) at 11/6/2021 1643  Last data filed at 11/6/2021 0702  Gross per 24 hour   Intake    Output 3275 ml   Net -3275 ml        Physical Examination:     I had a face to face encounter with this patient and independently examined them on 11/6/2021 as outlined below:          Constitutional:  No acute distress  Eyes: anicteric   ENT:  Oral mucosa moist, no discharge   Resp:  CTA bilaterally. No wheezing/rhonchi/rales. Chest: No accessory muscle use   CV:  Regular rhythm, normal rate, no rubs    GI:  Soft, non distended, non tender.  normoactive bowel sounds    Musculoskeletal:  No edema, warm    Neurologic:  grossly non-focal  Skin: no jaundice  Psych: not agitated, not anxious      Data Review:       Labs:     Recent Labs     11/05/21  0032 11/04/21  0907   WBC 10.4 13.7*   HGB 12.5 13.7   HCT 36.4* 41.6    293       Lab Results   Component Value Date/Time    Cholesterol, total 169 10/23/2021 08:16 AM    HDL Cholesterol 42 10/23/2021 08:16 AM    LDL, calculated 102.6 (H) 10/23/2021 08:16 AM    Triglyceride 122 10/23/2021 08:16 AM    CHOL/HDL Ratio 4.0 10/23/2021 08:16 AM     Lab Results   Component Value Date/Time    Glucose (POC) 126 (H) 11/06/2021 11:41 AM    Glucose (POC) 96 11/06/2021 07:44 AM    Glucose (POC) 98 11/05/2021 09:14 PM    Glucose (POC) 126 (H) 11/05/2021 04:31 PM    Glucose (POC) 104 11/05/2021 11:10 AM         Medications Reviewed:     Current Facility-Administered Medications   Medication Dose Route Frequency    isosorbide mononitrate ER (IMDUR) tablet 120 mg  120 mg Oral 7am    [Held by provider] losartan (COZAAR) tablet 50 mg  50 mg Oral DAILY    phenytoin (DILANTIN) chewable tablet 100 mg  100 mg Oral TID    metoprolol tartrate (LOPRESSOR) tablet 6.25 mg  6.25 mg Oral BID    lacosamide (VIMPAT) tablet 200 mg  200 mg Oral BID    atorvastatin (LIPITOR) tablet 40 mg  40 mg Oral DAILY    calcium carbonate (TUMS) chewable tablet 200 mg [elemental]  200 mg Oral TID PRN    levETIRAcetam (KEPPRA) tablet 1,500 mg  1,500 mg Oral BID    cholecalciferol (VITAMIN D3) (1000 Units /25 mcg) tablet 5,000 Units  5,000 Units Oral BID    glucose chewable tablet 16 g  4 Tablet Oral PRN    dextrose (D50W) injection syrg 12.5-25 g  25-50 mL IntraVENous PRN    glucagon (GLUCAGEN) injection 1 mg  1 mg IntraMUSCular PRN    insulin lispro (HUMALOG) injection   SubCUTAneous AC&HS    acetaminophen (TYLENOL) tablet 650 mg  650 mg Oral Q4H PRN    Or    acetaminophen (TYLENOL) solution 650 mg  650 mg Per NG tube Q4H PRN    Or    acetaminophen (TYLENOL) suppository 650 mg  650 mg Rectal Q4H PRN     ______________________________________________________________________  EXPECTED LENGTH OF STAY: 3d 7h  ACTUAL LENGTH OF STAY:          15                 Jose Forrester MD

## 2021-11-06 NOTE — PROGRESS NOTES
Bedside shift change report given to Flavio White RN (oncoming nurse) by Maria Elena Joseph (offgoing nurse). Report included the following information SBAR, ED Summary, Procedure Summary, Intake/Output, Recent Results and Cardiac Rhythm NSR.

## 2021-11-06 NOTE — PROGRESS NOTES
[address] [facility] Adult  Hospitalist Group                                                                                          Hospitalist Progress Note          Date of Service:  2021  NAME:  Stephanie Longoria  :  1935  MRN:  [id number]    Admission Summary:   \"86 y.o. male who presents with left side weakness and numbness   80year-old male past medical history with CAD, HTN, HLP and CVA. Pt was seen in ER yesterday due episode of left upper lip tingling, which resolved in several hours. She had CT noticed a large chronic subdural hematoma.   ER discussed with Dr. Diandra Colon of neurosurgery and arranged pt to be seen in Dr. Radha Gardner  office at 2:30 PM  Today. Today, pt  was at Dr. Radha Gardner office and after pt was seen and was leaving head at home and suddenly developed acute onset of left-sided weakness.   Said he turned right around went back to Dr. Radha Gardner office which instructed the patient to come here for further evaluation via EMS. Christi Goltz is currently states little bit of a headache on the left side. His left side weakness and numbness symptoms resolved in 30 mins. \"    Interval history / Subjective:   No events overnight. Diarrhea resolved. No dizziness, pain, n/v. Wants to go to rehab. Assessment & Plan:     Hypotension: noted today while on the toilet, after diarrhea. He was on scheduled Miralax. Hypotension resolved after IV fluids.  -restarted antihypertensives except losartan.      Chronic right subdural hematoma:  Intermittent left sided weakness:   Complex partial Seizure  -MRI redemonstrated Multiseptated  chronic right subdural hematoma  -Holding ASA, plavix - resume once collection resolves (as an outpatient and when cleared by NSG)  -Appreciate neurology following-Keppra/Vimpat and added [id number]/1  -repeat CT stable  -f/u as an outpt w/ neurosurgery    Mild bilateral carotid disease    Hypertension  -holding meds as above    CAD s/p stents:  -no cp  -home aspirin/plavix was held for reasons above    Code status: full   DVT prophylaxis: scd's  PTA: independent    Dispo: ready for DC to rehab however family appealed d/c due to wanting another facility. Hospital Problems  Date Reviewed: 10/27/2021          Codes Class Noted POA    Simple partial seizures ([facility] [address] ICD-10-CM: G40.109  ICD-9-CM: 345.50  10/30/2021 Unknown        * (Principal) Subdural hematoma ([facility] [address] ICD-10-CM: W52.1Z6N  ICD-9-CM: 432.1  10/22/2021 Unknown                Review of Systems:   As per HPI      Vital Signs:    Last 24hrs VS reviewed since prior progress note. Most recent are:  Visit Vitals  BP (!) 146/79   Pulse 72   Temp 97.4 °F (36.3 °C)   Resp 20   Ht 5' 11\" (1.803 m)   Wt 88.7 kg (195 lb 8.8 oz)   SpO2 98%   BMI 27.27 kg/m²         Intake/Output Summary (Last 24 hours) at 11/6/2021 1641  Last data filed at 11/6/2021 0702  Gross per 24 hour   Intake    Output 3275 ml   Net -3275 ml        Physical Examination:     I had a face to face encounter with this patient and independently examined them on 11/6/2021 as outlined below:          Constitutional:  No acute distress  Eyes: anicteric   ENT:  Oral mucosa moist, no discharge   Resp:  CTA bilaterally. No wheezing/rhonchi/rales. Chest: No accessory muscle use   CV:  Regular rhythm, normal rate, no rubs    GI:  Soft, non distended, non tender.  normoactive bowel sounds    Musculoskeletal:  No edema, warm    Neurologic:  grossly non-focal  Skin: no jaundice  Psych: not agitated, not anxious      Data Review:       Labs:     Recent Labs     11/05/21  0032 11/04/21  0907   WBC 10.4 13.7*   HGB 12.5 13.7   HCT 36.4* 41.6    293       Lab Results   Component Value Date/Time    Cholesterol, total 169 10/23/2021 08:16 AM    HDL Cholesterol 42 10/23/2021 08:16 AM    LDL, calculated 102.6 (H) 10/23/2021 08:16 AM    Triglyceride 122 10/23/2021 08:16 AM    CHOL/HDL Ratio 4.0 10/23/2021 08:16 AM     Lab Results   Component Value Date/Time    Glucose (POC) 126 (H) 11/06/2021 11:41 AM    Glucose (POC) 96 11/06/2021 07:44 AM    Glucose (POC) 98 11/05/2021 09:14 PM    Glucose (POC) 126 (H) 11/05/2021 04:31 PM    Glucose (POC) 104 11/05/2021 11:10 AM         Medications Reviewed:     Current Facility-Administered Medications   Medication Dose Route Frequency    isosorbide mononitrate ER (IMDUR) tablet 120 mg  120 mg Oral 7am    [Held by provider] losartan (COZAAR) tablet 50 mg  50 mg Oral DAILY    phenytoin (DILANTIN) chewable tablet 100 mg  100 mg Oral TID    metoprolol tartrate (LOPRESSOR) tablet 6.25 mg  6.25 mg Oral BID    lacosamide (VIMPAT) tablet 200 mg  200 mg Oral BID    atorvastatin (LIPITOR) tablet 40 mg  40 mg Oral DAILY    calcium carbonate (TUMS) chewable tablet 200 mg [elemental]  200 mg Oral TID PRN    levETIRAcetam (KEPPRA) tablet 1,500 mg  1,500 mg Oral BID    cholecalciferol (VITAMIN D3) (1000 Units /25 mcg) tablet 5,000 Units  5,000 Units Oral BID    glucose chewable tablet 16 g  4 Tablet Oral PRN    dextrose (D50W) injection syrg 12.5-25 g  25-50 mL IntraVENous PRN    glucagon (GLUCAGEN) injection 1 mg  1 mg IntraMUSCular PRN    insulin lispro (HUMALOG) injection   SubCUTAneous AC&HS    acetaminophen (TYLENOL) tablet 650 mg  650 mg Oral Q4H PRN    Or    acetaminophen (TYLENOL) solution 650 mg  650 mg Per NG tube Q4H PRN    Or    acetaminophen (TYLENOL) suppository 650 mg  650 mg Rectal Q4H PRN     ______________________________________________________________________  EXPECTED LENGTH OF STAY: 3d 7h  ACTUAL LENGTH OF STAY:          15                 Johanny Winters MD

## 2021-11-07 LAB
GLUCOSE BLD STRIP.AUTO-MCNC: 117 MG/DL (ref 65–117)
GLUCOSE BLD STRIP.AUTO-MCNC: 156 MG/DL (ref 65–117)
GLUCOSE BLD STRIP.AUTO-MCNC: 162 MG/DL (ref 65–117)
GLUCOSE BLD STRIP.AUTO-MCNC: 91 MG/DL (ref 65–117)
SERVICE CMNT-IMP: ABNORMAL
SERVICE CMNT-IMP: ABNORMAL
SERVICE CMNT-IMP: NORMAL
SERVICE CMNT-IMP: NORMAL

## 2021-11-07 PROCEDURE — 74011250637 HC RX REV CODE- 250/637: Performed by: NURSE PRACTITIONER

## 2021-11-07 PROCEDURE — 74011250637 HC RX REV CODE- 250/637: Performed by: HOSPITALIST

## 2021-11-07 PROCEDURE — 65660000000 HC RM CCU STEPDOWN

## 2021-11-07 PROCEDURE — 74011636637 HC RX REV CODE- 636/637: Performed by: HOSPITALIST

## 2021-11-07 PROCEDURE — 74011250637 HC RX REV CODE- 250/637: Performed by: INTERNAL MEDICINE

## 2021-11-07 PROCEDURE — 82962 GLUCOSE BLOOD TEST: CPT

## 2021-11-07 RX ADMIN — PHENYTOIN 100 MG: 50 TABLET, CHEWABLE ORAL at 15:56

## 2021-11-07 RX ADMIN — INSULIN LISPRO 2 UNITS: 100 INJECTION, SOLUTION INTRAVENOUS; SUBCUTANEOUS at 11:42

## 2021-11-07 RX ADMIN — LACOSAMIDE 200 MG: 50 TABLET, FILM COATED ORAL at 23:19

## 2021-11-07 RX ADMIN — ISOSORBIDE MONONITRATE 120 MG: 60 TABLET, EXTENDED RELEASE ORAL at 06:00

## 2021-11-07 RX ADMIN — METOPROLOL TARTRATE 6.25 MG: 25 TABLET, FILM COATED ORAL at 08:08

## 2021-11-07 RX ADMIN — LACOSAMIDE 200 MG: 50 TABLET, FILM COATED ORAL at 08:08

## 2021-11-07 RX ADMIN — PHENYTOIN 100 MG: 50 TABLET, CHEWABLE ORAL at 08:09

## 2021-11-07 RX ADMIN — LEVETIRACETAM 1500 MG: 500 TABLET, FILM COATED ORAL at 06:00

## 2021-11-07 RX ADMIN — Medication 5000 UNITS: at 08:09

## 2021-11-07 RX ADMIN — Medication 5000 UNITS: at 23:19

## 2021-11-07 RX ADMIN — LEVETIRACETAM 1500 MG: 500 TABLET, FILM COATED ORAL at 18:46

## 2021-11-07 RX ADMIN — ATORVASTATIN CALCIUM 40 MG: 40 TABLET, FILM COATED ORAL at 08:09

## 2021-11-07 RX ADMIN — METOPROLOL TARTRATE 6.25 MG: 25 TABLET, FILM COATED ORAL at 18:46

## 2021-11-07 RX ADMIN — PHENYTOIN 100 MG: 50 TABLET, CHEWABLE ORAL at 23:18

## 2021-11-07 NOTE — PROGRESS NOTES
Physical THerapy    Noted and acknowledged  new PT  order. Pt is already on caseload 3xweek  will follow up on Monday.

## 2021-11-07 NOTE — PROGRESS NOTES
[address] [facility] Adult  Hospitalist Group                                                                                          Hospitalist Progress Note  [place], [place]  Answering service: [phone] OR [phone] from in house phone        Date of Service:  2021  NAME:  Walt Mariee  :  1935  MRN:  [id number]      Admission Summary:   80 y. o. male who presents with left side weakness and numbness   80year-old male past medical history with CAD, HTN, HLP and CVA. Pt was seen in ER yesterday due episode of left upper lip tingling, which resolved in several hours. She had CT noticed a large chronic subdural hematoma.   ER discussed with Dr. Abebe Melendez of neurosurgery and arranged pt to be seen in Dr. Vicki Roque at 2:30 PM  Today. Today, pt  was at Dr. Uzma Santiago office and after pt was seen Jie Bin leaving head at home and suddenly developed acute onset of left-sided weakness.   Said he turned right around went back to Dr. Uzma Santiago office which instructed the patient to come here for further evaluation via EMS. Massiel Lewis is currently states little bit of a headache on the left side. His left side weakness and numbness symptoms resolved in 30 mins. \"    Interval history / Subjective:    No acute distress noted. No overnight events. D/C pending appeal decision     Assessment & Plan:     # Hypotension: noted yesterday while on the toilet, after diarrhea. He was on scheduled Miralax. Hypotension resolved after IV fluids.  -restarted antihypertensives except losartan (BP got too low when losartan was restarted).    - No further hypotension noted  # Chronic right subdural hematoma:  Intermittent left sided weakness:   # Complex partial Seizure  -MRI redemonstrated Multiseptated  chronic right subdural hematoma  -Holding ASA, plavix - resume once collection resolves (as an outpatient and when cleared by NSG)  -Appreciate neurology following-Keppra/Vimpat and added [id number]/1  -repeat CT stable  -f/u as an outpt w/ neurosurgery     # Mild bilateral carotid disease     # Hypertension  -holding meds as above     # CAD s/p stents:  -no cp  -home aspirin/plavix was held for reasons above     Code status: Full  DVT prophylaxis: SCds    Care Plan discussed with: Patient/Family  Anticipated Disposition: [facility]/Rehab  Anticipated Discharge: 24 hours to 48 hours     Hospital Problems  Date Reviewed: 10/27/2021          Codes Class Noted POA    Simple partial seizures ([facility] [address] ICD-10-CM: G40.109  ICD-9-CM: 345.50  10/30/2021 Unknown        * (Principal) Subdural hematoma ([facility] [address] ICD-10-CM: L23.1P7O  ICD-9-CM: 432.1  10/22/2021 Unknown                Review of Systems:   Pertinent items are noted in HPI. Vital Signs:    Last 24hrs VS reviewed since prior progress note. Most recent are:  Visit Vitals  /71   Pulse 78   Temp 97.9 °F (36.6 °C)   Resp 20   Ht 5' 11\" (1.803 m)   Wt 88.7 kg (195 lb 8.8 oz)   SpO2 98%   BMI 27.27 kg/m²       No intake or output data in the 24 hours ending 11/07/21 1706     Physical Examination:     I had a face to face encounter with this patient and independently examined them on 11/7/2021 as outlined below:          Constitutional:  No acute distress, cooperative, pleasant    ENT:  Oral mucosa moist, oropharynx benign. Resp:  CTA bilaterally. No wheezing/rhonchi/rales. No accessory muscle use   CV:  Regular rhythm, normal rate    GI:  Soft, non distended, non tender. normoactive bowel sounds    Musculoskeletal:  No edema, warm    Neurologic:  Moves all extremities.   AAOx3            Data Review:    Review and/or order of clinical lab test  Review and/or order of tests in the radiology section of CPT  Review and/or order of tests in the medicine section of CPT      Labs:     Recent Labs     11/05/21  0032   WBC 10.4   HGB 12.5   HCT 36.4*        Recent Labs     11/05/21  0032      K 3.8   *   CO2 21   BUN 20   CREA 1.00   *   CA 8.5   MG 1.8     No results for input(s): ALT, AP, TBIL, TBILI, TP, ALB, GLOB, GGT, AML, LPSE in the last 72 hours. No lab exists for component: SGOT, GPT, AMYP, HLPSE  No results for input(s): INR, PTP, APTT, INREXT in the last 72 hours. No results for input(s): FE, TIBC, PSAT, FERR in the last 72 hours. No results found for: FOL, RBCF   No results for input(s): PH, PCO2, PO2 in the last 72 hours. No results for input(s): CPK, CKNDX, TROIQ in the last 72 hours.     No lab exists for component: CPKMB  Lab Results   Component Value Date/Time    Cholesterol, total 169 10/23/2021 08:16 AM    HDL Cholesterol 42 10/23/2021 08:16 AM    LDL, calculated 102.6 (H) 10/23/2021 08:16 AM    Triglyceride 122 10/23/2021 08:16 AM    CHOL/HDL Ratio 4.0 10/23/2021 08:16 AM     Lab Results   Component Value Date/Time    Glucose (POC) 117 11/07/2021 04:23 PM    Glucose (POC) 162 (H) 11/07/2021 11:39 AM    Glucose (POC) 91 11/07/2021 06:50 AM    Glucose (POC) 132 (H) 11/06/2021 09:57 PM    Glucose (POC) 138 (H) 11/06/2021 04:45 PM     No results found for: COLOR, APPRN, SPGRU, REFSG, ALFONSO, PROTU, GLUCU, KETU, BILU, UROU, AMALIA, LEUKU, GLUKE, EPSU, BACTU, WBCU, RBCU, CASTS, UCRY      Medications Reviewed:     Current Facility-Administered Medications   Medication Dose Route Frequency    isosorbide mononitrate ER (IMDUR) tablet 120 mg  120 mg Oral 7am    [Held by provider] losartan (COZAAR) tablet 50 mg  50 mg Oral DAILY    phenytoin (DILANTIN) chewable tablet 100 mg  100 mg Oral TID    metoprolol tartrate (LOPRESSOR) tablet 6.25 mg  6.25 mg Oral BID    lacosamide (VIMPAT) tablet 200 mg  200 mg Oral BID    atorvastatin (LIPITOR) tablet 40 mg  40 mg Oral DAILY    calcium carbonate (TUMS) chewable tablet 200 mg [elemental]  200 mg Oral TID PRN    levETIRAcetam (KEPPRA) tablet 1,500 mg  1,500 mg Oral BID    cholecalciferol (VITAMIN D3) (1000 Units /25 mcg) tablet 5,000 Units  5,000 Units Oral BID    glucose chewable tablet 16 g  4 Tablet Oral PRN  dextrose (D50W) injection syrg 12.5-25 g  25-50 mL IntraVENous PRN    glucagon (GLUCAGEN) injection 1 mg  1 mg IntraMUSCular PRN    insulin lispro (HUMALOG) injection   SubCUTAneous AC&HS    acetaminophen (TYLENOL) tablet 650 mg  650 mg Oral Q4H PRN    Or    acetaminophen (TYLENOL) solution 650 mg  650 mg Per NG tube Q4H PRN    Or    acetaminophen (TYLENOL) suppository 650 mg  650 mg Rectal Q4H PRN     ______________________________________________________________________  EXPECTED LENGTH OF STAY: 3d 7h  ACTUAL LENGTH OF STAY:          Milan Awad 98., FNP

## 2021-11-08 LAB
GLUCOSE BLD STRIP.AUTO-MCNC: 138 MG/DL (ref 65–117)
GLUCOSE BLD STRIP.AUTO-MCNC: 89 MG/DL (ref 65–117)
GLUCOSE BLD STRIP.AUTO-MCNC: 95 MG/DL (ref 65–117)
GLUCOSE BLD STRIP.AUTO-MCNC: 99 MG/DL (ref 65–117)
SERVICE CMNT-IMP: ABNORMAL
SERVICE CMNT-IMP: NORMAL

## 2021-11-08 PROCEDURE — 97530 THERAPEUTIC ACTIVITIES: CPT

## 2021-11-08 PROCEDURE — 74011250637 HC RX REV CODE- 250/637: Performed by: NURSE PRACTITIONER

## 2021-11-08 PROCEDURE — 74011250637 HC RX REV CODE- 250/637: Performed by: INTERNAL MEDICINE

## 2021-11-08 PROCEDURE — 65660000000 HC RM CCU STEPDOWN

## 2021-11-08 PROCEDURE — 36415 COLL VENOUS BLD VENIPUNCTURE: CPT

## 2021-11-08 PROCEDURE — 82962 GLUCOSE BLOOD TEST: CPT

## 2021-11-08 PROCEDURE — 80177 DRUG SCRN QUAN LEVETIRACETAM: CPT

## 2021-11-08 PROCEDURE — 74011250637 HC RX REV CODE- 250/637: Performed by: HOSPITALIST

## 2021-11-08 PROCEDURE — 97535 SELF CARE MNGMENT TRAINING: CPT

## 2021-11-08 PROCEDURE — 97116 GAIT TRAINING THERAPY: CPT

## 2021-11-08 RX ADMIN — CALCIUM CARBONATE (ANTACID) CHEW TAB 500 MG 200 MG: 500 CHEW TAB at 09:02

## 2021-11-08 RX ADMIN — LACOSAMIDE 200 MG: 50 TABLET, FILM COATED ORAL at 08:59

## 2021-11-08 RX ADMIN — LEVETIRACETAM 1500 MG: 500 TABLET, FILM COATED ORAL at 18:34

## 2021-11-08 RX ADMIN — ATORVASTATIN CALCIUM 40 MG: 40 TABLET, FILM COATED ORAL at 08:59

## 2021-11-08 RX ADMIN — ISOSORBIDE MONONITRATE 120 MG: 60 TABLET, EXTENDED RELEASE ORAL at 06:48

## 2021-11-08 RX ADMIN — PHENYTOIN 100 MG: 50 TABLET, CHEWABLE ORAL at 22:20

## 2021-11-08 RX ADMIN — METOPROLOL TARTRATE 6.25 MG: 25 TABLET, FILM COATED ORAL at 18:34

## 2021-11-08 RX ADMIN — LACOSAMIDE 200 MG: 50 TABLET, FILM COATED ORAL at 22:19

## 2021-11-08 RX ADMIN — LEVETIRACETAM 1500 MG: 500 TABLET, FILM COATED ORAL at 06:48

## 2021-11-08 RX ADMIN — METOPROLOL TARTRATE 6.25 MG: 25 TABLET, FILM COATED ORAL at 08:59

## 2021-11-08 RX ADMIN — Medication 5000 UNITS: at 22:19

## 2021-11-08 RX ADMIN — PHENYTOIN 100 MG: 50 TABLET, CHEWABLE ORAL at 08:59

## 2021-11-08 RX ADMIN — PHENYTOIN 100 MG: 50 TABLET, CHEWABLE ORAL at 16:33

## 2021-11-08 RX ADMIN — Medication 5000 UNITS: at 08:59

## 2021-11-08 NOTE — PROGRESS NOTES
Problem: Falls - Risk of  Goal: *Absence of Falls  Description: Document [place]via Fall Risk and appropriate interventions in the flowsheet.   Outcome: Progressing Towards Goal  Note: Fall Risk Interventions:  Mobility Interventions: Assess mobility with egress test, PT Consult for mobility concerns, OT consult for ADLs         Medication Interventions: Assess postural VS orthostatic hypotension, Evaluate medications/consider consulting pharmacy, Patient to call before getting OOB    Elimination Interventions: Bed/chair exit alarm, Stay With Me (per policy), Toilet paper/wipes in reach    History of Falls Interventions: Bed/chair exit alarm, Door open when patient unattended, Investigate reason for fall         Problem: Patient Education: Go to Patient Education Activity  Goal: Patient/Family Education  Outcome: Progressing Towards Goal     Problem: Patient Education: Go to Patient Education Activity  Goal: Patient/Family Education  Outcome: Progressing Towards Goal     Problem: Hemorrhagic Stroke: Day 5 through Discharge  Goal: Off Pathway (Use only if patient is Off Pathway)  Outcome: Progressing Towards Goal  Goal: Activity/Safety  Outcome: Progressing Towards Goal  Goal: Consults, if ordered  Outcome: Progressing Towards Goal  Goal: Diagnostic Test/Procedures  Outcome: Progressing Towards Goal  Goal: Nutrition/Diet  Outcome: Progressing Towards Goal  Goal: Medications  Outcome: Progressing Towards Goal  Goal: Respiratory  Outcome: Progressing Towards Goal  Goal: Treatments/Interventions/Procedures  Outcome: Progressing Towards Goal  Goal: Psychosocial  Outcome: Progressing Towards Goal  Goal: *Hemodynamically stable  Outcome: Progressing Towards Goal  Goal: *Verbalizes anxiety and depression are reduced or absent  Outcome: Progressing Towards Goal  Goal: *Absence of aspiration  Outcome: Progressing Towards Goal  Goal: *Absence of signs and symptoms of DVT  Outcome: Progressing Towards Goal  Goal: *Optimal pain control at patient's stated goal  Outcome: Progressing Towards Goal  Goal: *Tolerating diet  Outcome: Progressing Towards Goal  Goal: *Progressive mobility and function  Outcome: Progressing Towards Goal  Goal: *Rehabilitation readiness  Outcome: Progressing Towards Goal

## 2021-11-08 NOTE — DISCHARGE SUMMARY
Discharge Summary     Patient:  Maia Cuevas       MRN: [id number]       YOB: 1935       Age: 80 y.o. Date of admission:  10/22/2021    Date of discharge:  11/9/2021    Primary care provider: Dr. Alexandria Garcia MD    Admitting provider:  Mercedez Kirkpatrick MD    Discharging provider:  Debbie Rasmussen MD - [phone]  If unavailable, call [phone] and ask the  to page the triage hospitalist.    Consultations  · IP CONSULT TO NEUROSURGERY  · IP CONSULT TO NEUROSURGERY  · IP CONSULT TO NEUROLOGY    Procedures  · * No surgery found *    Discharge destination: SNF . The patient is stable for discharge. Admission diagnosis  · CVA (cerebral vascular accident) ([facility] [I63.9]    Current Discharge Medication List      START taking these medications    Details   phenytoin (DILANTIN) 50 mg chewable tablet Take 2 Tablets by mouth three (3) times daily. Qty: 90 Tablet, Refills: 0  Start date: 11/5/2021      levETIRAcetam (KEPPRA) 750 mg tablet Take 2 Tablets by mouth two (2) times a day. Qty: 120 Tablet, Refills: 0  Start date: 11/5/2021      lacosamide (VIMPAT) 200 mg tab tablet Take 1 Tablet by mouth two (2) times a day. Max Daily Amount: 400 mg. Followup with neurologist  Qty: 60 Tablet, Refills: 0  Start date: 11/5/2021    Associated Diagnoses: Seizure ([facility]      atorvastatin (LIPITOR) 40 mg tablet Take 1 Tablet by mouth daily. Qty: 20 Tablet, Refills: 0  Start date: 10/30/2021         CONTINUE these medications which have NOT CHANGED    Details   metoprolol tartrate (LOPRESSOR) 25 mg tablet Take 12.5 mg by mouth daily. cholecalciferol (Vitamin D3) (1000 Units /25 mcg) tablet Take 5,000 Units by mouth two (2) times a day. isosorbide mononitrate ER (IMDUR) 120 mg CR tablet Take 120 mg by mouth every morning.          STOP taking these medications       losartan (COZAAR) 50 mg tablet Comments:   Reason for Stopping:         clopidogreL (Plavix) 75 mg tab Comments:   Reason for Stopping:         aspirin delayed-release 81 mg tablet Comments:   Reason for Stopping: Follow-up Information     Follow up With Specialties Details Why [address] [place] 70Jemima, DO Neurology Call  [address] [facility]  [phone]      Other, MD Tatyana    Patient can only remember the practice name and not the physician      Carin Hernandez MD Neurosurgery Schedule an appointment as soon as possible for a visit in 2 weeks  [address]  [phone]            Final discharge diagnoses and brief hospital course  Please also refer to the admission H&P for details on the presenting problem. 80 y. o. male who presented with left side weakness and numbness. He has a history of CAD, HTN, HLP and CVA. He was found to have a large chronic subdural hematoma. He was discharged to follow-up with neurosurgery. He saw the neurosurgeon and at that time developed left-sided weakness, so he was sent to the ED. MRI brain revealed a multi-septated SDH. It was felt that his symptoms may be related to seizures from cortical irritation.     Chronic right subdural hematoma:  Intermittent left sided weakness:   Complex partial Seizure  -MRI redemonstrated multiseptated  chronic right subdural hematoma  -Holding ASA, plavix - resume once collection resolves (as an outpatient and when cleared by NSG)  -repeat CT stable  Per neurosurgery:  \"Continue all three antiepileptics - I will adjust doses as needed  Avoid anticoagulation until collection resolved  Follow up with me 2 weeks after discharge\"     Hypotension: noted today while on the toilet on 11/4, after diarrhea, which he's reportedly been having for at least 2 days. Resolved with IV fluids. Antihypertensives resumed and tolerated. He had diarrhea likely due to scheduled Miralax.  This has resolved.     Mild bilateral carotid disease     Hypertension     CAD s/p stents  Physical examination at discharge  Visit Vitals  BP (!) 143/75   Pulse 76   Temp 97.9 °F (36.6 °C)   Resp 20   Ht 5' 11\" (1.803 m)   Wt 88.7 kg (195 lb 8.8 oz)   SpO2 98%   BMI 27.27 kg/m²     Awake and alert  Lung clear  CVS: RRR  Abd: soft NT ND   Ext: no edema     Pertinent imaging studies:        No results for input(s): WBC, HGB, HCT, PLT, HGBEXT, HCTEXT, PLTEXT in the last 72 hours. No results for input(s): NA, K, CL, CO2, BUN, CREA, GLU, CA, MG, PHOS, URICA in the last 72 hours. No results for input(s): AP, TBIL, TP, ALB, GLOB, GGT, AML, LPSE in the last 72 hours. No lab exists for component: SGOT, GPT, AMYP, HLPSE  No results for input(s): INR, PTP, APTT, INREXT in the last 72 hours. No results for input(s): FE, TIBC, PSAT, FERR in the last 72 hours. No results for input(s): PH, PCO2, PO2 in the last 72 hours. No results for input(s): CPK, CKMB in the last 72 hours. No lab exists for component: TROPONINI  No components found for: [place]    Chronic Diagnoses:    Problem List as of 11/8/2021 Date Reviewed: 10/27/2021          Codes Class Noted - Resolved    Simple partial seizures ([facility] ICD-10-CM: G40.109  ICD-9-CM: 345.50  10/30/2021 - Present        * (Principal) Subdural hematoma ([facility] ICD-10-CM: H81.0V2H  ICD-9-CM: 432.1  10/22/2021 - Present        RESOLVED: Left hemiparesis ([facility] ICD-10-CM: G81.94  ICD-9-CM: 342.90  10/23/2021 - 10/29/2021              Time spent on discharge related activities today greater than 30 minutes. Signed:  Rimma Murrieta MD                 Hospitalist, Internal Medicine      Cc:  Other, MD Tatyana

## 2021-11-08 NOTE — PROGRESS NOTES
Bedside shift change report given to Yani LA (oncoming nurse) by Maria Elena Joseph (offgoing nurse). Report included the following information SBAR, ED Summary, Intake/Output and Cardiac Rhythm NSR.

## 2021-11-08 NOTE — PROGRESS NOTES
Problem: Hemorrhagic Stroke: Day 5 through Discharge  Goal: Off Pathway (Use only if patient is Off Pathway)  Outcome: Progressing Towards Goal  Goal: Activity/Safety  Outcome: Progressing Towards Goal  Goal: Consults, if ordered  Outcome: Progressing Towards Goal  Goal: Treatments/Interventions/Procedures  Outcome: Progressing Towards Goal  Goal: Psychosocial  Outcome: Progressing Towards Goal  Goal: *Verbalizes anxiety and depression are reduced or absent  Outcome: Progressing Towards Goal  Goal: *Absence of aspiration  Outcome: Progressing Towards Goal     Problem: Hemorrhagic Stroke: Discharge Outcomes  Goal: *Verbalizes anxiety and depression are reduced or absent  Outcome: Progressing Towards Goal  Goal: *Verbalize understanding of risk factor modification(Stroke Metric)  Outcome: Progressing Towards Goal  Goal: *Absence of signs and symptoms of DVT  Outcome: Progressing Towards Goal  Goal: *Absence of aspiration  Outcome: Progressing Towards Goal

## 2021-11-08 NOTE — DISCHARGE INSTRUCTIONS
Discharge SNF/Rehab Instructions/LTAC       PATIENT ID: Tomi Roberts  MRN: [id number]   YOB: 1935    DATE OF ADMISSION: 10/22/2021  4:43 PM    DATE OF DISCHARGE: 11/8/2021    PRIMARY CARE PROVIDER: Tatyana Light MD       ATTENDING PHYSICIAN: Brock Kuhn MD  DISCHARGING PROVIDER: Max Cantor MD     To contact this individual call [phone] and ask the  to page. If unavailable ask to be transferred the Adult Hospitalist Department. CONSULTATIONS: IP CONSULT TO NEUROSURGERY  IP CONSULT TO NEUROSURGERY  IP CONSULT TO NEUROLOGY    PROCEDURES/SURGERIES: * No surgery found *    ADMITTING DIAGNOSES & HOSPITAL COURSE:   80 y. o. male who presented with left side weakness and numbness. He has a history of CAD, HTN, HLP and CVA. He was found to have a large chronic subdural hematoma. He was discharged to follow-up with neurosurgery. He saw the neurosurgeon and at that time developed left-sided weakness, so he was sent to the ED. MRI brain revealed a multi-septated SDH. It was felt that his symptoms may be related to seizures from cortical irritation. Chronic right subdural hematoma:  Intermittent left sided weakness:   Complex partial Seizure  -MRI redemonstrated multiseptated  chronic right subdural hematoma  -Holding ASA, plavix - resume once collection resolves (as an outpatient and when cleared by NSG)  -repeat CT stable  Per neurosurgery:  \"Continue all three antiepileptics - I will adjust doses as needed  Avoid anticoagulation until collection resolved  Follow up with me 2 weeks after discharge\"     Hypotension: noted today while on the toilet on 11/4, after diarrhea, which he's reportedly been having for at least 2 days. Resolved with IV fluids. Antihypertensives resumed and tolerated. He had diarrhea likely due to scheduled Miralax. This has resolved.     Mild bilateral carotid disease     Hypertension     CAD s/p stents    DISCHARGE DIAGNOSES / PLAN:        PENDING TEST RESULTS:   At the time of discharge the following test results are still pending: n/a    FOLLOW UP APPOINTMENTS:    Follow-up Information     Follow up With Specialties Details Why [address], Isabel Bowen, DO Neurology Call  [address]  [phone]      Other, MD Tatyana    Patient can only remember the practice name and not the physician      Bi Bhagat MD Neurosurgery Schedule an appointment as soon as possible for a visit in 2 weeks  [address]  [phone]             ADDITIONAL CARE RECOMMENDATIONS: see above    DIET: Regular Diet    ACTIVITY: PT/OT Eval and Treat      DISCHARGE MEDICATIONS:   See Medication Reconciliation Form      NOTIFY YOUR PHYSICIAN FOR ANY OF THE FOLLOWING:   Fever over 101 degrees for 24 hours. Chest pain, shortness of breath, fever, chills, nausea, vomiting, diarrhea, change in mentation, falling, weakness, bleeding. Severe pain or pain not relieved by medications. Or, any other signs or symptoms that you may have questions about.     DISPOSITION:    Home With:   OT  PT  HH  RN      x SNF/Inpatient Rehab/LTAC    Independent/assisted living    Hospice    Other:       PATIENT CONDITION AT DISCHARGE:     Functional status    Poor    x Deconditioned     Independent      Cognition     Lucid     Forgetful     Dementia      Catheters/lines (plus indication)    Bernstein     PICC     PEG     None      Code status   x  Full code     DNR      PHYSICAL EXAMINATION AT DISCHARGE:   Refer to Progress Note      CHRONIC MEDICAL DIAGNOSES:  Problem List as of 11/5/2021 Date Reviewed: 10/27/2021          Codes Class Noted - Resolved    Simple partial seizures ([facility] ICD-10-CM: G40.109  ICD-9-CM: 345.50  10/30/2021 - Present        * (Principal) Subdural hematoma ([facility] ICD-10-CM: L99.2H4K  ICD-9-CM: 432.1  10/22/2021 - Present        RESOLVED: Left hemiparesis ([facility] ICD-10-CM: G81.94  ICD-9-CM: 342.90  10/23/2021 - 10/29/2021                CDMP Checked:   Yes x     PROBLEM LIST Updated:  Yes x         Signed:   Radha Ornelas MD  11/8/2021

## 2021-11-08 NOTE — PROGRESS NOTES
Transition of Care Plan: [facility]-pending review     RUR: 9%     PCP F/U: Dr. José Miguel Chu     Disposition: [facility]-pending review     Transportation: [facility] 1100-10/09/2021     Main Contact: Michelle Pearl: [phone]  [phone]    1237: Spoke with . This CM was notified that patient's family has appealed medicare decision regarding discharge. Daughter states that she does not want to proceed with any other facilities and is still wanting patient to go to [facility] [place]. Spoke with Merlin Notice at [facility] ([phone]) who needs new clinical notes including those from PT/OT. States likely not today, but will review case for tomorrow. CM will send updated clinicals to [phone] once available. 1612: Therapy notes are in and clinicals have been faxed to [facility] for review     [phone]: [facility] states that they can accept tomorrow. Will need a rapid prior to discharge. Request for [facility] to pickup at 1100. MD and RN notified. Will continue to follow.      Keaton Pearson RN, CRM

## 2021-11-08 NOTE — PROGRESS NOTES
Problem: Mobility Impaired (Adult and Pediatric)  Goal: *Acute Goals and Plan of Care (Insert Text)  Description:   FUNCTIONAL STATUS PRIOR TO ADMISSION: Patient was independent and active without use of DME.    HOME SUPPORT PRIOR TO ADMISSION: The patient lived with wife but did not require assist.    Physical Therapy Goals  Initiated 10/23/2021, Goals re-assessed 11/1/2021, and 11/8/2021 and appropriate for carryover. 1.  Patient will move from supine to sit and sit to supine  in bed with modified independence within 7 day(s). 2.  Patient will transfer from bed to chair and chair to bed with modified independence using the least restrictive device within 7 day(s). 3.  Patient will perform sit to stand with modified independence within 7 day(s). 4.  Patient will ambulate with modified independence for 200 feet with the least restrictive device within 7 day(s). 5.  Patient will ascend/descend 4 stairs with 1 handrail(s) with modified independence within 7 day(s). 6.  Patient will improve Avalos Balance score by 7 points within 7 days. Outcome: Progressing Towards Goal     PHYSICAL THERAPY TREATMENT  Patient: Jennifer Alvarado (53 y.o. male)  Date: 11/8/2021  Diagnosis: CVA (cerebral vascular accident) [facility]) [I63.9]   Subdural hematoma ([facility]       Precautions: Fall (a little impulsive)  Chart, physical therapy assessment, plan of care and goals were reviewed. ASSESSMENT  Patient continues with skilled PT services and is progressing towards goals. Pt greeted lying supine in bed with HOBE, agreeable to work with therapy and eager to ambulate. Pt completing bed mobility with SBA, and transfers and gait with RW with CGA. Attempted amb without RW, however, pt frequently reaching out with hand for steadying assist. Overall dynamic balance and gait stability and speed improved with introduction of RW. Pt ambulated 120ft with RW with slow but steady gait demonstrated.  Noted, LLE steppage gait during swing phase however, able to perform active DF through full range when cued but with poor carryover of improved gait mechanics when cuing stopped. Overall, pt is mobilizing well at this time and will continue to benefit from acute PT interventions. Rec home with HHPT and 24/7 supervision/assist vs SNF placement for continued therapy. Encouraged pt to ambulate in marks 1-2 additional times per day with RN staff assisting. Current Level of Function Impacting Discharge (mobility/balance): CGA transfer and gait with RW    Other factors to consider for discharge:          PLAN :  Patient continues to benefit from skilled intervention to address the above impairments. Continue treatment per established plan of care. to address goals. Recommendation for discharge: (in order for the patient to meet his/her long term goals)  Therapy up to 5 days/week in SNF setting or intensive home health therapy program    This discharge recommendation:  Has been made in collaboration with the attending provider and/or case management    IF patient discharges home will need the following DME: rolling walker       SUBJECTIVE:   Patient stated I'm definitely feeling stronger.     OBJECTIVE DATA SUMMARY:   Critical Behavior:  Neurologic State: Alert  Orientation Level: Oriented X4  Cognition: Appropriate decision making, Appropriate for age attention/concentration, Appropriate safety awareness  Safety/Judgement: Decreased awareness of environment, Decreased awareness of need for assistance, Decreased awareness of need for safety, Decreased insight into deficits  Functional Mobility Training:  Bed Mobility:     Supine to Sit: Supervision     Scooting: Supervision        Transfers:  Sit to Stand: Contact guard assistance  Stand to Sit: Contact guard assistance                             Balance:  Sitting - Static: Good (unsupported)  Sitting - Dynamic: Good (unsupported)  Standing - Static: Good; Constant support  Standing - Dynamic : Good; Constant support  Ambulation/Gait Training:  Distance (ft): 120 Feet (ft)  Assistive Device: Walker, rolling; Gait belt  Ambulation - Level of Assistance: Contact guard assistance        Gait Abnormalities: Steppage gait (on L)        Base of Support: Widened     Speed/Denisha: Slow; Shuffled    Therapeutic Exercises:   LAQ, ankle pumps, seated marching 2 x 10 reps  Pain Rating:  Denied pain    Activity Tolerance:   WNL and Good    After treatment patient left in no apparent distress:   Sitting in chair, Call bell within reach, and Bed / chair alarm activated    COMMUNICATION/COLLABORATION:   The patients plan of care was discussed with: Occupational therapist, Registered nurse, and Case management.      Laury Wing PT, DPT   Time Calculation: 25 mins

## 2021-11-08 NOTE — PROGRESS NOTES
Problem: Self Care Deficits Care Plan (Adult)  Goal: *Acute Goals and Plan of Care (Insert Text)  Description: FUNCTIONAL STATUS PRIOR TO ADMISSION: Patient was independent and active without use of DME. He drives and performs all ADL and I-ADL without AD. He also mows his grass unless it's too hot then he asks son or someone else to mow it. HOME SUPPORT: The patient lived with wife and son but did not require assist.     Occupational Therapy Goals  Initiated 10/23/2021, continued 11/3/2021  1. Patient will perform standing bathing task sitting to bathe knees and distal only without LOB with modified independence within 7 day(s). 2.  Patient will perform item retrieval in prep for upper body dressing and lower body dressing with modified independence within 7 day(s). 3.  Patient will perform simple home management with modified independence within 7 day(s). 4.  Patient will perform toilet transfers with modified independence within 7 day(s). 5.  Patient will perform all aspects of toileting with independence within 7 day(s). 6. Patient will complete all functional mobility during OT session without LOB or cues for safety or sequencing tasks within 7 days. Outcome: Progressing Towards Goal     OCCUPATIONAL THERAPY TREATMENT  Patient: Gabe Beltran (50 y.o. male)  Date: 11/8/2021  Diagnosis: CVA (cerebral vascular accident) [facility]) [I63.9]   Subdural hematoma ([facility]       Precautions: Fall  Chart, occupational therapy assessment, plan of care, and goals were reviewed. ASSESSMENT  Patient continues with skilled OT services and is progressing towards goals. Remains limited by slight L hemiparesis and mild balance impairment. Patient with improved insight into deficits and safety awareness. Receptive to education on fall prevention and RW management during functional tasks. Patient with good distal LB reach in tailor sit and doffed/ donned socks seated.   Also performed the following with stand by to contact guard assistance: sit-stand, ambulated to/ from sink, standing grooming, and ambulated ~200 feet using RW after seated rest break. Patient reports his wife was recently hospitalized, but is recovering well at home. He also reports he lives with his son, who works during the day, and that his daughter and adult granddaughter live within 1 mile of his home and are supportive. Recommend d/c home with [address] if family able to provide increased assistance to meet current functional level. If family unable to provide this level of support, recommend SNF. Current Level of Function Impacting Discharge (ADLs): up to stand by to contact guard assistance x1 for ADLs and ambulation using RW         PLAN :  Patient continues to benefit from skilled intervention to address the above impairments. Continue treatment per established plan of care to address goals. Recommendation for discharge: (in order for the patient to meet his/her long term goals)   Recommend d/c home with [address] if family able to provide increased assistance to meet current functional level. If family unable to provide this level of support, recommend SNF. 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     OBJECTIVE DATA SUMMARY:   Cognitive/Behavioral Status:  Neurologic State: Alert  Orientation Level: Oriented X4  Cognition: Follows commands  Perception: Appears intact          Functional Mobility and Transfers for ADLs:  Bed Mobility:  Supine to Sit: Supervision  Scooting: Supervision    Transfers:  Sit to Stand: Contact guard assistance          Balance:  Sitting - Static: Good (unsupported)  Sitting - Dynamic: Good (unsupported)  Standing - Static: Good; Constant support  Standing - Dynamic : Good; Constant support    ADL Intervention:       Grooming  Washing Hands: Stand-by assistance (standing at sink with RW)  Brushing Teeth: Stand-by assistance (standing at sink with RW)            Lower Body Dressing Assistance  Socks: Supervision (doffed and donned in tailor sit)     Pain:  Patient did not report pain    Activity Tolerance:   Good, VSS    After treatment patient left in no apparent distress:   Sitting in chair, Call bell within reach, and Bed / chair alarm activated    COMMUNICATION/COLLABORATION:   The patients plan of care was discussed with: Physical therapist, Registered nurse, Physician, and Case management.      Malachi Love OT  Time Calculation: 28 mins

## 2021-11-09 VITALS
BODY MASS INDEX: 27.38 KG/M2 | DIASTOLIC BLOOD PRESSURE: 78 MMHG | TEMPERATURE: 98 F | RESPIRATION RATE: 23 BRPM | HEART RATE: 76 BPM | HEIGHT: 71 IN | SYSTOLIC BLOOD PRESSURE: 143 MMHG | OXYGEN SATURATION: 99 % | WEIGHT: 195.55 LBS

## 2021-11-09 LAB
COVID-19 RAPID TEST, COVR: NOT DETECTED
GLUCOSE BLD STRIP.AUTO-MCNC: 110 MG/DL (ref 65–117)
SERVICE CMNT-IMP: NORMAL
SOURCE, COVRS: NORMAL

## 2021-11-09 PROCEDURE — 74011250637 HC RX REV CODE- 250/637: Performed by: NURSE PRACTITIONER

## 2021-11-09 PROCEDURE — 87635 SARS-COV-2 COVID-19 AMP PRB: CPT

## 2021-11-09 PROCEDURE — 74011250637 HC RX REV CODE- 250/637: Performed by: HOSPITALIST

## 2021-11-09 PROCEDURE — 82962 GLUCOSE BLOOD TEST: CPT

## 2021-11-09 PROCEDURE — 74011250637 HC RX REV CODE- 250/637: Performed by: INTERNAL MEDICINE

## 2021-11-09 RX ORDER — LACOSAMIDE 200 MG/1
200 TABLET ORAL 2 TIMES DAILY
Qty: 60 TABLET | Refills: 1 | Status: SHIPPED | OUTPATIENT
Start: 2021-11-09

## 2021-11-09 RX ADMIN — METOPROLOL TARTRATE 6.25 MG: 25 TABLET, FILM COATED ORAL at 08:09

## 2021-11-09 RX ADMIN — ISOSORBIDE MONONITRATE 120 MG: 60 TABLET, EXTENDED RELEASE ORAL at 08:08

## 2021-11-09 RX ADMIN — LEVETIRACETAM 1500 MG: 500 TABLET, FILM COATED ORAL at 08:08

## 2021-11-09 RX ADMIN — PHENYTOIN 100 MG: 50 TABLET, CHEWABLE ORAL at 08:08

## 2021-11-09 RX ADMIN — Medication 5000 UNITS: at 08:08

## 2021-11-09 RX ADMIN — ATORVASTATIN CALCIUM 40 MG: 40 TABLET, FILM COATED ORAL at 08:08

## 2021-11-09 RX ADMIN — LACOSAMIDE 200 MG: 50 TABLET, FILM COATED ORAL at 08:08

## 2021-11-09 NOTE — PROGRESS NOTES
Problem: Falls - Risk of  Goal: *Absence of Falls  Description: Document Julissa Song Fall Risk and appropriate interventions in the flowsheet.   Outcome: Resolved/Met  Note: Fall Risk Interventions:  Mobility Interventions: Bed/chair exit alarm, Communicate number of staff needed for ambulation/transfer         Medication Interventions: Patient to call before getting OOB, Teach patient to arise slowly    Elimination Interventions: Call light in reach, Bed/chair exit alarm    History of Falls Interventions: Bed/chair exit alarm, Door open when patient unattended         Problem: Patient Education: Go to Patient Education Activity  Goal: Patient/Family Education  Outcome: Resolved/Met     Problem: Patient Education: Go to Patient Education Activity  Goal: Patient/Family Education  Outcome: Resolved/Met     Problem: Patient Education: Go to Patient Education Activity  Goal: Patient/Family Education  Outcome: Resolved/Met     Problem: Patient Education: Go to Patient Education Activity  Goal: Patient/Family Education  Outcome: Resolved/Met     Problem: Hemorrhagic Stroke: Admission Day (0-3 hours)  Goal: Off Pathway (Use only if patient is Off Pathway)  Outcome: Resolved/Met  Goal: Activity/Safety  Outcome: Resolved/Met  Goal: Consults, if ordered  Outcome: Resolved/Met  Goal: Diagnostic Test/Procedures  Outcome: Resolved/Met  Goal: Nutrition/Diet  Outcome: Resolved/Met  Goal: Medications  Outcome: Resolved/Met  Goal: Respiratory  Outcome: Resolved/Met  Goal: Treatments/Interventions/Procedures  Outcome: Resolved/Met  Goal: Psychosocial  Outcome: Resolved/Met  Goal: *Establish/diagnose type of stroke  Outcome: Resolved/Met  Goal: *Patient maintains clear airway/free of aspiration  Outcome: Resolved/Met  Goal: *Hemodynamically stable  Outcome: Resolved/Met     Problem: Hemorrhagic Stroke:  3-24 hours  Goal: Off Pathway (Use only if patient is Off Pathway)  Outcome: Resolved/Met  Goal: Activity/Safety  Outcome: Resolved/Met  Goal: Consults, if ordered  Outcome: Resolved/Met  Goal: Diagnostic Test/Procedures  Outcome: Resolved/Met  Goal: Nutrition/Diet  Outcome: Resolved/Met  Goal: Discharge Planning  Outcome: Resolved/Met  Goal: Medications  Outcome: Resolved/Met  Goal: Respiratory  Outcome: Resolved/Met  Goal: Treatments/Interventions/Procedures  Outcome: Resolved/Met  Goal: Psychosocial  Outcome: Resolved/Met  Goal: *Hemodynamically stable  Outcome: Resolved/Met  Goal: *Verbalizes anxiety and depression are reduced or absent  Outcome: Resolved/Met  Goal: *Absence of aspiration  Outcome: Resolved/Met  Goal: *Absence of signs and symptoms of DVT  Outcome: Resolved/Met  Goal: *Optimal pain control at patient's stated goal  Outcome: Resolved/Met  Goal: *Tolerating diet  Outcome: Resolved/Met  Goal: *Progressive mobility and function (eg: ADL's)  Outcome: Resolved/Met  Goal: *Rehabilitation readiness  Outcome: Resolved/Met     Problem: Hemorrhagic Stroke: Day 2  Goal: Off Pathway (Use only if patient is Off Pathway)  Outcome: Resolved/Met  Goal: Activity/Safety  Outcome: Resolved/Met  Goal: Consults, if ordered  Outcome: Resolved/Met  Goal: Diagnostic Test/Procedures  Outcome: Resolved/Met  Goal: Nutrition/Diet  Outcome: Resolved/Met  Goal: Medications  Outcome: Resolved/Met  Goal: Respiratory  Outcome: Resolved/Met  Goal: Treatments/Interventions/Procedures  Outcome: Resolved/Met  Goal: Psychosocial  Outcome: Resolved/Met  Goal: *Hemodynamically stable  Outcome: Resolved/Met  Goal: *Verbalizes anxiety and depression are reduced or absent  Outcome: Resolved/Met  Goal: *Absence of aspiration  Outcome: Resolved/Met  Goal: *Absence of signs and symptoms of DVT  Outcome: Resolved/Met  Goal: *Optimal pain control at patient's stated goal  Outcome: Resolved/Met  Goal: *Tolerating diet  Outcome: Resolved/Met  Goal: *Progressive mobility and function  Outcome: Resolved/Met  Goal: *Rehabilitation readiness  Outcome: Resolved/Met Problem: Hemorrhagic Stroke: Day 3  Goal: Off Pathway (Use only if patient is Off Pathway)  Outcome: Resolved/Met  Goal: Activity/Safety  Outcome: Resolved/Met  Goal: Consults, if ordered  Outcome: Resolved/Met  Goal: Diagnostic Test/Procedures  Outcome: Resolved/Met  Goal: Nutrition/Diet  Outcome: Resolved/Met  Goal: Medications  Outcome: Resolved/Met  Goal: Respiratory  Outcome: Resolved/Met  Goal: Treatments/Interventions/Procedures  Outcome: Resolved/Met  Goal: Psychosocial  Outcome: Resolved/Met  Goal: *Hemodynamically stable  Outcome: Resolved/Met  Goal: *Verbalizes anxiety and depression are reduced or absent  Outcome: Resolved/Met  Goal: *Absence of aspiration  Outcome: Resolved/Met  Goal: *Absence of signs and symptoms of DVT  Outcome: Resolved/Met  Goal: *Optimal pain control at patient's stated goal  Outcome: Resolved/Met  Goal: *Tolerating diet  Outcome: Resolved/Met  Goal: *Progressive mobility and function  Outcome: Resolved/Met  Goal: *Rehabilitation readiness  Outcome: Resolved/Met     Problem: Hemorrhagic Stroke: Day 4  Goal: Off Pathway (Use only if patient is Off Pathway)  Outcome: Resolved/Met  Goal: Activity/Safety  Outcome: Resolved/Met  Goal: Consults, if ordered  Outcome: Resolved/Met  Goal: Diagnostic Test/Procedures  Outcome: Resolved/Met  Goal: Nutrition/Diet  Outcome: Resolved/Met  Goal: Medications  Outcome: Resolved/Met  Goal: Respiratory  Outcome: Resolved/Met  Goal: Treatments/Interventions/Procedures  Outcome: Resolved/Met  Goal: Psychosocial  Outcome: Resolved/Met  Goal: *Hemodynamically stable  Outcome: Resolved/Met  Goal: *Verbalizes anxiety and depression are reduced or absent  Outcome: Resolved/Met  Goal: *Absence of aspiration  Outcome: Resolved/Met  Goal: *Absence of signs and symptoms of DVT  Outcome: Resolved/Met  Goal: *Optimal pain control at patient's stated goal  Outcome: Resolved/Met  Goal: *Tolerating diet  Outcome: Resolved/Met  Goal: *Progressive mobility and function  Outcome: Resolved/Met  Goal: *Rehabilitation readiness  Outcome: Resolved/Met     Problem: Hemorrhagic Stroke: Day 5 through Discharge  Goal: Off Pathway (Use only if patient is Off Pathway)  Outcome: Resolved/Met  Goal: Activity/Safety  Outcome: Resolved/Met  Goal: Consults, if ordered  Outcome: Resolved/Met  Goal: Diagnostic Test/Procedures  Outcome: Resolved/Met  Goal: Nutrition/Diet  Outcome: Resolved/Met  Goal: Medications  Outcome: Resolved/Met  Goal: Respiratory  Outcome: Resolved/Met  Goal: Treatments/Interventions/Procedures  Outcome: Resolved/Met  Goal: Psychosocial  Outcome: Resolved/Met  Goal: *Hemodynamically stable  Outcome: Resolved/Met  Goal: *Verbalizes anxiety and depression are reduced or absent  Outcome: Resolved/Met  Goal: *Absence of aspiration  Outcome: Resolved/Met  Goal: *Absence of signs and symptoms of DVT  Outcome: Resolved/Met  Goal: *Optimal pain control at patient's stated goal  Outcome: Resolved/Met  Goal: *Tolerating diet  Outcome: Resolved/Met  Goal: *Progressive mobility and function  Outcome: Resolved/Met  Goal: *Rehabilitation readiness  Outcome: Resolved/Met     Problem: Hemorrhagic Stroke: Discharge Outcomes  Goal: *Verbalizes anxiety and depression are reduced or absent  Outcome: Resolved/Met  Goal: *Verbalize understanding of risk factor modification(Stroke Metric)  Outcome: Resolved/Met  Goal: *Optimal pain control at patient's stated goal  Outcome: Resolved/Met  Goal: *Hemodynamically stable  Outcome: Resolved/Met  Goal: *Absence of aspiration pneumonia  Outcome: Resolved/Met  Goal: *Aware of needed dietary changes  Outcome: Resolved/Met  Goal: *Verbalizes understanding and describes medication purposes and frequencies  Outcome: Resolved/Met  Goal: *Tolerating diet  Outcome: Resolved/Met  Goal: *Absence of signs and symptoms of DVT  Outcome: Resolved/Met  Goal: *Absence of aspiration  Outcome: Resolved/Met  Goal: *Progressive mobility and function  Outcome: Resolved/Met  Goal: *Home safety concerns addressed  Outcome: Resolved/Met

## 2021-11-09 NOTE — ROUTINE PROCESS
Bedside and Verbal shift change report given to Malgorzata Mullins (oncoming nurse) by Linda Vernon (offgoing nurse). Report included the following information SBAR, Kardex, ED Summary, Intake/Output, MAR, Cardiac Rhythm NSR and Dual Neuro Assessment.

## 2021-11-09 NOTE — PROGRESS NOTES
Transition of Care Plan[facility]  [facility] to call report to [phone] and ask for [address]     RUR: 11% Low     PCP F/U: Dr. Bandar Avilez     Disposition: [facility]-room 126     Transportation: [facility] 1100     Main Contact: Michelle MORRIS-HCRJ-[phone]     1172: Confirmed with LAUREEN at the [facility] that they can accept this morning. [facility]  at 1100. RN to call report to  [phone] and ask for [address].      Will continue to follow.      Tyree Madden RN, CRM    Transition of Care Plan to SNF/Rehab    SNF/Rehab Transition:  Patient has been accepted to [facility] and meets criteria for admission. Patient will transported by [facility] and expected to leave at 1100. Communication to Patient/Family:  Met with patient and family and they are agreeable to the transition plan. Communication to SNF/Rehab:  Bedside RN, [facility], has been notified to update the transition plan to the facility and call report (phone number [phone] ). Discharge information has been updated on the AVS.     Discharge instructions to be sent to facility. Nursing Please include all hard scripts for controlled substances, med rec and dc summary, and AVS in packet.      Reviewed and confirmed with facility, [facility], can manage the patient care needs for the following:     Brittney Delgado with (X) only those applicable:    Medication:  [x]  Medications will be available at the facility  []  IV Antibiotics   [x]  Controlled Substance - hard copy to be sent with patient   [x]  Weekly Labs   Documents:  [x] Hard RX  [] MAR  [x] Kardex  [] AVS  []Transfer Summary  []Discharge   Equipment:  []  CPAP/BiPAP  []  Wound Vacuum  []  Bernstein or Urinary Device  []  PICC/Central Line  []  Nebulizer  []  Ventilator   Treatment:  []Isolation (for MRSA, VRE, etc.)  []Surgical Drain Management  []Tracheostomy Care  []Dressing Changes  []Dialysis with transportation and chair time. []PEG Care  []Oxygen  []Daily Weights for Heart Failure   Dietary:  []Any diet limitations  []Tube Feedings   []Total Parenteral Management (TPN)   Eligible for Medicaid Long Term Services and Supports  Yes:  [] Eligible for medical assistance or will become eligible within 180 days and UAI completed. [] Provider/Patient and/or support system has requested screening. [] UAI copy provided to patient or responsible party. [] UAI unavailable at discharge will send once processed to SNF provider. [] UAI unavailable at discharged mailed to patient  No:   [x] Private pay and is not financially eligible for Medicaid within the next 180 days. [] Reside out-of-state.   [] A residents of a state owned/operated facility that is licensed  by [address] [employer] [facility] or [facility]  [] Enrollment in [facility] hospice services  []  Medical [address]  [] Patient /Family declines to have screening completed or provide financial information for screening     Financial Resources:  Medicaid    [] Initiated and application pending   [] Full coverage     Advanced Care Plan:  []Surrogate Decision Maker of Care  []POA  []Communicated Code Status-FULL    Other

## 2021-11-10 ENCOUNTER — PATIENT OUTREACH (OUTPATIENT)
Dept: CASE MANAGEMENT | Age: 86
End: 2021-11-10

## 2021-11-10 LAB — LEVETIRACETAM SERPL-MCNC: 33.1 UG/ML (ref 10–40)

## 2021-11-10 NOTE — PROGRESS NOTES
Transition of care outreach postponed for 14 days due to patient's discharge to SNF.  10/22-11/9/21 [facility] D/C the [place] [facility] jeff f/u 14d

## 2021-11-24 ENCOUNTER — PATIENT OUTREACH (OUTPATIENT)
Dept: CASE MANAGEMENT | Age: 86
End: 2021-11-24

## 2021-11-24 NOTE — PROGRESS NOTES
Patient not in today currently at the [place], advised patients wife I will call back in 2 days. Patients wife states patient left his new walker at [facility] when he was discharged . Spoke to unit secretary on 6th floor  and she looked for it, but could not find it.   Advised patients wife

## 2021-12-07 ENCOUNTER — PATIENT OUTREACH (OUTPATIENT)
Dept: CASE MANAGEMENT | Age: 86
End: 2021-12-07

## 2021-12-08 ENCOUNTER — PATIENT OUTREACH (OUTPATIENT)
Dept: CASE MANAGEMENT | Age: 86
End: 2021-12-08

## 2021-12-10 ENCOUNTER — APPOINTMENT (OUTPATIENT)
Dept: CT IMAGING | Age: 86
DRG: 064 | End: 2021-12-10
Attending: EMERGENCY MEDICINE
Payer: MEDICARE

## 2021-12-10 ENCOUNTER — HOSPITAL ENCOUNTER (INPATIENT)
Age: 86
LOS: 4 days | Discharge: NURSING, REHAB OR LONG-TERM CARE | DRG: 064 | End: 2021-12-14
Attending: EMERGENCY MEDICINE | Admitting: INTERNAL MEDICINE
Payer: MEDICARE

## 2021-12-10 DIAGNOSIS — G40.909 SEIZURE DISORDER (HCC): ICD-10-CM

## 2021-12-10 DIAGNOSIS — I63.9 ACUTE CVA (CEREBROVASCULAR ACCIDENT) (HCC): ICD-10-CM

## 2021-12-10 DIAGNOSIS — R29.810 FACIAL DROOP: ICD-10-CM

## 2021-12-10 DIAGNOSIS — R47.81 SLURRED SPEECH: ICD-10-CM

## 2021-12-10 DIAGNOSIS — R56.9 SEIZURE (HCC): ICD-10-CM

## 2021-12-10 DIAGNOSIS — R41.0 CONFUSION: Primary | ICD-10-CM

## 2021-12-10 DIAGNOSIS — I62.03 CHRONIC SUBDURAL HEMATOMA (HCC): ICD-10-CM

## 2021-12-10 PROBLEM — I65.22 LEFT CAROTID STENOSIS: Status: ACTIVE | Noted: 2021-12-10

## 2021-12-10 LAB
ALBUMIN SERPL-MCNC: 3.6 G/DL (ref 3.5–5)
ALBUMIN/GLOB SERPL: 1.2 {RATIO} (ref 1.1–2.2)
ALP SERPL-CCNC: 158 U/L (ref 45–117)
ALT SERPL-CCNC: 66 U/L (ref 12–78)
ANION GAP SERPL CALC-SCNC: 8 MMOL/L (ref 5–15)
APPEARANCE UR: CLEAR
AST SERPL-CCNC: 44 U/L (ref 15–37)
BACTERIA URNS QL MICRO: NEGATIVE /HPF
BASOPHILS # BLD: 0 K/UL (ref 0–0.1)
BASOPHILS NFR BLD: 0 % (ref 0–1)
BILIRUB SERPL-MCNC: 0.4 MG/DL (ref 0.2–1)
BILIRUB UR QL: NEGATIVE
BUN SERPL-MCNC: 13 MG/DL (ref 6–20)
BUN/CREAT SERPL: 11 (ref 12–20)
CALCIUM SERPL-MCNC: 9.2 MG/DL (ref 8.5–10.1)
CHLORIDE SERPL-SCNC: 106 MMOL/L (ref 97–108)
CO2 SERPL-SCNC: 27 MMOL/L (ref 21–32)
COLOR UR: NORMAL
CREAT SERPL-MCNC: 1.14 MG/DL (ref 0.7–1.3)
DIFFERENTIAL METHOD BLD: NORMAL
EOSINOPHIL # BLD: 0.2 K/UL (ref 0–0.4)
EOSINOPHIL NFR BLD: 2 % (ref 0–7)
EPITH CASTS URNS QL MICRO: NORMAL /LPF
ERYTHROCYTE [DISTWIDTH] IN BLOOD BY AUTOMATED COUNT: 13 % (ref 11.5–14.5)
GLOBULIN SER CALC-MCNC: 3 G/DL (ref 2–4)
GLUCOSE BLD STRIP.AUTO-MCNC: 90 MG/DL (ref 65–117)
GLUCOSE SERPL-MCNC: 100 MG/DL (ref 65–100)
GLUCOSE UR STRIP.AUTO-MCNC: NEGATIVE MG/DL
HCT VFR BLD AUTO: 40.5 % (ref 36.6–50.3)
HGB BLD-MCNC: 13 G/DL (ref 12.1–17)
HGB UR QL STRIP: NEGATIVE
IMM GRANULOCYTES # BLD AUTO: 0 K/UL (ref 0–0.04)
IMM GRANULOCYTES NFR BLD AUTO: 0 % (ref 0–0.5)
INR PPP: 1.2 (ref 0.9–1.1)
KETONES UR QL STRIP.AUTO: NEGATIVE MG/DL
LEUKOCYTE ESTERASE UR QL STRIP.AUTO: NEGATIVE
LYMPHOCYTES # BLD: 2.4 K/UL (ref 0.8–3.5)
LYMPHOCYTES NFR BLD: 33 % (ref 12–49)
MCH RBC QN AUTO: 30 PG (ref 26–34)
MCHC RBC AUTO-ENTMCNC: 32.1 G/DL (ref 30–36.5)
MCV RBC AUTO: 93.3 FL (ref 80–99)
MONOCYTES # BLD: 0.5 K/UL (ref 0–1)
MONOCYTES NFR BLD: 7 % (ref 5–13)
NEUTS SEG # BLD: 4.2 K/UL (ref 1.8–8)
NEUTS SEG NFR BLD: 57 % (ref 32–75)
NITRITE UR QL STRIP.AUTO: NEGATIVE
NRBC # BLD: 0 K/UL (ref 0–0.01)
NRBC BLD-RTO: 0 PER 100 WBC
PH UR STRIP: 7 [PH] (ref 5–8)
PHENYTOIN SERPL-MCNC: 1 UG/ML (ref 10–20)
PLATELET # BLD AUTO: 215 K/UL (ref 150–400)
PMV BLD AUTO: 9.7 FL (ref 8.9–12.9)
POTASSIUM SERPL-SCNC: 3.9 MMOL/L (ref 3.5–5.1)
PROT SERPL-MCNC: 6.6 G/DL (ref 6.4–8.2)
PROT UR STRIP-MCNC: NEGATIVE MG/DL
PROTHROMBIN TIME: 11.6 SEC (ref 9–11.1)
RBC # BLD AUTO: 4.34 M/UL (ref 4.1–5.7)
RBC #/AREA URNS HPF: NORMAL /HPF (ref 0–5)
SERVICE CMNT-IMP: NORMAL
SODIUM SERPL-SCNC: 141 MMOL/L (ref 136–145)
SP GR UR REFRACTOMETRY: 1.01 (ref 1–1.03)
UR CULT HOLD, URHOLD: NORMAL
UROBILINOGEN UR QL STRIP.AUTO: 0.2 EU/DL (ref 0.2–1)
WBC # BLD AUTO: 7.3 K/UL (ref 4.1–11.1)
WBC URNS QL MICRO: NORMAL /HPF (ref 0–4)

## 2021-12-10 PROCEDURE — 4A03X5D MEASUREMENT OF ARTERIAL FLOW, INTRACRANIAL, EXTERNAL APPROACH: ICD-10-PCS | Performed by: RADIOLOGY

## 2021-12-10 PROCEDURE — 80235 DRUG ASSAY LACOSAMIDE: CPT

## 2021-12-10 PROCEDURE — 80177 DRUG SCRN QUAN LEVETIRACETAM: CPT

## 2021-12-10 PROCEDURE — 36415 COLL VENOUS BLD VENIPUNCTURE: CPT

## 2021-12-10 PROCEDURE — 65270000029 HC RM PRIVATE

## 2021-12-10 PROCEDURE — 70496 CT ANGIOGRAPHY HEAD: CPT

## 2021-12-10 PROCEDURE — 80053 COMPREHEN METABOLIC PANEL: CPT

## 2021-12-10 PROCEDURE — 85610 PROTHROMBIN TIME: CPT

## 2021-12-10 PROCEDURE — 74011000636 HC RX REV CODE- 636: Performed by: EMERGENCY MEDICINE

## 2021-12-10 PROCEDURE — 94762 N-INVAS EAR/PLS OXIMTRY CONT: CPT

## 2021-12-10 PROCEDURE — 70450 CT HEAD/BRAIN W/O DYE: CPT

## 2021-12-10 PROCEDURE — 99284 EMERGENCY DEPT VISIT MOD MDM: CPT

## 2021-12-10 PROCEDURE — 81001 URINALYSIS AUTO W/SCOPE: CPT

## 2021-12-10 PROCEDURE — 80185 ASSAY OF PHENYTOIN TOTAL: CPT

## 2021-12-10 PROCEDURE — 85025 COMPLETE CBC W/AUTO DIFF WBC: CPT

## 2021-12-10 PROCEDURE — 82962 GLUCOSE BLOOD TEST: CPT

## 2021-12-10 PROCEDURE — 93005 ELECTROCARDIOGRAM TRACING: CPT

## 2021-12-10 RX ORDER — FACIAL-BODY WIPES
10 EACH TOPICAL DAILY PRN
Status: DISCONTINUED | OUTPATIENT
Start: 2021-12-10 | End: 2021-12-14 | Stop reason: HOSPADM

## 2021-12-10 RX ORDER — SODIUM CHLORIDE 0.9 % (FLUSH) 0.9 %
5-40 SYRINGE (ML) INJECTION AS NEEDED
Status: DISCONTINUED | OUTPATIENT
Start: 2021-12-10 | End: 2021-12-14 | Stop reason: HOSPADM

## 2021-12-10 RX ORDER — SODIUM CHLORIDE 9 MG/ML
75 INJECTION, SOLUTION INTRAVENOUS CONTINUOUS
Status: DISCONTINUED | OUTPATIENT
Start: 2021-12-10 | End: 2021-12-14 | Stop reason: HOSPADM

## 2021-12-10 RX ORDER — ACETAMINOPHEN 325 MG/1
650 TABLET ORAL
Status: DISCONTINUED | OUTPATIENT
Start: 2021-12-10 | End: 2021-12-14 | Stop reason: HOSPADM

## 2021-12-10 RX ORDER — ONDANSETRON 2 MG/ML
4 INJECTION INTRAMUSCULAR; INTRAVENOUS
Status: DISCONTINUED | OUTPATIENT
Start: 2021-12-10 | End: 2021-12-14 | Stop reason: HOSPADM

## 2021-12-10 RX ORDER — PROMETHAZINE HYDROCHLORIDE 25 MG/1
12.5 TABLET ORAL
Status: DISCONTINUED | OUTPATIENT
Start: 2021-12-10 | End: 2021-12-14 | Stop reason: HOSPADM

## 2021-12-10 RX ORDER — ACETAMINOPHEN 650 MG/1
650 SUPPOSITORY RECTAL
Status: DISCONTINUED | OUTPATIENT
Start: 2021-12-10 | End: 2021-12-14 | Stop reason: HOSPADM

## 2021-12-10 RX ORDER — SODIUM CHLORIDE 0.9 % (FLUSH) 0.9 %
5-40 SYRINGE (ML) INJECTION EVERY 8 HOURS
Status: DISCONTINUED | OUTPATIENT
Start: 2021-12-10 | End: 2021-12-14 | Stop reason: HOSPADM

## 2021-12-10 RX ORDER — SODIUM CHLORIDE 9 MG/ML
25 INJECTION, SOLUTION INTRAVENOUS AS NEEDED
Status: DISCONTINUED | OUTPATIENT
Start: 2021-12-10 | End: 2021-12-14 | Stop reason: HOSPADM

## 2021-12-10 RX ADMIN — IOPAMIDOL 100 ML: 755 INJECTION, SOLUTION INTRAVENOUS at 19:01

## 2021-12-10 NOTE — ED NOTES
Signs and symptoms: slurred speech, right sided facial droop  VAN score: Negative  Last Known Well: last night  Blood Glucose (EMS acceptable): 90   Blood Pressure (EMS acceptable): 136/78  Anticoagulants: none    Code Stroke Level 2 initiated at this time.

## 2021-12-11 ENCOUNTER — APPOINTMENT (OUTPATIENT)
Dept: MRI IMAGING | Age: 86
DRG: 064 | End: 2021-12-11
Attending: INTERNAL MEDICINE
Payer: MEDICARE

## 2021-12-11 ENCOUNTER — APPOINTMENT (OUTPATIENT)
Dept: NON INVASIVE DIAGNOSTICS | Age: 86
DRG: 064 | End: 2021-12-11
Attending: INTERNAL MEDICINE
Payer: MEDICARE

## 2021-12-11 LAB
ALBUMIN SERPL-MCNC: 3.2 G/DL (ref 3.5–5)
ALBUMIN/GLOB SERPL: 1.1 {RATIO} (ref 1.1–2.2)
ALP SERPL-CCNC: 154 U/L (ref 45–117)
ALT SERPL-CCNC: 57 U/L (ref 12–78)
ANION GAP SERPL CALC-SCNC: 4 MMOL/L (ref 5–15)
APTT PPP: 23.4 SEC (ref 22.1–31)
AST SERPL-CCNC: 47 U/L (ref 15–37)
BILIRUB DIRECT SERPL-MCNC: 0.1 MG/DL (ref 0–0.2)
BILIRUB SERPL-MCNC: 0.4 MG/DL (ref 0.2–1)
BUN SERPL-MCNC: 11 MG/DL (ref 6–20)
BUN/CREAT SERPL: 12 (ref 12–20)
CALCIUM SERPL-MCNC: 9 MG/DL (ref 8.5–10.1)
CHLORIDE SERPL-SCNC: 108 MMOL/L (ref 97–108)
CHOLEST SERPL-MCNC: 153 MG/DL
CO2 SERPL-SCNC: 26 MMOL/L (ref 21–32)
CREAT SERPL-MCNC: 0.95 MG/DL (ref 0.7–1.3)
ECHO AO ASC DIAM: 3.91 CM
ECHO AV ANNULUS DIAM: 3.9 CM
ECHO AV AREA PEAK VELOCITY: 1.85 CM2
ECHO AV AREA/BSA PEAK VELOCITY: 0.9 CM2/M2
ECHO AV PEAK GRADIENT: 8.04 MMHG
ECHO AV PEAK VELOCITY: 141.1 CM/S
ECHO LA AREA 4C: 16.53 CM2
ECHO LA MAJOR AXIS: 3.82 CM
ECHO LA MINOR AXIS: 1.9 CM
ECHO LA VOL 2C: 34.25 ML (ref 18–58)
ECHO LA VOL 4C: 41.33 ML (ref 18–58)
ECHO LA VOL BP: 42.01 ML (ref 18–58)
ECHO LA VOL/BSA BIPLANE: 20.9 ML/M2 (ref 16–28)
ECHO LA VOLUME INDEX A2C: 17.04 ML/M2 (ref 16–28)
ECHO LA VOLUME INDEX A4C: 20.56 ML/M2 (ref 16–28)
ECHO LV E' LATERAL VELOCITY: 9.13 CM/S
ECHO LV E' SEPTAL VELOCITY: 4.46 CM/S
ECHO LV EDV A2C: 79.54 ML
ECHO LV EDV A4C: 132.16 ML
ECHO LV EDV BP: 103.79 ML (ref 67–155)
ECHO LV EDV INDEX A4C: 65.8 ML/M2
ECHO LV EDV INDEX BP: 51.6 ML/M2
ECHO LV EDV NDEX A2C: 39.6 ML/M2
ECHO LV EJECTION FRACTION A2C: 56 PERCENT
ECHO LV EJECTION FRACTION A4C: 61 PERCENT
ECHO LV EJECTION FRACTION BIPLANE: 59.4 PERCENT (ref 55–100)
ECHO LV ESV A2C: 34.87 ML
ECHO LV ESV A4C: 51.17 ML
ECHO LV ESV BP: 42.15 ML (ref 22–58)
ECHO LV ESV INDEX A2C: 17.3 ML/M2
ECHO LV ESV INDEX A4C: 25.5 ML/M2
ECHO LV ESV INDEX BP: 21 ML/M2
ECHO LV INTERNAL DIMENSION DIASTOLIC: 4.03 CM (ref 4.2–5.9)
ECHO LV INTERNAL DIMENSION SYSTOLIC: 3.78 CM
ECHO LV IVSD: 0.99 CM (ref 0.6–1)
ECHO LV MASS 2D: 125 G (ref 88–224)
ECHO LV MASS INDEX 2D: 62.2 G/M2 (ref 49–115)
ECHO LV POSTERIOR WALL DIASTOLIC: 0.97 CM (ref 0.6–1)
ECHO LVOT DIAM: 2.05 CM
ECHO LVOT PEAK GRADIENT: 2.5 MMHG
ECHO LVOT PEAK VELOCITY: 79.1 CM/S
ECHO MV A VELOCITY: 73.09 CM/S
ECHO MV E DECELERATION TIME (DT): 454.75 MS
ECHO MV E VELOCITY: 49.28 CM/S
ECHO MV E/A RATIO: 0.67
ECHO MV E/E' LATERAL: 5.4
ECHO MV E/E' RATIO (AVERAGED): 8.22
ECHO MV E/E' SEPTAL: 11.05
ECHO PV MAX VELOCITY: 99.15 CM/S
ECHO PV PEAK INSTANTANEOUS GRADIENT SYSTOLIC: 3.93 MMHG
ECHO PV REGURGITANT MAX VELOCITY: 87.02 CM/S
ECHO RV INTERNAL DIMENSION: 3.61 CM
ECHO RV TAPSE: 1.86 CM (ref 1.5–2)
ECHO TV REGURGITANT MAX VELOCITY: 207.29 CM/S
ECHO TV REGURGITANT PEAK GRADIENT: 17.66 MMHG
ERYTHROCYTE [DISTWIDTH] IN BLOOD BY AUTOMATED COUNT: 13.1 % (ref 11.5–14.5)
EST. AVERAGE GLUCOSE BLD GHB EST-MCNC: 117 MG/DL
GLOBULIN SER CALC-MCNC: 3 G/DL (ref 2–4)
GLUCOSE BLD STRIP.AUTO-MCNC: 95 MG/DL (ref 65–117)
GLUCOSE SERPL-MCNC: 108 MG/DL (ref 65–100)
HBA1C MFR BLD: 5.7 % (ref 4–5.6)
HCT VFR BLD AUTO: 39.7 % (ref 36.6–50.3)
HDLC SERPL-MCNC: 44 MG/DL
HDLC SERPL: 3.5 {RATIO} (ref 0–5)
HGB BLD-MCNC: 12.8 G/DL (ref 12.1–17)
LA VOL DISK BP: 38.23 ML (ref 18–58)
LDLC SERPL CALC-MCNC: 78.6 MG/DL (ref 0–100)
MAGNESIUM SERPL-MCNC: 2 MG/DL (ref 1.6–2.4)
MCH RBC QN AUTO: 30.5 PG (ref 26–34)
MCHC RBC AUTO-ENTMCNC: 32.2 G/DL (ref 30–36.5)
MCV RBC AUTO: 94.5 FL (ref 80–99)
NRBC # BLD: 0 K/UL (ref 0–0.01)
NRBC BLD-RTO: 0 PER 100 WBC
PHOSPHATE SERPL-MCNC: 2.7 MG/DL (ref 2.6–4.7)
PLATELET # BLD AUTO: 220 K/UL (ref 150–400)
PMV BLD AUTO: 9.7 FL (ref 8.9–12.9)
POTASSIUM SERPL-SCNC: 4 MMOL/L (ref 3.5–5.1)
PROT SERPL-MCNC: 6.2 G/DL (ref 6.4–8.2)
RBC # BLD AUTO: 4.2 M/UL (ref 4.1–5.7)
SERVICE CMNT-IMP: NORMAL
SODIUM SERPL-SCNC: 138 MMOL/L (ref 136–145)
THERAPEUTIC RANGE,PTTT: NORMAL SECS (ref 58–77)
TRIGL SERPL-MCNC: 152 MG/DL (ref ?–150)
VLDLC SERPL CALC-MCNC: 30.4 MG/DL
WBC # BLD AUTO: 7.6 K/UL (ref 4.1–11.1)

## 2021-12-11 PROCEDURE — 97165 OT EVAL LOW COMPLEX 30 MIN: CPT

## 2021-12-11 PROCEDURE — 97535 SELF CARE MNGMENT TRAINING: CPT

## 2021-12-11 PROCEDURE — 74011250637 HC RX REV CODE- 250/637: Performed by: PSYCHIATRY & NEUROLOGY

## 2021-12-11 PROCEDURE — 97161 PT EVAL LOW COMPLEX 20 MIN: CPT

## 2021-12-11 PROCEDURE — 83735 ASSAY OF MAGNESIUM: CPT

## 2021-12-11 PROCEDURE — 83036 HEMOGLOBIN GLYCOSYLATED A1C: CPT

## 2021-12-11 PROCEDURE — 93306 TTE W/DOPPLER COMPLETE: CPT | Performed by: SPECIALIST

## 2021-12-11 PROCEDURE — 99223 1ST HOSP IP/OBS HIGH 75: CPT | Performed by: PSYCHIATRY & NEUROLOGY

## 2021-12-11 PROCEDURE — 65660000000 HC RM CCU STEPDOWN

## 2021-12-11 PROCEDURE — 36415 COLL VENOUS BLD VENIPUNCTURE: CPT

## 2021-12-11 PROCEDURE — 85730 THROMBOPLASTIN TIME PARTIAL: CPT

## 2021-12-11 PROCEDURE — 80061 LIPID PANEL: CPT

## 2021-12-11 PROCEDURE — 74011250637 HC RX REV CODE- 250/637: Performed by: INTERNAL MEDICINE

## 2021-12-11 PROCEDURE — 70551 MRI BRAIN STEM W/O DYE: CPT

## 2021-12-11 PROCEDURE — 80048 BASIC METABOLIC PNL TOTAL CA: CPT

## 2021-12-11 PROCEDURE — 84100 ASSAY OF PHOSPHORUS: CPT

## 2021-12-11 PROCEDURE — 82962 GLUCOSE BLOOD TEST: CPT

## 2021-12-11 PROCEDURE — 74011250636 HC RX REV CODE- 250/636: Performed by: INTERNAL MEDICINE

## 2021-12-11 PROCEDURE — 80076 HEPATIC FUNCTION PANEL: CPT

## 2021-12-11 PROCEDURE — 93306 TTE W/DOPPLER COMPLETE: CPT

## 2021-12-11 PROCEDURE — 97116 GAIT TRAINING THERAPY: CPT

## 2021-12-11 PROCEDURE — 85027 COMPLETE CBC AUTOMATED: CPT

## 2021-12-11 RX ORDER — GUAIFENESIN 100 MG/5ML
81 LIQUID (ML) ORAL DAILY
Status: DISCONTINUED | OUTPATIENT
Start: 2021-12-11 | End: 2021-12-14 | Stop reason: HOSPADM

## 2021-12-11 RX ORDER — METOPROLOL TARTRATE 25 MG/1
12.5 TABLET, FILM COATED ORAL DAILY
Status: DISCONTINUED | OUTPATIENT
Start: 2021-12-11 | End: 2021-12-14 | Stop reason: HOSPADM

## 2021-12-11 RX ORDER — ISOSORBIDE MONONITRATE 60 MG/1
120 TABLET, EXTENDED RELEASE ORAL
Status: DISCONTINUED | OUTPATIENT
Start: 2021-12-11 | End: 2021-12-14 | Stop reason: HOSPADM

## 2021-12-11 RX ORDER — METOPROLOL SUCCINATE 25 MG/1
25 TABLET, EXTENDED RELEASE ORAL DAILY
COMMUNITY
End: 2021-12-14

## 2021-12-11 RX ORDER — LISINOPRIL AND HYDROCHLOROTHIAZIDE 12.5; 2 MG/1; MG/1
1 TABLET ORAL DAILY
COMMUNITY
End: 2021-12-14

## 2021-12-11 RX ORDER — PHENYTOIN SODIUM 100 MG/1
100 CAPSULE, EXTENDED RELEASE ORAL
Status: DISCONTINUED | OUTPATIENT
Start: 2021-12-12 | End: 2021-12-14 | Stop reason: HOSPADM

## 2021-12-11 RX ORDER — PHENYTOIN SODIUM 100 MG/1
100 CAPSULE, EXTENDED RELEASE ORAL
COMMUNITY

## 2021-12-11 RX ORDER — LEVETIRACETAM 500 MG/1
1500 TABLET ORAL 2 TIMES DAILY
Status: DISCONTINUED | OUTPATIENT
Start: 2021-12-11 | End: 2021-12-14 | Stop reason: HOSPADM

## 2021-12-11 RX ORDER — ATORVASTATIN CALCIUM 20 MG/1
10 TABLET, FILM COATED ORAL
COMMUNITY
End: 2021-12-14

## 2021-12-11 RX ORDER — PHENYTOIN 50 MG/1
100 TABLET, CHEWABLE ORAL 3 TIMES DAILY
Status: DISCONTINUED | OUTPATIENT
Start: 2021-12-11 | End: 2021-12-11

## 2021-12-11 RX ORDER — LACOSAMIDE 50 MG/1
200 TABLET ORAL 2 TIMES DAILY
Status: DISCONTINUED | OUTPATIENT
Start: 2021-12-11 | End: 2021-12-14 | Stop reason: HOSPADM

## 2021-12-11 RX ORDER — ATORVASTATIN CALCIUM 20 MG/1
40 TABLET, FILM COATED ORAL DAILY
Status: DISCONTINUED | OUTPATIENT
Start: 2021-12-11 | End: 2021-12-14 | Stop reason: HOSPADM

## 2021-12-11 RX ADMIN — ISOSORBIDE MONONITRATE 120 MG: 60 TABLET, EXTENDED RELEASE ORAL at 11:26

## 2021-12-11 RX ADMIN — LACOSAMIDE 200 MG: 100 TABLET, FILM COATED ORAL at 17:41

## 2021-12-11 RX ADMIN — PHENYTOIN 100 MG: 50 TABLET, CHEWABLE ORAL at 16:51

## 2021-12-11 RX ADMIN — LACOSAMIDE 200 MG: 100 TABLET, FILM COATED ORAL at 11:25

## 2021-12-11 RX ADMIN — Medication 10 ML: at 00:57

## 2021-12-11 RX ADMIN — ASPIRIN 81 MG: 81 TABLET, CHEWABLE ORAL at 13:58

## 2021-12-11 RX ADMIN — LEVETIRACETAM 1500 MG: 500 TABLET, FILM COATED ORAL at 17:41

## 2021-12-11 RX ADMIN — Medication 10 ML: at 20:00

## 2021-12-11 RX ADMIN — SODIUM CHLORIDE 75 ML/HR: 9 INJECTION, SOLUTION INTRAVENOUS at 00:03

## 2021-12-11 RX ADMIN — LEVETIRACETAM 1500 MG: 500 TABLET, FILM COATED ORAL at 11:26

## 2021-12-11 RX ADMIN — PHENYTOIN 100 MG: 50 TABLET, CHEWABLE ORAL at 11:31

## 2021-12-11 RX ADMIN — METOPROLOL TARTRATE 12.5 MG: 25 TABLET, FILM COATED ORAL at 11:25

## 2021-12-11 RX ADMIN — Medication 10 ML: at 14:00

## 2021-12-11 RX ADMIN — ATORVASTATIN CALCIUM 40 MG: 20 TABLET, FILM COATED ORAL at 11:25

## 2021-12-11 NOTE — ED NOTES
TRANSFER - OUT REPORT:    Verbal report given to Sadia Potter RN on Caitlyn Prim  being transferred to [facility] routine progression of care       Report consisted of patients Situation, Background, Assessment and   Recommendations(SBAR). Information from the following report(s) ED Summary, Intake/Output, MAR, Recent Results and Cardiac Rhythm nsr was reviewed with the receiving nurse. Lines:   Peripheral IV 12/10/21 Right Antecubital (Active)   Site Assessment Clean, dry, & intact 12/10/21 1907   Phlebitis Assessment 0 12/10/21 1907   Infiltration Assessment 0 12/10/21 1907   Dressing Status Clean, dry, & intact 12/10/21 1907   Dressing Type Transparent 12/10/21 1907   Hub Color/Line Status Pink 12/10/21 1907        Opportunity for questions and clarification was provided.       Patient transported with:   Monitor  Registered Nurse  Tech

## 2021-12-11 NOTE — PROGRESS NOTES
TRANSFER - OUT REPORT:    Verbal report given to Adina Saldaña on Tabitha Christine  being transferred to [facility](unit) for routine progression of care       Report consisted of patients Situation, Background, Assessment and   Recommendations(SBAR). Information from the following report(s) SBAR, Kardex and Cardiac Rhythm SB-NSR was reviewed with the receiving nurse. Lines:   Peripheral IV 12/10/21 Right Antecubital (Active)   Site Assessment Clean, dry, & intact 12/10/21 1907   Phlebitis Assessment 0 12/10/21 1907   Infiltration Assessment 0 12/10/21 1907   Dressing Status Clean, dry, & intact 12/10/21 1907   Dressing Type Transparent 12/10/21 1907   Hub Color/Line Status Pink 12/10/21 1907        Opportunity for questions and clarification was provided. Patient transported with:   Monitor      0115- attempted to reach MRI department, no answer at this time. Will call back. 46- RN called Dr. Whitney Pinon to make him aware that MRI could not be reached. Confirmed MRI was not ordered STAT & that it would be okay to have completed in the AM.     Bedside and Verbal shift change report given to Val (oncoming nurse) by Ramana Duggan (offgoing nurse). Report included the following information SBAR, Kardex, ED Summary and Cardiac Rhythm NSR.

## 2021-12-11 NOTE — CONSULTS
NEUROLOGY CONSULT NOTE    Patient ID:  Nuvia Bowden  [id number]  47 y.o.  1935    Date of Consultation:  December 11, 2021    Referring Physician: Dr. Dee Shaw    Reason for Consultation:  Dysarthria, facial droop    History of Present Illness:     Patient Active Problem List    Diagnosis Date Noted    CAD (coronary artery disease)     Hypercholesteremia     Hypertension     Hx of completed stroke     Seizure disorder ([facility] [address]     Facial droop 12/10/2021    Left carotid stenosis 12/10/2021    Simple partial seizures ([address] 10/30/2021    Subdural hematoma ([facility] [address] 10/22/2021     Past Medical History:   Diagnosis Date    CAD (coronary artery disease)     Hx of completed stroke     Hypercholesteremia     Hypertension     Seizure disorder ([facility] [address]     Subdural hematoma ([facility] [address]       Past Surgical History:   Procedure Laterality Date    HX CORONARY STENT PLACEMENT      MD CARDIAC SURG PROCEDURE UNLIST      bypass      Prior to Admission medications    Medication Sig Start Date End Date Taking? Authorizing Provider   lacosamide (VIMPAT) 200 mg tab tablet Take 1 Tablet by mouth two (2) times a day. Max Daily Amount: 400 mg. Followup with neurologist 11/9/21  Yes Tod Dowling MD   phenytoin (DILANTIN) 50 mg chewable tablet Take 2 Tablets by mouth three (3) times daily. 11/5/21  Yes Ney Harmon MD   levETIRAcetam (KEPPRA) 750 mg tablet Take 2 Tablets by mouth two (2) times a day. 11/5/21  Yes Ney Harmon MD   atorvastatin (LIPITOR) 40 mg tablet Take 1 Tablet by mouth daily. Patient taking differently: Take 20 mg by mouth daily. Take one half tablet PO 1x daily 10/30/21  Yes Kathy Mcmillan MD   metoprolol tartrate (LOPRESSOR) 25 mg tablet Take 12.5 mg by mouth daily. Yes Tatyana Light MD   cholecalciferol (Vitamin D3) (1000 Units /25 mcg) tablet Take 5,000 Units by mouth two (2) times a day.    Yes Nadege, MD Tatyana   isosorbide mononitrate ER (IMDUR) 120 mg CR tablet Take 120 mg by mouth every morning. Yes Other, MD Tatyana     Allergies   Allergen Reactions    Morphine Unknown (comments)      Social History     Tobacco Use    Smoking status: Never Smoker    Smokeless tobacco: Never Used   Substance Use Topics    Alcohol use: Never      Family History   Problem Relation Age of Onset    Hypertension Father         Subjective: Juan Miguel Phan is a 80 y.o. History of CVA, CAD, hypertension, hyperlipidemia and large right subdural hematoma complicated with seizure was admitted from the ER 12/10/2021 for slurred speech and right facial droop. Patient night prior to admission was having some slurred speech and right facial droop. Patient was then brought to the ER. In the ER blood pressure was 136/78. NIH stroke scale was 3. Laboratory work-up revealed unremarkable CBC, PTT, INR, urinalysis, BMP, magnesium, phosphorus and lipid panel. Decreased albumin, increased alkaline phosphatase and slight increase in AST. Phenytoin level was 1. Head CT without contrast revealed atrophy and white matter disease that is moderate. Mainly periventricular. Chronic left frontoparietal infarction. Supratentorial right subdural hematoma is seen and may be slightly smaller than prior. Head and neck CTA revealed no intracranial or extracranial arterial occlusion or aneurysm. No significant ICA stenosis. Brain MRI without contrast revealed acute lacunar infarct left thalamus. Chronic right subdural hematoma. Chronic encephalomalacia left parietal lobe. Moderate white matter disease. When seen, patient continues to have issues with right facial droop and slurred speech. No seizure-like activity. Review of records revealed:  Patient was admitted at [facility] last 10/22/2021 and discharged 11/9/2021 for chronic right subdural hematoma complicated with seizures. Brain MRI with and without contrast done 10/23/2021 revealed multiseptated chronic right subdural hematoma.   Mild mass-effect but no definite shift of the midline. No abnormal enhancements. Patient required 3 seizure medication to control his seizures that were mainly left-sided. Patient was discharged on Dilantin 100 mg 3 times daily, levetiracetam 1500 mg twice daily and lacosamide 200 mg twice daily. Patient is being followed by Dr. Damari Sanders (neurosurgery). Outside reports reviewed: ER records, radiology reports, lab reports, historical medical records. Review of Systems:    Pertinent items are noted in HPI. Objective:     Patient Vitals for the past 8 hrs:   BP Temp Pulse Resp SpO2   12/11/21 0734 111/72 98.4 °F (36.9 °C) 60 16 92 %   12/11/21 0723   (!) 52     12/11/21 0344 100/61 97 °F (36.1 °C) (!) 54 14 93 %     PHYSICAL EXAM:    NEUROLOGICAL EXAM:    Appearance: The patient is well developed, well nourished, provides a coherent history and is in no acute distress. Mental Status: Oriented to time, place and person. Fluent, no aphasia but with dysarthria. Mood and affect appropriate. Cranial Nerves:   Intact visual fields. JENY, EOM's full, no nystagmus, no ptosis. Facial sensation is normal. Corneal reflexes are intact. Right facial droop. Hearing is normal bilaterally. Palate is midline with normal elevation. Sternocleidomastoid and trapezius muscles are normal. Tongue is midline. Motor:  5/5 strength in upper and lower proximal and distal muscles. Normal bulk and tone. No pronator drift. Reflexes:   Deep tendon reflexes 1+/4 and symmetrical. Downgoing toes. Sensory:   Normal to cold. Gait:  Not tested. Tremor:   No tremor noted. Cerebellar:  Intact FTN/ZEESHAN/HTS.      Imaging  CT Head, brain MRI: reviewed    Lab Review    Recent Results (from the past 24 hour(s))   GLUCOSE, POC    Collection Time: 12/10/21  6:49 PM   Result Value Ref Range    Glucose (POC) 90 65 - 117 mg/dL    Performed by Taj RICHTER    Collection Time: 12/10/21  6:59 PM   Result Value Ref Range    WBC 7.3 4.1 - 11.1 K/uL    RBC 4.34 4.10 - 5.70 M/uL    HGB 13.0 12.1 - 17.0 g/dL    HCT 40.5 36.6 - 50.3 %    MCV 93.3 80.0 - 99.0 FL    MCH 30.0 26.0 - 34.0 PG    MCHC 32.1 30.0 - 36.5 g/dL    RDW 13.0 11.5 - 14.5 %    PLATELET 445 221 - 045 K/uL    MPV 9.7 8.9 - 12.9 FL    NRBC 0.0 0.0  WBC    ABSOLUTE NRBC 0.00 0.00 - 0.01 K/uL    NEUTROPHILS 57 32 - 75 %    LYMPHOCYTES 33 12 - 49 %    MONOCYTES 7 5 - 13 %    EOSINOPHILS 2 0 - 7 %    BASOPHILS 0 0 - 1 %    IMMATURE GRANULOCYTES 0 0 - 0.5 %    ABS. NEUTROPHILS 4.2 1.8 - 8.0 K/UL    ABS. LYMPHOCYTES 2.4 0.8 - 3.5 K/UL    ABS. MONOCYTES 0.5 0.0 - 1.0 K/UL    ABS. EOSINOPHILS 0.2 0.0 - 0.4 K/UL    ABS. BASOPHILS 0.0 0.0 - 0.1 K/UL    ABS. IMM. GRANS. 0.0 0.00 - 0.04 K/UL    DF AUTOMATED     METABOLIC PANEL, COMPREHENSIVE    Collection Time: 12/10/21  6:59 PM   Result Value Ref Range    Sodium 141 136 - 145 mmol/L    Potassium 3.9 3.5 - 5.1 mmol/L    Chloride 106 97 - 108 mmol/L    CO2 27 21 - 32 mmol/L    Anion gap 8 5 - 15 mmol/L    Glucose 100 65 - 100 mg/dL    BUN 13 6 - 20 MG/DL    Creatinine 1.14 0.70 - 1.30 MG/DL    BUN/Creatinine ratio 11 (L) 12 - 20      GFR est AA >60 >60 ml/min/1.73m2    GFR est non-AA >60 >60 ml/min/1.73m2    Calcium 9.2 8.5 - 10.1 MG/DL    Bilirubin, total 0.4 0.2 - 1.0 MG/DL    ALT (SGPT) 66 12 - 78 U/L    AST (SGOT) 44 (H) 15 - 37 U/L    Alk.  phosphatase 158 (H) 45 - 117 U/L    Protein, total 6.6 6.4 - 8.2 g/dL    Albumin 3.6 3.5 - 5.0 g/dL    Globulin 3.0 2.0 - 4.0 g/dL    A-G Ratio 1.2 1.1 - 2.2     PROTHROMBIN TIME + INR    Collection Time: 12/10/21  6:59 PM   Result Value Ref Range    INR 1.2 (H) 0.9 - 1.1      Prothrombin time 11.6 (H) 9.0 - 11.1 sec   PHENYTOIN    Collection Time: 12/10/21  7:02 PM   Result Value Ref Range    Phenytoin 1.0 (L) 10.0 - 20.0 ug/mL   URINALYSIS W/MICROSCOPIC    Collection Time: 12/10/21  7:28 PM   Result Value Ref Range    Color YELLOW/STRAW      Appearance CLEAR CLEAR Specific gravity 1.010 1.003 - 1.030      pH (UA) 7.0 5.0 - 8.0      Protein Negative NEG mg/dL    Glucose Negative NEG mg/dL    Ketone Negative NEG mg/dL    Bilirubin Negative NEG      Blood Negative NEG      Urobilinogen 0.2 0.2 - 1.0 EU/dL    Nitrites Negative NEG      Leukocyte Esterase Negative NEG      WBC 0-4 0 - 4 /hpf    RBC 0-5 0 - 5 /hpf    Epithelial cells FEW FEW /lpf    Bacteria Negative NEG /hpf   URINE CULTURE HOLD SAMPLE    Collection Time: 12/10/21  7:28 PM    Specimen: Serum; Urine   Result Value Ref Range    Urine culture hold        Urine on hold in Microbiology dept for 2 days. If unpreserved urine is submitted, it cannot be used for addtional testing after 24 hours, recollection will be required. EKG, 12 LEAD, INITIAL    Collection Time: 12/10/21  7:31 PM   Result Value Ref Range    Ventricular Rate 60 BPM    QRS Duration 116 ms    Q-T Interval 432 ms    QTC Calculation (Bezet) 432 ms    Calculated R Axis -34 degrees    Calculated T Axis 112 degrees    Diagnosis       Atrial fibrillation with a competing junctional pacemaker  Left axis deviation  Minimal voltage criteria for LVH, may be normal variant ( Raad product )  Nonspecific ST and T wave abnormality  Abnormal ECG  When compared with ECG of 22-OCT-2021 17:08,  Atrial fibrillation has replaced Sinus rhythm     GLUCOSE, POC    Collection Time: 12/11/21 12:59 AM   Result Value Ref Range    Glucose (POC) 95 65 - 117 mg/dL    Performed by Lv Bailey    CBC W/O DIFF    Collection Time: 12/11/21  2:13 AM   Result Value Ref Range    WBC 7.6 4.1 - 11.1 K/uL    RBC 4.20 4. 10 - 5.70 M/uL    HGB 12.8 12.1 - 17.0 g/dL    HCT 39.7 36.6 - 50.3 %    MCV 94.5 80.0 - 99.0 FL    MCH 30.5 26.0 - 34.0 PG    MCHC 32.2 30.0 - 36.5 g/dL    RDW 13.1 11.5 - 14.5 %    PLATELET 220 644 - 981 K/uL    MPV 9.7 8.9 - 12.9 FL    NRBC 0.0 0  WBC    ABSOLUTE NRBC 0.00 0.00 - 0.01 K/uL   PTT    Collection Time: 12/11/21  2:13 AM   Result Value Ref Range aPTT 23.4 22.1 - 31.0 sec    aPTT, therapeutic range     58.0 - 50.8 SECS   METABOLIC PANEL, BASIC    Collection Time: 12/11/21  2:13 AM   Result Value Ref Range    Sodium 138 136 - 145 mmol/L    Potassium 4.0 3.5 - 5.1 mmol/L    Chloride 108 97 - 108 mmol/L    CO2 26 21 - 32 mmol/L    Anion gap 4 (L) 5 - 15 mmol/L    Glucose 108 (H) 65 - 100 mg/dL    BUN 11 6 - 20 MG/DL    Creatinine 0.95 0.70 - 1.30 MG/DL    BUN/Creatinine ratio 12 12 - 20      GFR est AA >60 >60 ml/min/1.73m2    GFR est non-AA >60 >60 ml/min/1.73m2    Calcium 9.0 8.5 - 10.1 MG/DL   MAGNESIUM    Collection Time: 12/11/21  2:13 AM   Result Value Ref Range    Magnesium 2.0 1.6 - 2.4 mg/dL   PHOSPHORUS    Collection Time: 12/11/21  2:13 AM   Result Value Ref Range    Phosphorus 2.7 2.6 - 4.7 MG/DL   HEPATIC FUNCTION PANEL    Collection Time: 12/11/21  2:13 AM   Result Value Ref Range    Protein, total 6.2 (L) 6.4 - 8.2 g/dL    Albumin 3.2 (L) 3.5 - 5.0 g/dL    Globulin 3.0 2.0 - 4.0 g/dL    A-G Ratio 1.1 1.1 - 2.2      Bilirubin, total 0.4 0.2 - 1.0 MG/DL    Bilirubin, direct 0.1 0.0 - 0.2 MG/DL    Alk. phosphatase 154 (H) 45 - 117 U/L    AST (SGOT) 47 (H) 15 - 37 U/L    ALT (SGPT) 57 12 - 78 U/L   LIPID PANEL    Collection Time: 12/11/21  2:36 AM   Result Value Ref Range    Cholesterol, total 153 <200 MG/DL    Triglyceride 152 (H) <150 MG/DL    HDL Cholesterol 44 MG/DL    LDL, calculated 78.6 0 - 100 MG/DL    VLDL, calculated 30.4 MG/DL    CHOL/HDL Ratio 3.5 0.0 - 5.0           Assessment:   Acute CVA  Seizure disorder  Chronic subdural hematoma    Plan:   Neurological examination reveals dysarthria and right facial droop. Status post left thalamic infarct. Motor component is likely due to the proximity of the stroke to the internal capsule. No sensory deficits discerned currently. Head CT without contrast did not reveal any acute process. Chronic left frontoparietal infarction. Supratentorial right subdural hematoma.   Brain MRI without contrast revealed acute lacunar infarct left thalamus. Chronic right subdural hematoma. Chronic encephalomalacia left parietal lobe. Moderate white matter disease. No changes in patient's chronic subdural hematoma with slight decrease in size. Continue care with neurosurgery as an outpatient. Head and neck CTA did not reveal any flow-limiting stenosis or aneurysm. No significant ICA stenosis. LDL was at goal which is less than 70. Continue statin therapy. Echocardiogram is pending. Echocardiogram with bubble study done 10/25/2021 did not reveal any shunting. Given chronicity of the subdural hematoma and the current stroke, patient was started on aspirin 81 mg daily for stroke prophylaxis. No evidence of seizures currently. Continue home dosages of phenytoin, levetiracetam and Vimpat. Levels are pending. Phenytoin level is subtherapeutic. Baseline evaluation by speech, PT and OT. Per DMV regulation patient cannot drive for 6 months. Thank you for the consult. 70 minutes was spent with this patient. This included review of his prior hospitalization record, neuroimaging, review of this hospitalization records, evaluation of the patient, formulation of treatment plan and answering all patient's questions and concerns. This note was created using voice recognition software. Despite editing, there may be syntax errors.

## 2021-12-11 NOTE — PROGRESS NOTES
[facility]    Medical Progress Note      NAME: Zainab Cr   :  1935  MRM:  [id number]    Date/Time of service 2021  11:32 AM   Admission orders placed prior to 11:59 PM on 12/10       Assessment and Plan:     Acute lacunar infarct in the left thalamus / Dysarthria / R Facial droop / R sided weakness - Symptoms resolving only partly. Not a TPA candidate. Unremarkable tele. Neuro checks. Neurology consulted. Fall precautions and PT/OT/speech evaluation. MRI brain showed \"Acute lacunar infarct in the left thalamus. \" Recent negative ECHO. Continue statin.     L carotid stenosis - Noted on CTA. Consult Vascular surgery     Hx Simple partial seizures / Seizure disorder - Continue vimpat, dilantin, keppra     CAD (coronary artery disease) / Hypertension - continue statin, metoprolol and imdur, but he is bradycardic and hypotensive despite lowest metoprolol dose. Hypercholesteremia - Atorvastatin     Chronic R sided subdural hematoma / Hx of completed stroke - CT showed improvement. Monitor        Subjective:     Chief Complaint:  R side symptoms    ROS:  (bold if positive, if negative)    Tolerating some PT  Tolerating Diet        Objective:     Last 24hrs VS reviewed since prior progress note.  Most recent are:    Visit Vitals  /61 (BP 1 Location: Left lower arm, BP Patient Position: At rest)   Pulse (!) 52   Temp 97 °F (36.1 °C)   Resp 14   Ht 5' 11\" (1.803 m)   Wt 80.6 kg (177 lb 11.1 oz)   SpO2 93%   BMI 24.78 kg/m²     SpO2 Readings from Last 6 Encounters:   21 93%   21 99%   10/21/21 96%   10/11/21 97%   21 96%   21 95%            Intake/Output Summary (Last 24 hours) at 2021 0737  Last data filed at 2021 0345  Gross per 24 hour   Intake 0 ml   Output 800 ml   Net -800 ml        Physical Exam:    Gen:  Well-developed, well-nourished, in no acute distress  HEENT:  Pink conjunctivae, PERRL, hearing intact to voice, moist mucous membranes  Neck:  Supple, without masses, thyroid non-tender  Resp:  No accessory muscle use, clear breath sounds without wheezes rales or rhonchi  Card:  No murmurs, bradycardic S1, S2 without thrills, bruits or peripheral edema  Abd:  Soft, non-tender, non-distended, normoactive bowel sounds are present, no mass  Lymph:  No cervical or inguinal adenopathy  Musc:  No cyanosis or clubbing  Skin:  No rashes or ulcers, skin turgor is good  Neuro:  Cranial nerves are grossly intact, R side motor weakness, follows commands   Psych:  Good insight, oriented to person, place and time, alert    Telemetry reviewed:   normal sinus rhythm  __________________________________________________________________  Medications Reviewed: (see below)  Medications:     Current Facility-Administered Medications   Medication Dose Route Frequency    atorvastatin (LIPITOR) tablet 40 mg  40 mg Oral DAILY    isosorbide mononitrate ER (IMDUR) tablet 120 mg  120 mg Oral 7am    lacosamide (VIMPAT) tablet 200 mg  200 mg Oral BID    levETIRAcetam (KEPPRA) tablet 1,500 mg  1,500 mg Oral BID    metoprolol tartrate (LOPRESSOR) tablet 12.5 mg  12.5 mg Oral DAILY    phenytoin (DILANTIN) chewable tablet 100 mg  100 mg Oral TID    0.9% sodium chloride infusion  75 mL/hr IntraVENous CONTINUOUS    sodium chloride (NS) flush 5-40 mL  5-40 mL IntraVENous Q8H    sodium chloride (NS) flush 5-40 mL  5-40 mL IntraVENous PRN    0.9% sodium chloride infusion 25 mL  25 mL IntraVENous PRN    acetaminophen (TYLENOL) tablet 650 mg  650 mg Oral Q6H PRN    Or    acetaminophen (TYLENOL) suppository 650 mg  650 mg Rectal Q6H PRN    bisacodyL (DULCOLAX) suppository 10 mg  10 mg Rectal DAILY PRN    promethazine (PHENERGAN) tablet 12.5 mg  12.5 mg Oral Q6H PRN    Or    ondansetron (ZOFRAN) injection 4 mg  4 mg IntraVENous Q6H PRN        Lab Data Reviewed: (see below)  Lab Review:     Recent Labs     12/11/21  0213 12/10/21  1859   WBC 7.6 7.3   HGB 12.8 13.0 HCT 39.7 40.5    215     Recent Labs     12/11/21  0213 12/10/21  1859    141   K 4.0 3.9    106   CO2 26 27   * 100   BUN 11 13   CREA 0.95 1.14   CA 9.0 9.2   MG 2.0  --    PHOS 2.7  --    ALB 3.2* 3.6   TBILI 0.4 0.4   ALT 57 66   INR  --  1.2*     Lab Results   Component Value Date/Time    Glucose (POC) 95 12/11/2021 12:59 AM    Glucose (POC) 90 12/10/2021 06:49 PM    Glucose (POC) 110 11/09/2021 08:06 AM    Glucose (POC) 89 11/08/2021 10:29 PM    Glucose (POC) 95 11/08/2021 04:22 PM     No results for input(s): PH, PCO2, PO2, HCO3, FIO2 in the last 72 hours. Recent Labs     12/10/21  1859   INR 1.2*     All Micro Results     Procedure Component Value Units Date/Time    URINE CULTURE HOLD SAMPLE [[id number]] Collected: 12/10/21 1928    Order Status: Completed Specimen: Urine from Serum Updated: 12/10/21 2046     Urine culture hold       Urine on hold in Microbiology dept for 2 days. If unpreserved urine is submitted, it cannot be used for addtional testing after 24 hours, recollection will be required. Other pertinent lab: none    Total time spent with patient: 30 Minutes I personally reviewed chart, notes, data and current medications in the medical record. I have personally examined and treated the patient at bedside during this period.                  Care Plan discussed with: Patient, Care Manager, Nursing Staff, Consultant/Specialist and >50% of time spent in counseling and coordination of care    Discussed:  Care Plan and D/C Planning    Prophylaxis:  H2B/PPI    Disposition:   PT, OT, RN           ___________________________________________________    Attending Physician: Fidelia Mcpherson MD

## 2021-12-11 NOTE — ED NOTES
Discussed pending MRI with   Dr. Marianna Dean  Who stated MRI can be done when patient is [facility]

## 2021-12-11 NOTE — PROGRESS NOTES
Problem: Mobility Impaired (Adult and Pediatric)  Goal: *Acute Goals and Plan of Care (Insert Text)  Description:   FUNCTIONAL STATUS PRIOR TO ADMISSION: Patient was modified independent using a rolling walker for functional mobility. HOME SUPPORT PRIOR TO ADMISSION: The patient lived with wife and son. Son works during the day. Patient admitted to difficulty getting in/out of shower. Physical Therapy Goals  Initiated 12/11/2021  1. Patient will move from supine to sit and sit to supine  in bed with supervision/set-up within 7 day(s). 2.  Patient will transfer from bed to chair and chair to bed with supervision/set-up using the least restrictive device within 7 day(s). 3.  Patient will perform sit to stand with supervision/set-up within 7 day(s). 4.  Patient will ambulate with supervision/set-up for 100 feet with the least restrictive device within 7 day(s). 5.  Patient will ascend/descend 4 stairs with 1 handrail(s) with minimal assistance/contact guard assist within 7 day(s). 6.  Patient will improve Avalos Balance score by 7 points within 7 days. Outcome: Progressing Towards Goal   PHYSICAL THERAPY EVALUATION- NEURO POPULATION  Patient: Sary Phelan (58 y.o. male)  Date: 12/11/2021  Primary Diagnosis: Facial droop [R29.810]        Precautions:   Fall      ASSESSMENT  Based on the objective data described below, the patient presents with generalized weakness, decreased bed mobility, transfers and gait, right facial droop, slurred speech, and high fall risk following admission for facial droop. Patient is being worked up for stroke; CT showed chronic left frontal-parietal infarct and  stable right SDH for which he was hospitalized in October 2021. MRI is pending. Patient would be appropriate for inpatient rehab setting .     Current Level of Function Impacting Discharge (mobility/balance): Patient received in bed alert and oriented though he thought he was at [facility] ; came up with [facility] once given minimal cues. Educated patient regarding [facility] signs and symptoms of stroke and handout provided. He expressed understanding. Patient sat on edge of bed and ambulated 20 feet with rolling walker +2 min assist. He was returned to bed for echo. Patient with poor safety awareness and impulsive at times. He must have assistance at all times for mobility. Recommend inpatient rehab. Functional Outcome Measure: The patient scored Total: 5/56 on the [facility] Assessment which is indicative of high fall risk. Other factors to consider for discharge: none     Patient will benefit from skilled therapy intervention to address the above noted impairments. PLAN :  Recommendations and Planned Interventions: bed mobility training, transfer training, gait training, and therapeutic exercises      Frequency/Duration: Patient will be followed by physical therapy:  6 times a week to address goals. Recommendation for discharge: (in order for the patient to meet his/her long term goals)  Therapy 3 hours per day 5-7 days per week    This discharge recommendation:  Has not yet been discussed the attending provider and/or case management    IF patient discharges home will need the following DME: none         SUBJECTIVE:   Patient stated Emily Maldonado had a seizure at home.     OBJECTIVE DATA SUMMARY:   HISTORY:    Past Medical History:   Diagnosis Date    CAD (coronary artery disease)     Hx of completed stroke     Hypercholesteremia     Hypertension     Seizure disorder ([facility] [address]     Subdural hematoma (HCC)      Past Surgical History:   Procedure Laterality Date    HX CORONARY STENT PLACEMENT      [place] CARDIAC SURG PROCEDURE UNLIST      bypass       Personal factors and/or comorbidities impacting plan of care: high fall risk    Home Situation  Home Environment: Private residence  Wheelchair Ramp: Yes  One/Two Story Residence: One story  Living Alone: No  Support Systems: Spouse/Significant Other, Other Family Member(s)  Patient Expects to be Discharged to[de-identified] Rehabilitation facility  Current DME Used/Available at Home: Walker, rolling  Tub or Shower Type: Tub/Shower combination    EXAMINATION/PRESENTATION/DECISION MAKING:   Critical Behavior:  Neurologic State: Alert  Orientation Level: Oriented to person, Oriented to time, Disoriented to place  Cognition: Poor safety awareness  Safety/Judgement: Decreased awareness of need for safety       Skin:  not fully observed    Range Of Motion:  AROM: Generally decreased, functional                       Strength:    Strength: Generally decreased, functional                    Tone & Sensation:   Tone: Normal              Sensation: Intact               Coordination:   Slow and inaccurate to BUE's; proprioception in tack  Vision:   Acuity: Able to read normal print without difficulty  Functional Mobility:  Bed Mobility:     Supine to Sit: Additional time; Minimum assistance; Assist x2  Sit to Supine: Assist x2; Moderate assistance  Scooting: Minimum assistance  Transfers:  Sit to Stand: Assist x2; Minimum assistance  Stand to Sit: Assist x2; Minimum assistance                       Balance:   Sitting: Intact; High guard  Standing: Impaired  Standing - Static: Constant support  Standing - Dynamic : Constant support  Ambulation/Gait Training:  Distance (ft): 20 Feet (ft)  Assistive Device: Gait belt; Walker, rolling  Ambulation - Level of Assistance: Minimal assistance; Assist x2        Gait Abnormalities: Decreased step clearance        Base of Support: Narrowed     Speed/Denisha: Pace decreased (<100 feet/min)  Step Length: Left shortened; Right shortened                            Therapeutic Exercises:    Ankle pumps    Functional Measure  Avalos Balance Test:    Sitting to Standin  Standing Unsupported: 0  Sitting with Back Unsupported: 3  Standing to Sittin  Transfers: 1  Standing Unsupported with Eyes Closed: 0  Standing Unsupported with Feet Together: 0  Reach Forward with Outstretched Arm: 0   Object: 0  Turn to Look Over Shoulders: 0  Turn 360 Degrees: 0  Alternate Foot on Step/Stool: 0  Standing Unsupported One Foot in Front: 0  Stand on One Le  Total: 5/56         56=Maximum possible score;   0-20=High fall risk  21-40=Moderate fall risk   41-56=Low fall risk        Physical Therapy Evaluation Charge Determination   History Examination Presentation Decision-Making   MEDIUM  Complexity : 1-2 comorbidities / personal factors will impact the outcome/ POC  LOW Complexity : 1-2 Standardized tests and measures addressing body structure, function, activity limitation and / or participation in recreation  LOW Complexity : Stable, uncomplicated  Other outcome measures Avalos  HIGH       Based on the above components, the patient evaluation is determined to be of the following complexity level: LOW     Pain Rating:  None at this time    Activity Tolerance:   Good    After treatment patient left in no apparent distress:   Supine in bed, Call bell within reach, Bed / chair alarm activated, and Side rails x 3    COMMUNICATION/EDUCATION:   The patients plan of care was discussed with: Occupational therapist and Registered nurse. Patient was educated regarding his deficit(s) of facial droop and slurred speech and high fall risk. He demonstrated Good understanding as evidenced by verbal communication. Patient and/or family was verbally educated on the BE FAST acronym for signs/symptoms of CVA and TIA. Informed patient to refer to the Stroke Binder for further BE FAST information. All questions answered with patient indicating his understanding. Fall prevention education was provided and the patient/caregiver indicated understanding. and Patient/family agree to work toward stated goals and plan of care.     Thank you for this referral.  Ignacio Taveras, PT   Time Calculation: 27 mins

## 2021-12-11 NOTE — PROGRESS NOTES
Admission Medication Reconciliation:     Information obtained from:    Son, daughter, spouse in room 319. Granddaughter via phone call in room [address]:  YES    Comments/Recommendations:   Patient able to confirm name, , allergies, and preferred pharmacy  Updated PTA medication list  At discharge from [facility] 21 the patient's atorvastatin was increased from 10 mg daily to 40 mg daily. At discharge the patient resumed his [facility] from [facility] atorvastatin 20 mg tablet - Take one half tablet which is 10 mg daily. Due to a bump in the patient's liver enzymes the patient's phenytoin was changed from phenytoin chewable tablets 100 mg three times daily to phenytoin Er 100 mg twice daily and then phenytoin Er 100 mg once at bedtime. The patient changed to phenytoin Er 100 mg once daily at bedtime on 21. It is reported that the patient is taking lisinopril/hctz 20/12.5 mg once daily which was not listed on the PTA list for this admission or the admission at [facility]. The patient has been taking metoprolol succinate 25 mg daily at home per family. ¹[employer] pharmacy benefit data reflects medications filled and processed through the patient's insurance, however   this data does NOT capture whether the medication was picked up or is currently being taken by the patient. Prior to Admission Medications   Prescriptions Last Dose Informant Taking?   atorvastatin (LIPITOR) 20 mg tablet  Family Member Yes   Sig: Take 10 mg by mouth nightly. cholecalciferol (Vitamin D3) (1000 Units /25 mcg) tablet  Family Member Yes   Sig: Take 2,000 Units by mouth two (2) times a day. isosorbide mononitrate ER (IMDUR) 120 mg CR tablet  Family Member Yes   Sig: Take 120 mg by mouth every morning. lacosamide (VIMPAT) 200 mg tab tablet  Family Member Yes   Sig: Take 1 Tablet by mouth two (2) times a day. Max Daily Amount: 400 mg.  Followup with neurologist   levETIRAcetam (KEPPRA) 750 mg tablet  Family Member Yes   Sig: Take 2 Tablets by mouth two (2) times a day. lisinopril-hydroCHLOROthiazide (PRINZIDE, ZESTORETIC) 20-12.5 mg per tablet   Yes   Sig: Take 1 Tablet by mouth daily. metoprolol succinate (TOPROL-XL) 25 mg XL tablet  Family Member Yes   Sig: Take 25 mg by mouth daily. phenytoin ER (DILANTIN ER) 100 mg ER capsule  Family Member Yes   Sig: Take 100 mg by mouth nightly. On 12/8/21 the patient was reduced to 100 mg at night time by the [facility] due to liver labs. Facility-Administered Medications: None         Please contact the main inpatient pharmacy with any questions or concerns at [phone] and we will direct you to the clinical pharmacist covering this patient's care while in-house.    Jem Vazquez, SanamD, BCPS

## 2021-12-11 NOTE — H&P
Pepe Fajardo [facility]  [address] [facility], [address] [place] [address]  [phone]    Admission History and Physical      NAME:  Yazmin Chavis   :   1935   MRN:  [id number]     PCP:  Yesi Guerrero MD     Date/Time of service:  2021  12:53 AM        Subjective:     CHIEF COMPLAINT: slurred speech, facial droop      HISTORY OF PRESENT ILLNESS:     The patient is a 81 yo hx of HTN, CAD s/p stents, CVA, seizure, R subdural hematoma, presented w/ dysarthria, R sided weakness concerning for a CVA. The patient is a poor historian. He went to [facility] ED tonight with worsening R facial droop and slurred speech. Denied chest pain, SOB, fevers, chills, nausea, vomiting, diarrhea. The patient was evaluated by Tele Neuro but TPA not given due to recent subdural.  He was admitted to [facility] last month for weakness and found to have a R subdural hematoma. In the ED, head CT showed improving subdural.      Allergies   Allergen Reactions    Morphine Unknown (comments)       Prior to Admission medications    Medication Sig Start Date End Date Taking? Authorizing Provider   lacosamide (VIMPAT) 200 mg tab tablet Take 1 Tablet by mouth two (2) times a day. Max Daily Amount: 400 mg. Followup with neurologist 21  Yes Dc Condon MD   phenytoin (DILANTIN) 50 mg chewable tablet Take 2 Tablets by mouth three (3) times daily. 21  Yes Nya Harmon MD   levETIRAcetam (KEPPRA) 750 mg tablet Take 2 Tablets by mouth two (2) times a day. 21  Yes Nya Harmon MD   atorvastatin (LIPITOR) 40 mg tablet Take 1 Tablet by mouth daily. 10/30/21  Yes Fátima Allen MD   metoprolol tartrate (LOPRESSOR) 25 mg tablet Take 12.5 mg by mouth daily. Yes Nadege, MD Tatyana   cholecalciferol (Vitamin D3) (1000 Units /25 mcg) tablet Take 5,000 Units by mouth two (2) times a day.    Yes Other, MD Tatyana   isosorbide mononitrate ER (IMDUR) 120 mg CR tablet Take 120 mg by mouth every morning. Yes Other, MD Tatyana       Past Medical History:   Diagnosis Date    CAD (coronary artery disease)     Hypercholesteremia     Hypertension     [facility] [address]     Stroke ([facility] [address]     Subdural hematoma (HCC)         Past Surgical History:   Procedure Laterality Date    HX CORONARY STENT PLACEMENT      [place] CARDIAC SURG PROCEDURE UNLIST      bypass       Social History     Tobacco Use    Smoking status: Never Smoker    Smokeless tobacco: Never Used   Substance Use Topics    Alcohol use: Never        Family History   Problem Relation Age of Onset    Hypertension Father         Review of Systems:  (bold if positive, if negative)    Gen:  Eyes:  ENT:  CVS:  Pulm:  GI:  :  MS:  Skin:  Psych:  Endo:  Hem:  Renal:  Neuro:   weakness,        Objective:      VITALS:    Vital signs reviewed; most recent are:    Visit Vitals  BP (!) 153/79   Pulse (!) 59   Temp 97.9 °F (36.6 °C)   Resp 21   Ht 5' 11\" (1.803 m)   Wt 80.6 kg (177 lb 11.1 oz)   SpO2 98%   BMI 24.78 kg/m²     SpO2 Readings from Last 6 Encounters:   12/10/21 98%   11/09/21 99%   10/21/21 96%   10/11/21 97%   04/05/21 96%   02/16/21 95%            Intake/Output Summary (Last 24 hours) at 12/11/2021 0053  Last data filed at 12/10/2021 2123  Gross per 24 hour   Intake    Output 500 ml   Net -500 ml        Exam:     Physical Exam:    Gen:  Elderly, disheveled, ill-appearing, NAD  HEENT:  Pink conjunctivae, PERRL, hearing intact to voice, dry mucous membranes  Neck:  Supple, without masses, thyroid non-tender  Resp:  No accessory muscle use, clear breath sounds without wheezes rales or rhonchi  Card:  No murmurs, normal S1, S2 without thrills, bruits or peripheral edema  Abd:  Soft, non-tender, non-distended, normoactive bowel sounds are present  Lymph:  No cervical adenopathy  Musc:  No cyanosis or clubbing  Skin:  No rashes  Neuro:  Right facial droop, tongue deviated to R. Dysarthria. R sided strength 4/5  Psych:  Alert with poor insight.   Oriented to person, place    Labs:    Recent Labs     12/10/21  1859   WBC 7.3   HGB 13.0   HCT 40.5        Recent Labs     12/10/21  1859      K 3.9      CO2 27      BUN 13   CREA 1.14   CA 9.2   ALB 3.6   TBILI 0.4   ALT 66     Lab Results   Component Value Date/Time    Glucose (POC) 90 12/10/2021 06:49 PM    Glucose (POC) 110 11/09/2021 08:06 AM     No results for input(s): PH, PCO2, PO2, HCO3, FIO2 in the last 72 hours. Recent Labs     12/10/21  1859   INR 1.2*       Radiology and EKG reviewed:   Head CT reviewed    **Old Records reviewed in [facility]**       Assessment/Plan:       Principal Problem:    81 yo hx of HTN, CAD s/p stents, CVA, seizure, R subdural hematoma, presented w/ dysarthria, R sided weakness concerning for a CVA    1) Dysarthria/R Facial droop/R sided weakness: concerning for CVA. Head CT with improving chronic R subdural.  Will monitor on Tele. Obtain head MRI, echo, carotid dopplers. Will consult Neuro, PT/OT, speech. Defer ASA to neuro. Cont statin    2) L carotid stenosis: seen on CTA. Will consult Vascular surg    3) Seizures: cont home vimpat, dilantin, keppra    4) HTN/CAD: cont toprol, imdur    5) Chronic R sided subdural hematoma: CT showed improvement. Nothing acute.   Will monitor     Risk of deterioration: high      Total time spent with patient: 79 Minutes **I personally saw and examined the patient during this time period**                 Care Plan discussed with: Patient, nursing     Discussed:  Care Plan    Prophylaxis:  SCD's    Probable Disposition:  Home w/Family vs SNF            ___________________________________________________    Attending Physician: Mikaela Camilo MD

## 2021-12-11 NOTE — ED NOTES
Patient is transported to [facility] via [facility] ambulance. Report to transport team. Denies any c/o at time of transport.

## 2021-12-11 NOTE — PROGRESS NOTES
Problem: Self Care Deficits Care Plan (Adult)  Goal: *Acute Goals and Plan of Care (Insert Text)  Description: FUNCTIONAL STATUS PRIOR TO ADMISSION: Patient was modified independent using a rolling walker for functional mobility. Reports difficulty with shower transfers. HOME SUPPORT: The patient lived with his wife and son (son works during the day). Occupational Therapy Goals  Initiated 12/11/2021   1. Patient will perform grooming with supervision/set-up in unsupported sit within 7 day(s). 2.  Patient will perform lower body dressing with minimal assistance within 7 day(s). 3.  Patient will perform toilet transfers with CGA within 7 day(s). 4.  Patient will perform all aspects of toileting with contact guard assist within 7 day(s). 5.  Patient will participate in upper extremity therapeutic exercise/activities with supervision/set-up within 5 day(s). 6.  Patient will utilize energy conservation techniques during functional activities with verbal cues within 7 day(s). 4.  Patient will improve their Fugl Goff score by 4 points in prep for ADLs within 7 days. (61/65 RUE)  Outcome: Progressing Towards Goal  OCCUPATIONAL THERAPY EVALUATION  Patient: Nuvia Bowden (3 y.o. male)  Date: 12/11/2021  Primary Diagnosis: Facial droop [R29.810]        Precautions:  Fall    ASSESSMENT  Based on the objective data described below, the patient presents with generalized weakness, slurred speech, R facial droop, decreased coordination, impaired functional mobility and decreased safety awareness. Patient is being worked up for stroke, CT showed chronic left frontal-parietal infarct and  stable right SDH for which he was hospitalized in October 2021, MRI pending. He was received semisupine in bed, agreeable to participate. Performed UB ADLs with setup-min A in semisupine. He transferred supine>sit with min A x2, fair-good sitting balance.  Participated in [place] UE assessment and scored 61/65, noted decreased coordination and impaired RUE supination/pronation. He ambulated in room with min A x2, required cueing for safe transfers due to impulsivity. Pt returned to bed at end of session for echo. Pt is functioning below his baseline at this time and will benefit from skilled therapy intervention to address the above noted impairments. Recommend inpatient rehab at discharge. Current Level of Function Impacting Discharge (ADLs/self-care): Min-mod A x2 transfers, setup-max A ADLs    Functional Outcome Measure: The patient scored Total A-D  Total A-D (Motor Function): 61/66 on the Fugl-Goff Assessment which is indicative of mild impairment in upper extremity functional status. Other factors to consider for discharge: fall risk, below PLOF     Patient will benefit from skilled therapy intervention to address the above noted impairments. PLAN :  Recommendations and Planned Interventions: self care training, functional mobility training, therapeutic exercise, balance training, therapeutic activities, endurance activities, patient education, home safety training, and family training/education    Frequency/Duration: Patient will be followed by occupational therapy 5 times a week to address goals. Recommendation for discharge: (in order for the patient to meet his/her long term goals)  Therapy 3 hours per day 5-7 days per week    This discharge recommendation:  Has not yet been discussed the attending provider and/or case management    IF patient discharges home will need the following DME: TBD       SUBJECTIVE:   Patient stated I have trouble getting in there (shower).     OBJECTIVE DATA SUMMARY:   HISTORY:   Past Medical History:   Diagnosis Date    CAD (coronary artery disease)     Hx of completed stroke     Hypercholesteremia     Hypertension     [facility] disorder ([facility] [address]     Subdural hematoma (HCC)      Past Surgical History:   Procedure Laterality Date    HX CORONARY STENT PLACEMENT      [place] CARDIAC SURG PROCEDURE UNLIST      bypass       Expanded or extensive additional review of patient history:     Home Situation  Home Environment: Private residence  Wheelchair Ramp: Yes  One/Two Story Residence: One story  Living Alone: No  Support Systems: Spouse/Significant Other, Other Family Member(s)  Patient Expects to be Discharged to[de-identified] Rehabilitation facility  Current DME Used/Available at Home: Walker, rolling  Tub or Shower Type: Tub/Shower combination    Hand dominance: Right    EXAMINATION OF PERFORMANCE DEFICITS:  Cognitive/Behavioral Status:  Neurologic State: Alert  Orientation Level: Oriented to person; Oriented to time; Disoriented to place  Cognition: Poor safety awareness        Safety/Judgement: Decreased awareness of need for safety    Vision/Perceptual:    Tracking: Able to track stimulus in all quadrants w/o difficulty         Acuity: Able to read normal print without difficulty         Range of Motion  AROM: Generally decreased, functional     Strength:  Strength: Generally decreased, functional        Coordination:     Fine Motor Skills-Upper: Left Impaired; Right Impaired    Gross Motor Skills-Upper: Left Intact; Right Intact    Tone & Sensation:  Tone: Normal  Sensation: Intact          Balance:  Sitting: Intact; High guard  Standing: Impaired  Standing - Static: Constant support  Standing - Dynamic : Constant support    Functional Mobility and Transfers for ADLs:  Bed Mobility:  Supine to Sit: Additional time; Minimum assistance; Assist x2  Sit to Supine: Assist x2; Moderate assistance  Scooting: Minimum assistance    Transfers:  Sit to Stand: Assist x2; Minimum assistance  Stand to Sit: Assist x2; Minimum assistance    ADL Assessment:  Feeding: Setup    Oral Facial Hygiene/Grooming: Setup    Bathing:  Moderate assistance    Upper Body Dressing: Minimum assistance    Lower Body Dressing: Maximum assistance    Toileting: Maximum assistance          ADL Intervention and task modifications: Grooming  Position Performed: Long sitting on bed  Washing Hands: Set-up    Upper [address]: Minimum  assistance      Cognitive Retraining  Safety/Judgement: Decreased awareness of need for safety    Functional Measure:  Fugl-Goff Assessment of Motor Recovery after Stroke:   Reflex Activity  Flexors/Biceps/Fingers: Can be elicited  Extensors/Triceps: Can be elicited  Reflex Subtotal: 4    Volitional Movement Within Synergies  Shoulder Retraction: Full  Shoulder Elevation: Full  Shoulder Abduction (90 degrees): Full  Shoulder External Rotation: Full  Elbow Flexion: Full  Forearm Supination: Partial  Shoulder Adduction/Internal Rotation: Full  Elbow Extension: Full  Forearm Pronation: Full  Subtotal: 17    Volitional Movement Mixing Synergies  Hand to Lumbar Spine: Full  Shoulder Flexion (0-90 degrees): Full  Pronation-Supination: Partial  Subtotal: 5    Volitional Movement With Little or No Synergy  Shoulder Abduction (0-90 degrees): Full  Shoulder Flexion ( degrees): Full  Pronation/Supination: Partial  Subtotal : 5    Normal Reflex Activity  Biceps, Triceps, Finger Flexors: Full  Subtotal : 2    Upper Extremity Total   Upper Extremity Total: 33    Wrist  Stability at 15 Degree Dorsiflexion: Full  Repeated Dorsiflexion/ Volar Flexion: Full  Stability at 15 Degree Dorsiflexion: Full  Repeated Dorsiflexion/ Volar Flexion: Full  Circumduction: Partial  Wrist Total: 9    Hand  Mass Flexion: Full  Mass Extension: Full  Grasp A: Full  Grasp B: Full  Grasp C: Full  Grasp D: Full  Grasp E: Full  Hand Total: 14    Coordination/Speed  Tremor: None  Dysmetria: Slight  Time: <1s  Coordination/Speed Total : 5    Total A-D  Total A-D (Motor Function): 61/66     This is a reliable/valid measure of arm function after a neurological event.  It has established value to characterize functional status and for measuring spontaneous and therapy-induced recovery; tests proximal and distal motor functions. Fugl-Goff Assessment  UE scores recorded between five and 30 days post neurologic event can be used to predict UE recovery at six months post neurologic event. Severe = 0-21 points   Moderately Severe = 22-33 points   Moderate = 34-47 points   Mild = 48-66 points  NAYE Padilla, AL Goode, & TORIE Guo (1992). Measurement of motor recovery after stroke: Outcome assessment and sample size requirements. Stroke, 23, pp. 2420-1281.   ------------------------------------------------------------------------------------------------------------------------------------------------------------------  MCID:  Stroke:   Beck Gotti et al, 2001; n = 171; mean age 79 (6) years; assessed within 16 (12) days of stroke, Acute Stroke)  FMA Motor Scores from Admission to Discharge    10 point increase in FMA Upper Extremity = 1.5 change in discharge FIM    10 point increase in FMA Lower Extremity = 1.9 change in discharge FIM  MDC:   Stroke:   Soraya Horton et al, 2008, n = 14, mean age = 59.9 (14.6) years, assessed on average 14 (6.5) months post stroke, Chronic Stroke)    FMA = 5.2 points for the Upper Extremity portion of the assessment     Occupational Therapy Evaluation Charge Determination   History Examination Decision-Making   LOW Complexity : Brief history review  MEDIUM Complexity : 3-5 performance deficits relating to physical, cognitive , or psychosocial skils that result in activity limitations and / or participation restrictions MEDIUM Complexity : Patient may present with comorbidities that affect occupational performnce.  Miniml to moderate modification of tasks or assistance (eg, physical or verbal ) with assesment(s) is necessary to enable patient to complete evaluation       Based on the above components, the patient evaluation is determined to be of the following complexity level: LOW   Pain Rating:  Pt did not report pain during session    Activity Tolerance:   Fair    After treatment patient left in no apparent distress:    Supine in bed, Call bell within reach, Bed / chair alarm activated, and Side rails x 3    COMMUNICATION/EDUCATION:   The patients plan of care was discussed with: Physical therapist and Registered nurse. Patient was educated regarding his deficit(s) of R facial droop as this relates to his diagnosis of CVA workup. He demonstrated Good understanding as evidenced by teachback. Patient and/or family was verbally educated on the BE FAST acronym for signs/symptoms of CVA and TIA. Informed patient to refer to the Stroke Binder for further BE FAST information. All questions answered with patient indicating good understanding. Home safety education was provided and the patient/caregiver indicated understanding., Patient/family have participated as able in goal setting and plan of care. , and Patient/family agree to work toward stated goals and plan of care. This patients plan of care is appropriate for delegation to [facility].     Thank you for this referral.  Jamir Saez OT  Time Calculation: 30 mins

## 2021-12-11 NOTE — ED PROVIDER NOTES
20-year-old male with history of CVA, CAD hypertension, hyperlipidemia and history of large subdural hematoma that was contributing to seizures. Patient followed by Dr. Heath Ronquillo, neurosurgery was placed on 3 AEDs, Vimpat phenytoin and Keppra. Last night started having some slurred speech noted by patient's wife. And started having some right-sided facial droop which is new. No report of any weakness in the arms or in the legs and no seizure-like activity witnessed by family. Patient presented to ER for the symptoms. Level 2 stroke alert was called immediately. Patient has normal glucose. No report of any fevers or chills. No nausea or vomiting. Patient had recent hospitalization in October for onset of left-sided weakness and numbness when he was in the neurosurgeons office and was sent to the ER. Had MRI showing multiseptated subdural hematoma and felt that his symptoms were related to seizures from cortical irritation. No current facility-administered medications on file prior to encounter. Current Outpatient Medications on File Prior to Encounter:  lacosamide (VIMPAT) 200 mg tab tablet, Take 1 Tablet by mouth two (2) times a day. Max Daily Amount: 400 mg. Followup with neurologist, Disp: 60 Tablet, Rfl: 1  phenytoin (DILANTIN) 50 mg chewable tablet, Take 2 Tablets by mouth three (3) times daily. , Disp: 90 Tablet, Rfl: 0  levETIRAcetam (KEPPRA) 750 mg tablet, Take 2 Tablets by mouth two (2) times a day., Disp: 120 Tablet, Rfl: 0  atorvastatin (LIPITOR) 40 mg tablet, Take 1 Tablet by mouth daily. , Disp: 20 Tablet, Rfl: 0  metoprolol tartrate (LOPRESSOR) 25 mg tablet, Take 12.5 mg by mouth daily. , Disp: , Rfl:   cholecalciferol (Vitamin D3) (1000 Units /25 mcg) tablet, Take 5,000 Units by mouth two (2) times a day., Disp: , Rfl:   isosorbide mononitrate ER (IMDUR) 120 mg CR tablet, Take 120 mg by mouth every morning., Disp: , Rfl:                  Past Medical History:   Diagnosis Date    CAD (coronary artery disease)     Hypercholesteremia     Hypertension     Stroke [facility])        Past Surgical History:   Procedure Laterality Date    HX CORONARY STENT PLACEMENT      [place] CARDIAC SURG PROCEDURE UNLIST      bypass         No family history on file. Social History     Socioeconomic History    Marital status:      Spouse name: Not on file    Number of children: Not on file    Years of education: Not on file    Highest education level: Not on file   Occupational History    Not on file   Tobacco Use    Smoking status: Never Smoker    Smokeless tobacco: Never Used   Substance and Sexual Activity    Alcohol use: Never    Drug use: Never    Sexual activity: Not on file   Other Topics Concern    Not on file   Social History Narrative    Not on file     Social Determinants of Health     Financial Resource Strain:     Difficulty of Paying Living Expenses: Not on file   Food Insecurity:     Worried About Running Out of Food in the Last Year: Not on file    Griselda of Food in the Last Year: Not on file   Transportation Needs:     Lack of Transportation (Medical): Not on file    Lack of Transportation (Non-Medical):  Not on file   Physical Activity:     Days of Exercise per Week: Not on file    Minutes of Exercise per Session: Not on file   Stress:     Feeling of Stress : Not on file   Social Connections:     Frequency of Communication with Friends and Family: Not on file    Frequency of Social Gatherings with Friends and Family: Not on file    Attends Christian Services: Not on file    Active Member of Clubs or Organizations: Not on file    Attends Club or Organization Meetings: Not on file    Marital Status: Not on file   Intimate Partner Violence:     Fear of Current or Ex-Partner: Not on file    Emotionally Abused: Not on file    Physically Abused: Not on file    Sexually Abused: Not on file   Housing Stability:     Unable to Pay for Housing in the Last Year: Not on file  Number of Places Lived in the Last Year: Not on file    Unstable Housing in the Last Year: Not on file         ALLERGIES: Morphine    Review of Systems   Constitutional: Negative for chills and fever. HENT: Negative for congestion and sore throat. Eyes: Negative for pain. Respiratory: Negative for shortness of breath. Cardiovascular: Negative for chest pain. Gastrointestinal: Negative for abdominal pain, diarrhea, nausea and vomiting. Genitourinary: Negative for dysuria and flank pain. Musculoskeletal: Negative for back pain and neck pain. Skin: Negative for rash. Neurological: Positive for speech difficulty and weakness (facial droop). Negative for dizziness, seizures, syncope, numbness and headaches. All other systems reviewed and are negative. Vitals:    12/10/21 1850   BP: 136/78   Pulse: 60   Resp: 16   Temp: 97.9 °F (36.6 °C)   SpO2: 95%   Weight: 80.6 kg (177 lb 11.1 oz)   Height: 5' 11\" (1.803 m)            Physical Exam  Constitutional:       Appearance: He is well-developed. HENT:      Head: Normocephalic. Eyes:      Conjunctiva/sclera: Conjunctivae normal.   Cardiovascular:      Rate and Rhythm: Normal rate and regular rhythm. Pulmonary:      Effort: Pulmonary effort is normal. No respiratory distress. Breath sounds: Normal breath sounds. Abdominal:      General: Bowel sounds are normal.      Palpations: Abdomen is soft. Tenderness: There is no abdominal tenderness. Musculoskeletal:         General: Normal range of motion. Cervical back: Normal range of motion and neck supple. Skin:     General: Skin is warm. Capillary Refill: Capillary refill takes less than 2 seconds. Findings: No rash. Neurological:      Mental Status: He is alert and oriented to person, place, and time. GCS: GCS eye subscore is 4. GCS verbal subscore is 5. GCS motor subscore is 6.       Cranial Nerves: Cranial nerve deficit, dysarthria and facial asymmetry present. Sensory: Sensation is intact. Motor: Motor function is intact. Coordination: Coordination is intact. Comments: Right-sided facial droop and some dysarthria. MDM  Number of Diagnoses or Management Options  Chronic subdural hematoma (HCC)  Confusion  Facial droop  Seizure ([address]  Slurred speech  Diagnosis management comments: Patient with known chronic left-sided subdural with recent hospitalization October due to left-sided weakness thought to be due to cortical irritation causing seizures. Started on antiepileptic drugs. Last night started acting slightly confused and having some slurred speech having some right-sided facial droop. Stroke alert was called. No new stroke seen. Patient does have severe carotid artery stenosis and stable to diminishing subdural.  Neuro believes that patient symptoms likely due to subacute seizures. Will check seizure medication levels. Will admit for EEG and further evaluation and consultation for vascular surgery for his severe carotid stenosis. No TPA. Total critical care time spent exclusive of procedures:  40min         Amount and/or Complexity of Data Reviewed  Clinical lab tests: reviewed  Tests in the radiology section of CPT®: reviewed      ED Course as of 12/10/21 1943   Fri Dec 10, 2021   Og Delgado - POC: 90 [ZD]   1859 Spoke with telemetry neuro. Will evaluate patient however patient would not be TPA candidate as he had recent brain bleed. [ZD]   1859 Azie regular [ZD]   1906 CT CODE NEURO HEAD WO CONTRAST  Atrophy and chronic small vessel ischemic disease the white matter  again demonstrated with unchanged extent of chronic left frontal-parietal  infarct. There is also a stable to slightly diminished chronic right subdural  hematoma. [ZD]   1920 Spoke with neuroBelieve the patient's slurred speech and mild confusion are consequence of likely subacute seizures. No tPA. Patient does have severe carotid artery stenosis. Neuro recommending admission for EEG as well as consultation with vascular surgery for patient's severe carotid artery stenosis. Will check seizure medication levels. [ZD]   1941 EKG sinus bradycardia rate of 58 beats minute with left axis deviation and nonspecific intraventricular conduction delay. No ST elevation or depressions. EKG interpreted by Aysha Mishra MD   [ZD]      ED Course User Index  [ZD] Edd Pace MD       Procedures        Perfect Serve Consult for Admission  7:23 PM    ED Room Number: [address] 1615  Patient Name and age: Judith Molina 80 y.o.  male  Working Diagnosis:   1. Confusion    2. Slurred speech    3. Facial droop    4. Chronic subdural hematoma (HCC)    5. Seizure ([address]        COVID-19 Suspicion:  no  Sepsis present:  no  Reassessment needed: no  Code Status:  Full Code  Readmission: no  Isolation Requirements:  no  Recommended Level of Care:  Neuro-remote tele  Department:[place] [facility] - [phone]  Other:  Spoke with neuro  Believe the patient's slurred speech and mild confusion are consequence of likely subacute seizures. No tPA. Patient does have severe carotid artery stenosis. Neuro recommending admission for EEG as well as consultation with vascular surgery for patient's severe carotid artery stenosis. Will check seizure medication levels.

## 2021-12-11 NOTE — PROGRESS NOTES
Admission Medication Reconciliation:     The patient is alone in his room and unable to complete the interview. Pharmacist called the home phone number and spoke with patient's son. The son believed the pill bottles were in the patient's room. Nurse and pharmacist reviewed the room but could not locate the pill bottles. The pharmacist called the patient's daughter Barbie Lopes  with no answer. Message left requesting call back.     Thank you,      Luis Antonio Echavarria, PharmD, BCPS

## 2021-12-11 NOTE — PROGRESS NOTES
0700 Bedside shift change report given to [address]ORDANA Vizcaino (oncoming nurse) by Jeramie Hills (offgoing nurse). Report included the following information SBAR, Kardex, ED Summary, Intake/Output, MAR and Recent Results. This patient was assisted with Intentional Toileting every 2 hours during this shift as appropriate. Documentation of ambulation and output reflected on Flowsheet as appropriate. Purposeful hourly rounding was completed using AIDET and 5Ps. Outcomes of PHR documented as they occurred. Bed alarm in use as appropriate. Dual Suction and ambubag in place. 21  Dr Leslie Zuniga. At bedside pt asked if this RN if he could eat Dr. Nikki Wilcox he could have sips with meds and if he passed his dysphagia screening he could go on easy to chew diet. Pt had passed evaluation screen. Put on easy to chew diet. 1807 Pt began to choke while eating dinner. Risk for aspiration high, diet is back to NPO. Awaiting a Speech consult. Stroke Education provided to patient and relative(s) and the following topics were discussed    1. Patients personal risk factors for stroke are prior stroke hx of past stroke    2. Warning signs of Stroke:        * Sudden numbness or weakness of the face, arm or leg, especially on one side of          The body            * Sudden confusion, trouble speaking or understanding        * Sudden trouble seeing in one or both eyes        * Sudden trouble walking, dizziness, loss of balance or coordination        * Sudden severe headache with no known cause      3. Importance of activation Emergency Medical Services ( 9-1-1 ) immediately if experience any warning signs of stroke. 4. Be sure and schedule a follow-up appointment with your primary care doctor or any specialists as instructed. 5. You must take medicine every day to treat your risk factors for stroke. Be sure to take your medicines exactly as your doctor tells you: no more, no less. Know what your medicines are for , what they do. Anti-thrombotics /anticoagulants can help prevent strokes. You are taking the following medicine(s)  ASA     6. Smoking and second-hand smoke greatly increase your risk of stroke, cardiovascular disease and death. Smoking history unknown    7. Information provided was [facility] Stroke Education Binder, Stroke Handouts or Verbal Education    8. Documentation of teaching completed in Patient Education Activity and on Care Plan with teaching response noted? yes          1930   Bedside shift change report given to [address] [place] [address] (oncoming nurse) by Aliza Sol RN (offgoing nurse). Report included the following information SBAR, Kardex, ED Summary, Intake/Output, MAR, Recent Results and Cardiac Rhythm NSR.

## 2021-12-11 NOTE — PROGRESS NOTES
Patient seen and examined  Full consult to follow  80 yowm with high grade symptomatic left carotid stenosis  Lesion fairly calcified which makes strategy for rectification more challenging  Recent subdural also might complicate usage of plavix. ... Will review options and make recommendation for method and timing  Thank you for the consult  Will follow with you.

## 2021-12-11 NOTE — ED NOTES
Assume care of patient , report received from [facility]. Patient appear to be in no acute distress. Ct scan completed and resulted. Patient will be admitted to [facility], awaiting  Bed assignment.

## 2021-12-12 LAB
CALCULATED R AXIS, ECG10: -34 DEGREES
CALCULATED T AXIS, ECG11: 112 DEGREES
COMMENT, HOLDF: NORMAL
DIAGNOSIS, 93000: NORMAL
Q-T INTERVAL, ECG07: 432 MS
QRS DURATION, ECG06: 116 MS
QTC CALCULATION (BEZET), ECG08: 432 MS
SAMPLES BEING HELD,HOLD: NORMAL
TSH SERPL DL<=0.05 MIU/L-ACNC: 3.03 UIU/ML (ref 0.36–3.74)
VENTRICULAR RATE, ECG03: 60 BPM

## 2021-12-12 PROCEDURE — 99233 SBSQ HOSP IP/OBS HIGH 50: CPT | Performed by: PSYCHIATRY & NEUROLOGY

## 2021-12-12 PROCEDURE — 74011250637 HC RX REV CODE- 250/637: Performed by: INTERNAL MEDICINE

## 2021-12-12 PROCEDURE — 92610 EVALUATE SWALLOWING FUNCTION: CPT

## 2021-12-12 PROCEDURE — 74011250637 HC RX REV CODE- 250/637: Performed by: PSYCHIATRY & NEUROLOGY

## 2021-12-12 PROCEDURE — 65660000000 HC RM CCU STEPDOWN

## 2021-12-12 PROCEDURE — 84443 ASSAY THYROID STIM HORMONE: CPT

## 2021-12-12 RX ADMIN — ISOSORBIDE MONONITRATE 120 MG: 60 TABLET, EXTENDED RELEASE ORAL at 07:00

## 2021-12-12 RX ADMIN — ATORVASTATIN CALCIUM 40 MG: 20 TABLET, FILM COATED ORAL at 10:02

## 2021-12-12 RX ADMIN — PHENYTOIN SODIUM 100 MG: 100 CAPSULE ORAL at 21:39

## 2021-12-12 RX ADMIN — LEVETIRACETAM 1500 MG: 500 TABLET, FILM COATED ORAL at 17:22

## 2021-12-12 RX ADMIN — METOPROLOL TARTRATE 12.5 MG: 25 TABLET, FILM COATED ORAL at 10:02

## 2021-12-12 RX ADMIN — LEVETIRACETAM 1500 MG: 500 TABLET, FILM COATED ORAL at 10:02

## 2021-12-12 RX ADMIN — LACOSAMIDE 200 MG: 100 TABLET, FILM COATED ORAL at 10:02

## 2021-12-12 RX ADMIN — ASPIRIN 81 MG: 81 TABLET, CHEWABLE ORAL at 10:02

## 2021-12-12 RX ADMIN — LACOSAMIDE 200 MG: 100 TABLET, FILM COATED ORAL at 17:22

## 2021-12-12 RX ADMIN — Medication 10 ML: at 06:18

## 2021-12-12 NOTE — PROGRESS NOTES
Neurology Progress Note    Patient ID:  Joanie Summers  [id number]  80 y.o.  1935    CC: slurred speech and facial droop    Subjective:      Patient continues to have slurred speech, facial droop and difficulty swallowing. Labs done revealed hemoglobin A1c of 5.7 and normal thyroid studies. Echocardiogram revealed EF of 55 to 60%. Patient was seen by speech therapy and is on puréed diet and will undergo modified barium swallowing testing tomorrow. Current Facility-Administered Medications   Medication Dose Route Frequency    atorvastatin (LIPITOR) tablet 40 mg  40 mg Oral DAILY    isosorbide mononitrate ER (IMDUR) tablet 120 mg  120 mg Oral 7am    lacosamide (VIMPAT) tablet 200 mg  200 mg Oral BID    levETIRAcetam (KEPPRA) tablet 1,500 mg  1,500 mg Oral BID    metoprolol tartrate (LOPRESSOR) tablet 12.5 mg  12.5 mg Oral DAILY    aspirin chewable tablet 81 mg  81 mg Oral DAILY    phenytoin ER (DILANTIN ER) ER capsule 100 mg  100 mg Oral QHS    0.9% sodium chloride infusion  75 mL/hr IntraVENous CONTINUOUS    sodium chloride (NS) flush 5-40 mL  5-40 mL IntraVENous Q8H    sodium chloride (NS) flush 5-40 mL  5-40 mL IntraVENous PRN    0.9% sodium chloride infusion 25 mL  25 mL IntraVENous PRN    acetaminophen (TYLENOL) tablet 650 mg  650 mg Oral Q6H PRN    Or    acetaminophen (TYLENOL) suppository 650 mg  650 mg Rectal Q6H PRN    bisacodyL (DULCOLAX) suppository 10 mg  10 mg Rectal DAILY PRN    promethazine (PHENERGAN) tablet 12.5 mg  12.5 mg Oral Q6H PRN    Or    ondansetron (ZOFRAN) injection 4 mg  4 mg IntraVENous Q6H PRN        Review of Systems:    Pertinent items are noted in HPI. Objective:     Patient Vitals for the past 8 hrs:   BP Temp Pulse Resp SpO2   12/12/21 0741   (!) 52     12/12/21 0731 126/72 98.2 °F (36.8 °C) 63 16 92 %   12/12/21 0348 111/68 97.8 °F (36.6 °C) 67 16 94 %       No intake/output data recorded.   12/10 1901 - 12/12 0700  In: 120 [P.O.:120]  Out: 800 [Urine:800]    Lab Review   Recent Results (from the past 24 hour(s))   ECHO ADULT COMPLETE    Collection Time: 12/11/21 10:51 AM   Result Value Ref Range    IVSd 0.99 0.6 - 1.0 cm    LVIDd 4.03 (A) 4.2 - 5.9 cm    LVIDs 3.78 cm    LVOT d 2.05 cm    LVPWd 0.97 0.6 - 1.0 cm    BP EF 59.4 55 - 100 percent    LV Ejection Fraction MOD 2C 56 percent    LV Ejection Fraction MOD 4C 61 percent    LV ED Vol A2C 79.54 mL    LV ED Vol A4C 132.16 mL    LV ED Vol .79 67 - 155 mL    LV ES Vol A2C 34.87 mL    LV ES Vol A4C 51.17 mL    LV ES Vol BP 42.15 22 - 58 mL    LVOT Peak Gradient 2.50 mmHg    LVOT Peak Velocity 79.10 cm/s    RVIDd 3.61 cm    Left Atrium Major Axis 3.82 cm    LA Volume 42.01 18 - 58 mL    LA Area 4C 16.53 cm2    LA Vol 2C 34.25 18 - 58 mL    LA Vol 4C 41.33 18 - 58 mL    LA Volume DISK BP 38.23 18 - 58 mL    AV Annulus 3.90 cm    Aortic Valve Area by Continuity of Peak Velocity 1.85 cm2    AoV PG 8.04 mmHg    Aortic Valve Systolic Peak Velocity 503.40 cm/s    MV A Juan Manuel 73.09 cm/s    Mitral Valve E Wave Deceleration Time 454.75 ms    MV E Juan Manuel 49.28 cm/s    E/E' ratio (averaged) 8.22     E/E' lateral 5.40     E/E' septal 11.05     LV E' Lateral Velocity 9.13 cm/s    LV E' Septal Velocity 4.46 cm/s    Pulmonic Regurgitant End Max Velocity 87.02 cm/s    Pulmonic Valve Systolic Peak Instantaneous Gradient 3.93 mmHg    Pulmonic Valve Max Velocity 99.15 cm/s    Tapse 1.86 1.5 - 2.0 cm    Triscuspid Valve Regurgitation Peak Gradient 17.66 mmHg    TR Max Velocity 207.29 cm/s    AO ASC D 3.91 cm    MV E/A 0.67     LV Mass .0 88 - 224 g    LV Mass AL Index 62.2 49 - 115 g/m2    LVES Vol Index BP 21.0 mL/m2    LVED Vol Index BP 51.6 mL/m2    Left Atrium Minor Axis 1.90 cm    LA Vol Index 20.90 16 - 28 ml/m2    LA Vol Index 17.04 16 - 28 ml/m2    LA Vol Index 20.56 16 - 28 ml/m2    LVED Vol Index A4C 65.8 mL/m2    LVED Vol Index A2C 39.6 mL/m2    LVES Vol Index A4C 25.5 mL/m2    LVES Vol Index A2C 17.3 mL/m2 AMBER/BSA Pk Juan Manuel 0.9 cm2/m2   TSH 3RD GENERATION    Collection Time: 12/12/21  5:40 AM   Result Value Ref Range    TSH 3.03 0.36 - 3.74 uIU/mL   SAMPLES BEING HELD    Collection Time: 12/12/21  5:40 AM   Result Value Ref Range    SAMPLES BEING HELD 1LAV,1SST     COMMENT        Add-on orders for these samples will be processed based on acceptable specimen integrity and analyte stability, which may vary by analyte. PHYSICAL EXAM:     NEUROLOGICAL EXAM:     Appearance: The patient is well developed, well nourished, provides a coherent history and is in no acute distress. Mental Status: Oriented to time, place and person. Fluent, no aphasia but with dysarthria. Mood and affect appropriate. Cranial Nerves:   Intact visual fields. JENY, EOM's full, no nystagmus, no ptosis. Facial sensation is normal. Corneal reflexes are intact. Right facial droop. Hearing is normal bilaterally. Palate is midline with normal elevation. Sternocleidomastoid and trapezius muscles are normal. Tongue is midline. Motor:  5/5 strength in upper and lower proximal and distal muscles. Normal bulk and tone. No pronator drift. Reflexes:   Deep tendon reflexes 1+/4 and symmetrical. Downgoing toes. Sensory:   Normal to cold. Gait:  Not tested. Tremor:   No tremor noted. Cerebellar:  Intact FTN/ZEESHAN/HTS         Assessment:   Acute CVA  Seizure disorder  Chronic subdural hematoma    Plan:   Neurological examination reveals dysarthria and right facial droop. Status post left thalamic infarct. Motor component is likely due to the proximity of the stroke to the internal capsule. No sensory deficits discerned currently.     Head CT without contrast did not reveal any acute process. Chronic left frontoparietal infarction. Supratentorial right subdural hematoma. Brain MRI without contrast revealed acute lacunar infarct left thalamus. Chronic right subdural hematoma. Chronic encephalomalacia left parietal lobe.   Moderate white matter disease.     No changes in patient's chronic subdural hematoma with slight decrease in size. Continue care with neurosurgery as an outpatient.     Head and neck CTA did not reveal any flow-limiting stenosis or aneurysm. No significant ICA stenosis.     LDL was at goal which is less than 70. Continue statin therapy.     Echocardiogram was unremarkable. Echocardiogram with bubble study done 10/25/2021 did not reveal any shunting.     Given chronicity of the subdural hematoma and the current stroke, continue Aspirin 81 mg daily for stroke prophylaxis.     No evidence of seizures currently. Continue home dosages of phenytoin, levetiracetam and Vimpat. Levels are pending. Phenytoin level is subtherapeutic (dose was decreased from 300 mg daily to 100 mg daily by [place] physician).     Continue speech therapy. [facility] [place] [place] tomorrow.      Per DMV regulation patient cannot drive for 6 months. 35 minutes was spent with this patient. This included review of labs, evaluation of the patient, formulation of treatment plan, and long discussion with patient's wife about patient's condition and status and treatment plan and answering all of her questions and concerns.     Signed:  Sierra Olivas MD  12/12/2021  10:16 AM

## 2021-12-12 NOTE — PROGRESS NOTES
Bedside shift change report given to [facility] (oncoming nurse) by Marcelo Islas RN (offgoing nurse). Report included the following information SBAR, Kardex, ED Summary, Intake/Output, and Recent Results. Observing NPO until evaluated by speech    Pt has pulled out line and pulled off leads several times. Attempting busy apron to keep pt occupied    Stroke Education provided to patient and the following topics were discussed    1. Patients personal risk factors for stroke are hypertension and prior stroke    2. Warning signs of Stroke:        * Sudden numbness or weakness of the face, arm or leg, especially on one side of          The body            * Sudden confusion, trouble speaking or understanding        * Sudden trouble seeing in one or both eyes        * Sudden trouble walking, dizziness, loss of balance or coordination        * Sudden severe headache with no known cause      3. Importance of activation Emergency Medical Services ( 9-1-1 ) immediately if experience any warning signs of stroke. 4. Be sure and schedule a follow-up appointment with your primary care doctor or any specialists as instructed. 5. You must take medicine every day to treat your risk factors for stroke. Be sure to take your medicines exactly as your doctor tells you: no more, no less. Know what your medicines are for , what they do. Anti-thrombotics /anticoagulants can help prevent strokes. You are taking the following medicine(s)  Aspirin     6. Smoking and second-hand smoke greatly increase your risk of stroke, cardiovascular disease and death. Smoking history never    7. Information provided was [facility] Stroke Education Binder, Stroke Handouts or Verbal Education    8. Documentation of teaching completed in Patient Education Activity and on Care Plan with teaching response noted?   yes    0700: isosorbide mononitrate held d/t pt failing dysphagia screening and being NPO    Bedside shift change report given to [facility] [employer] (oncoming nurse) by Wilfred Carson (offgoing nurse). Report included the following information SBAR, Kardex, ED Summary, Intake/Output, and Recent Results. This patient was assisted with Intentional Toileting every 2 hours during this shift as appropriate. Documentation of ambulation and output reflected on Flowsheet as appropriate. Purposeful hourly rounding was completed using AIDET and 5Ps. Outcomes of PHR documented as they occurred. Bed alarm in use as appropriate. Dual Suction and ambubag in place.

## 2021-12-12 NOTE — PROGRESS NOTES
CARE MANAGEMENT INITIAL ASSESSEMENT      NAME:   Catracho Paulson   :     1935   MRN:     [id number]       Emergency Contact:  Extended Emergency Contact Information  Primary Emergency Contact: Herlinda Martinez  Home Phone: [phone]  Relation: Spouse  Preferred language: ENGLISH   needed? No  Secondary Emergency Contact: Edil Leslie  Mobile Phone: [phone]  Relation: Son    Advance Directive:  Full Code, does not have an advance directive. [facility] Healthcare Decision Maker:   Eric Leonr- spouse- [phone]    Reason for Admission:  Mr. Lisandra Caballero is a 80 y.o. male with history that includes CVA, CAD, HTN and seizure disorder  who was emergently admitted for:  right sided facial droop and right sided weakness    Patient Active Problem List   Diagnosis Code    Subdural hematoma ([facility] [address] S06.5X9A    Simple partial seizures ([address] G40.109    Facial droop R29.810    Left carotid stenosis I65.22    CAD (coronary artery disease) I25.10    Hypercholesteremia E78.00    Hypertension I10    Hx of completed stroke Z86.73    Seizure disorder ([address] G40.909       Assessment:  Via phone with spouse Randy Paulson. RUR:  16%  Risk Level: Moderate  Value-based purchasing:  Yes  Bundle patient:  No    Residency:  Private residence  Exterior Steps:  1  Interior Steps:  None    Lives With:  Spouse    Prior functioning:  Independent.   Patient requires assistance with:  N/A    Prior DME required:  Rolling walker and Rollator    DME available:  Rolling walker and Rollator    Rehab history:  Home Health:  Provider - [facility]  Date - CURRENTLY OPEN    Discharge Concerns:  None      Insurer:  Payor: Vinay Nones / Plan: [address] / Product Type: Medicare /     PCP: Ava Faith   [facility]   Current patient: Yes   Approximate date of last visit: December   Access to virtual PCP visits:   No    [facility]acy:  [facility]     COVID-19 vaccination status:  Not vaccinated    DC Transport:  TBD      Transition of care plan:  SNF / IPR     Comments:   Pt admitted on 12/10/21 for R facial droop/R sided weakness. Documentation indicates Pt may be experiencing some agitation today. CM completed initial assessment via phone with spouse. Pt lives at home with spouse. Previous documentation indicates son lives with Pt and spouse and that daughter lives close by. Pt has [facility] through [address] [place] [address]. [facility] states [facility] and [facility] just [facility]. Pt has no hx of home O2. Pt has a walker and rollator. Spouse states prior to admission, Pt was ambulating independent but would occasionally use the walker. Pt was independent with ADLs. Spouse denies any problems with ADLs. Spouse informed CM that Pt fell in the bathroom and hit head about 6 months ago. CM does not believe that Pt sought medical attention after the fall. Pt was also at [facility] from 10/22-11/9. Spouse states that after [facility], Pt was discharged to [facility] in [place] but only stayed a week as he did not like the care. CM discussed need for inpatient rehab at PT/OT/ST recommendation. CM provided spouse with acute inpatient rehab options. Spouse states that she would like medicals sent to [address] [place] [address] and [facility]. Referral sent via [facility]. CM will continue to follow. _____________________________________  JEANETTE Parmar - Care Management  12/12/2021   12:40 PM      Care Management Interventions  PCP Verified by CM: Yes (Dr. Fine Code- Gail Medrano)  Mode of Transport at Discharge:  Other (see comment) (TBD)  Transition of Care Consult (CM Consult): Acute Rehab, Discharge Planning  MyChart Signup: No  Discharge Durable Medical Equipment: No  Physical Therapy Consult: Yes  Occupational Therapy Consult: Yes  Speech Therapy Consult: Yes  Support Systems: Spouse/Significant Other, Child(kim)  Confirm Follow Up Transport: Family  Discharge Location  Discharge Placement: Rehab hospital/unit acute

## 2021-12-12 NOTE — PROGRESS NOTES
[facility]    Medical Progress Note      NAME: Mane Villa   :  1935  MRM:  [id number]    Date/Time of service 2021  9:34 AM         Assessment and Plan:     Acute lacunar infarct in the left thalamus / Dysarthria / R Facial droop / R sided weakness - Symptoms resolving only partly. Not a TPA candidate. Unremarkable tele. Neuro checks. Neurology consulted. Fall precautions and PT/OT/speech evaluation. NPO for now. MRI brain showed \"Acute lacunar infarct in the left thalamus. \" Recent negative ECHO. Continue statin.     L carotid stenosis - Noted on CTA. Consulted Vascular surgery     Hx Simple partial seizures / Seizure disorder - Continue vimpat, dilantin, keppra     CAD (coronary artery disease) / Hypertension - continue statin, metoprolol and imdur, but he is bradycardic despite lowest metoprolol dose. Hypercholesteremia - Atorvastatin     Chronic R sided subdural hematoma / Hx of completed stroke - CT showed improvement. Monitor        Subjective:     Chief Complaint:  R side symptoms persists, and has not passed swallow screen    ROS:  (bold if positive, if negative)    Tolerating some PT  Tolerating Diet        Objective:     Last 24hrs VS reviewed since prior progress note.  Most recent are:    Visit Vitals  /72 (BP 1 Location: Right upper arm, BP Patient Position: At rest)   Pulse (!) 52   Temp 98.2 °F (36.8 °C)   Resp 16   Ht 5' 11\" (1.803 m)   Wt 80.6 kg (177 lb 11.1 oz)   SpO2 92%   BMI 24.78 kg/m²     SpO2 Readings from Last 6 Encounters:   21 92%   21 99%   10/21/21 96%   10/11/21 97%   21 96%   21 95%            Intake/Output Summary (Last 24 hours) at 2021 0933  Last data filed at 2021 0230  Gross per 24 hour   Intake 120 ml   Output    Net 120 ml        Physical Exam:    Gen:  Well-developed, well-nourished, in no acute distress  HEENT:  Pink conjunctivae, PERRL, hearing intact to voice, moist mucous membranes  Neck: Supple, without masses, thyroid non-tender  Resp:  No accessory muscle use, clear breath sounds without wheezes rales or rhonchi  Card:  No murmurs, bradycardic S1, S2 without thrills, bruits or peripheral edema  Abd:  Soft, non-tender, non-distended, normoactive bowel sounds are present, no mass  Lymph:  No cervical or inguinal adenopathy  Musc:  No cyanosis or clubbing  Skin:  No rashes or ulcers, skin turgor is good  Neuro:  Cranial nerves are grossly intact, R side motor weakness, follows commands   Psych:  Good insight, oriented to person, place and time, alert    Telemetry reviewed:   normal sinus rhythm  __________________________________________________________________  Medications Reviewed: (see below)  Medications:     Current Facility-Administered Medications   Medication Dose Route Frequency    atorvastatin (LIPITOR) tablet 40 mg  40 mg Oral DAILY    isosorbide mononitrate ER (IMDUR) tablet 120 mg  120 mg Oral 7am    lacosamide (VIMPAT) tablet 200 mg  200 mg Oral BID    levETIRAcetam (KEPPRA) tablet 1,500 mg  1,500 mg Oral BID    metoprolol tartrate (LOPRESSOR) tablet 12.5 mg  12.5 mg Oral DAILY    aspirin chewable tablet 81 mg  81 mg Oral DAILY    phenytoin ER (DILANTIN ER) ER capsule 100 mg  100 mg Oral QHS    0.9% sodium chloride infusion  75 mL/hr IntraVENous CONTINUOUS    sodium chloride (NS) flush 5-40 mL  5-40 mL IntraVENous Q8H    sodium chloride (NS) flush 5-40 mL  5-40 mL IntraVENous PRN    0.9% sodium chloride infusion 25 mL  25 mL IntraVENous PRN    acetaminophen (TYLENOL) tablet 650 mg  650 mg Oral Q6H PRN    Or    acetaminophen (TYLENOL) suppository 650 mg  650 mg Rectal Q6H PRN    bisacodyL (DULCOLAX) suppository 10 mg  10 mg Rectal DAILY PRN    promethazine (PHENERGAN) tablet 12.5 mg  12.5 mg Oral Q6H PRN    Or    ondansetron (ZOFRAN) injection 4 mg  4 mg IntraVENous Q6H PRN        Lab Data Reviewed: (see below)  Lab Review:     Recent Labs     12/11/21  4022 12/10/21  1859   WBC 7.6 7.3   HGB 12.8 13.0   HCT 39.7 40.5    215     Recent Labs     12/11/21  0213 12/10/21  1859    141   K 4.0 3.9    106   CO2 26 27   * 100   BUN 11 13   CREA 0.95 1.14   CA 9.0 9.2   MG 2.0  --    PHOS 2.7  --    ALB 3.2* 3.6   TBILI 0.4 0.4   ALT 57 66   INR  --  1.2*     Lab Results   Component Value Date/Time    Glucose (POC) 95 12/11/2021 12:59 AM    Glucose (POC) 90 12/10/2021 06:49 PM    Glucose (POC) 110 11/09/2021 08:06 AM    Glucose (POC) 89 11/08/2021 10:29 PM    Glucose (POC) 95 11/08/2021 04:22 PM     No results for input(s): PH, PCO2, PO2, HCO3, FIO2 in the last 72 hours. Recent Labs     12/10/21  1859   INR 1.2*     All Micro Results     Procedure Component Value Units Date/Time    URINE CULTURE HOLD SAMPLE [[id number]] Collected: 12/10/21 1928    Order Status: Completed Specimen: Urine from Serum Updated: 12/10/21 2046     Urine culture hold       Urine on hold in Microbiology dept for 2 days. If unpreserved urine is submitted, it cannot be used for addtional testing after 24 hours, recollection will be required. Other pertinent lab: none    Total time spent with patient: 30 Minutes I personally reviewed chart, notes, data and current medications in the medical record. I have personally examined and treated the patient at bedside during this period.                  Care Plan discussed with: Patient, Care Manager, Nursing Staff, Consultant/Specialist and >50% of time spent in counseling and coordination of care    Discussed:  Care Plan and D/C Planning    Prophylaxis:  H2B/PPI    Disposition:  HH PT, OT, RN           ___________________________________________________    Attending Physician: Guille Damon MD

## 2021-12-12 NOTE — PROGRESS NOTES
Patient set off bed alarm and bedside nurse, PCT and charge nurse entered the room. Patient covered in urine and stool. Patient using inappropriate language yelling at nursing staff. Patient agitated and ripped out second IV this shift. Patient also ripped off tele leads multiple times this shift. Contacted Dr. Silke Ray about if patient needed tele/IVs since patient is getting more agitated and combative. Dr. Silke Ray stated to hold off on IV and tele and offer liquids. Will continue to monitor.

## 2021-12-12 NOTE — PROGRESS NOTES
Called speech on call r/t IP speech consult after failing STAND. Talked to [facility] and she stated she would try to come in ASAP but is covering the city so to keep patient NPO until she sees him. Will continue to monitor. 2180: Dr. Forrest Born on the unit and informed him that speech should be by today and that meds are held until speech evaluates.

## 2021-12-12 NOTE — PROGRESS NOTES
Problem: Dysphagia (Adult)  Goal: *Acute Goals and Plan of Care (Insert Text)  Description: Swallowing goals initiated 12-12-21:   1)  tolerate purees, nectars without s/s aspiration by 12-15-21  2) MBS to determine safest, but least restrictive diet by 12-15-21  Outcome: Not Progressing Towards Goal   SPEECH [address] EVALUATION  Patient: Rocío Mathew (22 y.o. male)  Date: 12/12/2021  Primary Diagnosis: Facial droop [R29.810]        Precautions: aspiration  Fall    ASSESSMENT :  Based on the objective data described below, the patient presents with mild-moderate oral-pharyngeal dysphagia. Oral issues:  difficulty with straw suck, mild R oral residue,  R facial droop, R lingual deviation  Pharyngeal phase: intermittent discoordination in swallow with s/s aspiration with thins. Admitted 12-10-21 with R facial droop and dysarthria. Mild-moderate dysarthria. He choked on dinner 12-11-21. MRI:  L thalamic infarct, chronic L frontal-parietal infarcts. Old R large SDH  PMH: Old R large hematoma with L weakness, sz (daughter reports that Giovanni Granger MD was weaning him off sz meds due to liver damage). CAD with stents,  HTN, XOL,  sz. Patient will benefit from skilled intervention to address the above impairments. Patients rehabilitation potential is considered to be Good     PLAN :  Recommendations and Planned Interventions:  Start purees, mildly thick liquids. Pills in puress. He will need cup sips and help with eating. MBS TOMORROW  Frequency/Duration: Patient will be followed by speech-language pathology 3 times a week to address goals. Discharge Recommendations: Inpatient Rehab     SUBJECTIVE:   Patient stated [place].-where he lives.      OBJECTIVE:     Past Medical History:   Diagnosis Date    CAD (coronary artery disease)     Hx of completed stroke     Hypercholesteremia     Hypertension     Seizure disorder ([facility] [address]     Subdural hematoma (HCC)      Past Surgical History: Procedure Laterality Date    HX CORONARY STENT PLACEMENT      [place] CARDIAC SURG PROCEDURE UNLIST      bypass     Prior Level of Function/Home Situation:   Home Situation  Home Environment: Private residence  Wheelchair Ramp: Yes  One/Two Story Residence: One story  Living Alone: No  Support Systems: Spouse/Significant Other, Other Family Member(s)  Patient Expects to be Discharged to[de-identified] Rehabilitation facility  Current DME Used/Available at Home: Walker, rolling  Tub or Shower Type: Tub/Shower combination  Diet prior to admission: regualr, thins  Current Diet:  NPO b/c of choking instance. Cognitive and Communication Status:  Neurologic State: Confused  Orientation Level: Oriented to person, Oriented to place  Cognition: Decreased attention/concentration, Decreased command following, Impaired decision making  Perception: Appears intact  Perseveration: No perseveration noted  Safety/Judgement: Decreased awareness of need for assistance, Decreased insight into deficits  Oral Assessment:  Oral Assessment  Labial: Right droop (moderate)  Dentition: Limited; Natural (70%)  Oral Hygiene: WFL  Lingual: Right deviation  Velum: No impairment  Mandible: No impairment  P.O. Trials:  Patient Position: upright in bed  Vocal quality prior to P.O.:  (mild-moderate dysarthria)  Consistency Presented: Puree; Thin liquid;  Nectar thick liquid; Pill/Tablet  How Presented: SLP-fed/presented; Spoon; Straw; Successive swallows   ORAL PHASE:   Bolus Acceptance: No impairment  Bolus Formation/Control: Impaired (difficutly using straw; weak suck, then sucking vs blowing)     Propulsion: No impairment  Oral Residue: Right; Less than 10% of bolus  PHARYNGEAL PHASE:   Initiation of Swallow: Delayed (# of seconds) (mild)  Laryngeal Elevation: Weak (mild)  Aspiration Signs/Symptoms:  (severe choking instance on thins)  Pharyngeal Phase Characteristics:  (generalized discoorindation)     After his SDH at [facility] 10-22-21 with NO speech or swallowing issues. SLP called daughter. She reports no h/o dysphagia. Dysarthria started day of admission. She reports that his speech has worsened since admission. SPEECH:   50% speech intelligibility at word level. He could repeat himself with improved intelligibility upon request.             NOMS:   The NOMS functional outcome measure was used to quantify this patient's level of swallowing impairment. Based on the NOMS, the patient was determined to be at level 4 for swallow function       NOMS Swallowing Levels:  Level 1 (CN): NPO  Level 2 (CM): NPO but takes consistency in therapy  Level 3 (CL): Takes less than 50% of nutrition p.o. and continues with nonoral feedings; and/or safe with mod cues; and/or max diet restriction  Level 4 (CK): Safe swallow but needs mod cues; and/or mod diet restriction; and/or still requires some nonoral feeding/supplements  Level 5 (CJ): Safe swallow with min diet restriction; and/or needs min cues  Level 6 (CI): Independent with p.o.; rare cues; usually self cues; may need to avoid some foods or needs extra time  Level 7 ([address] [place] [address]): Independent for all p.o.  KIANNA. (2003). National Outcomes Measurement System (NOMS): Adult Speech-Language Pathology User's Guide. Pain:  Pain Scale 1: Visual  Pain Intensity 1: 0       After treatment:   Patient left in no apparent distress in bed and Nursing notified    COMMUNICATION/EDUCATION:   Patient was educated regarding his deficit(s) of DYSPHAGIA, DYSARTHRIA as this relates to his diagnosis of CVA. He demonstrated Fair understanding as evidenced by discussion. .    The patient's plan of care including recommendations, planned interventions, and recommended diet changes were discussed with: Registered nurse. Patient/family have participated as able in goal setting and plan of care. Patient/family agree to work toward stated goals and plan of care.     Thank you for this referral.  CIELO Ward  Time Calculation: 25 mins

## 2021-12-12 NOTE — PROGRESS NOTES
Reviewed situation further  With recent subdural, I think the plavix dosing required for carotid stenting might not be safe  Will get duplex to better quantify degree of stenosis and then decide if CEA is in his best interest.

## 2021-12-13 ENCOUNTER — APPOINTMENT (OUTPATIENT)
Dept: GENERAL RADIOLOGY | Age: 86
DRG: 064 | End: 2021-12-13
Attending: INTERNAL MEDICINE
Payer: MEDICARE

## 2021-12-13 ENCOUNTER — APPOINTMENT (OUTPATIENT)
Dept: VASCULAR SURGERY | Age: 86
DRG: 064 | End: 2021-12-13
Payer: MEDICARE

## 2021-12-13 LAB
ALBUMIN SERPL-MCNC: 2.9 G/DL (ref 3.5–5)
ALBUMIN/GLOB SERPL: 1 {RATIO} (ref 1.1–2.2)
ALP SERPL-CCNC: 144 U/L (ref 45–117)
ALT SERPL-CCNC: 50 U/L (ref 12–78)
ANION GAP SERPL CALC-SCNC: 7 MMOL/L (ref 5–15)
AST SERPL-CCNC: 38 U/L (ref 15–37)
BILIRUB SERPL-MCNC: 0.5 MG/DL (ref 0.2–1)
BUN SERPL-MCNC: 13 MG/DL (ref 6–20)
BUN/CREAT SERPL: 14 (ref 12–20)
CALCIUM SERPL-MCNC: 8.4 MG/DL (ref 8.5–10.1)
CHLORIDE SERPL-SCNC: 111 MMOL/L (ref 97–108)
CO2 SERPL-SCNC: 22 MMOL/L (ref 21–32)
COVID-19 RAPID TEST, COVR: NOT DETECTED
CREAT SERPL-MCNC: 0.95 MG/DL (ref 0.7–1.3)
ERYTHROCYTE [DISTWIDTH] IN BLOOD BY AUTOMATED COUNT: 12.9 % (ref 11.5–14.5)
GLOBULIN SER CALC-MCNC: 2.9 G/DL (ref 2–4)
GLUCOSE SERPL-MCNC: 98 MG/DL (ref 65–100)
HCT VFR BLD AUTO: 37.2 % (ref 36.6–50.3)
HGB BLD-MCNC: 12.3 G/DL (ref 12.1–17)
LEFT CCA DIST DIAS: 10.2 CM/S
LEFT CCA DIST SYS: 64.6 CM/S
LEFT CCA PROX DIAS: 13.6 CM/S
LEFT CCA PROX SYS: 110.2 CM/S
LEFT ECA DIAS: 0 CM/S
LEFT ECA SYS: 129.9 CM/S
LEFT ICA DIST DIAS: 5.9 CM/S
LEFT ICA DIST SYS: 25.8 CM/S
LEFT ICA MID DIAS: 5.8 CM/S
LEFT ICA MID SYS: 68.8 CM/S
LEFT ICA PROX DIAS: 25.4 CM/S
LEFT ICA PROX SYS: 133.8 CM/S
LEFT ICA/CCA SYS: 2.07
LEFT VERTEBRAL DIAS: 13.08 CM/S
LEFT VERTEBRAL SYS: 49.3 CM/S
LEVETIRACETAM SERPL-MCNC: 63.9 UG/ML (ref 10–40)
MAGNESIUM SERPL-MCNC: 1.7 MG/DL (ref 1.6–2.4)
MCH RBC QN AUTO: 31.1 PG (ref 26–34)
MCHC RBC AUTO-ENTMCNC: 33.1 G/DL (ref 30–36.5)
MCV RBC AUTO: 93.9 FL (ref 80–99)
NRBC # BLD: 0 K/UL (ref 0–0.01)
NRBC BLD-RTO: 0 PER 100 WBC
PHOSPHATE SERPL-MCNC: 2.8 MG/DL (ref 2.6–4.7)
PLATELET # BLD AUTO: 187 K/UL (ref 150–400)
PMV BLD AUTO: 9.8 FL (ref 8.9–12.9)
POTASSIUM SERPL-SCNC: 3.6 MMOL/L (ref 3.5–5.1)
PROT SERPL-MCNC: 5.8 G/DL (ref 6.4–8.2)
RBC # BLD AUTO: 3.96 M/UL (ref 4.1–5.7)
RIGHT CCA DIST DIAS: 12.8 CM/S
RIGHT CCA DIST SYS: 85.4 CM/S
RIGHT CCA PROX DIAS: 9.5 CM/S
RIGHT CCA PROX SYS: 109.7 CM/S
RIGHT ECA DIAS: 0 CM/S
RIGHT ECA SYS: 152.9 CM/S
RIGHT ICA DIST DIAS: 7.2 CM/S
RIGHT ICA DIST SYS: 37.8 CM/S
RIGHT ICA MID DIAS: 13.1 CM/S
RIGHT ICA MID SYS: 70.2 CM/S
RIGHT ICA PROX DIAS: 9.8 CM/S
RIGHT ICA PROX SYS: 68 CM/S
RIGHT ICA/CCA SYS: 0.8
RIGHT VERTEBRAL DIAS: 0 CM/S
RIGHT VERTEBRAL SYS: 21.4 CM/S
SODIUM SERPL-SCNC: 140 MMOL/L (ref 136–145)
SOURCE, COVRS: NORMAL
VAS LEFT SUBCLAVIAN PROX EDV: 11.6 CM/S
VAS LEFT SUBCLAVIAN PROX PSV: 137.8 CM/S
VAS RIGHT SUBCLAVIAN PROX EDV: 0 CM/S
VAS RIGHT SUBCLAVIAN PROX PSV: 49.6 CM/S
WBC # BLD AUTO: 7 K/UL (ref 4.1–11.1)

## 2021-12-13 PROCEDURE — 80053 COMPREHEN METABOLIC PANEL: CPT

## 2021-12-13 PROCEDURE — 74011250637 HC RX REV CODE- 250/637: Performed by: INTERNAL MEDICINE

## 2021-12-13 PROCEDURE — 65270000029 HC RM PRIVATE

## 2021-12-13 PROCEDURE — 97110 THERAPEUTIC EXERCISES: CPT

## 2021-12-13 PROCEDURE — 97116 GAIT TRAINING THERAPY: CPT

## 2021-12-13 PROCEDURE — 74011250637 HC RX REV CODE- 250/637: Performed by: PSYCHIATRY & NEUROLOGY

## 2021-12-13 PROCEDURE — 92611 MOTION FLUOROSCOPY/SWALLOW: CPT

## 2021-12-13 PROCEDURE — 84100 ASSAY OF PHOSPHORUS: CPT

## 2021-12-13 PROCEDURE — 87635 SARS-COV-2 COVID-19 AMP PRB: CPT

## 2021-12-13 PROCEDURE — 99231 SBSQ HOSP IP/OBS SF/LOW 25: CPT | Performed by: PSYCHIATRY & NEUROLOGY

## 2021-12-13 PROCEDURE — 97535 SELF CARE MNGMENT TRAINING: CPT

## 2021-12-13 PROCEDURE — 85027 COMPLETE CBC AUTOMATED: CPT

## 2021-12-13 PROCEDURE — 97530 THERAPEUTIC ACTIVITIES: CPT

## 2021-12-13 PROCEDURE — 93880 EXTRACRANIAL BILAT STUDY: CPT

## 2021-12-13 PROCEDURE — 74230 X-RAY XM SWLNG FUNCJ C+: CPT

## 2021-12-13 PROCEDURE — 83735 ASSAY OF MAGNESIUM: CPT

## 2021-12-13 PROCEDURE — 36415 COLL VENOUS BLD VENIPUNCTURE: CPT

## 2021-12-13 RX ORDER — GUAIFENESIN 100 MG/5ML
81 LIQUID (ML) ORAL DAILY
Qty: 30 TABLET | Refills: 0 | Status: SHIPPED | OUTPATIENT
Start: 2021-12-14 | End: 2022-01-18

## 2021-12-13 RX ORDER — METOPROLOL TARTRATE 25 MG/1
12.5 TABLET, FILM COATED ORAL DAILY
Qty: 15 TABLET | Refills: 0 | Status: SHIPPED | OUTPATIENT
Start: 2021-12-14 | End: 2021-12-14 | Stop reason: SDUPTHER

## 2021-12-13 RX ORDER — ATORVASTATIN CALCIUM 40 MG/1
40 TABLET, FILM COATED ORAL DAILY
Qty: 30 TABLET | Refills: 0 | Status: ON HOLD | OUTPATIENT
Start: 2021-12-13 | End: 2022-01-18

## 2021-12-13 RX ADMIN — LACOSAMIDE 200 MG: 100 TABLET, FILM COATED ORAL at 09:03

## 2021-12-13 RX ADMIN — LEVETIRACETAM 1500 MG: 500 TABLET, FILM COATED ORAL at 17:15

## 2021-12-13 RX ADMIN — ISOSORBIDE MONONITRATE 120 MG: 60 TABLET, EXTENDED RELEASE ORAL at 06:17

## 2021-12-13 RX ADMIN — ASPIRIN 81 MG: 81 TABLET, CHEWABLE ORAL at 09:03

## 2021-12-13 RX ADMIN — LACOSAMIDE 200 MG: 100 TABLET, FILM COATED ORAL at 17:15

## 2021-12-13 RX ADMIN — LEVETIRACETAM 1500 MG: 500 TABLET, FILM COATED ORAL at 09:03

## 2021-12-13 RX ADMIN — METOPROLOL TARTRATE 12.5 MG: 25 TABLET, FILM COATED ORAL at 09:03

## 2021-12-13 RX ADMIN — PHENYTOIN SODIUM 100 MG: 100 CAPSULE ORAL at 20:28

## 2021-12-13 RX ADMIN — ATORVASTATIN CALCIUM 40 MG: 20 TABLET, FILM COATED ORAL at 09:03

## 2021-12-13 NOTE — PROGRESS NOTES
[facility]    Medical Progress Note      NAME: Edi Chavez   :  1935  MRM:  [id number]    Date/Time of service 2021  9:23 AM         Assessment and Plan:     Acute lacunar infarct in the left thalamus / Dysarthria / R Facial droop / R sided weakness - Symptoms resolving only partly. Not a TPA candidate. Unremarkable tele. Neuro checks. Neurology consulted. Fall precautions and PT/OT/speech evaluation. Tolerating soft diet for now. MRI brain showed \"Acute lacunar infarct in the left thalamus. \" Recent negative ECHO. Continue statin.     L carotid stenosis - Noted on CTA. Consulted Vascular surgery, still awaiting final decision     Hx Simple partial seizures / Seizure disorder - Continue vimpat, dilantin, keppra     CAD (coronary artery disease) / Hypertension - continue statin, metoprolol and imdur, but he is bradycardic despite lowest metoprolol dose. Hypercholesteremia - Atorvastatin     Chronic R sided subdural hematoma / Hx of completed stroke - CT showed improvement. Monitor        Subjective:     Chief Complaint:  Dysarthia symptoms persists    ROS:  (bold if positive, if negative)    Tolerating some PT  Tolerating Diet        Objective:     Last 24hrs VS reviewed since prior progress note.  Most recent are:    Visit Vitals  /73 (BP 1 Location: Right upper arm, BP Patient Position: At rest)   Pulse (!) 55   Temp 97.6 °F (36.4 °C)   Resp 16   Ht 5' 11\" (1.803 m)   Wt 82.5 kg (181 lb 14.4 oz)   SpO2 93%   BMI 25.37 kg/m²     SpO2 Readings from Last 6 Encounters:   21 93%   21 99%   10/21/21 96%   10/11/21 97%   21 96%   21 95%            Intake/Output Summary (Last 24 hours) at 2021 1201  Last data filed at 2021 0225  Gross per 24 hour   Intake 0 ml   Output 200 ml   Net -200 ml        Physical Exam:    Gen:  Well-developed, well-nourished, in no acute distress  HEENT:  Pink conjunctivae, PERRL, hearing intact to voice, moist mucous membranes  Neck:  Supple, without masses, thyroid non-tender  Resp:  No accessory muscle use, clear breath sounds without wheezes rales or rhonchi  Card:  No murmurs, bradycardic S1, S2 without thrills, bruits or peripheral edema  Abd:  Soft, non-tender, non-distended, normoactive bowel sounds are present, no mass  Lymph:  No cervical or inguinal adenopathy  Musc:  No cyanosis or clubbing  Skin:  No rashes or ulcers, skin turgor is good  Neuro:  Cranial nerves are grossly intact, R side motor weakness, follows commands   Psych:  Good insight, oriented to person, place and time, alert    Telemetry reviewed:   normal sinus rhythm  __________________________________________________________________  Medications Reviewed: (see below)  Medications:     Current Facility-Administered Medications   Medication Dose Route Frequency    atorvastatin (LIPITOR) tablet 40 mg  40 mg Oral DAILY    isosorbide mononitrate ER (IMDUR) tablet 120 mg  120 mg Oral 7am    lacosamide (VIMPAT) tablet 200 mg  200 mg Oral BID    levETIRAcetam (KEPPRA) tablet 1,500 mg  1,500 mg Oral BID    metoprolol tartrate (LOPRESSOR) tablet 12.5 mg  12.5 mg Oral DAILY    aspirin chewable tablet 81 mg  81 mg Oral DAILY    phenytoin ER (DILANTIN ER) ER capsule 100 mg  100 mg Oral QHS    0.9% sodium chloride infusion  75 mL/hr IntraVENous CONTINUOUS    sodium chloride (NS) flush 5-40 mL  5-40 mL IntraVENous Q8H    sodium chloride (NS) flush 5-40 mL  5-40 mL IntraVENous PRN    0.9% sodium chloride infusion 25 mL  25 mL IntraVENous PRN    acetaminophen (TYLENOL) tablet 650 mg  650 mg Oral Q6H PRN    Or    acetaminophen (TYLENOL) suppository 650 mg  650 mg Rectal Q6H PRN    bisacodyL (DULCOLAX) suppository 10 mg  10 mg Rectal DAILY PRN    promethazine (PHENERGAN) tablet 12.5 mg  12.5 mg Oral Q6H PRN    Or    ondansetron (ZOFRAN) injection 4 mg  4 mg IntraVENous Q6H PRN        Lab Data Reviewed: (see below)  Lab Review:     Recent Labs 12/13/21  0124 12/11/21  0213 12/10/21  1859   WBC 7.0 7.6 7.3   HGB 12.3 12.8 13.0   HCT 37.2 39.7 40.5    220 215     Recent Labs     12/13/21  0124 12/11/21  0213 12/10/21  1859    138 141   K 3.6 4.0 3.9   * 108 106   CO2 22 26 27   GLU 98 108* 100   BUN 13 11 13   CREA 0.95 0.95 1.14   CA 8.4* 9.0 9.2   MG 1.7 2.0  --    PHOS 2.8 2.7  --    ALB 2.9* 3.2* 3.6   TBILI 0.5 0.4 0.4   ALT 50 57 66   INR  --   --  1.2*     Lab Results   Component Value Date/Time    Glucose (POC) 95 12/11/2021 12:59 AM    Glucose (POC) 90 12/10/2021 06:49 PM    Glucose (POC) 110 11/09/2021 08:06 AM    Glucose (POC) 89 11/08/2021 10:29 PM    Glucose (POC) 95 11/08/2021 04:22 PM     No results for input(s): PH, PCO2, PO2, HCO3, FIO2 in the last 72 hours. Recent Labs     12/10/21  1859   INR 1.2*     All Micro Results     Procedure Component Value Units Date/Time    URINE CULTURE HOLD SAMPLE [[id number]] Collected: 12/10/21 1928    Order Status: Completed Specimen: Urine from Serum Updated: 12/10/21 2046     Urine culture hold       Urine on hold in Microbiology dept for 2 days. If unpreserved urine is submitted, it cannot be used for addtional testing after 24 hours, recollection will be required. Other pertinent lab: none    Total time spent with patient: 30 Minutes I personally reviewed chart, notes, data and current medications in the medical record. I have personally examined and treated the patient at bedside during this period.                  Care Plan discussed with: Patient, Care Manager, Nursing Staff, Consultant/Specialist and >50% of time spent in counseling and coordination of care    Discussed:  Care Plan and D/C Planning    Prophylaxis:  H2B/PPI    Disposition:   PT, OT, RN           ___________________________________________________    Attending Physician: Mao Dubois MD

## 2021-12-13 NOTE — PROGRESS NOTES
Reviewed duplex  Left carotid stenosis in the high 10-49% range  I think the risks of surgery exceed the potential benefits  Recommend continued medical management  Repeat duplex in my office in 6 mo

## 2021-12-13 NOTE — PROGRESS NOTES
Problem: Self Care Deficits Care Plan (Adult)  Goal: *Acute Goals and Plan of Care (Insert Text)  Description: FUNCTIONAL STATUS PRIOR TO ADMISSION: Patient was modified independent using a rolling walker for functional mobility. Reports difficulty with shower transfers. HOME SUPPORT: The patient lived with his wife and son (son works during the day). Occupational Therapy Goals  Initiated 12/11/2021   1. Patient will perform grooming with supervision/set-up in unsupported sit within 7 day(s). 2.  Patient will perform lower body dressing with minimal assistance within 7 day(s). 3.  Patient will perform toilet transfers with CGA within 7 day(s). 4.  Patient will perform all aspects of toileting with contact guard assist within 7 day(s). 5.  Patient will participate in upper extremity therapeutic exercise/activities with supervision/set-up within 5 day(s). 6.  Patient will utilize energy conservation techniques during functional activities with verbal cues within 7 day(s). 4.  Patient will improve their Fugl Goff score by 4 points in prep for ADLs within 7 days. (61/65 RUE)       Outcome: Progressing Towards Goal   OCCUPATIONAL THERAPY TREATMENT  Patient: Caitlyn Bledsoe (88 y.o. male)  Date: 12/13/2021  Diagnosis: Facial droop [R29.810]   Facial droop       Precautions: Fall  Chart, occupational therapy assessment, plan of care, and goals were reviewed. ASSESSMENT  Patient continues with skilled OT services and is progressing towards goals. Pt performs ADL related transfers with min assist. Pt was set-up for shaving seated in chair but than wanted to ambulate to mirror for task. During standing pt had incontinent episode of BM, ambulated to restroom and assisted for hygiene and clean gown and socks. Pt also engaged with UE there ex, needs cues to slow down reps. Pt limited by decreased balance and safety in standing for ADl tasks. He would benefit from inpatient rehab.     Current Level of Function Impacting Discharge (ADLs): min assist to transfer to commode, assist for thorough hygiene and total assist to doff/don socks, moderate assist shaving    Other factors to consider for discharge:          PLAN :  Patient continues to benefit from skilled intervention to address the above impairments. Continue treatment per established plan of care to address goals. Recommend with staff: ADls there ex, there act, meals in chair 3 x a day    Recommend next OT session: cont towards goals    Recommendation for discharge: (in order for the patient to meet his/her long term goals)  Therapy 3 hours per day 5-7 days per week    This discharge recommendation:  Has not yet been discussed the attending provider and/or case management    IF patient discharges home will need the following DME:        SUBJECTIVE:   Patient stated .    OBJECTIVE DATA SUMMARY:   Cognitive/Behavioral Status:  Neurologic State: Confused  Orientation Level: Oriented to person; Disoriented to time; Disoriented to situation; Disoriented to place  Cognition: Decreased attention/concentration; Decreased command following             Functional Mobility and Transfers for ADLs:  Bed Mobility:  Rolling: Minimum assistance  Supine to Sit: Minimum assistance  Sit to Supine: Minimum assistance  Scooting: Contact guard assistance    Transfers:  Sit to Stand: Minimum assistance  Functional Transfers  Toilet Transfer : Minimum assistance  Cues: Physical assistance  Bed to Chair: Minimum assistance    Balance:  Sitting: Intact  Standing: Intact; With support  Standing - Static: Constant support  Standing - Dynamic : Constant support    ADL Intervention:       Grooming  Grooming Assistance: Moderate assistance  Position Performed: Seated in chair; Standing  Shaving: Moderate assistance  Cues: Physical assistance              Upper Body Dressing Assistance  Dressing Assistance: Moderate assistance  Hospital Gown:  Moderate assistance    Lower Body Dressing Assistance  Socks: Total assistance (dependent)  Position Performed: Seated in chair  Cues: [employer]; Physical assistance    Toileting  Toileting Assistance: Moderate assistance  Bowel Hygiene: Maximum assistance         Therapeutic Exercises:     EXERCISE   Sets   Reps   Active Active Assist   Passive   Comments   Finger flex/ext 1 10 [x]           []           []              Chest presses 1 5 [x]           []           []              Shoulder flex/ext 1 5 [x]           []           []              Shoulder horizontal ab/add 1 5 [x]           []           []                 []           []           []                 []           []           []                 []           []           []                 []           []           []                 []           []           []                 []           []           []                 []           []           []                   Activity Tolerance:   Fair    After treatment patient left in no apparent distress:   Sitting in chair    COMMUNICATION/COLLABORATION:   The patients plan of care was discussed with: Physical therapist, Occupational therapist, and Registered nurse.      BURAK Long  Time Calculation: 49 mins

## 2021-12-13 NOTE — PROGRESS NOTES
SPEECH PATHOLOGY MODIFIED BARIUM SWALLOW STUDY  Patient: Julio César Hall (13 y.o. male)  Date: 12/13/2021  Primary Diagnosis: Facial droop [R29.810]       Precautions: aspiration  Fall    ASSESSMENT :  Based on the objective data described below, the patient presents with mild-moderate oral-pharyngeal dysphagia. Oral issues: R oral leakage-moderate-; oral holding of pills; R oral residue  Pharyngeal issues; mild-moderate delay in swallow; this resulted in small amounts of intermittent penetration with nectars. Almost aspirated with meds and nectars. Will need Meds in purees. He is currently  On appropriate diet. .    Patient will benefit from skilled intervention to address the above impairments. Patients rehabilitation potential is considered to be Fair     PLAN :  Recommendations and Planned Interventions:  Continue purees, nectars (mildly thick). Pills in purees. He may be able to advance to minced and moist diet quickly on rehab when in supervised dining situation. Frequency/Duration: Patient will be followed by speech-language pathology 5 times a week to address goals. Discharge Recommendations: Inpatient Rehab     SUBJECTIVE:   Patient had a severe coughing instance on saliva prior to MBS starting.  .    OBJECTIVE:     Past Medical History:   Diagnosis Date    CAD (coronary artery disease)     Hx of completed stroke     Hypercholesteremia     Hypertension     Seizure disorder (HCC)     Subdural hematoma (HCC)      Past Surgical History:   Procedure Laterality Date    HX CORONARY STENT PLACEMENT      [place] CARDIAC SURG PROCEDURE UNLIST      bypass     Prior Level of Function/Home Situation:   Home Situation  Home Environment: Private residence  Wheelchair Ramp: Yes  One/Two Story Residence: One story  Living Alone: No  Support Systems: Spouse/Significant Other, Child(kim)  Patient Expects to be Discharged to[de-identified] Rehabilitation facility  Current DME Used/Available at Home: Walker, rolling  Tub or Shower Type: Tub/Shower combination  Diet prior to admission: regular, thins  Current Diet:  Purees, nectars. Radiologist: Janelle Stein Views: Lateral  Patient Position: upright in [place] chair    Trial 1: Trial 2:   Consistency Presented: Solid; Puree; Pill/Tablet; Nectar thick liquid     How Presented: Self-fed/presented; SLP-fed/presented; Spoon; Cup/sip      ORAL PHASE:      Bolus Acceptance: Impaired (unable to use straw. moderate R oral leakage (delayed) with nectars)     Bolus Formation/Control: Impaired (delayed a-p transit.  pill stuck in front of mouth with NTL. had to use pudding to clear): Anterior; Posterior  :     Propulsion: Delayed (# of seconds); Tongue pumping     Oral Residue: MILD-MODERATE R oral residue  PHARYNGEAL PHASE:      Initiation of Swallow:  (triggered as bolus fell over  airway between valleculae and pyriforms)     Timing: Pooling 1-5 sec     Penetration:  (intermittent trace penetration before the swallow wiht nectars. .It was worse (deep penetration intermediate to cords) when trying to swallow pill with nectarsa)     Aspiration/Timing:  (will possibly have trace silent aspiration, as penetration was silent.)     Pharyngeal Clearance: No residue                               Trial 3: Trial 4:                            :    :                                                                        Decreased Tongue Base Retraction?: No  Laryngeal Elevation: WFL (within functional limits)  Aspiration/Penetration Score: 2 (Penetration/No residue-Contrast enters the airway penetrates, remains above the folds/cords, and is cleared)  Pharyngeal Symmetry: Not assessed                NOMS:   The NOMS functional outcome measure was used to quantify this patient's level of swallowing impairment.   Based on the NOMS, the patient was determined to be at level 4 for swallow function         NOMS Swallowing Levels:  Level 1 (CN): NPO  Level 2 (CM): NPO but takes consistency in therapy  Level 3 (CL): Takes less than 50% of nutrition p.o. and continues with nonoral feedings; and/or safe with mod cues; and/or max diet restriction  Level 4 (CK): Safe swallow but needs mod cues; and/or mod diet restriction; and/or still requires some nonoral feeding/supplements  Level 5 (CJ): Safe swallow with min diet restriction; and/or needs min cues  Level 6 (CI): Independent with p.o.; rare cues; usually self cues; may need to avoid some foods or needs extra time  Level 7 ([address] [place] [address]): Independent for all p.o.  KIANNA. (2003). National Outcomes Measurement System (NOMS): Adult Speech-Language Pathology User's Guide. COMMUNICATION/EDUCATION:   Patient was educated regarding His deficit(s) of DYSPHAGIA  as this relates to His diagnosis of cva. He demonstrated Guarded understanding as evidenced by poor insight into deficits. .    The patients plan of care including findings from MBS, recommendations, planned interventions, and recommended diet changes were discussed with: none. Patient is unable to participate in goal setting and plan of care.     Thank you for this referral.  Vanesa Platt, SLP  Time Calculation: 25 mins

## 2021-12-13 NOTE — ROUTINE PROCESS
Bedside and Verbal shift change report given to Gabriela Martel (oncoming nurse) by Evelyn Castillo (offgoing nurse). Report included the following information SBAR, Kardex, ED Summary, Procedure Summary, Intake/Output, MAR, Recent Results and Cardiac Rhythm NSR.

## 2021-12-13 NOTE — PROGRESS NOTES
Problem: Mobility Impaired (Adult and Pediatric)  Goal: *Acute Goals and Plan of Care (Insert Text)  Description:   FUNCTIONAL STATUS PRIOR TO ADMISSION: Patient was modified independent using a rolling walker for functional mobility. HOME SUPPORT PRIOR TO ADMISSION: The patient lived with wife and son. Son works during the day. Patient admitted to difficulty getting in/out of shower. Physical Therapy Goals  Initiated 12/11/2021  1. Patient will move from supine to sit and sit to supine  in bed with supervision/set-up within 7 day(s). 2.  Patient will transfer from bed to chair and chair to bed with supervision/set-up using the least restrictive device within 7 day(s). 3.  Patient will perform sit to stand with supervision/set-up within 7 day(s). 4.  Patient will ambulate with supervision/set-up for 100 feet with the least restrictive device within 7 day(s). 5.  Patient will ascend/descend 4 stairs with 1 handrail(s) with minimal assistance/contact guard assist within 7 day(s). 6.  Patient will improve Avalos Balance score by 7 points within 7 days. Outcome: Progressing Towards Goal   PHYSICAL THERAPY TREATMENT  Patient: Mairbel Pastrana (6 y.o. male)  Date: 12/13/2021  Diagnosis: Facial droop [R29.810]   Facial droop       Precautions: Fall  Chart, physical therapy assessment, plan of care and goals were reviewed. ASSESSMENT  Patient continues with skilled PT services and is progressing towards goals. Communicated with nurse cleared for therapy. Patient supine on bed when received. Rolled on the edge of bed min assist, supine to sit min assist, sit to stand min assist. Ambulate in the room and around the bed min assist. OOB to chair as tolerated performed some active range of motion exercise on both LE all planes. Educate and instructed patient to continue active range of motion exercise on both legs while up on chair or on bed multiple times.  Recommend patient to be up on the chair at least 3 times a day every meal times as tolerated. Activated chair alarm and notified nurse who agreed to monitor patient. Current Level of Function Impacting Discharge (mobility/balance): min assist with transfers and ambulation using a rolling walker. Other factors to consider for discharge: fall         PLAN :  Patient continues to benefit from skilled intervention to address the above impairments. Continue treatment per established plan of care. to address goals. Recommendation for discharge: (in order for the patient to meet his/her long term goals)  Therapy 3 hours per day 5-7 days per week    This discharge recommendation:  Has been made in collaboration with the attending provider and/or case management    IF patient discharges home will need the following DME: patient owns DME required for discharge       SUBJECTIVE:   Patient stated Arjun Barros.     OBJECTIVE DATA SUMMARY:   Critical Behavior:  Neurologic State: Confused  Orientation Level: Oriented to person, Disoriented to time, Disoriented to situation, Disoriented to place  Cognition: Decreased attention/concentration, Decreased command following  Safety/Judgement: Decreased awareness of need for assistance, Decreased insight into deficits  Functional Mobility Training:  Bed Mobility:  Rolling: Minimum assistance  Supine to Sit: Minimum assistance  Sit to Supine: Minimum assistance  Scooting: Contact guard assistance        Transfers:  Sit to Stand: Minimum assistance  Stand to Sit: Minimum assistance  Stand Pivot Transfers: Minimum assistance     Bed to Chair: Minimum assistance                    Balance:  Sitting: Intact  Standing: Intact;  With support  Standing - Static: Constant support  Standing - Dynamic : Constant support  Ambulation/Gait Training:  Distance (ft): 25 Feet (ft)  Assistive Device: Walker, rolling; Gait belt  Ambulation - Level of Assistance: Minimal assistance     Gait Description (WDL): Exceptions to WDL  Gait Abnormalities: Decreased step clearance        Base of Support: Narrowed     Speed/Denisha: Pace decreased (<100 feet/min)  Step Length: Right shortened; Left shortened                Therapeutic Exercises:   Educate and instructed patient to continue active range of motion exercise on both legs while up on chair or on bed multiple times. Recommend patient to be up on the chair at least 3 times a day every meal times as tolerated. Pain Ratin/10    Activity Tolerance:   Good    After treatment patient left in no apparent distress:   Sitting in chair, Heels elevated for pressure relief, Call bell within reach, and Bed / chair alarm activated    COMMUNICATION/COLLABORATION:   The patients plan of care was discussed with: Occupational therapist and Registered nurse. Que Ching PT,WCC.    Time Calculation: 24 mins

## 2021-12-13 NOTE — PROGRESS NOTES
Problem: Pressure Injury - Risk of  Goal: *Prevention of pressure injury  Description: Document Eliecer Scale and appropriate interventions in the flowsheet. Outcome: Progressing Towards Goal  Note: Pressure Injury Interventions:  Sensory Interventions: Assess changes in LOC    Moisture Interventions: Minimize layers    Activity Interventions: Increase time out of bed    Mobility Interventions: HOB 30 degrees or less    Nutrition Interventions: Document food/fluid/supplement intake    Friction and Shear Interventions: Minimize layers                Problem: Patient Education: Go to Patient Education Activity  Goal: Patient/Family Education  Outcome: Progressing Towards Goal     Problem: Falls - Risk of  Goal: *Absence of Falls  Description: Document Tasha Fall Risk and appropriate interventions in the flowsheet.   Outcome: Progressing Towards Goal  Note: Fall Risk Interventions:            Medication Interventions: Bed/chair exit alarm    Elimination Interventions: Bed/chair exit alarm, Call light in reach              Problem: Patient Education: Go to Patient Education Activity  Goal: Patient/Family Education  Outcome: Progressing Towards Goal     Problem: Patient Education: Go to Patient Education Activity  Goal: Patient/Family Education  Outcome: Progressing Towards Goal     Problem: Patient Education: Go to Patient Education Activity  Goal: Patient/Family Education  Outcome: Progressing Towards Goal     Problem: Patient Education: Go to Patient Education Activity  Goal: Patient/Family Education  Outcome: Progressing Towards Goal     Problem: TIA/CVA Stroke: 0-24 hours  Goal: Off Pathway (Use only if patient is Off Pathway)  Outcome: Progressing Towards Goal  Goal: Activity/Safety  Outcome: Progressing Towards Goal  Goal: Consults, if ordered  Outcome: Progressing Towards Goal  Goal: Diagnostic Test/Procedures  Outcome: Progressing Towards Goal  Goal: Nutrition/Diet  Outcome: Progressing Towards Goal  Goal: Discharge Planning  Outcome: Progressing Towards Goal  Goal: Medications  Outcome: Progressing Towards Goal  Goal: Respiratory  Outcome: Progressing Towards Goal  Goal: Treatments/Interventions/Procedures  Outcome: Progressing Towards Goal  Goal: Minimize risk of bleeding post-thrombolytic infusion  Outcome: Progressing Towards Goal  Goal: Monitor for complications post-thrombolytic infusion  Outcome: Progressing Towards Goal  Goal: Psychosocial  Outcome: Progressing Towards Goal  Goal: *Hemodynamically stable  Outcome: Progressing Towards Goal  Goal: *Neurologically stable  Description: Absence of additional neurological deficits    Outcome: Progressing Towards Goal  Goal: *Verbalizes anxiety and depression are reduced or absent  Outcome: Progressing Towards Goal  Goal: *Absence of Signs of Aspiration on Current Diet  Outcome: Progressing Towards Goal  Goal: *Absence of deep venous thrombosis signs and symptoms(Stroke Metric)  Outcome: Progressing Towards Goal  Goal: *Ability to perform ADLs and demonstrates progressive mobility and function  Outcome: Progressing Towards Goal  Goal: *Stroke education started(Stroke Metric)  Outcome: Progressing Towards Goal  Goal: *Dysphagia screen performed(Stroke Metric)  Outcome: Progressing Towards Goal  Goal: *Rehab consulted(Stroke Metric)  Outcome: Progressing Towards Goal     Problem: TIA/CVA Stroke: Day 2 Until Discharge  Goal: Off Pathway (Use only if patient is Off Pathway)  Outcome: Progressing Towards Goal  Goal: Activity/Safety  Outcome: Progressing Towards Goal  Goal: Diagnostic Test/Procedures  Outcome: Progressing Towards Goal  Goal: Nutrition/Diet  Outcome: Progressing Towards Goal  Goal: Discharge Planning  Outcome: Progressing Towards Goal  Goal: Medications  Outcome: Progressing Towards Goal  Goal: Respiratory  Outcome: Progressing Towards Goal  Goal: Treatments/Interventions/Procedures  Outcome: Progressing Towards Goal  Goal: Psychosocial  Outcome: Progressing Towards Goal  Goal: *Verbalizes anxiety and depression are reduced or absent  Outcome: Progressing Towards Goal  Goal: *Absence of aspiration  Outcome: Progressing Towards Goal  Goal: *Absence of deep venous thrombosis signs and symptoms(Stroke Metric)  Outcome: Progressing Towards Goal  Goal: *Optimal pain control at patient's stated goal  Outcome: Progressing Towards Goal  Goal: *Tolerating diet  Outcome: Progressing Towards Goal  Goal: *Ability to perform ADLs and demonstrates progressive mobility and function  Outcome: Progressing Towards Goal  Goal: *Stroke education continued(Stroke Metric)  Outcome: Progressing Towards Goal     Problem: Ischemic Stroke: Discharge Outcomes  Goal: *Verbalizes anxiety and depression are reduced or absent  Outcome: Progressing Towards Goal  Goal: *Verbalize understanding of risk factor modification(Stroke Metric)  Outcome: Progressing Towards Goal  Goal: *Hemodynamically stable  Outcome: Progressing Towards Goal  Goal: *Absence of aspiration pneumonia  Outcome: Progressing Towards Goal  Goal: *Aware of needed dietary changes  Outcome: Progressing Towards Goal  Goal: *Verbalize understanding of prescribed medications including anti-coagulants, anti-lipid, and/or anti-platelets(Stroke Metric)  Outcome: Progressing Towards Goal  Goal: *Tolerating diet  Outcome: Progressing Towards Goal  Goal: *Aware of follow-up diagnostics related to anticoagulants  Outcome: Progressing Towards Goal  Goal: *Ability to perform ADLs and demonstrates progressive mobility and function  Outcome: Progressing Towards Goal  Goal: *Absence of DVT(Stroke Metric)  Outcome: Progressing Towards Goal  Goal: *Absence of aspiration  Outcome: Progressing Towards Goal  Goal: *Optimal pain control at patient's stated goal  Outcome: Progressing Towards Goal  Goal: *Home safety concerns addressed  Outcome: Progressing Towards Goal  Goal: *Describes available resources and support systems  Outcome: Progressing Towards Goal  Goal: *Verbalizes understanding of activation of EMS(911) for stroke symptoms(Stroke Metric)  Outcome: Progressing Towards Goal  Goal: *Understands and describes signs and symptoms to report to providers(Stroke Metric)  Outcome: Progressing Towards Goal  Goal: *Neurolgocially stable (absence of additional neurological deficits)  Outcome: Progressing Towards Goal  Goal: *Verbalizes importance of follow-up with primary care physician(Stroke Metric)  Outcome: Progressing Towards Goal  Goal: *Smoking cessation discussed,if applicable(Stroke Metric)  Outcome: Progressing Towards Goal  Goal: *Depression screening completed(Stroke Metric)  Outcome: Progressing Towards Goal     Problem: Patient Education: Go to Patient Education Activity  Goal: Patient/Family Education  Outcome: Progressing Towards Goal

## 2021-12-13 NOTE — PROGRESS NOTES
Neurology Progress Note    Patient ID:  J Luis Donohue  [id number]  80 y.o.  1935    CC: slurred speech and facial droop    Subjective:      Patient continues to have right facial droop and a little less dysarthria. Unremarkable CBC, low calcium 8.4 and decrease albumin. Current Facility-Administered Medications   Medication Dose Route Frequency    atorvastatin (LIPITOR) tablet 40 mg  40 mg Oral DAILY    isosorbide mononitrate ER (IMDUR) tablet 120 mg  120 mg Oral 7am    lacosamide (VIMPAT) tablet 200 mg  200 mg Oral BID    levETIRAcetam (KEPPRA) tablet 1,500 mg  1,500 mg Oral BID    metoprolol tartrate (LOPRESSOR) tablet 12.5 mg  12.5 mg Oral DAILY    aspirin chewable tablet 81 mg  81 mg Oral DAILY    phenytoin ER (DILANTIN ER) ER capsule 100 mg  100 mg Oral QHS    0.9% sodium chloride infusion  75 mL/hr IntraVENous CONTINUOUS    sodium chloride (NS) flush 5-40 mL  5-40 mL IntraVENous Q8H    sodium chloride (NS) flush 5-40 mL  5-40 mL IntraVENous PRN    0.9% sodium chloride infusion 25 mL  25 mL IntraVENous PRN    acetaminophen (TYLENOL) tablet 650 mg  650 mg Oral Q6H PRN    Or    acetaminophen (TYLENOL) suppository 650 mg  650 mg Rectal Q6H PRN    bisacodyL (DULCOLAX) suppository 10 mg  10 mg Rectal DAILY PRN    promethazine (PHENERGAN) tablet 12.5 mg  12.5 mg Oral Q6H PRN    Or    ondansetron (ZOFRAN) injection 4 mg  4 mg IntraVENous Q6H PRN        Review of Systems:    Pertinent items are noted in HPI. Objective:     Patient Vitals for the past 8 hrs:   BP Temp Pulse Resp SpO2   12/13/21 1104 106/60 97.5 °F (36.4 °C) (!) 55 16 93 %   12/13/21 0722 126/73 97.6 °F (36.4 °C) (!) 55 16 93 %       No intake/output data recorded.   12/11 1901 - 12/13 0700  In: 0   Out: 200 [Urine:200]    Lab Review   Recent Results (from the past 24 hour(s))   CBC W/O DIFF    Collection Time: 12/13/21  1:24 AM   Result Value Ref Range    WBC 7.0 4.1 - 11.1 K/uL    RBC 3.96 (L) 4.10 - 5.70 M/uL    HGB 12.3 12.1 - 17.0 g/dL    HCT 37.2 36.6 - 50.3 %    MCV 93.9 80.0 - 99.0 FL    MCH 31.1 26.0 - 34.0 PG    MCHC 33.1 30.0 - 36.5 g/dL    RDW 12.9 11.5 - 14.5 %    PLATELET 503 679 - 025 K/uL    MPV 9.8 8.9 - 12.9 FL    NRBC 0.0 0  WBC    ABSOLUTE NRBC 0.00 0.00 - 0.01 K/uL   MAGNESIUM    Collection Time: 12/13/21  1:24 AM   Result Value Ref Range    Magnesium 1.7 1.6 - 2.4 mg/dL   PHOSPHORUS    Collection Time: 12/13/21  1:24 AM   Result Value Ref Range    Phosphorus 2.8 2.6 - 4.7 MG/DL   METABOLIC PANEL, COMPREHENSIVE    Collection Time: 12/13/21  1:24 AM   Result Value Ref Range    Sodium 140 136 - 145 mmol/L    Potassium 3.6 3.5 - 5.1 mmol/L    Chloride 111 (H) 97 - 108 mmol/L    CO2 22 21 - 32 mmol/L    Anion gap 7 5 - 15 mmol/L    Glucose 98 65 - 100 mg/dL    BUN 13 6 - 20 MG/DL    Creatinine 0.95 0.70 - 1.30 MG/DL    BUN/Creatinine ratio 14 12 - 20      GFR est AA >60 >60 ml/min/1.73m2    GFR est non-AA >60 >60 ml/min/1.73m2    Calcium 8.4 (L) 8.5 - 10.1 MG/DL    Bilirubin, total 0.5 0.2 - 1.0 MG/DL    ALT (SGPT) 50 12 - 78 U/L    AST (SGOT) 38 (H) 15 - 37 U/L    Alk.  phosphatase 144 (H) 45 - 117 U/L    Protein, total 5.8 (L) 6.4 - 8.2 g/dL    Albumin 2.9 (L) 3.5 - 5.0 g/dL    Globulin 2.9 2.0 - 4.0 g/dL    A-G Ratio 1.0 (L) 1.1 - 2.2     DUPLEX CAROTID BILATERAL    Collection Time: 12/13/21  9:33 AM   Result Value Ref Range    Right subclavian prox PSV 49.6 cm/s    Right subclavian prox EDV 0.0 cm/s    Right cca dist sys 85.4 cm/s    Right CCA dist delgado 12.8 cm/s    Right CCA prox sys 109.7 cm/s    Right CCA prox delgado 9.5 cm/s    Right eca sys 152.9 cm/s    RIGHT EXTERNAL CAROTID ARTERY D 0.00 cm/s    Right ICA dist sys 37.8 cm/s    Right ICA dist delgado 7.2 cm/s    Right ICA mid sys 70.2 cm/s    Right ICA mid delgado 13.1 cm/s    Right ICA prox sys 68.0 cm/s    Right ICA prox delgado 9.8 cm/s    Right vertebral sys 21.4 cm/s    RIGHT VERTEBRAL ARTERY D 0.00 cm/s    Right ICA/CCA sys 0.8     Left subclavian prox .8 cm/s    Left subclavian prox EDV 11.6 cm/s    Left CCA dist sys 64.6 cm/s    Left CCA dist delgado 10.2 cm/s    Left CCA prox sys 110.2 cm/s    Left CCA prox delgado 13.6 cm/s    Left ECA sys 129.9 cm/s    LEFT EXTERNAL CAROTID ARTERY D 0.00 cm/s    Left ICA dist sys 25.8 cm/s    Left ICA dist delgado 5.9 cm/s    Left ICA mid sys 68.8 cm/s    Left ICA mid delgado 5.8 cm/s    Left ICA prox sys 133.8 cm/s    Left ICA prox delgado 25.4 cm/s    Left vertebral sys 49.3 cm/s    LEFT VERTEBRAL ARTERY D 13.08 cm/s    Left ICA/CCA sys 2.07      PHYSICAL EXAM:     NEUROLOGICAL EXAM:     Appearance: The patient is well developed, well nourished, provides a coherent history and is in no acute distress. Mental Status: Oriented to time, place and person. Fluent, no aphasia but with dysarthria. Mood and affect appropriate. Cranial Nerves:   Intact visual fields. JENY, EOM's full, no nystagmus, no ptosis. Facial sensation is normal. Corneal reflexes are intact. Right facial droop. Hearing is normal bilaterally. Palate is midline with normal elevation. Sternocleidomastoid and trapezius muscles are normal. Tongue is midline. Motor:  5/5 strength in upper and lower proximal and distal muscles. Normal bulk and tone. No pronator drift. Reflexes:   Deep tendon reflexes 1+/4 and symmetrical. Downgoing toes. Sensory:   Normal to cold. Gait:  Not tested. Tremor:   No tremor noted. Cerebellar:  Intact FTN/ZEESHAN/HTS         Assessment:   Acute CVA  Seizure disorder  Chronic subdural hematoma    Plan:   Neurological examination reveals dysarthria  (slightly better) and right facial droop.  Status post left thalamic infarct.  Motor component is likely due to the proximity of the stroke to the internal capsule.  No sensory deficits discerned currently.     Head CT without contrast did not reveal any acute process.  Chronic left frontoparietal infarction.  Supratentorial right subdural hematoma.  Brain MRI without contrast revealed acute lacunar infarct left thalamus.  Chronic right subdural hematoma.  Chronic encephalomalacia left parietal lobe.  Moderate white matter disease.     No changes in patient's chronic subdural hematoma with slight decrease in size.  Continue care with neurosurgery as an outpatient.     Head and neck CTA did not reveal any flow-limiting stenosis or aneurysm.  No significant ICA stenosis.     LDL was at goal which is less than 70.  Continue statin therapy.     Echocardiogram was unremarkable. Echocardiogram with bubble study done 10/25/2021 did not reveal any shunting.     Given chronicity of the subdural hematoma and the current stroke, continue Aspirin 81 mg daily for stroke prophylaxis.     No evidence of seizures currently.  Continue home dosages of phenytoin, levetiracetam and Vimpat.  Levels are pending.  Phenytoin level is subtherapeutic (dose was decreased from 300 mg daily to 100 mg daily by [facility] physician).     Continue speech therapy. MBS today.     Per DMV regulation patient cannot drive for 6 months. No further recommendations from a neurological standpoint. Follow up with outpatient neurology in 1 month.     Signed:  Bhanu Oh MD  12/13/2021  12:41 PM

## 2021-12-13 NOTE — PROGRESS NOTES
Physician Progress Note      PATIENT:               Steffany Jc  CSN #:                  [id number]  :                       1935  ADMIT DATE:       12/10/2021 6:45 PM  [address] [place] DATE:  RESPONDING  PROVIDER #:        Ivone Rosas MD          QUERY TEXT:    Patient admitted with CVA noted to have atrial fibrillation. If possible, please document in progress notes and discharge summary further specificity regarding the type of atrial fibrillation: The medical record reflects the following:  Risk Factors: abnormal EKG  Clinical Indicators:  EKG  Atrial fibrillation with a competing junctional pacemaker  Left axis deviation  Minimal voltage criteria for LVH, may be normal variant ( Calvin product )  Nonspecific ST and T wave abnormality  Abnormal ECG  When compared with ECG of 22-OCT-2021 17:08,  Atrial fibrillation has replaced Sinus rhythm        Treatment: EKG,  isosorbide    Thank you,  Zoe Ivory RN, CDI, CRCR, CCDS  Certified Clinical   [phone]        Chronic: nonspecific term that could be referring to paroxysmal, persistent, or permanent  Longstanding persistent: persistent and continuous, lasting > 1 year. Paroxysmal - self-terminating or intermittent; resolves with or without intervention within 7 days of onset; may recur with various frequency. Persistent - Fails to terminate within 7 days; Often requires meds or cardioversion to restore to NSR. Permanent - longstanding & persistent; Medication has been ineffective in restoring NSR &/or cardioversion is contraindicated    Definitions per MS-DRG Training Guide and Quick Reference Guide, [facility] 5 Diseases and Disorders of the Circulatory System; 2019; . Software content from the [employer]?  Advanced CDI Transformation Program.  Options provided:  -- Paroxysmal Atrial Fibrillation  -- Longstanding Persistent Atrial Fibrillation  -- Permanent Atrial Fibrillation  -- Persistent Atrial Fibrillation  -- Chronic Atrial Fibrillation, unspecified  -- Other - I will add my own diagnosis  -- Disagree - Not applicable / Not valid  -- Disagree - Clinically unable to determine / Unknown  -- Refer to Clinical Documentation Reviewer    PROVIDER RESPONSE TEXT:    This patient has persistent atrial fibrillation.     Query created by: Martin Quezada on 12/13/2021 11:34 AM      Electronically signed by:  Landon Jorge MD 12/13/2021 11:50 AM

## 2021-12-13 NOTE — PROGRESS NOTES
CM follow up:    Patient cleared by neurology and vascular. Patient ready for DC, await rehab acceptance. [facility] via [employer]. Spoke with patient and his granddaughter at bedside. Discussed acute rehab placement and requiring assist of 2. Patient agreeable with plan.    2:20pm - Talked with [facility] liaison. She states bed will likely be available tomorrow.     Deni Eisenberg RN, MSN/Care manager  [phone]

## 2021-12-13 NOTE — DISCHARGE SUMMARY
Physician Discharge Summary     Patient ID:  Jose Martin Alexander  [id number]  25 y.o.  1935    Admit date: 12/10/2021    Discharge date of service and time: 12/13/2021    Admission Diagnoses: Facial droop [R29.810]    Discharge Diagnoses:    Principal Diagnosis   Facial droop                                             Hospital Course and other diagnoses  Acute lacunar infarct in the left thalamus / Dysarthria / R Facial droop / R sided weakness - Symptoms resolving only partly. Not a TPA candidate. Unremarkable tele. Neuro checks. Neurology consulted. Fall precautions and PT/OT/speech evaluation. Tolerating soft diet for now. MRI brain showed \"Acute lacunar infarct in the left thalamus. \" Recent negative ECHO. Continue statin.     L carotid stenosis - Noted on CTA.  Consulted Vascular surgery, still awaiting final decision     Hx Simple partial seizures / Seizure disorder - Continue vimpat, dilantin, keppra     CAD (coronary artery disease) / Hypertension - continue statin, metoprolol and imdur, but he is bradycardic despite lowest metoprolol dose.     Hypercholesteremia - Atorvastatin     Chronic R sided subdural hematoma / Hx of completed stroke - CT showed improvement.  Monitor      PCP: Other, Tatyana, MD    Consults: Neurology and Vascular Surgery    Significant Diagnostic Studies: See Hospital Course    Discharged home in improved condition.     Discharge Exam:  /73 (BP 1 Location: Right upper arm, BP Patient Position: At rest)   Pulse (!) 55   Temp 97.6 °F (36.4 °C)   Resp 16   Ht 5' 11\" (1.803 m)   Wt 82.5 kg (181 lb 14.4 oz)   SpO2 93%   BMI 25.37 kg/m²      Gen:  Well-developed, well-nourished, in no acute distress  HEENT:  Pink conjunctivae, PERRL, hearing intact to voice, moist mucous membranes  Neck:  Supple, without masses, thyroid non-tender  Resp:  No accessory muscle use, clear breath sounds without wheezes rales or rhonchi  Card:  No murmurs, bradycardic S1, S2 without thrills, bruits or peripheral edema  Abd:  Soft, non-tender, non-distended, normoactive bowel sounds are present, no mass  Lymph:  No cervical or inguinal adenopathy  Musc:  No cyanosis or clubbing  Skin:  No rashes or ulcers, skin turgor is good  Neuro:  Cranial nerves are grossly intact, R side motor weakness, follows commands   Psych:  Good insight, oriented to person, place and time, aler    Patient Instructions:   Current Discharge Medication List      START taking these medications    Details   aspirin 81 mg chewable tablet Take 1 Tablet by mouth daily for 30 days. Qty: 30 Tablet, Refills: 0         CONTINUE these medications which have CHANGED    Details   metoprolol tartrate (LOPRESSOR) 25 mg tablet Take 0.5 Tablets by mouth daily for 30 days. Qty: 15 Tablet, Refills: 0      atorvastatin (LIPITOR) 40 mg tablet Take 1 Tablet by mouth daily for 30 days. Qty: 30 Tablet, Refills: 0         CONTINUE these medications which have NOT CHANGED    Details   phenytoin ER (DILANTIN ER) 100 mg ER capsule Take 100 mg by mouth nightly. On 12/8/21 the patient was reduced to 100 mg at night time by the [facility] due to liver labs. lacosamide (VIMPAT) 200 mg tab tablet Take 1 Tablet by mouth two (2) times a day. Max Daily Amount: 400 mg. Followup with neurologist  Qty: 60 Tablet, Refills: 1    Associated Diagnoses: Seizure ([address]      levETIRAcetam (KEPPRA) 750 mg tablet Take 2 Tablets by mouth two (2) times a day. Qty: 120 Tablet, Refills: 0      cholecalciferol (Vitamin D3) (1000 Units /25 mcg) tablet Take 2,000 Units by mouth two (2) times a day. isosorbide mononitrate ER (IMDUR) 120 mg CR tablet Take 120 mg by mouth every morning.          STOP taking these medications       metoprolol succinate (TOPROL-XL) 25 mg XL tablet Comments:   Reason for Stopping:         lisinopril-hydroCHLOROthiazide (PRINZIDE, ZESTORETIC) 20-12.5 mg per tablet Comments:   Reason for Stopping:             Activity: Activity as tolerated and PT/OT Eval and Treat  Diet: Cardiac Diet and Low fat, Low cholesterol  Wound Care: None needed    Follow-up with your PCP and neurology in a few weeks.   Follow-up tests/labs - none    Signed:  Shayy Madrigal MD  12/13/2021  1:01 PM

## 2021-12-14 VITALS
TEMPERATURE: 98.5 F | DIASTOLIC BLOOD PRESSURE: 68 MMHG | RESPIRATION RATE: 17 BRPM | OXYGEN SATURATION: 93 % | SYSTOLIC BLOOD PRESSURE: 121 MMHG | BODY MASS INDEX: 25.47 KG/M2 | HEART RATE: 56 BPM | WEIGHT: 181.9 LBS | HEIGHT: 71 IN

## 2021-12-14 PROCEDURE — 51798 US URINE CAPACITY MEASURE: CPT

## 2021-12-14 PROCEDURE — 74011250637 HC RX REV CODE- 250/637: Performed by: INTERNAL MEDICINE

## 2021-12-14 PROCEDURE — 74011250637 HC RX REV CODE- 250/637: Performed by: PSYCHIATRY & NEUROLOGY

## 2021-12-14 PROCEDURE — 92526 ORAL FUNCTION THERAPY: CPT

## 2021-12-14 RX ORDER — METOPROLOL TARTRATE 25 MG/1
12.5 TABLET, FILM COATED ORAL DAILY
Qty: 15 TABLET | Refills: 0 | Status: SHIPPED
Start: 2021-12-14 | End: 2022-01-18

## 2021-12-14 RX ADMIN — ISOSORBIDE MONONITRATE 120 MG: 60 TABLET, EXTENDED RELEASE ORAL at 06:03

## 2021-12-14 RX ADMIN — ATORVASTATIN CALCIUM 40 MG: 20 TABLET, FILM COATED ORAL at 10:27

## 2021-12-14 RX ADMIN — METOPROLOL TARTRATE 12.5 MG: 25 TABLET, FILM COATED ORAL at 10:27

## 2021-12-14 RX ADMIN — ASPIRIN 81 MG: 81 TABLET, CHEWABLE ORAL at 10:27

## 2021-12-14 RX ADMIN — LEVETIRACETAM 1500 MG: 500 TABLET, FILM COATED ORAL at 10:27

## 2021-12-14 RX ADMIN — LACOSAMIDE 200 MG: 100 TABLET, FILM COATED ORAL at 10:27

## 2021-12-14 NOTE — PROGRESS NOTES
Problem: Pressure Injury - Risk of  Goal: *Prevention of pressure injury  Description: Document Eliecer Scale and appropriate interventions in the flowsheet.   Outcome: Progressing Towards Goal  Note: Pressure Injury Interventions:  Sensory Interventions: Assess changes in LOC, Keep linens dry and wrinkle-free    Moisture Interventions: Apply protective barrier, creams and emollients, Check for incontinence Q2 hours and as needed    Activity Interventions: PT/OT evaluation    Mobility Interventions: PT/OT evaluation    Nutrition Interventions: Offer support with meals,snacks and hydration    Friction and Shear Interventions: Minimize layers

## 2021-12-14 NOTE — PROGRESS NOTES
Report called into [facility] nurse Maranda Barry. Report included, SBAR, Kardex, Intake/Output, MAR and Recent Results.

## 2021-12-14 NOTE — ROUTINE PROCESS
1957  TRANSFER - OUT REPORT:    Verbal report given to Patito Chung 1313 (name) on Deneise Rape  being transferred to [facility] (unit) for routine progression of care       Report consisted of patients Situation, Background, Assessment and   Recommendations(SBAR). Information from the following report(s) SBAR, Kardex, ED Summary, Procedure Summary, Intake/Output, MAR, Recent Results and Med Rec Status was reviewed with the receiving nurse. Lines:       Opportunity for questions and clarification was provided. Patient transported with:   Tech    Attempted to notify next of kin of bed change. Was unsuccessful.

## 2021-12-14 NOTE — PROGRESS NOTES
12/14/2021  Case Management Note    5:11 PM  Accessed chart to build [facility] packet/PCS form as it was not on chart at nurse's station. Abigail from [facility] aware patient is being picked up now.     MARY ELLEN Navarrete

## 2021-12-14 NOTE — PROGRESS NOTES
TRANSFER - IN REPORT:    Verbal report received from Weldon, RN (name) on Sary Phelan  being received from [facility] (unit) for routine progression of care      Report consisted of patients Situation, Background, Assessment and   Recommendations(SBAR). Information from the following report(s) SBAR, Kardex, Intake/Output, MAR and Recent Results was reviewed with the receiving nurse. Opportunity for questions and clarification was provided. Assessment completed upon patients arrival to unit and care assumed.

## 2021-12-14 NOTE — PROGRESS NOTES
Problem: Dysphagia (Adult)  Goal: *Acute Goals and Plan of Care (Insert Text)  Description: Swallowing goals initiated 12-12-21:   1)  tolerate purees, nectars without s/s aspiration by 12-15-21  2) MBS to determine safest, but least restrictive diet by 12-15-21  Outcome: Progressing Towards Goal   SPEECH LANGUAGE PATHOLOGY DYSPHAGIA TREATMENT  Patient: Chente Soto (87 y.o. male)  Date: 12/14/2021  Diagnosis: Facial droop [R29.810]   Facial droop       Precautions: aspiration Fall    ASSESSMENT:  Patient continues with moderate to severe dysarthria with poor insight that his speech intelligibility is poor. NOt taking much PO on purees, but diet should NOT be advanced to solids until he is a regularly supervised dining situation on rehab. No overt s/s aspiration on nectar  thick liquids. PLAN:  Recommendations and Planned Interventions:  Continue purees, nectars. Pills in purees, as he aspirated nectars when trying to take pill on MBS. Patient continues to benefit from skilled intervention to address the above impairments. Continue treatment per established plan of care. Discharge Recommendations:  Inpatient Rehab     SUBJECTIVE:   Patient stated I don't like that. OBJECTIVE:   Cognitive and Communication Status:  Neurologic State: Alert  Orientation Level: Oriented to person, Disoriented to place, Disoriented to situation  Cognition: Impaired decision making, Impulsive  Perception: Appears intact  Perseveration: No perseveration noted  Safety/Judgement: Decreased awareness of need for assistance, Decreased insight into deficits  Dysphagia Treatment:  Oral Assessment:  Oral Assessment  Labial: Right droop (moderate to severe)  Oral Hygiene: white coating on tongue ?thrush? Lingual: Right deviation  P.O. Trials:  Patient Position: upright in bed  Vocal quality prior to P.O.:           ORAL PHASE:   Patient declining all purees. Reviewed MBS results with him.  Concern for aspiration risk with combining solid chunks in mouth with liquids when he is alone in his room. Patient with mild R oral leakage of NTL with cup sips  PHARYNGEAL PHASE:   No overt s/s aspiration on NTL    SPEECH:   Speech intelligibility 30% at sentence level with poor patient insight into defictis. Moderate to severe dysarthria. DDK:   Bilabials:  P: decreased spirantization B: voicing issues plus nasalance  Alveolars:  mildly decreased plosion   Velars; severe spirantization and voicing issues with unvoiced consonants. Exercises:  Laryngeal Exercises:                                                                                                                                   Pain:  Pain Scale 1: Numeric (0 - 10)  Pain Intensity 1: 0       After treatment:   Patient left in no apparent distress in bed and Nursing notified    COMMUNICATION/EDUCATION:   Patient was educated regarding his deficit(s) of dysphagia, dysarthria as this relates to his diagnosis of CVA. He demonstrated Fair understanding as evidenced by discussion. .    The patient's plan of care including recommendations, planned interventions, and recommended diet changes were discussed with:  none .      Phoenix Lucero SLP  Time Calculation: 15 mins

## 2021-12-14 NOTE — PROGRESS NOTES
LESLY:   1)[facility]   2) Risk of readmission- 16% MODERATE    1:37 PM-   Accepting Physician: Rajinder Pace   RN Call Report: [phone]  Transportation arranged for 4:45 PM by [facility] transportation. If [facility] pushes back to where pt will not be at the facility by 8:00 PM please re-arrange for 9:30 AM tomorrow morning (12/15) to ensure a safe transition. CM reached out to pt's wife- no answer, pt is in a deep sleep. Will continue to reach out to her. 12:11 PM- Pt remains on Med. Surg- now stable for d/c. CM spoke with Abigail at [facility]- pt can come today anytime after 1:00 PM. Referral sent to [facility], waiting on response. Care Management Interventions  PCP Verified by CM:  Yes  Mode of Transport at Discharge: BLS  Transition of Care Consult (CM Consult): Discharge Planning, Acute Rehab  MyChart Signup: No  Discharge Durable Medical Equipment: No  Health Maintenance Reviewed: Yes  Physical Therapy Consult: Yes  Occupational Therapy Consult: Yes  Speech Therapy Consult: Yes  Support Systems: Spouse/Significant Other  Confirm Follow Up Transport: Family  The Patient and/or Patient Representative was Provided with a Choice of Provider and Agrees with the Discharge Plan?: Yes  Freedom of Choice List was Provided with Basic Dialogue that Supports the Patient's Individualized Plan of Care/Goals, Treatment Preferences and Shares the Quality Data Associated with the Providers?: Yes  The [employer] Information Provided?: Yes (Pt has active VA coverage)  Discharge Location  Discharge Placement: Other: ([facility])     Nell Easley, MSW, [address]

## 2021-12-14 NOTE — PROGRESS NOTES
Bedside shift change report given to Tricia Mane RN (oncoming nurse) by Tho Cormier RN (offgoing nurse). Report included the following information SBAR, Kardex, Intake/Output, MAR and Recent Results.

## 2021-12-14 NOTE — DISCHARGE INSTRUCTIONS
Patient Discharge Instructions    Latia Aguilar / [id number] : 1935    Admitted 12/10/2021 Discharged: 2021     Primary Diagnoses  Problem List as of 2021 Date Reviewed: 2021           CAD (coronary artery disease)   Hypercholesteremia   Hypertension   Hx of completed stroke   Seizure disorder ([facility]   * (Principal) Facial droop   Left carotid stenosis   Simple partial seizures ([facility]   Subdural hematoma (HCC)        NO DRIVING PER DMV. DMV Transient Ischemic Attack and/or Cerebral Vascular Accident Policy   It is the policy of the Department of Motor Vehicles, based on guidance and recommendations from the [address], that if a  suffers a Transient Ischemic Attack (TIA), the s privilege to operate a motor vehicle will be suspended for three months. The three-month suspension may be shortened for drivers who have suffered a TIA, provided that treatment has been provided mitigating the risk of reoccurrence and there is no impact on a s ability to operate a motor vehicle safely. These cases may be referred to the [address] for their guidance and recommendations. If a  suffers a Cerebral Vascular Accident (CVA), the s privilege to operate a motor vehicle will be suspended for six months. This six-month suspension period may be shortened if DMV receives information from the 's health care provider indicating that the  has fully recovered. These cases may be referred to the [address] for its guidance and recommendations. Following the six-month suspension, [facility] will request an evaluation with a certified  rehabilitation specialist if the  has suffered paralysis or cognitive changes due to the CVA.  If the s physical and cognitive information is not indicated in the medical report received by the agency, [facility] will request it from the health care provider.    (DigitalStatues.no. asp). Based on the information received about the s cognitive abilities, the  may also be subject to the [facility]. The  is also required to furnish an updated Vision Report with an examination date that is not older than 90 days and occurs after the TIA/CVA, in accordance with [place] Code Section 46.2-311. DMV may impose additional requirements on the individual depending on the information received by the agency. https://www.reyes-sanchez.com/. asp    Take Home Medications     · It is important that you take the medication exactly as they are prescribed. · Keep your medication in the bottles provided by the pharmacist and keep a list of the medication names, dosages, and times to be taken in your wallet. · Do not take other medications without consulting your doctor. What to do at Home    Recommended diet: Continue purees, nectars; pills in purees, as he aspirated nectars when trying to take pill on MBS. Recommended activity: Activity as tolerated and PT/OT Eval and Treat    If you experience worse symptoms, please come to the ER. Follow-up with a PCP within 1 week. Please follow up with neurology within 4 weeks. Please follow up with vascular surgery within 6 months. Follow-up Information     Follow up With Specialties Details Why Contact Info    Emigdio Dupree MD Neurology Schedule an appointment as soon as possible for a visit in 4 weeks Hospital Follow up [address] [place] [address] [phone]        Schedule an appointment as soon as possible for a visit in 1 week Please follow up with a primary care doctro within 1 week. Please obtain follow up blood work including CMP within 1 week.       [facility] [address] [place] [address] [address] [facility]  [phone]       no driving for 6 months per DMV. Amisha Wadsworth MD General and Vascular Surgery Schedule an appointment as soon as possible for a visit in 6 months Hospital Follow Up and repeat carotid ultrasound  [facility]  [address] [place] [address] [address] [facility]  [phone]             Information obtained by :  I understand that if any problems occur once I am at home I am to contact my physician. I understand and acknowledge receipt of the instructions indicated above.                                                                                                                                            Physician's or R.N.'s Signature                                                                  Date/Time                                                                                                                                              Patient or Representative Signature                                                          Date/Time

## 2021-12-14 NOTE — PROGRESS NOTES
TRANSFER - OUT REPORT:    Verbal report given to dAam Drew (name) on Tiny Fontanez  (patient name)  being transferred to [phone] (unit) for routine progression of care   Report consisted of patients Situation, Background, Assessment and   Recommendations(SBAR).      Jonathan Chaparro, RN, MSN/Care manager  [phone]

## 2021-12-14 NOTE — DISCHARGE SUMMARY
[facility]  [address] [facility], [address] [place] [address]  [phone]    Physician Discharge Summary     Patient ID:  J Luis Donohue  [id number]  87 y.o.  1935    Admit date: 12/10/2021    Discharge date and time: 12/14/2021 10:51 AM    Admission Diagnoses: Facial droop [R29.810]    Discharge Diagnoses:  Principal Diagnosis Facial droop                                            Principal Problem:    Facial droop (12/10/2021)    Active Problems:    Subdural hematoma ([address] (10/22/2021)      Simple partial seizures ([address] (10/30/2021)      Left carotid stenosis (12/10/2021)      CAD (coronary artery disease) ()      Hypercholesteremia ()      Hypertension ()      Hx of completed stroke ()      Seizure disorder [facility]) ()           Hospital Course:     Acute lacunar infarct in the left thalamus / Dysarthria / R Facial droop / R sided weakness - Symptoms resolving only partly. Not a TPA candidate. MRI brain showed \"Acute lacunar infarct in the left thalamus. \" Recent negative ECHO. Neurology evaluted. PT/OT/speech evaluated; continue at rehab.  Tolerating puree diet for now. Continue statin and aspirin on discharge. Neurology evaluated; f/u OP.      L carotid stenosis - Evaluated by vascular; felt that risks of surgery exceed the potential benefits. F/u with vascular OP within 6 months.      Hx Simple partial seizures / Seizure disorder - Continue vimpat, dilantin, keppra     CAD (coronary artery disease) / Hypertension - continue statin, imdur, reduced dose metoprolol      Hypercholesteremia - Atorvastatin     Chronic R sided subdural hematoma / Hx of completed stroke - CT showed improvement. Neurology evaluted; continue aspirin per neurology. F/u neurology OP. Elevated AST - slight. improved and only 38 now. Continue statin. F/u OP with PCP for repeat CMP within 1 week.      PCP: Other, Tatyana, MD     Consults: Neurology and Vascular Surgery    Significant Diagnostic Studies:     MRI brain   IMPRESSION     1. Acute lacunar infarct in the left thalamus. 2. Chronic right subdural hematoma. 3. 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 4. Moderate white matter disease likely related to chronic small vessel ischemic  disease. Discharge Exam:  Physical Exam:    Gen: elderly, in no acute distress  HEENT:  Pink conjunctivae, PERRL, hearing intact to voice, moist mucous membranes  Resp: No accessory muscle use, clear breath sounds without wheezes rales or rhonchi  Card: No murmurs, normal S1, S2 without thrills, bruits or peripheral edema  Abd:  Soft, non-tender, non-distended, normoactive bowel sounds are present  Musc: No cyanosis or clubbing  Skin: No rashes or ulcers, skin turgor is good  Neuro:  Dysarthria, R Facial droop, R sided weakness  , follows commands appropriately  Psych:  Fair insight     Disposition: SNF  Discharge Condition: Stable    Patient Instructions:   Current Discharge Medication List      START taking these medications    Details   aspirin 81 mg chewable tablet Take 1 Tablet by mouth daily for 30 days. Qty: 30 Tablet, Refills: 0         CONTINUE these medications which have CHANGED    Details   metoprolol tartrate (LOPRESSOR) 25 mg tablet Take 0.5 Tablets by mouth daily for 30 days. Qty: 15 Tablet, Refills: 0      atorvastatin (LIPITOR) 40 mg tablet Take 1 Tablet by mouth daily for 30 days. Qty: 30 Tablet, Refills: 0         CONTINUE these medications which have NOT CHANGED    Details   phenytoin ER (DILANTIN ER) 100 mg ER capsule Take 100 mg by mouth nightly. On 12/8/21 the patient was reduced to 100 mg at night time by the [facility] due to liver labs. lacosamide (VIMPAT) 200 mg tab tablet Take 1 Tablet by mouth two (2) times a day. Max Daily Amount: 400 mg. Followup with neurologist  Qty: 60 Tablet, Refills: 1    Associated Diagnoses: Seizure ([address]      levETIRAcetam (KEPPRA) 750 mg tablet Take 2 Tablets by mouth two (2) times a day.   Qty: 120 Tablet, Refills: 0      cholecalciferol (Vitamin D3) (1000 Units /25 mcg) tablet Take 2,000 Units by mouth two (2) times a day. isosorbide mononitrate ER (IMDUR) 120 mg CR tablet Take 120 mg by mouth every morning. STOP taking these medications       metoprolol succinate (TOPROL-XL) 25 mg XL tablet Comments:   Reason for Stopping:         lisinopril-hydroCHLOROthiazide (PRINZIDE, ZESTORETIC) 20-12.5 mg per tablet Comments:   Reason for Stopping:               Activity: Activity as tolerated  Diet:  purees, nectars. Wound Care: None needed    Follow-up with  Follow-up Information     Follow up With Specialties Details Why Contact Info    Melissa Garcia MD Neurology Schedule an appointment as soon as possible for a visit in 4 weeks Hospital Follow up [address]  [facility] [phone]        Schedule an appointment as soon as possible for a visit in 1 week Please follow up with a primary care doctro within 1 week. Please obtain follow up blood work including CMP within 1 week. [facility] [address] [facility] [address]  [phone]       no driving for 6 months per [facility]. Dawood Vargas MD General and Vascular Surgery Schedule an appointment as soon as possible for a visit in 6 months Hospital Follow Up and repeat carotid ultrasound  [address] [place] [address] RESIDENTIAL TREATMENT FACILITY  [address] [place] [address] [address] [phone]            Follow-up tests/labs as above.      Signed:  Vedia Severe, DO  12/14/2021  10:51 AM  **I personally spent 35 min on discharge**

## 2021-12-15 ENCOUNTER — PATIENT OUTREACH (OUTPATIENT)
Dept: CASE MANAGEMENT | Age: 86
End: 2021-12-15

## 2021-12-15 NOTE — PROGRESS NOTES
Transition of care outreach postponed for 7 days due to patient's discharge to inpatient rehab facility.   12/10-12/14/21 STF facial droop D/C [facility] f/u 7d

## 2021-12-16 LAB — LACOSAMIDE SERPL-MCNC: 10.4 UG/ML (ref 5–10)

## 2021-12-17 ENCOUNTER — APPOINTMENT (OUTPATIENT)
Dept: CT IMAGING | Age: 86
End: 2021-12-17
Attending: EMERGENCY MEDICINE
Payer: COMMERCIAL

## 2021-12-17 ENCOUNTER — HOSPITAL ENCOUNTER (EMERGENCY)
Age: 86
Discharge: NURSING, REHAB OR LONG-TERM CARE | End: 2021-12-17
Attending: EMERGENCY MEDICINE | Admitting: EMERGENCY MEDICINE
Payer: COMMERCIAL

## 2021-12-17 VITALS
HEART RATE: 68 BPM | OXYGEN SATURATION: 100 % | DIASTOLIC BLOOD PRESSURE: 98 MMHG | TEMPERATURE: 98.6 F | SYSTOLIC BLOOD PRESSURE: 157 MMHG | RESPIRATION RATE: 16 BRPM

## 2021-12-17 DIAGNOSIS — I95.1 SYNCOPE DUE TO ORTHOSTATIC HYPOTENSION: Primary | ICD-10-CM

## 2021-12-17 LAB
ALBUMIN SERPL-MCNC: 3.4 G/DL (ref 3.5–5)
ALBUMIN/GLOB SERPL: 1.3 {RATIO} (ref 1.1–2.2)
ALP SERPL-CCNC: 147 U/L (ref 45–117)
ALT SERPL-CCNC: 63 U/L (ref 12–78)
ANION GAP SERPL CALC-SCNC: 5 MMOL/L (ref 5–15)
AST SERPL-CCNC: 48 U/L (ref 15–37)
BASOPHILS # BLD: 0.1 K/UL (ref 0–0.1)
BASOPHILS NFR BLD: 1 % (ref 0–1)
BILIRUB SERPL-MCNC: 0.4 MG/DL (ref 0.2–1)
BUN SERPL-MCNC: 14 MG/DL (ref 6–20)
BUN/CREAT SERPL: 11 (ref 12–20)
CALCIUM SERPL-MCNC: 9 MG/DL (ref 8.5–10.1)
CALCULATED R AXIS, ECG10: -36 DEGREES
CALCULATED T AXIS, ECG11: 157 DEGREES
CHLORIDE SERPL-SCNC: 108 MMOL/L (ref 97–108)
CO2 SERPL-SCNC: 25 MMOL/L (ref 21–32)
COMMENT, HOLDF: NORMAL
CREAT SERPL-MCNC: 1.22 MG/DL (ref 0.7–1.3)
DIAGNOSIS, 93000: NORMAL
DIFFERENTIAL METHOD BLD: NORMAL
EOSINOPHIL # BLD: 0.3 K/UL (ref 0–0.4)
EOSINOPHIL NFR BLD: 3 % (ref 0–7)
ERYTHROCYTE [DISTWIDTH] IN BLOOD BY AUTOMATED COUNT: 13.1 % (ref 11.5–14.5)
GLOBULIN SER CALC-MCNC: 2.7 G/DL (ref 2–4)
GLUCOSE SERPL-MCNC: 143 MG/DL (ref 65–100)
HCT VFR BLD AUTO: 39.5 % (ref 36.6–50.3)
HGB BLD-MCNC: 12.7 G/DL (ref 12.1–17)
IMM GRANULOCYTES # BLD AUTO: 0 K/UL (ref 0–0.04)
IMM GRANULOCYTES NFR BLD AUTO: 0 % (ref 0–0.5)
LYMPHOCYTES # BLD: 2.3 K/UL (ref 0.8–3.5)
LYMPHOCYTES NFR BLD: 25 % (ref 12–49)
MCH RBC QN AUTO: 30.2 PG (ref 26–34)
MCHC RBC AUTO-ENTMCNC: 32.2 G/DL (ref 30–36.5)
MCV RBC AUTO: 94 FL (ref 80–99)
MONOCYTES # BLD: 0.7 K/UL (ref 0–1)
MONOCYTES NFR BLD: 7 % (ref 5–13)
NEUTS SEG # BLD: 6 K/UL (ref 1.8–8)
NEUTS SEG NFR BLD: 64 % (ref 32–75)
NRBC # BLD: 0 K/UL (ref 0–0.01)
NRBC BLD-RTO: 0 PER 100 WBC
PLATELET # BLD AUTO: 204 K/UL (ref 150–400)
PMV BLD AUTO: 9.7 FL (ref 8.9–12.9)
POTASSIUM SERPL-SCNC: 3.8 MMOL/L (ref 3.5–5.1)
PROT SERPL-MCNC: 6.1 G/DL (ref 6.4–8.2)
Q-T INTERVAL, ECG07: 436 MS
QRS DURATION, ECG06: 124 MS
QTC CALCULATION (BEZET), ECG08: 409 MS
RBC # BLD AUTO: 4.2 M/UL (ref 4.1–5.7)
SAMPLES BEING HELD,HOLD: NORMAL
SODIUM SERPL-SCNC: 138 MMOL/L (ref 136–145)
TROPONIN-HIGH SENSITIVITY: 15 NG/L (ref 0–76)
VENTRICULAR RATE, ECG03: 53 BPM
WBC # BLD AUTO: 9.4 K/UL (ref 4.1–11.1)

## 2021-12-17 PROCEDURE — 80053 COMPREHEN METABOLIC PANEL: CPT

## 2021-12-17 PROCEDURE — 99283 EMERGENCY DEPT VISIT LOW MDM: CPT

## 2021-12-17 PROCEDURE — 36415 COLL VENOUS BLD VENIPUNCTURE: CPT

## 2021-12-17 PROCEDURE — 85025 COMPLETE CBC W/AUTO DIFF WBC: CPT

## 2021-12-17 PROCEDURE — 93005 ELECTROCARDIOGRAM TRACING: CPT

## 2021-12-17 PROCEDURE — 70450 CT HEAD/BRAIN W/O DYE: CPT

## 2021-12-17 PROCEDURE — 84484 ASSAY OF TROPONIN QUANT: CPT

## 2021-12-17 NOTE — ED TRIAGE NOTES
Triage: Pt arrives via EMS from [facility]. He is being treated there following a diagnosis of subdural hematoma. Per report pt was doing his rehab exercises with weights when he had a syncopal event. Pt arrives alert and oriented x4. No complaints.

## 2021-12-17 NOTE — PROGRESS NOTES
12/17/2021; 13:20 -   CM was notified that patient has been cleared for return to [facility]. CM contacted [facility] liaison Cristiano Ontiveros: [phone]) of the above. EMTALA Information:  Accepting MD: Dr. Darylene Jie: [facility]  Accepting Rep: Bijalnaty Jolly, Nurse Liaison    ZACH submitted transport request to [facility] ([facility]) phone [phone] via [address] ETA 2015. CM canceled transport request due to delayed transport time. CM contacted [employer] and was informed that no rigs are available. CM contacted [facility] and was informed that they do not have availability. [facility] has availability between 1700 and 1900. CM faxed PCS and face sheet to confirmed fax F: [phone]. Packet is at main nurse station. CM completed: PCS, Face Sheet, Attending Note, ED Summary, initiation of EMTALA.     NURSING TO COMPLETE: completion of EMTALA, REPORT: [phone]    CRM: Mike Katz, MPH, [address] [place] [address]; Z: [phone]

## 2021-12-17 NOTE — ED PROVIDER NOTES
63-year-old male with history as below presents to the emergency department from [facility] rehab after he had a witnessed orthostatic syncopal type event this morning while working with physical therapy. Brief syncopal type event they quickly improved sitting him back down. No preceding chest pain, SLB, palpitation symptoms. No fall or head injury. He was referred to the ED by the physician at [facility] for laboratory evaluation and CT scanning given recent history of subdural hematoma, Dr. Daryn Kuhn. He states that if his work-up is negative that he can be transferred back to [facility] for further care. On arrival the patient has slurred speech but states that this is baseline for him after his prior SDH. Denies any new focal numbness or weakness, chest pain, or any other medical concerns. Dr. Daryn Kuhn states that his symptoms were likely medication associated as he received isosorbide dinitrate and metoprolol at the same time this morning and suspect that this precipitated orthostatic hypotension.            Past Medical History:   Diagnosis Date    CAD (coronary artery disease)     Hx of completed stroke     Hypercholesteremia     Hypertension     Seizure disorder (HCC)     Subdural hematoma (HCC)        Past Surgical History:   Procedure Laterality Date    HX CORONARY STENT PLACEMENT      OR CARDIAC SURG PROCEDURE UNLIST      bypass         Family History:   Problem Relation Age of Onset    Hypertension Father        Social History     Socioeconomic History    Marital status:      Spouse name: Not on file    Number of children: Not on file    Years of education: Not on file    Highest education level: Not on file   Occupational History    Not on file   Tobacco Use    Smoking status: Never Smoker    Smokeless tobacco: Never Used   Substance and Sexual Activity    Alcohol use: Never    Drug use: Never    Sexual activity: Not on file   Other Topics Concern    Not on file   Social History Narrative    Not on file     Social Determinants of Health     Financial Resource Strain:     Difficulty of Paying Living Expenses: Not on file   Food Insecurity:     Worried About Running Out of Food in the Last Year: Not on file    Griselda of Food in the Last Year: Not on file   Transportation Needs:     Lack of Transportation (Medical): Not on file    Lack of Transportation (Non-Medical): Not on file   Physical Activity:     Days of Exercise per Week: Not on file    Minutes of Exercise per Session: Not on file   Stress:     Feeling of Stress : Not on file   Social Connections:     Frequency of Communication with Friends and Family: Not on file    Frequency of Social Gatherings with Friends and Family: Not on file    Attends Roman Catholic Services: Not on file    Active Member of [address] [place] [address] [employer] or Organizations: Not on file    Attends Club or Organization Meetings: Not on file    Marital Status: Not on file   Intimate Partner Violence:     Fear of Current or Ex-Partner: Not on file    Emotionally Abused: Not on file    Physically Abused: Not on file    Sexually Abused: Not on file   Housing Stability:     Unable to Pay for Housing in the Last Year: Not on file    Number of [place] in the Last Year: Not on file    Unstable Housing in the Last Year: Not on file         ALLERGIES: Morphine    Review of Systems   Constitutional: Positive for activity change. Negative for appetite change, chills and fever. HENT: Negative for congestion, rhinorrhea, sinus pain, sneezing and sore throat. Eyes: Negative for photophobia and visual disturbance. Respiratory: Negative for cough and shortness of breath. Cardiovascular: Negative for chest pain. Gastrointestinal: Negative for abdominal pain, blood in stool, constipation, diarrhea, nausea and vomiting. Genitourinary: Negative for difficulty urinating, dysuria, flank pain, hematuria, penile pain and testicular pain.    Musculoskeletal: Negative for arthralgias, back pain, myalgias and neck pain. Skin: Negative for rash and wound. Neurological: Positive for syncope. Negative for weakness, light-headedness, numbness and headaches. Psychiatric/Behavioral: Negative for self-injury and suicidal ideas. All other systems reviewed and are negative. Vitals:    12/17/21 1110   BP: (!) 164/78   Pulse: 61   Resp: 16   Temp: 97.9 °F (36.6 °C)   SpO2: 98%            Physical Exam  Vitals and nursing note reviewed. Constitutional:       General: He is not in acute distress. Appearance: Normal appearance. He is well-developed. He is not diaphoretic. HENT:      Head: Normocephalic and atraumatic. Nose: Nose normal.   Eyes:      Extraocular Movements: Extraocular movements intact. Conjunctiva/sclera: Conjunctivae normal.      Pupils: Pupils are equal, round, and reactive to light. Cardiovascular:      Rate and Rhythm: Normal rate and regular rhythm. Heart sounds: Normal heart sounds. Pulmonary:      Effort: Pulmonary effort is normal.      Breath sounds: Normal breath sounds. Abdominal:      General: There is no distension. Palpations: Abdomen is soft. Tenderness: There is no abdominal tenderness. Musculoskeletal:         General: No tenderness. Cervical back: Neck supple. Skin:     General: Skin is warm and dry. Neurological:      General: No focal deficit present. Mental Status: He is alert and oriented to person, place, and time. Cranial Nerves: Dysarthria present. No cranial nerve deficit. Sensory: No sensory deficit. Motor: No weakness. Coordination: Coordination normal.          MDM   80year-old male presents after he had orthostatic type syncopal episode at rehab facility. He is afebrile with vital signs stable no acute distress. No new focal neuro deficits. Labs returned reassuringly showing no significant abnormalities, negative troponin.   CT head shows unchanged mild right chronic subdural hematoma with no acute abnormalities. He remained asymptomatic while in the ED. [facility] discharge [facility] care and assessment. Procedures    1042 EKG shows normal sinus rhythm with a bradycardic rate of 53 bpm, left axis deviation, T wave inversions laterally with T wave flattening inferiorly but no significant ST elevation or depression suggestive of acute ischemia.

## 2022-01-04 ENCOUNTER — PATIENT OUTREACH (OUTPATIENT)
Dept: CASE MANAGEMENT | Age: 87
End: 2022-01-04

## 2022-01-04 NOTE — PROGRESS NOTES
[address] [place] [address] Dr Discharge Follow-Up      Date/Time:  2022 3:16 PM    Patient was admitted to [facility] on 12/10/21 and discharged to [facility] on 21. The patients discharge diagnosis was facial droop. Patient was discharge on 21 from [facility]. The discharge summary from the post acute facilty was not available at the time of outreach. Patient was contacted within 6 business days of discharge from the post acute facility. LPN Care Coordinator contacted the family wife Shalom Duron by telephone to perform post hospital discharge follow up. Verified name and  with family as identifiers. Provided introduction to self, and explanation of the LPN Care Coordinator role. Family received post acute facility discharge instructions. LPN Care Coordinator reviewed discharge instructions and red flags with family who verbalized understanding. Family given an opportunity to ask questions and does not have any further questions or concerns at this time. The family agrees to contact the PCP office for questions related to their healthcare. LPN Care Coordinator provided contact information for future reference. Advance Care Planning:   Does patient have an Advance Directive:  not on file     [address] orders at discharge: patient refused  [address]: n/a  Date of initial visit: 12[facility] ordered at discharge: none  [facility] received: none  Patient has his own walker cane and rollator    Medication(s):   The post acute facility medication discharge list was not available for this call.       [facility] follow up appointment(s):   Future Appointments   Date Time Provider aCrey Cowan   2022  2:00 PM DO JEAN CLAUDE Foote BS [facility]      Non-[facility] follow up appointment(s): 22  [facility]:  information provided as a resource

## 2022-01-11 ENCOUNTER — APPOINTMENT (OUTPATIENT)
Dept: CT IMAGING | Age: 87
End: 2022-01-11
Attending: EMERGENCY MEDICINE
Payer: MEDICARE

## 2022-01-11 ENCOUNTER — APPOINTMENT (OUTPATIENT)
Dept: GENERAL RADIOLOGY | Age: 87
End: 2022-01-11
Attending: EMERGENCY MEDICINE
Payer: MEDICARE

## 2022-01-11 ENCOUNTER — HOSPITAL ENCOUNTER (INPATIENT)
Age: 87
LOS: 4 days | Discharge: NURSING, REHAB OR LONG-TERM CARE | DRG: 064 | End: 2022-01-18
Attending: FAMILY MEDICINE | Admitting: HOSPITALIST
Payer: MEDICARE

## 2022-01-11 ENCOUNTER — HOSPITAL ENCOUNTER (EMERGENCY)
Age: 87
Discharge: OTHER INSTITUTION | End: 2022-01-11
Attending: EMERGENCY MEDICINE
Payer: MEDICARE

## 2022-01-11 VITALS
TEMPERATURE: 97.5 F | DIASTOLIC BLOOD PRESSURE: 53 MMHG | SYSTOLIC BLOOD PRESSURE: 116 MMHG | HEART RATE: 69 BPM | WEIGHT: 180 LBS | OXYGEN SATURATION: 93 % | RESPIRATION RATE: 17 BRPM | HEIGHT: 71 IN | BODY MASS INDEX: 25.2 KG/M2

## 2022-01-11 DIAGNOSIS — I63.9 CEREBROVASCULAR ACCIDENT (CVA), UNSPECIFIED MECHANISM (HCC): ICD-10-CM

## 2022-01-11 DIAGNOSIS — I62.01 ACUTE ON CHRONIC INTRACRANIAL SUBDURAL HEMATOMA (HCC): Primary | ICD-10-CM

## 2022-01-11 DIAGNOSIS — I62.00 SUBDURAL BLEEDING (HCC): ICD-10-CM

## 2022-01-11 DIAGNOSIS — I62.03 ACUTE ON CHRONIC INTRACRANIAL SUBDURAL HEMATOMA (HCC): Primary | ICD-10-CM

## 2022-01-11 DIAGNOSIS — R29.90 STROKE-LIKE SYMPTOMS: ICD-10-CM

## 2022-01-11 DIAGNOSIS — G81.94 LEFT HEMIPARESIS (HCC): ICD-10-CM

## 2022-01-11 LAB
ALBUMIN SERPL-MCNC: 3.6 G/DL (ref 3.5–5)
ALBUMIN/GLOB SERPL: 1.3 {RATIO} (ref 1.1–2.2)
ALP SERPL-CCNC: 129 U/L (ref 45–117)
ALT SERPL-CCNC: 51 U/L (ref 12–78)
ANION GAP SERPL CALC-SCNC: 7 MMOL/L (ref 5–15)
AST SERPL-CCNC: 33 U/L (ref 15–37)
BASOPHILS # BLD: 0 K/UL (ref 0–0.1)
BASOPHILS NFR BLD: 1 % (ref 0–1)
BILIRUB SERPL-MCNC: 0.4 MG/DL (ref 0.2–1)
BUN SERPL-MCNC: 16 MG/DL (ref 6–20)
BUN/CREAT SERPL: 13 (ref 12–20)
CALCIUM SERPL-MCNC: 9 MG/DL (ref 8.5–10.1)
CHLORIDE SERPL-SCNC: 105 MMOL/L (ref 97–108)
CO2 SERPL-SCNC: 29 MMOL/L (ref 21–32)
CREAT SERPL-MCNC: 1.25 MG/DL (ref 0.7–1.3)
DIFFERENTIAL METHOD BLD: ABNORMAL
EOSINOPHIL # BLD: 0.1 K/UL (ref 0–0.4)
EOSINOPHIL NFR BLD: 2 % (ref 0–7)
ERYTHROCYTE [DISTWIDTH] IN BLOOD BY AUTOMATED COUNT: 13.7 % (ref 11.5–14.5)
GLOBULIN SER CALC-MCNC: 2.8 G/DL (ref 2–4)
GLUCOSE BLD STRIP.AUTO-MCNC: 83 MG/DL (ref 65–117)
GLUCOSE SERPL-MCNC: 103 MG/DL (ref 65–100)
HCT VFR BLD AUTO: 36.5 % (ref 36.6–50.3)
HGB BLD-MCNC: 11.8 G/DL (ref 12.1–17)
IMM GRANULOCYTES # BLD AUTO: 0 K/UL (ref 0–0.04)
IMM GRANULOCYTES NFR BLD AUTO: 0 % (ref 0–0.5)
INR PPP: 1.2 (ref 0.9–1.1)
LYMPHOCYTES # BLD: 1.6 K/UL (ref 0.8–3.5)
LYMPHOCYTES NFR BLD: 26 % (ref 12–49)
MCH RBC QN AUTO: 30.3 PG (ref 26–34)
MCHC RBC AUTO-ENTMCNC: 32.3 G/DL (ref 30–36.5)
MCV RBC AUTO: 93.6 FL (ref 80–99)
MONOCYTES # BLD: 0.4 K/UL (ref 0–1)
MONOCYTES NFR BLD: 7 % (ref 5–13)
NEUTS SEG # BLD: 3.8 K/UL (ref 1.8–8)
NEUTS SEG NFR BLD: 64 % (ref 32–75)
NRBC # BLD: 0 K/UL (ref 0–0.01)
NRBC BLD-RTO: 0 PER 100 WBC
PLATELET # BLD AUTO: 200 K/UL (ref 150–400)
PMV BLD AUTO: 10.4 FL (ref 8.9–12.9)
POTASSIUM SERPL-SCNC: 3.9 MMOL/L (ref 3.5–5.1)
PROT SERPL-MCNC: 6.4 G/DL (ref 6.4–8.2)
PROTHROMBIN TIME: 11.9 SEC (ref 9–11.1)
RBC # BLD AUTO: 3.9 M/UL (ref 4.1–5.7)
SERVICE CMNT-IMP: NORMAL
SODIUM SERPL-SCNC: 141 MMOL/L (ref 136–145)
TROPONIN-HIGH SENSITIVITY: 11 NG/L (ref 0–76)
WBC # BLD AUTO: 6 K/UL (ref 4.1–11.1)

## 2022-01-11 PROCEDURE — 70496 CT ANGIOGRAPHY HEAD: CPT

## 2022-01-11 PROCEDURE — 96365 THER/PROPH/DIAG IV INF INIT: CPT

## 2022-01-11 PROCEDURE — 82962 GLUCOSE BLOOD TEST: CPT

## 2022-01-11 PROCEDURE — 84484 ASSAY OF TROPONIN QUANT: CPT

## 2022-01-11 PROCEDURE — 36415 COLL VENOUS BLD VENIPUNCTURE: CPT

## 2022-01-11 PROCEDURE — 74011250636 HC RX REV CODE- 250/636: Performed by: NURSE PRACTITIONER

## 2022-01-11 PROCEDURE — 96366 THER/PROPH/DIAG IV INF ADDON: CPT

## 2022-01-11 PROCEDURE — 80053 COMPREHEN METABOLIC PANEL: CPT

## 2022-01-11 PROCEDURE — 74011000250 HC RX REV CODE- 250: Performed by: NURSE PRACTITIONER

## 2022-01-11 PROCEDURE — 70450 CT HEAD/BRAIN W/O DYE: CPT

## 2022-01-11 PROCEDURE — 71045 X-RAY EXAM CHEST 1 VIEW: CPT

## 2022-01-11 PROCEDURE — 93005 ELECTROCARDIOGRAM TRACING: CPT

## 2022-01-11 PROCEDURE — 74011000636 HC RX REV CODE- 636: Performed by: EMERGENCY MEDICINE

## 2022-01-11 PROCEDURE — 65390000012 HC CONDITION CODE 44 OBSERVATION

## 2022-01-11 PROCEDURE — 85025 COMPLETE CBC W/AUTO DIFF WBC: CPT

## 2022-01-11 PROCEDURE — 65660000000 HC RM CCU STEPDOWN

## 2022-01-11 PROCEDURE — 85610 PROTHROMBIN TIME: CPT

## 2022-01-11 PROCEDURE — 74011250636 HC RX REV CODE- 250/636: Performed by: EMERGENCY MEDICINE

## 2022-01-11 PROCEDURE — 74011000250 HC RX REV CODE- 250: Performed by: EMERGENCY MEDICINE

## 2022-01-11 PROCEDURE — 99285 EMERGENCY DEPT VISIT HI MDM: CPT

## 2022-01-11 RX ORDER — ACETAMINOPHEN 650 MG/1
650 SUPPOSITORY RECTAL
Status: DISCONTINUED | OUTPATIENT
Start: 2022-01-11 | End: 2022-01-18 | Stop reason: HOSPADM

## 2022-01-11 RX ORDER — NALOXONE HYDROCHLORIDE 0.4 MG/ML
0.4 INJECTION, SOLUTION INTRAMUSCULAR; INTRAVENOUS; SUBCUTANEOUS AS NEEDED
Status: DISCONTINUED | OUTPATIENT
Start: 2022-01-11 | End: 2022-01-18 | Stop reason: HOSPADM

## 2022-01-11 RX ORDER — METOPROLOL TARTRATE 25 MG/1
12.5 TABLET, FILM COATED ORAL DAILY
Status: DISCONTINUED | OUTPATIENT
Start: 2022-01-12 | End: 2022-01-18 | Stop reason: HOSPADM

## 2022-01-11 RX ORDER — ONDANSETRON 2 MG/ML
4 INJECTION INTRAMUSCULAR; INTRAVENOUS
Status: DISCONTINUED | OUTPATIENT
Start: 2022-01-11 | End: 2022-01-18 | Stop reason: HOSPADM

## 2022-01-11 RX ORDER — ACETAMINOPHEN 325 MG/1
650 TABLET ORAL
Status: DISCONTINUED | OUTPATIENT
Start: 2022-01-11 | End: 2022-01-18 | Stop reason: HOSPADM

## 2022-01-11 RX ORDER — LACOSAMIDE 50 MG/1
200 TABLET ORAL 2 TIMES DAILY
Status: DISCONTINUED | OUTPATIENT
Start: 2022-01-12 | End: 2022-01-18 | Stop reason: HOSPADM

## 2022-01-11 RX ORDER — LEVETIRACETAM 500 MG/1
1500 TABLET ORAL 2 TIMES DAILY
Status: DISCONTINUED | OUTPATIENT
Start: 2022-01-12 | End: 2022-01-12

## 2022-01-11 RX ADMIN — NICARDIPINE HYDROCHLORIDE 12.5 MG/HR: 2.5 INJECTION, SOLUTION INTRAVENOUS at 17:37

## 2022-01-11 RX ADMIN — NICARDIPINE HYDROCHLORIDE 12.5 MG/HR: 2.5 INJECTION, SOLUTION INTRAVENOUS at 19:28

## 2022-01-11 RX ADMIN — IOPAMIDOL 100 ML: 755 INJECTION, SOLUTION INTRAVENOUS at 12:05

## 2022-01-11 RX ADMIN — SODIUM CHLORIDE 5 MG/HR: 9 INJECTION, SOLUTION INTRAVENOUS at 23:05

## 2022-01-11 RX ADMIN — NICARDIPINE HYDROCHLORIDE 5 MG/HR: 2.5 INJECTION, SOLUTION INTRAVENOUS at 17:04

## 2022-01-11 NOTE — ED TRIAGE NOTES
Signs and symptoms: left leg numbness; patient has residual from CVA one month PTA  VAN score: Negative  Last Known Well: 2100 last night  Blood Glucose (EMS acceptable): 186   Blood Pressure (EMS acceptable): 143/57  Anticoagulants: no     Code Stroke Level 2 initiated at this time. SBAR handoff provided to Siddharth Mai RN.

## 2022-01-11 NOTE — CONSULTS
Ct shows evolution of chronic sdh. No significant mass effect. Nothing neurosurgical to do at this time.   Can be followed with serial imaging

## 2022-01-11 NOTE — ED PROVIDER NOTES
Date of Service:  1/11/2022    Patient:  Bear Little    Chief Complaint:  Numbness       HPI:  Bear Little is a 80 y.o.  male who presents for evaluation of numbness. Patient with history of multiple strokes in the past including recent subdural with residual right-sided facial droop and left-sided weakness who apparently walks at home was noted to have some increased weakness and what he describes as right-sided lower extremity numbness starting last night. Questionable whether the numbness has been intermittent since the previous stroke that was originally reported however patient denies this noting that it started last night. Per family, patient walks with a walker, per EMS he could barely stand when they got there. Patient denies any acute pain or other problems. Residual right-sided facial droop and left-sided weakness from previous stroke.            Past Medical History:   Diagnosis Date    CAD (coronary artery disease)     Hx of completed stroke     Hypercholesteremia     Hypertension     Seizure disorder (HCC)     Subdural hematoma (HCC)        Past Surgical History:   Procedure Laterality Date    HX CORONARY STENT PLACEMENT      MA CARDIAC SURG PROCEDURE UNLIST      bypass         Family History:   Problem Relation Age of Onset    Hypertension Father        Social History     Socioeconomic History    Marital status:      Spouse name: Not on file    Number of children: Not on file    Years of education: Not on file    Highest education level: Not on file   Occupational History    Not on file   Tobacco Use    Smoking status: Never Smoker    Smokeless tobacco: Never Used   Substance and Sexual Activity    Alcohol use: Never    Drug use: Never    Sexual activity: Not on file   Other Topics Concern    Not on file   Social History Narrative    Not on file     Social Determinants of Health     Financial Resource Strain:     Difficulty of Paying Living Expenses: Not on file Food Insecurity:     Worried About Running Out of Food in the Last Year: Not on file    Griselda of Food in the Last Year: Not on file   Transportation Needs:     Lack of Transportation (Medical): Not on file    Lack of Transportation (Non-Medical): Not on file   Physical Activity:     Days of Exercise per Week: Not on file    Minutes of Exercise per Session: Not on file   Stress:     Feeling of Stress : Not on file   Social Connections:     Frequency of Communication with Friends and Family: Not on file    Frequency of Social Gatherings with Friends and Family: Not on file    Attends Cheondoism Services: Not on file    Active Member of [employer] or Organizations: Not on file    Attends Club or Organization Meetings: Not on file    Marital Status: Not on file   Intimate Partner Violence:     Fear of Current or Ex-Partner: Not on file    Emotionally Abused: Not on file    Physically Abused: Not on file    Sexually Abused: Not on file   Housing Stability:     Unable to Pay for Housing in the Last Year: Not on file    Number of [place] in the Last Year: Not on file    Unstable Housing in the Last Year: Not on file         ALLERGIES: Morphine    Review of Systems   Neurological: Positive for facial asymmetry, weakness and numbness. All other systems reviewed and are negative. There were no vitals filed for this visit. Physical Exam  Vitals and nursing note reviewed. Constitutional:       Appearance: Normal appearance. HENT:      Head: Normocephalic and atraumatic. Mouth/Throat:      Mouth: Mucous membranes are moist.   Eyes:      General: No scleral icterus. Pupils: Pupils are equal, round, and reactive to light. Cardiovascular:      Rate and Rhythm: Normal rate. Pulmonary:      Effort: Pulmonary effort is normal.   Abdominal:      General: Abdomen is flat. Musculoskeletal:         General: No tenderness. Normal range of motion. Skin:     General: Skin is warm. Capillary Refill: Capillary refill takes less than 2 seconds. Neurological:      Mental Status: He is alert and oriented to person, place, and time. Cranial Nerves: Cranial nerve deficit present. Sensory: Sensory deficit present. Motor: Weakness present. Comments: Right sided facial droop, lle weakness. Subjective right le decreased sensation   Psychiatric:         Mood and Affect: Mood normal.          [facility]  ED Course as of 01/11/22 1334   Tue Jan 11, 7303   1210 Systolic less than 926 [GG]      ED Course User Index  [GG] Sam Willams DO     VITAL SIGNS:  Patient Vitals for the past 4 hrs:   Temp Pulse Resp BP SpO2   01/11/22 1300  61 15 (!) 115/95 96 %   01/11/22 1253  (!) 57 16 (!) 153/79 96 %   01/11/22 1239  60 14 (!) 142/65 95 %   01/11/22 1230  60 14 (!) 158/80 95 %   01/11/22 1219  60 13 (!) 153/68 99 %   01/11/22 1204  61 14 (!) 153/69 97 %   01/11/22 1124 97.4 °F (36.3 °C) 68 18 (!) 143/57 95 %         LABS:  Recent Results (from the past 6 hour(s))   CBC WITH AUTOMATED DIFF    Collection Time: 01/11/22 12:18 PM   Result Value Ref Range    WBC 6.0 4.1 - 11.1 K/uL    RBC 3.90 (L) 4.10 - 5.70 M/uL    HGB 11.8 (L) 12.1 - 17.0 g/dL    HCT 36.5 (L) 36.6 - 50.3 %    MCV 93.6 80.0 - 99.0 FL    MCH 30.3 26.0 - 34.0 PG    MCHC 32.3 30.0 - 36.5 g/dL    RDW 13.7 11.5 - 14.5 %    PLATELET 692 403 - 888 K/uL    MPV 10.4 8.9 - 12.9 FL    NRBC 0.0 0.0  WBC    ABSOLUTE NRBC 0.00 0.00 - 0.01 K/uL    NEUTROPHILS 64 32 - 75 %    LYMPHOCYTES 26 12 - 49 %    MONOCYTES 7 5 - 13 %    EOSINOPHILS 2 0 - 7 %    BASOPHILS 1 0 - 1 %    IMMATURE GRANULOCYTES 0 0 - 0.5 %    ABS. NEUTROPHILS 3.8 1.8 - 8.0 K/UL    ABS. LYMPHOCYTES 1.6 0.8 - 3.5 K/UL    ABS. MONOCYTES 0.4 0.0 - 1.0 K/UL    ABS. EOSINOPHILS 0.1 0.0 - 0.4 K/UL    ABS. BASOPHILS 0.0 0.0 - 0.1 K/UL    ABS. IMM.  GRANS. 0.0 0.00 - 0.04 K/UL    DF AUTOMATED     METABOLIC PANEL, COMPREHENSIVE    Collection Time: 01/11/22 12:18 PM Result Value Ref Range    Sodium 141 136 - 145 mmol/L    Potassium 3.9 3.5 - 5.1 mmol/L    Chloride 105 97 - 108 mmol/L    CO2 29 21 - 32 mmol/L    Anion gap 7 5 - 15 mmol/L    Glucose 103 (H) 65 - 100 mg/dL    BUN 16 6 - 20 MG/DL    Creatinine 1.25 0.70 - 1.30 MG/DL    BUN/Creatinine ratio 13 12 - 20      GFR est AA >60 >60 ml/min/1.73m2    GFR est non-AA 55 (L) >60 ml/min/1.73m2    Calcium 9.0 8.5 - 10.1 MG/DL    Bilirubin, total PENDING MG/DL    ALT (SGPT) 51 12 - 78 U/L    AST (SGOT) 33 15 - 37 U/L    Alk. phosphatase PENDING U/L    Protein, total PENDING g/dL    Albumin 3.6 3.5 - 5.0 g/dL    Globulin PENDING g/dL    A-G Ratio PENDING     PROTHROMBIN TIME + INR    Collection Time: 01/11/22 12:18 PM   Result Value Ref Range    INR 1.2 (H) 0.9 - 1.1      Prothrombin time 11.9 (H) 9.0 - 11.1 sec   GLUCOSE, POC    Collection Time: 01/11/22 12:30 PM   Result Value Ref Range    Glucose (POC) 83 65 - 117 mg/dL    Performed by Letha Tabares (RN)    EKG, 12 LEAD, INITIAL    Collection Time: 01/11/22 12:32 PM   Result Value Ref Range    Ventricular Rate 65 BPM    Atrial Rate 65 BPM    P-R Interval 148 ms    QRS Duration 124 ms    Q-T Interval 428 ms    QTC Calculation (Bezet) 445 ms    Calculated R Axis -26 degrees    Calculated T Axis -163 degrees    Diagnosis       Sinus rhythm with occasional premature ventricular complexes  Nonspecific intraventricular conduction delay  Nonspecific T wave abnormality  Abnormal ECG  When compared with ECG of 17-DEC-2021 10:42,  premature ventricular complexes are now present  Non-specific change in ST segment in Anterior leads          IMAGING:  XR CHEST PORT   Final Result   Bibasilar atelectasis. CTA CODE NEURO HEAD AND NECK W CONT   Final Result   CTA Head:   1. No evidence of flow-limiting stenosis or aneurysm.  Scattered atherosclerotic   disease as above, unchanged from prior exam.   2. Redemonstrated acute on chronic right cerebral convexity subdural hematoma,   with mild local mass effect, but no midline shift or herniation. CTA Neck:   1. No evidence of flow-limiting stenosis. 2. Mild stenosis (less than 50% by NASCET criteria) of the proximal bilateral   internal carotid arteries, unchanged. CT CODE NEURO HEAD WO CONTRAST   Final Result   Chronic right convexity subdural hematoma has not changed in size but now has an   acute component. Local mass effect is stable. The findings were called to Dr. Aury Branham on 1/11/2022 at 12:12 PM by myself. [phone]            CT HEAD WO CONT    (Results Pending)         Medications During Visit:  Medications   niCARdipine (CARDENE) 25 mg in 0.9% sodium chloride 250 mL infusion (has no administration in time range)   iopamidoL (ISOVUE-370) 76 % injection 100 mL (100 mL IntraVENous Given 1/11/22 1205)         DECISION MAKING:  Lance Verde is a 80 y.o. male who comes in as above. Here, diagnostics as above. Patient has an acute on chronic subdural.  Stroke alert was initiated. This time patient's blood pressure is within the acceptable range. I spoke with Dr. Dawne Aase who notes that the patient should come to [facility], no acute intervention at this time. No need for ICU bed. Patient does not need emergent transfer. Patient's been accepted by Dr. Gene Perry. Patient remains stable      IMPRESSION:  1. Acute on chronic intracranial subdural hematoma (HCC)    2. Stroke-like symptoms        DISPOSITION:  Transferred to Another Facility      Current Discharge Medication List           Follow-up Information    None           The patient is asked to follow-up with their primary care provider in the next several days. They are to call tomorrow for an appointment. The patient is asked to return promptly for any increased concerns or worsening of symptoms. They can return to this emergency department or any other emergency department.     Procedures normal... Well appearing, well nourished, awake, alert, oriented to person, place, time/situation and in no apparent distress.

## 2022-01-11 NOTE — ED NOTES
Still waiting for bed placement at [facility]; repositioned in the bed for comfort.  Has call bell in hand if needed

## 2022-01-11 NOTE — ED NOTES
TRANSFER - OUT REPORT:    Verbal report given to paola early rn (name) on Palmer Block  being transferred to [place] [id number] (unit) for routine progression of care       Report consisted of patients Situation, Background, Assessment and   Recommendations(SBAR). Information from the following report(s) SBAR was reviewed with the receiving nurse. Lines:   Peripheral IV 01/11/22 Left Antecubital (Active)   Site Assessment Clean, dry, & intact 01/11/22 1206   Phlebitis Assessment 0 01/11/22 1206   Infiltration Assessment 0 01/11/22 1206   Dressing Status Clean, dry, & intact 01/11/22 1206   Dressing Type Transparent 01/11/22 1206   Hub Color/Line Status Pink 01/11/22 1206   Action Taken Blood drawn 01/11/22 1206        Opportunity for questions and clarification was provided.       Patient transported with:   Monitor

## 2022-01-11 NOTE — ED NOTES
Patient still very talkative about a past hospital visit. \"I am so mad \", involving a health care provider and his medications. Will ask the granddaughter if she knows about the patient's concerns.  Rate changed on cardene drip per orders

## 2022-01-11 NOTE — ED NOTES
Spoke with [facility] icu nurse for verification at initiation of his cardene drip. Patient states that his left leg feels \"a lot less numb\" and has much more movement in it. He is actively moving about the bed. No

## 2022-01-12 ENCOUNTER — APPOINTMENT (OUTPATIENT)
Dept: CT IMAGING | Age: 87
DRG: 064 | End: 2022-01-12
Attending: FAMILY MEDICINE
Payer: MEDICARE

## 2022-01-12 PROBLEM — I62.00 SUBDURAL BLEEDING (HCC): Status: ACTIVE | Noted: 2022-01-12

## 2022-01-12 LAB
ANION GAP SERPL CALC-SCNC: 7 MMOL/L (ref 5–15)
ATRIAL RATE: 65 BPM
BUN SERPL-MCNC: 12 MG/DL (ref 6–20)
BUN/CREAT SERPL: 14 (ref 12–20)
CALCIUM SERPL-MCNC: 8.6 MG/DL (ref 8.5–10.1)
CALCULATED R AXIS, ECG10: -26 DEGREES
CALCULATED T AXIS, ECG11: -163 DEGREES
CHLORIDE SERPL-SCNC: 109 MMOL/L (ref 97–108)
CO2 SERPL-SCNC: 24 MMOL/L (ref 21–32)
CREAT SERPL-MCNC: 0.87 MG/DL (ref 0.7–1.3)
DIAGNOSIS, 93000: NORMAL
ERYTHROCYTE [DISTWIDTH] IN BLOOD BY AUTOMATED COUNT: 13.6 % (ref 11.5–14.5)
GLUCOSE BLD STRIP.AUTO-MCNC: 171 MG/DL (ref 65–117)
GLUCOSE SERPL-MCNC: 96 MG/DL (ref 65–100)
HCT VFR BLD AUTO: 39.9 % (ref 36.6–50.3)
HGB BLD-MCNC: 13.2 G/DL (ref 12.1–17)
MCH RBC QN AUTO: 30.7 PG (ref 26–34)
MCHC RBC AUTO-ENTMCNC: 33.1 G/DL (ref 30–36.5)
MCV RBC AUTO: 92.8 FL (ref 80–99)
NRBC # BLD: 0 K/UL (ref 0–0.01)
NRBC BLD-RTO: 0 PER 100 WBC
P-R INTERVAL, ECG05: 148 MS
PLATELET # BLD AUTO: 209 K/UL (ref 150–400)
PMV BLD AUTO: 9.8 FL (ref 8.9–12.9)
POTASSIUM SERPL-SCNC: 3.5 MMOL/L (ref 3.5–5.1)
Q-T INTERVAL, ECG07: 428 MS
QRS DURATION, ECG06: 124 MS
QTC CALCULATION (BEZET), ECG08: 445 MS
RBC # BLD AUTO: 4.3 M/UL (ref 4.1–5.7)
SERVICE CMNT-IMP: ABNORMAL
SODIUM SERPL-SCNC: 140 MMOL/L (ref 136–145)
VENTRICULAR RATE, ECG03: 65 BPM
WBC # BLD AUTO: 8.1 K/UL (ref 4.1–11.1)

## 2022-01-12 PROCEDURE — 97161 PT EVAL LOW COMPLEX 20 MIN: CPT

## 2022-01-12 PROCEDURE — 74011250637 HC RX REV CODE- 250/637: Performed by: NURSE PRACTITIONER

## 2022-01-12 PROCEDURE — 97535 SELF CARE MNGMENT TRAINING: CPT

## 2022-01-12 PROCEDURE — 92522 EVALUATE SPEECH PRODUCTION: CPT

## 2022-01-12 PROCEDURE — 97112 NEUROMUSCULAR REEDUCATION: CPT

## 2022-01-12 PROCEDURE — 80048 BASIC METABOLIC PNL TOTAL CA: CPT

## 2022-01-12 PROCEDURE — 95816 EEG AWAKE AND DROWSY: CPT | Performed by: NURSE PRACTITIONER

## 2022-01-12 PROCEDURE — 92610 EVALUATE SWALLOWING FUNCTION: CPT

## 2022-01-12 PROCEDURE — 70450 CT HEAD/BRAIN W/O DYE: CPT

## 2022-01-12 PROCEDURE — 97165 OT EVAL LOW COMPLEX 30 MIN: CPT

## 2022-01-12 PROCEDURE — 82962 GLUCOSE BLOOD TEST: CPT

## 2022-01-12 PROCEDURE — 65390000012 HC CONDITION CODE 44 OBSERVATION

## 2022-01-12 PROCEDURE — 65660000000 HC RM CCU STEPDOWN

## 2022-01-12 PROCEDURE — 96366 THER/PROPH/DIAG IV INF ADDON: CPT

## 2022-01-12 PROCEDURE — 97530 THERAPEUTIC ACTIVITIES: CPT

## 2022-01-12 PROCEDURE — 74011250637 HC RX REV CODE- 250/637: Performed by: FAMILY MEDICINE

## 2022-01-12 PROCEDURE — 36415 COLL VENOUS BLD VENIPUNCTURE: CPT

## 2022-01-12 PROCEDURE — 85027 COMPLETE CBC AUTOMATED: CPT

## 2022-01-12 RX ORDER — LEVETIRACETAM 500 MG/1
1500 TABLET ORAL 2 TIMES DAILY
Status: DISCONTINUED | OUTPATIENT
Start: 2022-01-12 | End: 2022-01-18 | Stop reason: HOSPADM

## 2022-01-12 RX ORDER — ISOSORBIDE MONONITRATE 60 MG/1
120 TABLET, EXTENDED RELEASE ORAL
Status: DISCONTINUED | OUTPATIENT
Start: 2022-01-12 | End: 2022-01-18 | Stop reason: HOSPADM

## 2022-01-12 RX ORDER — MELATONIN
2000 2 TIMES DAILY
Status: DISCONTINUED | OUTPATIENT
Start: 2022-01-12 | End: 2022-01-18 | Stop reason: HOSPADM

## 2022-01-12 RX ORDER — PHENYTOIN SODIUM 100 MG/1
100 CAPSULE, EXTENDED RELEASE ORAL
Status: DISCONTINUED | OUTPATIENT
Start: 2022-01-12 | End: 2022-01-18 | Stop reason: HOSPADM

## 2022-01-12 RX ORDER — ATORVASTATIN CALCIUM 40 MG/1
40 TABLET, FILM COATED ORAL DAILY
Status: DISCONTINUED | OUTPATIENT
Start: 2022-01-12 | End: 2022-01-18 | Stop reason: HOSPADM

## 2022-01-12 RX ADMIN — Medication 2000 UNITS: at 09:35

## 2022-01-12 RX ADMIN — LEVETIRACETAM 1500 MG: 500 TABLET, FILM COATED ORAL at 16:34

## 2022-01-12 RX ADMIN — LEVETIRACETAM 1500 MG: 500 TABLET, FILM COATED ORAL at 03:26

## 2022-01-12 RX ADMIN — PHENYTOIN SODIUM 100 MG: 100 CAPSULE ORAL at 20:37

## 2022-01-12 RX ADMIN — ISOSORBIDE MONONITRATE 120 MG: 60 TABLET, EXTENDED RELEASE ORAL at 09:35

## 2022-01-12 RX ADMIN — METOPROLOL TARTRATE 12.5 MG: 25 TABLET, FILM COATED ORAL at 09:33

## 2022-01-12 RX ADMIN — ATORVASTATIN CALCIUM 40 MG: 40 TABLET, FILM COATED ORAL at 09:35

## 2022-01-12 RX ADMIN — LACOSAMIDE 200 MG: 50 TABLET, FILM COATED ORAL at 09:35

## 2022-01-12 RX ADMIN — LACOSAMIDE 200 MG: 50 TABLET, FILM COATED ORAL at 18:01

## 2022-01-12 RX ADMIN — Medication 2000 UNITS: at 20:37

## 2022-01-12 NOTE — PROGRESS NOTES
Transition of Care Plan: goal-IPR (per therapy and MD recommendation)-referrals pending with [facility] and [facility]    RUR: 18% moderate    PCP F/U:John Bustos MD    Disposition: IPR    Transportation: BLS    Main Contact: Spouse: Janice Simmons [phone]    0301: Attempted to meet patient at bedside. However, code stroke was called on patient. Patient now has other staff at bedside and unable to complete initial assessment. Telephone call to patient's wife, Janice Simmons. Patient lives with spouse and son Awilda Ying. Was recently discharged home from [facility] late in December. Was discharged with home health with [facility]. Prior to [facility], patient was discharged to the [facility] in [place] in October. Patient left AMA. Spouse states patient was walking well with his walker at home. Does need assistance with ADLs. States that her son Awilda Ying does live there, but is gone most of the day. [facility] Noted therapy recommendations of IPR. Discussed with MD and possible challenge with patient's insurance. Would still like to try IPR first. Spouse is agreeable. Would like referrals sent to [facility] and [facility]. Phillip Garcia RN, CRM    Reason for Readmission:    Acute on chronic subdural hematoma          RUR Score/Risk Level:    18% moderate     PCP: First and Last name:  Deedee Uribe MD   [facility]   Are you a current patient: Yes/No: yes   Approximate date of last visit:    Can you participate in a virtual visit with your PCP: no    Is a Care Conference indicated: no      Did you attend your follow up appointment (s): If not, why not: No-Not scheduled. Patient was suppose to call back for an appointment per chart review. Resources/supports as identified by patient/family:  Was utilizing [employer]. Does have some family support. Support with the [place].         Top Challenges facing patient (as identified by patient/family and CM):  Spouse concerned patient will never get back to baseline at 100% Finances/Medication cost?    Has medicare gets assistance from the [place]. No issues reported   Transportation   BLS     Support system or lack thereof? Family   Living arrangements? Lives with spouse         Self-care/ADLs/Cognition? A&O x 4 (per spouse). Uses walker. Needs assistance with ADLs        Current Advanced Directive/Advance Care Plan:  Full Code           Plan for utilizing home health:   Not indicated             Transition of Care Plan:    Based on readmission, the patient's previous Plan of Care   has been evaluated and/or modified. The current Transition of Care Plan is: Likely IPR      Care Management Interventions  PCP Verified by CM:  Yes  Mode of Transport at Discharge: BLS  Transition of Care Consult (CM Consult): Acute Rehab  Physical Therapy Consult: Yes  Occupational Therapy Consult: Yes  Support Systems: Spouse/Significant Other,Child(kim)  Confirm Follow Up Transport: Other (see comment) (BLS)  Discharge Location  Discharge Placement: Rehab hospital/unit acute    Readmission Assessment  Number of days since last admission?: 8-30 days  Previous disposition: Acute Rehab  Who is being interviewed?: Caregiver  What was the patient's/caregiver's perception as to why they think they needed to return back to the hospital?: Other (Comment) (new/worsening condition)  Did you visit your Primary Care Physician after you left the hospital, before you returned this time?: No  Why weren't you able to visit your PCP?: Did not have an appointment  Did you see a specialist, such as Cardiac, Pulmonary, Orthopedic Physician, etc. after you left the hospital?: No  Who advised the patient to return to the hospital?: Self-referral  Does the patient report anything that got in the way of taking their medications?: No  In our efforts to provide the best possible care to you and others like you, can you think of anything that we could have done to help you after you left the hospital the first time, so that you might not have needed to return so soon?: Other (Comment) (new/worsening condition)

## 2022-01-12 NOTE — ED NOTES
Bedside shift change report given to tiffany gutierrez  (oncoming nurse) by tiffany wagoner  (offgoing nurse). Report included the following information SBAR.

## 2022-01-12 NOTE — PROGRESS NOTES
Problem: Mobility Impaired (Adult and Pediatric)  Goal: *Acute Goals and Plan of Care (Insert Text)  Description: FUNCTIONAL STATUS PRIOR TO ADMISSION: Patient questionable historian. Initially pt verbalized mobilizing without a walker, but later with a walker and assistance of 1 person. HOME SUPPORT PRIOR TO ADMISSION: The patient lived with wife and son and has additional family nearby. Physical Therapy Goals  Initiated 1/12/2022  1. Patient will move from supine to sit and sit to supine and scoot up and down in bed with moderate assistance  within 7 day(s). 2.  Patient will transfer from bed to chair and chair to bed with moderate assistance x2 people using the least restrictive device within 7 day(s). 3.  Patient will perform sit to stand with moderate assistance x2 people  within 7 day(s). 4.  Patient will ambulate with maximal assistance x2 people for 20 feet with the least restrictive device within 7 day(s). Outcome: Not Met   PHYSICAL THERAPY EVALUATION  Patient: Ignacio Rios (33 y.o. male)  Date: 1/12/2022  Primary Diagnosis: Subdural bleeding ([address] [I62.00]        Precautions:   Fall    ASSESSMENT  Pt was admitted for a CVA work up. 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 Pt has a hx of prior CVA. Patient questionable historian. Initially pt verbalized at baseline mobilizing without a walker, but later with a walker and assistance of 1 person. Pt presents with decreased AROM (LLE >RLE), impaired sensation (R side), decreased strength (L LE MMT:hip flexion 2/5, knee extension 2/5), dysarthria and impaired balance. Pt required minAx1 person for rolling in bed and a maxA x2 people supine <>sit. Demonstrated a R sided lean seated at EOB with intermittent Nataliya to maintain sitting balance, able to correct and shift to midline with verbal and visual cues. Educated pt on BEFAST acronym for signs/sypmtoms of stroke. Recommending IPR upon discharge.        Current Level of Function Impacting Discharge (mobility/balance): maxA x2 for supine to sit    Functional Outcome Measure: The patient scored 2/56 on the Avalos outcome measure which is indicative of high fall risk. Other factors to consider for discharge: questionable historian, lives with family, hx of CVA, fall risk     Patient will benefit from skilled therapy intervention to address the above noted impairments. PLAN :  Recommendations and Planned Interventions: bed mobility training, transfer training, gait training, therapeutic exercises, neuromuscular re-education, patient and family training/education, and therapeutic activities      Frequency/Duration: Patient will be followed by physical therapy:  5 times a week to address goals. Recommendation for discharge: (in order for the patient to meet his/her long term goals)  Therapy 3 hours per day 5-7 days per week    This discharge recommendation:  Has not yet been discussed the attending provider and/or case management    IF patient discharges home will need the following DME: to be determined (TBD)         SUBJECTIVE:   Patient stated my wife was in this same room last time she was here.     OBJECTIVE DATA SUMMARY:   HISTORY:    Past Medical History:   Diagnosis Date    CAD (coronary artery disease)     Hx of completed stroke     Hypercholesteremia     Hypertension     Seizure disorder ([facility] [address]     Subdural hematoma (H[facility])      Past Surgical History:   Procedure Laterality Date    HX CORONARY STENT PLACEMENT      FL CARDIAC SURG PROCEDURE UNLIST      bypass           Home Situation  Home Environment: Private residence  # Steps to Enter: 4  Rails to Enter: Yes  Hand Rails : Left  One/Two Story Residence: One story  Living Alone: No  Support Systems: Spouse/Significant Other,Child(kim)  Current DME Used/Available at Home: None,Walker, rolling  Tub or Shower Type: Tub/Shower combination    EXAMINATION/PRESENTATION/DECISION MAKING:   Critical Behavior:  Neurologic State: Alert  Orientation Level: Oriented to person,Oriented to place,Oriented to situation,Oriented to time  Cognition: Follows commands       Range Of Motion:  AROM: Grossly decreased, non-functional (L side )                       Strength:    Strength: Grossly decreased, non-functional (LLE> RLE)                    Tone & Sensation:   Tone: Normal              Sensation: Impaired (diminished L side)               Coordination:  Coordination: Grossly decreased, non-functional  Vision:      Functional Mobility:  Bed Mobility:  Rolling: Minimum assistance  Supine to Sit: Assist x2;Maximum assistance  Sit to Supine: Assist x2;Maximum assistance  Scooting: Maximum assistance;Assist x2  Transfers:                             Balance:   Sitting: Impaired  Sitting - Static: Fair (occasional)  Sitting - Dynamic: Poor (constant support)  Ambulation/Gait Training:       Functional Measure:  Avalos Balance Test:    Sitting to Standin  Standing Unsupported: 0  Sitting with Back Unsupported: 2  Standing to Sittin  Transfers: 0  Standing Unsupported with Eyes Closed: 0  Standing Unsupported with Feet Together: 0  Reach Forward with Outstretched Arm: 0   Object: 0  Turn to Look Over Shoulders: 0  Turn 360 Degrees: 0  Alternate Foot on Step/Stool: 0  Standing Unsupported One Foot in Front: 0  Stand on One Le  Total: 2         56=Maximum possible score;   0-20=High fall risk  21-40=Moderate fall risk   41-56=Low fall risk                Physical Therapy Evaluation Charge Determination   History Examination Presentation Decision-Making   HIGH Complexity :3+ comorbidities / personal factors will impact the outcome/ POC  HIGH Complexity : 4+ Standardized tests and measures addressing body structure, function, activity limitation and / or participation in recreation  LOW Complexity : Stable, uncomplicated  Other outcome measures Avalos  HIGH       Based on the above components, the patient evaluation is determined to be of the following complexity level: LOW       Activity Tolerance:   Fair    After treatment patient left in no apparent distress:   Call bell within reach and Side rails x 3, bed in chair position    COMMUNICATION/EDUCATION:   The patients plan of care was discussed with: Occupational therapist and Registered nurse. Educated pt on BEFAST acronym for signs/sypmtoms of stroke. Fall prevention education was provided and the patient/caregiver indicated understanding., Patient/family have participated as able in goal setting and plan of care. , and Patient/family agree to work toward stated goals and plan of care. Thank you for this referral.  Josemanuel Dickerson, SPT   Time Calculation: 34 mins      Regarding student involvement in patient care:  A student participated in this treatment session. Per CMS Medicare statements and APTA guidelines I certify that the following was true:  1. I was present and directly observed the entire session. 2. I made all skilled judgments and clinical decisions regarding care. 3. I am the practitioner responsible for assessment, treatment, and documentation.

## 2022-01-12 NOTE — ED NOTES
Patient resting on the stretcher. V/S reassessed. Call bell within reach; will continue to monitor. Assisted patient with using the urinal again to void.

## 2022-01-12 NOTE — PROGRESS NOTES
Problem: Self Care Deficits Care Plan (Adult)  Goal: *Acute Goals and Plan of Care (Insert Text)  Description: FUNCTIONAL STATUS PRIOR TO ADMISSION: Patient was modified independent using a rolling walker for functional mobility. Per patient, recently has been performing BADLs at mod I - min assist level. Patient with recent admission 12/10-12/14 for L lacunar infarct w/ residual R side weakness and dysarthria. HOME SUPPORT: Patient reports living with his wife (also has mobility issues and uses cane / RW) and son (who works 10 hour shifts). Occupational Therapy Goals  Initiated 1/12/2022   1. Patient will perform EOB grooming with supervision/set-up within 7 day(s). 2.  Patient will perform upper body dressing with supervision/set-up within 7 day(s). 3.  Patient will perform lower body dressing with minimal assistance/contact guard assist using RW prn within 7 day(s). 4.  Patient will perform toilet transfers to [facility] with minimal assistance x2 using RW within 7 day(s). 5.  Patient will perform all aspects of toileting with minimal assistance/contact guard assist using RW prn within 7 day(s). 6.  Patient will participate in upper extremity therapeutic exercise/activities with supervision/set-up within 7 day(s). 7.  Patient will utilize fall prevention techniques during functional activities with verbal cues within 7 day(s). 8.  Patient will improve their Fugl Goff score by 5 points in prep for ADLs within 7 days. Outcome: Progressing Towards Goal   OCCUPATIONAL THERAPY EVALUATION  Patient: Sean Grier (9 y.o. male)  Date: 1/12/2022  Primary Diagnosis: Subdural bleeding (HCC) [I62.00]       Precautions: Fall    ASSESSMENT  Based on the objective data described below, the patient presents with generalized weakness (LUE > RUE), impaired functional mobility and balance, decreased activity tolerance, decreased midline orientation, and impaired motor planning s/p admission for chronic SDH.  Note patient also with residual R-side weakness and dysarthria (still able to understand most all speech) from CVA in December. This session, patient initiating mobility to EOB with min-mod assist x2, then demonstrating fair sitting balance with increasing posterior lean as fatiguing. Patient standing from EOB with min assist x2 and RW support, maintaining B knees in slight flexion. Patient requiring max multimodal cues for sequencing, motor planning, and midline with fluctuating mod-max assist x2 to take several steps to chair. Patient excited by improvement in LUE, able to raise nearly to shoulder height. With set-up, patient self-feeding in chair at end of session and chair alarm active. Patient well below mod I baseline at this time - for best outcome, recommend discharge to [facility]. *Returned after 90 minutes to assist patient back to chair. Patient hypotensive and attempting to speak but all words slurring and unable to decipher any words. Patient with completely flaccid LUE (would be [employer] score of 0 vs 43 this morning) and using RUE to repeatedly point to the left side of his face. When asked if he was numb on L face, patient nodding \"yes. \" Code Stroke call due to drastic change in presentation (also note imaging earlier interpreted as acute on chronic SDH, since changed to chronic SDH) and multiple symptoms noted. Three person sliding transfer to return patient from chair to bed. Staff arriving for code - slight L finger movements and increased arousal after approx 5-7 minutes. Patient left in care of Code Stroke team.    Current Level of Function Impacting Discharge (ADLs/self-care): SBA - max A    Functional Outcome Measure: The patient scored Total A-D  Total A-D (Motor Function): 43/66 on the Fugl-Goff Assessment which is indicative of moderate impairment in upper extremity functional status.      Other factors to consider for discharge: mod I at baseline     Patient will benefit from skilled therapy intervention to address the above noted impairments. PLAN :  Recommendations and Planned Interventions: self care training, functional mobility training, therapeutic exercise, balance training, visual/perceptual training, therapeutic activities, endurance activities, neuromuscular re-education, patient education, and home safety training    Frequency/Duration: Patient will be followed by occupational therapy 5 times a week to address goals. Recommendation for discharge: (in order for the patient to meet his/her long term goals)  Therapy 3 hours per day 5-7 days per week    This discharge recommendation:  Has not yet been discussed the attending provider and/or case management    IF patient discharges home will need the following DME: TBD       SUBJECTIVE:   Patient stated let's try it.     OBJECTIVE DATA SUMMARY:   HISTORY:   Past Medical History:   Diagnosis Date    CAD (coronary artery disease)     Hx of completed stroke     Hypercholesteremia     Hypertension     Seizure disorder ([facility] [address]     Subdural hematoma (HCC)      Past Surgical History:   Procedure Laterality Date    HX CORONARY STENT PLACEMENT      [place] CARDIAC SURG PROCEDURE UNLIST      bypass     Expanded or extensive additional review of patient history:     Home Situation  Home Environment: Private residence  # Steps to Enter: 4  Rails to Enter: Yes  Hand Rails : Left  One/Two Story Residence: One story  Living Alone: No  Support Systems: Spouse/Significant Other,Child(kim)  Current DME Used/Available at Home: None,Walker, rolling  Tub or Shower Type: Tub/Shower combination    Hand dominance:     EXAMINATION OF PERFORMANCE DEFICITS:  Cognitive/Behavioral Status:  Neurologic State: Alert  Orientation Level: Oriented X4  Cognition: Follows commands  Perception: Cues to maintain midline in sitting;Verbal;Visual;Tactile  Perseveration: No perseveration noted  Safety/Judgement: Decreased insight into deficits; Fall prevention;Decreased awareness of environment    Skin:     Edema:     Hearing:       Vision/Perceptual:    Tracking: Able to track stimulus in all quadrants w/o difficulty (occasional overshooting of target)      Range of Motion:  AROM: Generally decreased, functional    Strength:  Strength: Generally decreased, functional    Coordination:  Coordination: Generally decreased, functional  Fine Motor Skills-Upper: Left Impaired;Right Intact (R decreased baseline 2/2 CVA)    Gross Motor Skills-Upper: Left Impaired;Right Intact (R decreased baseline 2/2 CVA)    Tone & Sensation:  Tone: Normal  Sensation: Impaired (decreased LUE)    Balance:  Sitting: Impaired  Sitting - Static: Fair (occasional)  Sitting - Dynamic: Fair (occasional)  Standing: Impaired; With support  Standing - Static: Constant support;Fair;Poor  Standing - Dynamic : Constant support;Poor    Functional Mobility and Transfers for ADLs:  Bed Mobility:  Rolling: Minimum assistance  Supine to Sit: Moderate assistance;Assist x2 (patient initiating all movement )  Sit to Supine: Assist x2;Maximum assistance  Scooting: Maximum assistance;Assist x1    Transfers:  Sit to Stand: Minimum assistance;Assist x2  Stand to Sit: Minimum assistance;Assist x2  Bed to Chair: Moderate assistance;Maximum assistance;Assist x2; Additional time  Toilet Transfer : Maximum assistance;Assist x2 (infer to [facility])    ADL Assessment:  Feeding: Setup;Minimum assistance    Oral Facial Hygiene/Grooming: Setup;Minimum assistance    Bathing:  Moderate assistance    Upper Body Dressing: Minimum assistance    Lower Body Dressing: Maximum assistance    Toileting: Maximum assistance    ADL Intervention and task modifications:  Feeding  Feeding Assistance: Set-up  Container Management: Minimum assistance  Food to Mouth: Stand-by assistance  Drink to Mouth: Stand-by assistance    Lower Body Dressing Assistance  Socks: Minimum assistance (able to pull up in supported sitting at bed level)    Cognitive Retraining  Problem Solving: Awareness of environment; Identifying the problem; Identifying the task  Executive Functions: Executing cognitive plans  Organizing/Sequencing: Breaking task down  Safety/Judgement: Decreased insight into deficits; Fall prevention;Decreased awareness of environment    Functional Measure:  Fugl-Goff Assessment of Motor Recovery after Stroke:   Reflex Activity  Flexors/Biceps/Fingers: Can be elicited  Extensors/Triceps: Can be elicited  Reflex Subtotal: 4    Volitional Movement Within Synergies  Shoulder Retraction: Partial  Shoulder Elevation: Partial  Shoulder Abduction (90 degrees): Partial  Shoulder External Rotation: Partial  Elbow Flexion: Full  Forearm Supination: Partial  Shoulder Adduction/Internal Rotation: Full  Elbow Extension: Partial  Forearm Pronation: Full  Subtotal: 12    Volitional Movement Mixing Synergies  Hand to Lumbar Spine: Full  Shoulder Flexion (0-90 degrees): Partial  Pronation-Supination: Partial  Subtotal: 4    Volitional Movement With Little or No Synergy  Shoulder Abduction (0-90 degrees): Partial  Shoulder Flexion ( degrees): Partial  Pronation/Supination: Partial  Subtotal : 3    Normal Reflex Activity  Biceps, Triceps, Finger Flexors: Full  Subtotal : 2    Upper Extremity Total   Upper Extremity Total: 25    Wrist  Stability at 15 Degree Dorsiflexion: Partial  Repeated Dorsiflexion/ Volar Flexion: Partial  Stability at 15 Degree Dorsiflexion: Partial  Repeated Dorsiflexion/ Volar Flexion: Partial  Circumduction: Partial  Wrist Total: 5    Hand  Mass Flexion: Full  Mass Extension: Full  Grasp A: Partial  Grasp B: Full  Grasp C: Partial  Grasp D: Full  Grasp E: Full  Hand Total: 12    Coordination/Speed  Tremor: Slight  Dysmetria: Marked (never able to touch nose w/ LUE)  Time: >5s (R 15s; L 40s)  Coordination/Speed Total : 1    Total A-D  Total A-D (Motor Function): 43/66     This is a reliable/valid measure of arm function after a neurological event.  It has established value to characterize functional status and for measuring spontaneous and therapy-induced recovery; tests proximal and distal motor functions. Fugl-Goff Assessment  UE scores recorded between five and 30 days post neurologic event can be used to predict UE recovery at six months post neurologic event. Severe = 0-21 points   Moderately Severe = 22-33 points   Moderate = 34-47 points   Mild = 48-66 points  NAYE So, AL Goode, & TORIE Guo (1992). Measurement of motor recovery after stroke: Outcome assessment and sample size requirements. Stroke, 23, pp. 9121-8364.   ------------------------------------------------------------------------------------------------------------------------------------------------------------------  MCID:  Stroke:   Marc Guillory et al, 2001; n = 171; mean age 79 (5) years; assessed within 16 (12) days of stroke, Acute Stroke)  FMA Motor Scores from Admission to Discharge    10 point increase in FMA Upper Extremity = 1.5 change in discharge FIM    10 point increase in FMA Lower Extremity = 1.9 change in discharge FIM  MDC:   Stroke:   Javier Bennett et al, 2008, n = 14, mean age = 59.9 (14.6) years, assessed on average 14 (6.5) months post stroke, Chronic Stroke)    FMA = 5.2 points for the Upper Extremity portion of the assessment     Occupational Therapy Evaluation Charge Determination   History Examination Decision-Making   LOW Complexity : Brief history review  LOW Complexity : 1-3 performance deficits relating to physical, cognitive , or psychosocial skils that result in activity limitations and / or participation restrictions  LOW Complexity : No comorbidities that affect functional and no verbal or physical assistance needed to complete eval tasks       Based on the above components, the patient evaluation is determined to be of the following complexity level: LOW   Pain Rating:  Patient with no reports.     Activity Tolerance:   Fair and requires rest breaks    After treatment patient left in no apparent distress:    Sitting in chair, Call bell within reach, and Bed / chair alarm activated    COMMUNICATION/EDUCATION:   The patients plan of care was discussed with: Physical therapist and Registered nurse. Patient was educated regarding his deficit(s) of impaired balance, L-side weakness, and decreased AROM as this relates to his diagnosis of chronic subdural hemorrhage. He demonstrated Good understanding as evidenced by verbalization and discussion. Patient and/or family was verbally educated on the BE FAST acronym for signs/symptoms of CVA and TIA. All questions answered with patient indicating fair-good understanding. Home safety education was provided and the patient/caregiver indicated understanding., Patient/family have participated as able in goal setting and plan of care. , and Patient/family agree to work toward stated goals and plan of care.     Thank you for this referral.  Felipe Talavera, TED  Time Calculation: 29 mins + 12 minutes Code Stroke / return to chair

## 2022-01-12 NOTE — ED NOTES
[facility] here to transport pt to [address] [place] [address] form given to [facility] 6th floor and notified staff of pt's departure  Care transferred at this time

## 2022-01-12 NOTE — PROGRESS NOTES
Problem: Dysphagia (Adult)  Goal: *Acute Goals and Plan of Care (Insert Text)  Description: Speech pathology goals  Initiated 1/12/2022  1. Patient will tolerate soft and bite sized/mildly thick liquid diet with no overt s/s aspiration within 7 days  Outcome: [facility] AND SPEECH EVALUATIONS  Patient: [place] (85 y.o. male)  Date: 1/12/2022  Primary Diagnosis: Subdural bleeding ([facility] [address] [I62.00]        Precautions: aspiration,  Fall    ASSESSMENT :  Based on the objective data described below, the patient presents with baseline dysphagia per recent MBS in 12/2021 (results below), and patient is a poor historian. Patient initially reported eating regular/thin liquid diet, however then reported that he cannot drink water as he gets strangled. Patient did have weak coughing with thin liquid and no overt s/s aspiration with mildly thick liquid. Patient is at risk for aspiration secondary to baseline dysphagia and acute on chronic subdural hematoma. Patient with moderate dysarthria that he reported is baseline from CVA in 12/2021. Provided education regarding motor speech compensatory strategies, however patient reported that he does not use those. Patient limited by cognitive status and hearing loss. Patient will benefit from skilled intervention to address the above impairments. Patients rehabilitation potential is considered to be Fair     PLAN :  Recommendations and Planned Interventions:  -Soft and bite sized/mildly thick liquids    Recommend utilizing the following strategies to facilitate patient's functional intelligibility  -- Cue patient to speak slowly, take breaths, over-articulate, and speak loudly    Frequency/Duration: Patient will be followed by speech-language pathology 3 times a week to address goals. Discharge Recommendations: To Be Determined     SUBJECTIVE:   Patient stated I get strangled on water.  I need to drink juice.    OBJECTIVE:     Past Medical History:   Diagnosis Date    CAD (coronary artery disease)     Hx of completed stroke     Hypercholesteremia     Hypertension     Seizure disorder (HCC)     Subdural hematoma (HCC)      Past Surgical History:   Procedure Laterality Date    HX CORONARY STENT PLACEMENT      [place] CARDIAC SURG PROCEDURE UNLIST      bypass     Prior Level of Function/Home Situation:   Home Situation  Home Environment: Private residence  # Steps to Enter: 4  Rails to Enter: Yes  Hand Rails : Left  One/Two Story Residence: One story  Living Alone: No  Support Systems: Spouse/Significant Other,Child(kim)  Current DME Used/Available at Home: None,Walker, rolling  Tub or Shower Type: Tub/Shower combination  Diet prior to admission: puree/mildly thick during past admission  Current Diet:  Puree/thin   Cognitive and Communication Status:  Neurologic State: Alert  Orientation Level: Oriented X4  Cognition: Follows commands  Perception: Cues to maintain midline in sitting,Verbal,Visual,Tactile  Perseveration: No perseveration noted  Safety/Judgement: Decreased insight into deficits,Fall prevention,Decreased awareness of environment  Swallowing Evaluation:   Oral Assessment:  Oral Assessment  Dentition: Natural  Oral Hygiene: moist oral mucosa  P.O. Trials:  Patient Position: upright in bed  Vocal quality prior to P.O.: No impairment  Consistency Presented: Thin liquid; Nectar thick liquid;Puree; Solid  How Presented: Self-fed/presented;SLP-fed/presented;Straw     Bolus Acceptance: No impairment  Bolus Formation/Control: Impaired  Type of Impairment: Delayed;Mastication  Propulsion: Delayed (# of seconds)  Oral Residue: None  Initiation of Swallow: Delayed (# of seconds)  Laryngeal Elevation: Decreased  Aspiration Signs/Symptoms: Weak cough                Oral Phase Severity: Mild  Pharyngeal Phase Severity : Moderate     MBS 12/2021:   Based on the objective data described below, the patient presents with mild-moderate oral-pharyngeal dysphagia. Oral issues: R oral leakage-moderate-; oral holding of pills; R oral residue  Pharyngeal issues; mild-moderate delay in swallow; this resulted in small amounts of intermittent penetration with nectars. Almost aspirated with meds and nectars. Will need Meds in purees. NOMS:   The NOMS functional outcome measure was used to quantify this patient's level of swallowing impairment. Based on the NOMS, the patient was determined to be at level 4 for swallow function       NOMS Swallowing Levels:  Level 1 (CN): NPO  Level 2 (CM): NPO but takes consistency in therapy  Level 3 (CL): Takes less than 50% of nutrition p.o. and continues with nonoral feedings; and/or safe with mod cues; and/or max diet restriction  Level 4 (CK): Safe swallow but needs mod cues; and/or mod diet restriction; and/or still requires some nonoral feeding/supplements  Level 5 (CJ): Safe swallow with min diet restriction; and/or needs min cues  Level 6 (CI): Independent with p.o.; rare cues; usually self cues; may need to avoid some foods or needs extra time  Level 7 ([facility]): Independent for all p.o.  KIANNA. (2003). National Outcomes Measurement System (NOMS): Adult Speech-Language Pathology User's Guide. Speech/Language Evaluation  Motor Speech:  Oral-Motor Structure/Motor Speech  Dentition: Natural  Oral Hygiene: moist oral mucosa  Dysarthric Characteristics: Blended word boundaries; Imprecise;Decreased prosody  Intelligibility: Impaired  Overall Impairment Severity: Moderate  Compensatory Strategies for Motor Speech: slow, clear           Vocal Quality: No impairment                NOMS:   The NOMS functional outcome measure was used to quantify this patient's level of motor speech impairment. Based on the NOMS, the patient was determined to be at level 4 for motor speech function.          NOMS Motor Speech:  Level 1 (CN):  100% unintelligible  Level 2 (CM):  Communication partner responsible for message; can do CV or automatic words w/ max cues but rarely intelligible in context  Level 3 (CL): communication partner primarily responsible for message but says CV/automatic words intelligibly; mod cues to say simple words/phrases  Level 4 (CK): In structured conversation with familiar listener can say simple words and phrases. Mod cues for simple sentences  Level 5 (CJ):  Uses simple sentences for ADLs with familiar and unfamiliar listener; min cues for complex sentences  Level 6 (CI):  Intelligible in ADLs; difficulty in voc/social activites; rare cues for complex message; uses comp strategies  Level 7 ([address] [place] [address]):  Intelligible in all activities. May occasionally use compensatory strategies. KIANNA. (2003). National Outcomes Measurement System (NOMS): Adult Speech-Language Pathology User's Guide. Pain:             After treatment:   Patient left in no apparent distress in bed, Call bell within reach and Nursing notified    COMMUNICATION/EDUCATION:   Patient was too confused for education. The patient's plan of care including recommendations, planned interventions, and recommended diet changes were discussed with: Registered nurse. Patient is unable to participate in goal setting and plan of care.     Thank you for this referral.  Jasson Porras, SLP  Time Calculation: 20 mins

## 2022-01-12 NOTE — PROGRESS NOTES
Neurosurgery    Repeat head CT on 01/11/22 at 2015 is unchanged chronic SDH. CT shows expected evolution of SDH. No surgical intervention needed. Pt was seen by Dr. Laurence Dong in October 2021 for SDH and did not want surgery at that time. He  presented with sensory seizures in the fall and is on 3 AEDs. Pt was placed on ASA 81 mg in December for an acute lacunar infarct in his thalamus. Recommend holding ASA for now. Will sign off. Call if questions. Discussed with Dr. Tracee Blanchard. Please re-consult if needed.      Geovanna Roque NP

## 2022-01-12 NOTE — H&P
[facility] Adult  Hospitalist Group  History and Physical    Primary Care Provider: Zaynab Mejia MD  Date of Service:  1/11/2022    Subjective: Ritu Deleon is a 80 y.o. male with apmhx CAD, recent CVA with SDH, HTN, and seizure disorder who presents as a transfer from [facility] for acute on chronic SDH. His CC was numbness and weakness of RLE. He was recently admitted from 12/10-12/14 for left lacunar infarct with residual right sided weakness, facial droop, and dysarthria. He was discharged on ASA at this time. He presented to the ED again on 12/17 from rehab for syncope. CT head was stable, with normal w/u and he was discharged. He returns today with CC RLE weakness. In the ED, BP was initially 229/140. Other VSS. Labs were significant for INR 1.2. Initial CT head  1/11 at 1203 with chronic right convexity SDH that had an additional acute component when compared to  12/11/21 scan. CT head was repeated  On 1/11 at 2020 to evaluate for interval change per neurosurgery, and was found to be stable. In the Ed, he was started on cardene gtt, and tx with ppx keppra. Review of Systems:    All other review of systems were negative except for that written in the History of Present Illness. Past Medical History:   Diagnosis Date    CAD (coronary artery disease)     Hx of completed stroke     Hypercholesteremia     Hypertension     Seizure disorder ([facility] [address]     Subdural hematoma (HCC)       Past Surgical History:   Procedure Laterality Date    HX CORONARY STENT PLACEMENT      [place] CARDIAC SURG PROCEDURE UNLIST      bypass     Prior to Admission medications    Medication Sig Start Date End Date Taking? Authorizing Provider   metoprolol tartrate (LOPRESSOR) 25 mg tablet Take 0.5 Tablets by mouth daily for 30 days. 12/14/21 1/13/22  Doug Grace DO   aspirin 81 mg chewable tablet Take 1 Tablet by mouth daily for 30 days.  12/14/21 1/13/22  Shelton Terrell MD   atorvastatin (LIPITOR) 40 mg tablet Take 1 Tablet by mouth daily for 30 days. 12/13/21 1/12/22  Bobby Roa MD   phenytoin ER (DILANTIN ER) 100 mg ER capsule Take 100 mg by mouth nightly. On 12/8/21 the patient was reduced to 100 mg at night time by the [facility] due to liver labs. Provider, Shira   lacosamide (VIMPAT) 200 mg tab tablet Take 1 Tablet by mouth two (2) times a day. Max Daily Amount: 400 mg. Followup with neurologist 11/9/21   Margaret Whitten MD   levETIRAcetam (KEPPRA) 750 mg tablet Take 2 Tablets by mouth two (2) times a day. 11/5/21   Zohaib Carcamo MD   cholecalciferol (Vitamin D3) (1000 Units /25 mcg) tablet Take 2,000 Units by mouth two (2) times a day. Other, MD Tatyana   isosorbide mononitrate ER (IMDUR) 120 mg CR tablet Take 120 mg by mouth every morning. Other, MD Tatyana     Allergies   Allergen Reactions    Morphine Unknown (comments)      Family History   Problem Relation Age of Onset    Hypertension Father         SOCIAL HISTORY:  Patient resides at Home. Patient ambulates with walker  Objective:       Physical Exam:   Visit Vitals  /71   Pulse (!) 54   Temp 97.7 °F (36.5 °C)   Resp 15   SpO2 94%     General:  Alert, and oriented, but dysarthric which is his b/l after last CVA, cooperative, no distress, appears stated age. Head:  Normocephalic, without obvious abnormality, atraumatic. Eyes:  Conjunctivae/corneas clear. EOMs intact. Nose: Nares normal. Septum midline. Throat: Lips, mucosa, and tongue normal.    Neck: Supple, symmetrical, trachea midline   Back:   Symmetric, no curvature. ROM normal.    Lungs:   Clear to auscultation bilaterally. Chest wall:  No tenderness or deformity. Heart:  Regular rate and rhythm, S1, S2 normal, no murmur, click, rub or gallop. Abdomen:   Soft, non-tender. Bowel sounds normal.            Extremities: Extremities normal, atraumatic, no cyanosis or edema.    Pulses: 2+ radial pulses   Skin: Skin color, texture, turgor normal. No rashes or lesions       Neurologic: Dysarthria, 3/5 LLE strength, 4/5 RLE strength, left facial assymetry     ECG: SR with occasional PVC    Data Review: All diagnostic labs and studies have been reviewed. Chest x-ray: bibasilar atelectasis    Assessment:     Active Problems:    * No active hospital problems. *      Plan:     #hx CVA  #SDH  #right facial droop  #dysarthria  #RLE weakness  -physical exam and CT head stable  -BP was uncontrolled, and patient continues on cardene gtt. Per patient his BP meds were being titrated, but it was not clear what he was taking.  Metoprolol has been restarted, but will need med rec to figure out what if anything else he was taking for blood pressure  -SCD, ASA dc'ed  -continue keppra   -neurosurgery following  -will need pt/ot and cm once BP stabilized    #HTN  -cardene gtt + home metoprolol    #seizure d/o  -continue keppra  -needs med rec    FEN: cardiac, puree/dysphagia  dvt ppx: SCD  MPOA: Cristopher Luo (spouse)  Code: Full      Signed By: Hany Oliver MD     January 11, 2022

## 2022-01-12 NOTE — PROGRESS NOTES
Hospitalist Progress Note          Tora Lanes, MD  Please call  and page for questions. Call physician on-call through the  7pm-7am    Daily Progress Note: 1/12/2022    Primary care provider:Aysha Bustos MD    Date of admission: 1/11/2022  9:22 PM    Admission summery and hospital course:  80 y.o. male with apmhx CAD, recent CVA with SDH, HTN, and seizure disorder who presents as a transfer from [facility] for acute on chronic SDH. His CC was numbness and weakness of RLE. He was recently admitted from 12/10-12/14 for left lacunar infarct with residual right sided weakness, facial droop, and dysarthria. He was discharged on ASA at this time. He presented to the ED again on 12/17 from rehab for syncope. CT head was stable, with normal w/u and he was discharged. He returns today with CC RLE weakness. In the ED, BP was initially 229/140. Labs were significant for INR 1.2. Initial CT head  1/11 at 1203 with chronic right convexity SDH that had an additional acute component when compared to  12/11/21 scan. CT head was repeated  On 1/11 at 2020 to evaluate for interval change per neurosurgery, and was found to be stable.    In the Ed, he was started on cardene gtt, and tx with ppx keppra. Subjective:   Patient said he is doing okay other than weakness on his left side. Patient said he does not have any other acute issues at this time. Assessment/Plan:   Acute on chronic subdural hematoma  Cerebrovascular accident  Right facial droop  Dysarthria  Right lower extremity weakness  Continue to hold aspirin. Follow neurosurgery input, recommended for serial CT. We will repeat CT tonight. Follow physical and Occupational Therapy recommendations. Appreciated SLP input, patient is on dysphagia diet. Hypertensive emergency, present on admission  Blood pressure is normalized now. Titrate  Cardene drip as necessary.   She is off the drip since last night.  Continue home metoprolol. Coronary atherosclerosis  Continue statin, metoprolol and Imdur. Hold aspirin for now. Seizure disorder  Continue Vimpat and Keppra for now. Resume Dilantin ER at night. Liver functions stable. Consider neurology consult if necessary. Dyslipidemia  Continue his Lipitor. See orders for other plans. VTE prophylaxis: SCDs while in bed. Code status: Full code. Discussed plan of care with Patient/Family and Nurse. Pre-admission lived at home. Discharge planning: pending. 1445PM: She was hypotensive and had signs of acute mental status while working with therapist.  Patient was almost at his baseline when he was moved to his bed. We will get CT scan now. Continue PT/OT as able. Neurology has been consulted. Talk to the  for possible IPR at discharge. Review of Systems:     Review of Systems:  Symptom  Y/N  Comments   Symptom  Y/N  Comments    Fever/Chills  n    Chest Pain  n    Poor Appetite  n    Edema  n     Cough  n   Abdominal Pain  n     Sputum  n   Joint Pain  n    SOB/FRANKLIN  n   Pruritis/Rash      Nausea/vomit  n   Tolerating PT/OT      Diarrhea  n   Tolerating Diet      Constipation     Other      Could not obtain due to:         Objective:   Physical Exam:     Visit Vitals  /63 (BP 1 Location: Right arm, BP Patient Position: Lying)   Pulse (!) 54   Temp 97.8 °F (36.6 °C)   Resp 12   SpO2 94%      O2 Device: None (Room air)    Temp (24hrs), Av.9 °F (36.6 °C), Min:97.4 °F (36.3 °C), Max:98.9 °F (37.2 °C)    No intake/output data recorded. No intake/output data recorded. General:  Alert, cooperative, no distress, appears stated age. Lungs:   Clear to auscultation bilaterally. Chest wall:  No tenderness or deformity. Heart:  Regular rate and rhythm, S1, S2 normal, no murmur. Abdomen:   Soft, non-tender. Bowel sounds normal.    Extremities: Extremities normal, atraumatic, no cyanosis or edema.    Neurologic: Patient has slurred voice. Patient has weakness on his left upper extremity and lower extremity. Patient follows some commands. Data Review:       Recent Days:  Recent Labs     01/12/22  0008 01/11/22  1218   WBC 8.1 6.0   HGB 13.2 11.8*   HCT 39.9 36.5*    200     Recent Labs     01/12/22  0008 01/11/22  1218    141   K 3.5 3.9   * 105   CO2 24 29   GLU 96 103*   BUN 12 16   CREA 0.87 1.25   CA 8.6 9.0   ALB  --  3.6   ALT  --  51   INR  --  1.2*     No results for input(s): PH, PCO2, PO2, HCO3, FIO2 in the last 72 hours. 24 Hour Results:  Recent Results (from the past 24 hour(s))   CBC WITH AUTOMATED DIFF    Collection Time: 01/11/22 12:18 PM   Result Value Ref Range    WBC 6.0 4.1 - 11.1 K/uL    RBC 3.90 (L) 4.10 - 5.70 M/uL    HGB 11.8 (L) 12.1 - 17.0 g/dL    HCT 36.5 (L) 36.6 - 50.3 %    MCV 93.6 80.0 - 99.0 FL    MCH 30.3 26.0 - 34.0 PG    MCHC 32.3 30.0 - 36.5 g/dL    RDW 13.7 11.5 - 14.5 %    PLATELET 030 001 - 123 K/uL    MPV 10.4 8.9 - 12.9 FL    NRBC 0.0 0.0  WBC    ABSOLUTE NRBC 0.00 0.00 - 0.01 K/uL    NEUTROPHILS 64 32 - 75 %    LYMPHOCYTES 26 12 - 49 %    MONOCYTES 7 5 - 13 %    EOSINOPHILS 2 0 - 7 %    BASOPHILS 1 0 - 1 %    IMMATURE GRANULOCYTES 0 0 - 0.5 %    ABS. NEUTROPHILS 3.8 1.8 - 8.0 K/UL    ABS. LYMPHOCYTES 1.6 0.8 - 3.5 K/UL    ABS. MONOCYTES 0.4 0.0 - 1.0 K/UL    ABS. EOSINOPHILS 0.1 0.0 - 0.4 K/UL    ABS. BASOPHILS 0.0 0.0 - 0.1 K/UL    ABS. IMM.  GRANS. 0.0 0.00 - 0.04 K/UL    DF AUTOMATED     METABOLIC PANEL, COMPREHENSIVE    Collection Time: 01/11/22 12:18 PM   Result Value Ref Range    Sodium 141 136 - 145 mmol/L    Potassium 3.9 3.5 - 5.1 mmol/L    Chloride 105 97 - 108 mmol/L    CO2 29 21 - 32 mmol/L    Anion gap 7 5 - 15 mmol/L    Glucose 103 (H) 65 - 100 mg/dL    BUN 16 6 - 20 MG/DL    Creatinine 1.25 0.70 - 1.30 MG/DL    BUN/Creatinine ratio 13 12 - 20      GFR est AA >60 >60 ml/min/1.73m2    GFR est non-AA 55 (L) >60 ml/min/1.73m2    Calcium 9.0 8.5 - 10.1 MG/DL    Bilirubin, total 0.4 0.2 - 1.0 MG/DL    ALT (SGPT) 51 12 - 78 U/L    AST (SGOT) 33 15 - 37 U/L    Alk. phosphatase 129 (H) 45 - 117 U/L    Protein, total 6.4 6.4 - 8.2 g/dL    Albumin 3.6 3.5 - 5.0 g/dL    Globulin 2.8 2.0 - 4.0 g/dL    A-G Ratio 1.3 1.1 - 2.2     PROTHROMBIN TIME + INR    Collection Time: 01/11/22 12:18 PM   Result Value Ref Range    INR 1.2 (H) 0.9 - 1.1      Prothrombin time 11.9 (H) 9.0 - 11.1 sec   TROPONIN-HIGH SENSITIVITY    Collection Time: 01/11/22 12:18 PM   Result Value Ref Range    Troponin-High Sensitivity 11 0 - 76 ng/L   GLUCOSE, POC    Collection Time: 01/11/22 12:30 PM   Result Value Ref Range    Glucose (POC) 83 65 - 117 mg/dL    Performed by Florentino Bonner (RN)    EKG, 12 LEAD, INITIAL    Collection Time: 01/11/22 12:32 PM   Result Value Ref Range    Ventricular Rate 65 BPM    Atrial Rate 65 BPM    P-R Interval 148 ms    QRS Duration 124 ms    Q-T Interval 428 ms    QTC Calculation (Bezet) 445 ms    Calculated R Axis -26 degrees    Calculated T Axis -163 degrees    Diagnosis       Sinus rhythm with occasional premature ventricular complexes  Nonspecific intraventricular conduction delay  Nonspecific T wave abnormality  Abnormal ECG  When compared with ECG of 17-DEC-2021 10:42,  premature ventricular complexes are now present  Non-specific change in ST segment in Anterior leads  Confirmed by [id number], Burt Damian MD ([id number]) on 1/12/2022 7:13:56 AM     CBC W/O DIFF    Collection Time: 01/12/22 12:08 AM   Result Value Ref Range    WBC 8.1 4.1 - 11.1 K/uL    RBC 4.30 4. 10 - 5.70 M/uL    HGB 13.2 12.1 - 17.0 g/dL    HCT 39.9 36.6 - 50.3 %    MCV 92.8 80.0 - 99.0 FL    MCH 30.7 26.0 - 34.0 PG    MCHC 33.1 30.0 - 36.5 g/dL    RDW 13.6 11.5 - 14.5 %    PLATELET 013 021 - 742 K/uL    MPV 9.8 8.9 - 12.9 FL    NRBC 0.0 0  WBC    ABSOLUTE NRBC 0.00 0.00 - 3.48 K/uL   METABOLIC PANEL, BASIC    Collection Time: 01/12/22 12:08 AM   Result Value Ref Range Sodium 140 136 - 145 mmol/L    Potassium 3.5 3.5 - 5.1 mmol/L    Chloride 109 (H) 97 - 108 mmol/L    CO2 24 21 - 32 mmol/L    Anion gap 7 5 - 15 mmol/L    Glucose 96 65 - 100 mg/dL    BUN 12 6 - 20 MG/DL    Creatinine 0.87 0.70 - 1.30 MG/DL    BUN/Creatinine ratio 14 12 - 20      GFR est AA >60 >60 ml/min/1.73m2    GFR est non-AA >60 >60 ml/min/1.73m2    Calcium 8.6 8.5 - 10.1 MG/DL       Problem List:  Problem List as of 1/12/2022 Date Reviewed: 12/11/2021          Codes Class Noted - Resolved    CAD (coronary artery disease) ICD-10-CM: I25.10  ICD-9-CM: 414.00  Unknown - Present        Hypercholesteremia ICD-10-CM: E78.00  ICD-9-CM: 272.0  Unknown - Present        Hypertension ICD-10-CM: I10  ICD-9-CM: 401.9  Unknown - Present        Hx of completed stroke ICD-10-CM: Z86.73  ICD-9-CM: V12.54  Unknown - Present        Seizure disorder ([facility] [address] ICD-10-CM: G40.909  ICD-9-CM: 345.90  Unknown - Present        Facial droop ICD-10-CM: R29.810  ICD-9-CM: 781.94  12/10/2021 - Present        Left carotid stenosis ICD-10-CM: K42.13  ICD-9-CM: 433.10  12/10/2021 - Present        Simple partial seizures (HCC) ICD-10-CM: G40.109  ICD-9-CM: 345.50  10/30/2021 - Present        Subdural hematoma (HCC) ICD-10-CM: R21.0O4S  ICD-9-CM: 432.1  10/22/2021 - Present        RESOLVED: Left hemiparesis (HCC) ICD-10-CM: G81.94  ICD-9-CM: 342.90  10/23/2021 - 10/29/2021              Medications reviewed  Current Facility-Administered Medications   Medication Dose Route Frequency    levETIRAcetam (KEPPRA) tablet 1,500 mg  1,500 mg Oral BID    niCARdipine (CARDENE) 25 mg in 0.9% sodium chloride 250 mL infusion  0-15 mg/hr IntraVENous TITRATE    ondansetron (ZOFRAN) injection 4 mg  4 mg IntraVENous Q6H PRN    naloxone (NARCAN) injection 0.4 mg  0.4 mg IntraVENous PRN    acetaminophen (TYLENOL) tablet 650 mg  650 mg Oral Q4H PRN    Or    acetaminophen (TYLENOL) solution 650 mg  650 mg Per NG tube Q4H PRN    Or    acetaminophen (TYLENOL) suppository 650 mg  650 mg Rectal Q4H PRN    lacosamide (VIMPAT) tablet 200 mg  200 mg Oral BID    metoprolol tartrate (LOPRESSOR) tablet 12.5 mg  12.5 mg Oral DAILY       Care Plan discussed with: Patient/Family and Nurse    Total time spent with patient: 40 minutes.     Suzanne Walsh MD

## 2022-01-13 ENCOUNTER — APPOINTMENT (OUTPATIENT)
Dept: MRI IMAGING | Age: 87
DRG: 064 | End: 2022-01-13
Attending: PSYCHIATRY & NEUROLOGY
Payer: MEDICARE

## 2022-01-13 PROBLEM — I62.01 ACUTE ON CHRONIC INTRACRANIAL SUBDURAL HEMATOMA (HCC): Status: ACTIVE | Noted: 2022-01-13

## 2022-01-13 PROBLEM — I62.03 ACUTE ON CHRONIC INTRACRANIAL SUBDURAL HEMATOMA (HCC): Status: ACTIVE | Noted: 2022-01-13

## 2022-01-13 LAB — PHENYTOIN SERPL-MCNC: 2.1 UG/ML (ref 10–20)

## 2022-01-13 PROCEDURE — 70551 MRI BRAIN STEM W/O DYE: CPT

## 2022-01-13 PROCEDURE — 74011250637 HC RX REV CODE- 250/637: Performed by: FAMILY MEDICINE

## 2022-01-13 PROCEDURE — G0378 HOSPITAL OBSERVATION PER HR: HCPCS

## 2022-01-13 PROCEDURE — 99223 1ST HOSP IP/OBS HIGH 75: CPT | Performed by: PSYCHIATRY & NEUROLOGY

## 2022-01-13 PROCEDURE — 80185 ASSAY OF PHENYTOIN TOTAL: CPT

## 2022-01-13 PROCEDURE — 95816 EEG AWAKE AND DROWSY: CPT | Performed by: PSYCHIATRY & NEUROLOGY

## 2022-01-13 PROCEDURE — 74011250637 HC RX REV CODE- 250/637: Performed by: NURSE PRACTITIONER

## 2022-01-13 PROCEDURE — 36415 COLL VENOUS BLD VENIPUNCTURE: CPT

## 2022-01-13 PROCEDURE — 92526 ORAL FUNCTION THERAPY: CPT

## 2022-01-13 RX ADMIN — ISOSORBIDE MONONITRATE 120 MG: 60 TABLET, EXTENDED RELEASE ORAL at 08:12

## 2022-01-13 RX ADMIN — LEVETIRACETAM 1500 MG: 500 TABLET, FILM COATED ORAL at 16:10

## 2022-01-13 RX ADMIN — PHENYTOIN SODIUM 100 MG: 100 CAPSULE ORAL at 21:14

## 2022-01-13 RX ADMIN — METOPROLOL TARTRATE 12.5 MG: 25 TABLET, FILM COATED ORAL at 09:18

## 2022-01-13 RX ADMIN — Medication 2000 UNITS: at 21:14

## 2022-01-13 RX ADMIN — Medication 2000 UNITS: at 09:18

## 2022-01-13 RX ADMIN — LACOSAMIDE 200 MG: 50 TABLET, FILM COATED ORAL at 09:18

## 2022-01-13 RX ADMIN — LACOSAMIDE 200 MG: 50 TABLET, FILM COATED ORAL at 19:00

## 2022-01-13 RX ADMIN — ATORVASTATIN CALCIUM 40 MG: 40 TABLET, FILM COATED ORAL at 09:18

## 2022-01-13 RX ADMIN — LEVETIRACETAM 1500 MG: 500 TABLET, FILM COATED ORAL at 05:16

## 2022-01-13 NOTE — PROGRESS NOTES
5:59 PM  Virtual reviewer communicated change to CM, which reflect outpatient observation order written prior to Written discharge order. Code 44 delivered and given to patient. CM met with the patient and provided to the patient the printed information about her outpatient observation status. The patient was given the flyer entitled, \"Medicare Outpatient Observation: Information & notification. \" All questions were answered, no additional discharge needs identified at this time and patient expects to return to their (home, assisted living facility, relatives home, etc.) after discharge today.   Copy provided to patient or sent secure email, secure fax , or certified mail to patient's loved one per their request.    MARY ELLEN Martin/[place]  Care Management

## 2022-01-13 NOTE — PROGRESS NOTES
Problem: Falls - Risk of  Goal: *Absence of Falls  Description: Document Kyra Tellez Fall Risk and appropriate interventions in the flowsheet.   Outcome: Progressing Towards Goal  Note: Fall Risk Interventions:  Mobility Interventions: Patient to call before getting OOB,Communicate number of staff needed for ambulation/transfer         Medication Interventions: Bed/chair exit alarm,Patient to call before getting OOB    Elimination Interventions: Bed/chair exit alarm,Call light in reach    History of Falls Interventions: Door open when patient unattended,Evaluate medications/consider consulting pharmacy         Problem: Patient Education: Go to Patient Education Activity  Goal: Patient/Family Education  Outcome: Progressing Towards Goal     Problem: Patient Education: Go to Patient Education Activity  Goal: Patient/Family Education  Outcome: Progressing Towards Goal     Problem: Patient Education: Go to Patient Education Activity  Goal: Patient/Family Education  Outcome: Progressing Towards Goal

## 2022-01-13 NOTE — PROCEDURES
ELECTROENCEPHALOGRAM REPORT    HISTORY: Patient is a 59-year-old male who is being evaluated for left hemiparesis, associated sensory deficit and to rule out a seizure focus. DESCRIPTION: This is an 18-channel EEG performed on an awake patient. The dominant posterior background rhythm consists of medium-voltage rhythms in the 6 Hz frequency range out of the posterior head region. Photic stimulation does not elicit a significant driving response. Hyperventilation was not performed. ELECTROENCEPHALOGRAM SUMMARY: Mildly abnormal EEG due to mild slowing of the background rhythms. CLINICAL INTERPRETATION: This EEG is suggestive of some mild generalized encephalopathic process, nonspecific in type. This may be related to underlying structural brain injury and/or toxic/metabolic abnormality. No clearly lateralizing or epileptiform features were seen.      Fernanda Bright DO  01/13/22

## 2022-01-13 NOTE — CONSULTS
NEUROLOGY CONSULT NOTE    Name Yazmin Chavis Age 80 y.o. MRN [id number]  1935     Consulting Physician: Bear Jason MD      Chief Complaint: L-HP, numbness     Assessment:     Active Problems:    Left hemiparesis ([address] (10/23/2021)      Subdural bleeding [facility]) (2022)      80year old male with a h/o remote R pontine/L parietal/recent L thalamic infarction, CAD, HTN, HPL, chronic R SDH, subsequent seizures admitted with acute L-HP, L hemisensory deficit. This appears constant since admission per patient with only minimal improvement. Duration of symptoms does not appear consistent with seizure activity. CTH revealed acute on chronic R sided SDH which is stable on repeat imaging . Patient previously declined surgery in the past. Will obtain MRI Brain to exclude acute ischemia contributing to current left sided deficits. Seizure activity appears less likely. Recommendations:   MRI Brain WO  Will f/u EEG  Continue home AEDs    Thank you very much for this referral. I appreciate the opportunity to participate in this patient's care. History of Present Illness: This is a 80 y.o.   male, we were asked to see for left sided weakness, numbness. PMH notable for remote R pontine/L parietal/recent L thalamic infarction, CAD, HTN, HPL, chronic R SDH, subsequent seizures. He was admitted  with severe HTN, LUE/LLE weakness and numbness per patient which have persistent this admission with some minimal improvement. He also admits to residual dysarthria and R FD from his most recent stroke. CTH revealed acute on chronic R sided SDH which appears stable on repeat imaging . ASA currently held due to NSGY recommendations. Allergies   Allergen Reactions    Morphine Unknown (comments)        Prior to Admission medications    Medication Sig Start Date End Date Taking?  Authorizing Provider   metoprolol tartrate (LOPRESSOR) 25 mg tablet Take 0.5 Tablets by mouth daily for 30 days. 12/14/21 1/13/22 Yes Normie Lennox, DO   aspirin 81 mg chewable tablet Take 1 Tablet by mouth daily for 30 days. 12/14/21 1/13/22 Yes Abigail Magana MD   atorvastatin (LIPITOR) 40 mg tablet Take 1 Tablet by mouth daily for 30 days. 12/13/21 1/12/22 Yes Abigail Magana MD   phenytoin ER (DILANTIN ER) 100 mg ER capsule Take 100 mg by mouth nightly. On 12/8/21 the patient was reduced to 100 mg at night time by the [facility] due to liver labs. Yes Provider, Shira   lacosamide (VIMPAT) 200 mg tab tablet Take 1 Tablet by mouth two (2) times a day. Max Daily Amount: 400 mg. Followup with neurologist 11/9/21  Yes Kevin Jim MD   levETIRAcetam (KEPPRA) 750 mg tablet Take 2 Tablets by mouth two (2) times a day. 11/5/21  Yes Suri Harmon MD   cholecalciferol (Vitamin D3) (1000 Units /25 mcg) tablet Take 2,000 Units by mouth two (2) times a day. Yes Nadege, MD Tatyana   isosorbide mononitrate ER (IMDUR) 120 mg CR tablet Take 120 mg by mouth every morning. Yes Nadege, MD Tatyana       Past Medical History:   Diagnosis Date    CAD (coronary artery disease)     Hx of completed stroke     Hypercholesteremia     Hypertension     Seizure disorder ([facility] [address]     Subdural hematoma (HCC)         Past Surgical History:   Procedure Laterality Date    HX CORONARY STENT PLACEMENT      MS CARDIAC SURG PROCEDURE UNLIST      bypass        Social History     Tobacco Use    Smoking status: Never Smoker    Smokeless tobacco: Never Used   Substance Use Topics    Alcohol use: Never        Family History   Problem Relation Age of Onset    Hypertension Father         Review of Systems:   Comprehensive review of systems performed and negative except for as listed above. Exam:     Visit Vitals  BP (!) 162/86 (BP Patient Position: Standing)   Pulse (!) 59   Temp 97.9 °F (36.6 °C)   Resp 15   Wt 176 lb 12.9 oz (80.2 kg)   SpO2 93%   BMI 24.66 kg/m²        General: Well developed, well nourished. Patient in no apparent distress   Head: Normocephalic, atraumatic, anicteric sclera   Lungs:  Clear to auscultation bilaterally, No wheezes or rubs   Cardiac: Regular rate and rhythm with no murmurs. Abd: Bowel sounds were audible. No tenderness on palpation   Ext: No pedal edema   Skin: No overt signs of rash     Neurological Exam:  Mental Status: Alert and oriented to person place and time   Speech: Fluent no aphasia, moderate/severe dysarthria. Cranial Nerves:   Intact visual fields. Facial sensation is normal. Facial movement is asymmetric-mild R FD. Palate is midline. Normal sternocleidomastoid strength. Tongue is slightly deviated R. Hearing is intact bilaterally. Eyes: PERRL, EOM's full, no nystagmus, no ptosis. Motor:  LUE/LLE 4+/5, RUE/RLE 5/5. Normal bulk and tone. Reflexes:   Deep tendon reflexes 1+/4 and symmetrical.  Plantar response is downgoing b/l. Sensory:   Decreased LUE/LLE LT   Gait:  Gait is deferred. Tremor:   No tremor noted. Cerebellar:  Finger to nose and heel over shin to knee was demonstrated competently. Neurovascular: No carotid bruits. Imaging  CT Results (maximum last 3): Results from Hospital Encounter encounter on 01/11/22    CT HEAD WO CONT    Narrative  INDICATION: SDH    EXAM:  HEAD CT WITHOUT CONTRAST    COMPARISON: 1/11/2022    TECHNIQUE:  Routine noncontrast axial head CT was performed. Sagittal and  coronal reconstructions were generated. CT dose reduction was achieved through use of a standardized protocol tailored  for this examination and automatic exposure control for dose modulation. FINDINGS:    There has been no significant change in overall size of the right  holohemispheric subdural hemorrhage which measures up to 9 mm anteriorly, and 12  mm posteriorly, the density of the hemorrhage has decreased. Associated right to  left midline shift of 1-2 mm is unchanged. No hydrocephalus.  Chronic white  matter hypodensities similar to prior study. Impression  No significant change in size of right holohemispheric subdural hemorrhage,  though decreased in density. Minimal right to left midline shift, unchanged. Results from [facility] encounter on 01/11/22    CT HEAD WO CONT    Narrative  EXAM: CT HEAD WO CONT    INDICATION: repeat, follow-up subdural    COMPARISON: Earlier same day. CONTRAST: None. TECHNIQUE: Unenhanced CT of the head was performed using 5 mm images. Brain and  bone windows were generated. Coronal and sagittal reformats. CT dose reduction  was achieved through use of a standardized protocol tailored for this  examination and automatic exposure control for dose modulation. FINDINGS:  The ventricles and sulci are normal in size, shape and configuration. Marcio Spencer Extensive  low density in the periventricular white matter. . Acute on chronic right-sided  subdural hematoma is again noted. It has not significantly changed in size. . The  basilar cisterns are open. No CT evidence of acute infarct. The bone windows demonstrate no abnormalities. The visualized portions of the  paranasal sinuses and mastoid air cells are clear. Impression  Unchanged acute on chronic right-sided subdural hematoma      CTA CODE NEURO HEAD AND NECK W CONT    Narrative  EXAM:  CTA CODE NEURO HEAD AND NECK W CONT    INDICATION:   Code Stroke    COMPARISON:  CT head 1/11/2022. CONTRAST:  100 mL of Isovue-370. TECHNIQUE:  Unenhanced  images were obtained to localize the volume for  acquisition. Multislice helical axial CT angiography was performed from the  aortic arch to the top of the head during uneventful rapid bolus intravenous  contrast administration. Coronal and sagittal reformations and 3D post  processing was performed. CT dose reduction was achieved through use of a  standardized protocol tailored for this examination and automatic exposure  control for dose modulation. This study was analyzed by the [address] [facility] [address] [place] algorithm. FINDINGS:    CTA Head:  There is no evidence of large vessel occlusion or flow-limiting stenosis of the  intracranial internal carotid, anterior cerebral, and middle cerebral arteries. Calcification of the bilateral carotid siphons without significant stenosis. 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 The  anterior communicating artery is patent. There is no evidence of large vessel occlusion or flow-limiting stenosis of the  intracranial vertebral arteries, basilar artery, or posterior cerebral arteries. Multifocal mild stenoses of the bilateral posterior cerebral artery P2 segments,  unchanged. Calcification of the left V4 segment with mild stenosis, unchanged. The posterior communicating arteries are not well seen. There is no evidence of aneurysm or vascular malformation. The dural venous  sinuses and deep cerebral venous system are patent. No evidence of abnormal  parenchymal enhancement on delayed phase images. Redemonstrated acute on chronic  right cerebral convexity subdural hematoma, with mild local mass effect, but no  midline shift or herniation. CTA NECK:  NASCET method was utilized for calculating stenosis. Partially visualized postsurgical changes of CABG. The common carotid arteries  demonstrate no significant stenosis. Dense calcific atherosclerosis of the  proximal right internal carotid artery with mild (less than 50%) stenosis. Dense  calcific atherosclerosis of the proximal left internal carotid artery with mild  (less than 50%) stenosis. There is a left dominant vertebrobasilar arterial system. The cervical vertebral  arteries are normal in course, size and contour without significant stenosis. Visualized soft tissues of the neck are unremarkable. Visualized lung apices are  clear. No acute fracture or aggressive osseous lesion.  Multilevel degenerative  disc disease throughout the cervical spine, as well as multilevel facet  arthropathy, including severe left-sided facet arthropathy in the mid cervical  spine. Partially visualized median sternotomy. Impression  CTA Head:  1. No evidence of flow-limiting stenosis or aneurysm. Scattered atherosclerotic  disease as above, unchanged from prior exam.  2. Redemonstrated acute on chronic right cerebral convexity subdural hematoma,  with mild local mass effect, but no midline shift or herniation. CTA Neck:  1. No evidence of flow-limiting stenosis. 2. Mild stenosis (less than 50% by NASCET criteria) of the proximal bilateral  internal carotid arteries, unchanged. Lab Review  Lab Results   Component Value Date/Time    WBC 8.1 01/12/2022 12:08 AM    HCT 39.9 01/12/2022 12:08 AM    HGB 13.2 01/12/2022 12:08 AM    PLATELET 855 35/60/3852 12:08 AM     Lab Results   Component Value Date/Time    Sodium 140 01/12/2022 12:08 AM    Potassium 3.5 01/12/2022 12:08 AM    Chloride 109 (H) 01/12/2022 12:08 AM    CO2 24 01/12/2022 12:08 AM    Glucose 96 01/12/2022 12:08 AM    BUN 12 01/12/2022 12:08 AM    Creatinine 0.87 01/12/2022 12:08 AM    Calcium 8.6 01/12/2022 12:08 AM     No components found for: TROPQUANT  No results found for: NIRAV    Signed:  Smitha Brantley.  Wandy Phillips DO  1/13/2022  12:22 PM

## 2022-01-13 NOTE — PROGRESS NOTES
Occupational Therapy    Second attempt to see patient. Patient requesting therapist return tomorrow, currently visiting with his daughter. Will follow-up tomorrow as able and appropriate. Patient chart reviewed up to date. Attempted visit, however patient back in bed and sleeping soundly, not rousing to therapist entry. Note patient observed earlier up in chair. Will re-attempt as able. Thank you.   Alta Ewing, OTR/L

## 2022-01-13 NOTE — PROGRESS NOTES
Problem: Falls - Risk of  Goal: *Absence of Falls  Description: Document nEzo Elder Fall Risk and appropriate interventions in the flowsheet.   Outcome: Resolved/Met  Note: Fall Risk Interventions:  Mobility Interventions: Assess mobility with egress test,Communicate number of staff needed for ambulation/transfer,OT consult for ADLs         Medication Interventions: Assess postural VS orthostatic hypotension,Evaluate medications/consider consulting pharmacy,Teach patient to arise slowly    Elimination Interventions: Bed/chair exit alarm,Elevated toilet seat,Stay With Me (per policy)    History of Falls Interventions: Bed/chair exit alarm,Consult care management for discharge planning,Door open when patient unattended         Problem: Patient Education: Go to Patient Education Activity  Goal: Patient/Family Education  Outcome: Resolved/Met     Problem: Patient Education: Go to Patient Education Activity  Goal: Patient/Family Education  Outcome: Resolved/Met     Problem: TIA/CVA Stroke: Day 2 Until Discharge  Goal: Off Pathway (Use only if patient is Off Pathway)  Outcome: Resolved/Met  Goal: Activity/Safety  Outcome: Resolved/Met  Goal: Diagnostic Test/Procedures  Outcome: Resolved/Met  Goal: Nutrition/Diet  Outcome: Resolved/Met  Goal: Discharge Planning  Outcome: Resolved/Met  Goal: Medications  Outcome: Resolved/Met  Goal: Respiratory  Outcome: Resolved/Met  Goal: Treatments/Interventions/Procedures  Outcome: Resolved/Met  Goal: Psychosocial  Outcome: Resolved/Met  Goal: *Verbalizes anxiety and depression are reduced or absent  Outcome: Resolved/Met  Goal: *Absence of aspiration  Outcome: Resolved/Met  Goal: *Absence of deep venous thrombosis signs and symptoms(Stroke Metric)  Outcome: Resolved/Met  Goal: *Optimal pain control at patient's stated goal  Outcome: Resolved/Met  Goal: *Tolerating diet  Outcome: Resolved/Met  Goal: *Ability to perform ADLs and demonstrates progressive mobility and function  Outcome: Resolved/Met  Goal: *Stroke education continued(Stroke Metric)  Outcome: Resolved/Met     Problem: Seizure Disorder (Adult)  Goal: *STG: Remains free of seizure activity  Outcome: Resolved/Met  Goal: *STG: Maintains lab values within therapeutic range  Outcome: Resolved/Met  Goal: *STG/LTG: Complies with medication therapy  Outcome: Resolved/Met  Goal: *STG: Remains free of injury during seizure activity  Outcome: Resolved/Met  Goal: *STG: Remains safe in hospital  Outcome: Resolved/Met  Goal: Interventions  Outcome: Resolved/Met

## 2022-01-13 NOTE — PROGRESS NOTES
Problem: Dysphagia (Adult)  Goal: *Acute Goals and Plan of Care (Insert Text)  Description: Speech pathology goals  Initiated 1/12/2022  1. Patient will tolerate soft and bite sized/mildly thick liquid diet with no overt s/s aspiration within 7 days  Outcome: Progressing Towards Goal     SPEECH LANGUAGE PATHOLOGY DYSPHAGIA TREATMENT  Patient: Liam Keating (1 y.o. male)  Date: 1/13/2022  Diagnosis: Subdural bleeding ([facility] [address] [I62.00] <principal problem not specified>       Precautions:  Fall    ASSESSMENT:  Patient has been tolerating soft and bite sized diet/mildly thick liquids with no difficulties or adverse effects. Bedside, patient with no difficulties with any consistencies. Patient did have some weak delayed coughing following one cup of mildly thick juice and a few bites of food. Given tolerance and bedside presentation, recommend continue soft and bite sized diet/mildly thick liquids with precautions as outlined below. SLP to continue to follow for diet tolerance. PLAN:  Recommendations and Planned Interventions:  -- mildly thick liquids/soft and bite sized diet  -- upright for all PO   -- small bites  -- Slp to follow for diet tolerance. Patient continues to benefit from skilled intervention to address the above impairments. Continue treatment per established plan of care. Discharge Recommendations: To Be Determined     SUBJECTIVE:   Patient stated I don't like the sweet tea one, I want orange juice re: thickened ice tea.     OBJECTIVE:   Cognitive and Communication Status:  Neurologic State: Alert  Orientation Level: Oriented X4  Cognition: Follows commands  Perception: Appears intact  Perseveration: No perseveration noted  Safety/Judgement: Awareness of environment  Dysphagia Treatment:  Oral Assessment:  Oral Assessment  Labial: No impairment  Dentition: Natural  Oral Hygiene: moist oral mucosa free of secretions  Lingual: No impairment  Velum: No impairment  Mandible: No impairment  P.O. Trials:  Patient Position: upright in chair  Vocal quality prior to P.O.: No impairment  Consistency Presented: Nectar thick liquid;Puree;Ground  How Presented: Self-fed/presented     Bolus Acceptance: No impairment  Bolus Formation/Control: No impairment     Propulsion: No impairment  Oral Residue: None  Initiation of Swallow: No impairment  Laryngeal Elevation: Functional  Aspiration Signs/Symptoms: Delayed cough/throat clear  Pharyngeal Phase Characteristics: No impairment, issues, or problems                                                                                 Pain:  Pain Scale 1: Numeric (0 - 10)  Pain Intensity 1: 0       After treatment:   Patient left in no apparent distress sitting up in chair, Call bell within reach, and Nursing notified    COMMUNICATION/EDUCATION:     The patient's plan of care including recommendations, planned interventions, and recommended diet changes were discussed with: Registered nurse.      Wallace Rivera M.S. CF-SLP   Speech Language Pathologist     Time Calculation: 16 mins

## 2022-01-13 NOTE — PROGRESS NOTES
Bedside shift change report given to Dietrich Gottron, RN (oncoming nurse) by Trenton Robbins RN (offgoing nurse). Report included the following information SBAR, ED Summary, Procedure Summary, Intake/Output, Med Rec Status and Cardiac Rhythm NSR.

## 2022-01-13 NOTE — PROGRESS NOTES
Transition of Care Plan: goal-IPR (per therapy and MD recommendation)-referrals pending with [facility] and [facility] vs Home with HH     RUR: 18% moderate     PCP F/U:John Bustos MD     Disposition: IPR     Transportation: BLS     Main Contact: Dwayne Richey [phone]    1110: This CM was able to speak to patient at the bedside. Incoherent at times, but was alert and oriented x 4. Discussed IPR recommendations from therapy and medical team. Also discussed spouse's concerns with having him come back home. Patient states that he does not want to go back to IPR. States that the [place] set up home health for him with [facility] and that it was the best therapy he has ever had. Referrals for IPR have already been sent out yesterday.  Will discuss with MD.     Phillip Garcia, RN, CRM

## 2022-01-13 NOTE — PROGRESS NOTES
Hospitalist Progress Note           Please call  and page for questions. Call physician on-call through the  7pm-7am    Daily Progress Note: 1/13/2022    Primary care Barbara Williamson MD    Date of admission: 1/11/2022  9:22 PM    Admission summery and hospital course:  80 y.o. male with apmhx CAD, recent CVA with SDH, HTN, and seizure disorder who presents as a transfer from [facility] for acute on chronic SDH. His CC was numbness and weakness of RLE. He was recently admitted from 12/10-12/14 for left lacunar infarct with residual right sided weakness, facial droop, and dysarthria. He was discharged on ASA at this time. He presented to the ED again on 12/17 from rehab for syncope. CT head was stable, with normal w/u and he was discharged. He returns today with CC RLE weakness. In the ED, BP was initially 229/140. Labs were significant for INR 1.2. Initial CT head  1/11 at 1203 with chronic right convexity SDH that had an additional acute component when compared to  12/11/21 scan. CT head was repeated  On 1/11 at 2020 to evaluate for interval change per neurosurgery, and was found to be stable.    In the Ed, he was started on cardene gtt, and tx with ppx keppra. Subjective:     Patient denied any headache today, oriented X 3, some period of confusion per nursing staff. Assessment/Plan:   Acute on chronic subdural hematoma  At admission-Chronic right convexity subdural hematoma has not changed in size but now has an acute component  She had repeat CT head which was stable  -Per NSGY , no surgical intervention.   -hold aspirin      #Recent history of lacunar infarct in the left thalamus  History of rt pontine stroke  -aspirin held due to above, on statin    # Left sided weakness/numbness at admission  -MRI brain pending,neurology following      # HTN:  Controlled on current regimen  Requested orthostatic vitals today as there was some concern during therapy yesterday    Hypertensive emergency-resolved      Coronary atherosclerosis  Continue statin, metoprolol and Imdur. Hold aspirin for now. Seizure disorder  On Vimpat , Keppra and  Dilantin ER at night. Liver functions stable. Dyslipidemia  Continue his Lipitor. See orders for other plans. VTE prophylaxis: SCDs while in bed. Code status: Full code. Discussed plan of care with Patient/Family and Nurse. Pre-admission lived at home. Discharge planning: pending. Review of Systems:     Review of Systems:  Symptom  Y/N  Comments   Symptom  Y/N  Comments    Fever/Chills  n    Chest Pain  n    Poor Appetite  n    Edema  n     Cough  n   Abdominal Pain  n     Sputum  n   Joint Pain  n    SOB/FRANKLIN  n   Pruritis/Rash      Nausea/vomit  n   Tolerating PT/OT      Diarrhea  n   Tolerating Diet      Constipation     Other      Could not obtain due to:         Objective:   Physical Exam:     Visit Vitals  BP (!) 104/59 (BP 1 Location: Right arm, BP Patient Position: At rest)   Pulse 65   Temp 97.7 °F (36.5 °C)   Resp 15   Wt 80.2 kg (176 lb 12.9 oz)   SpO2 95%   BMI 24.66 kg/m²      O2 Device: None (Room air)    Temp (24hrs), Av.9 °F (36.6 °C), Min:97.6 °F (36.4 °C), Max:98.3 °F (36.8 °C)    No intake/output data recorded. No intake/output data recorded. General:  Alert,  no distress, appears stated age. Lungs:   Clear to auscultation bilaterally. Chest wall:  No tenderness or deformity. Heart:  Regular rate and rhythm, S1, S2 normal, no murmur. Abdomen:   Soft, non-tender. Bowel sounds normal.    Extremities: Extremities normal, atraumatic, no cyanosis or edema. Neurologic:  Patient has slurred speech. Patient has weakness on his left upper extremity and lower extremity- 3-4/5, RUE 5/5. Patient follows  commands.        Data Review:       Recent Days:  Recent Labs     22  0008 22  1218   WBC 8.1 6.0   HGB 13.2 11.8*   HCT 39.9 36.5*    200 Recent Labs     01/12/22  0008 01/11/22  1218    141   K 3.5 3.9   * 105   CO2 24 29   GLU 96 103*   BUN 12 16   CREA 0.87 1.25   CA 8.6 9.0   ALB  --  3.6   ALT  --  51   INR  --  1.2*     No results for input(s): PH, PCO2, PO2, HCO3, FIO2 in the last 72 hours.     24 Hour Results:  Recent Results (from the past 24 hour(s))   PHENYTOIN    Collection Time: 01/13/22  1:39 PM   Result Value Ref Range    Phenytoin 2.1 (L) 10.0 - 20.0 ug/mL       Problem List:  Problem List as of 1/13/2022 Date Reviewed: 12/11/2021          Codes Class Noted - Resolved    Subdural bleeding ([facility] ICD-10-CM: I62.00  ICD-9-CM: 432.1  1/12/2022 - Present        CAD (coronary artery disease) ICD-10-CM: I25.10  ICD-9-CM: 414.00  Unknown - Present        Hypercholesteremia ICD-10-CM: E78.00  ICD-9-CM: 272.0  Unknown - Present        Hypertension ICD-10-CM: I10  ICD-9-CM: 401.9  Unknown - Present        Hx of completed stroke ICD-10-CM: Z86.73  ICD-9-CM: V12.54  Unknown - Present        Seizure disorder ([facility] ICD-10-CM: G40.909  ICD-9-CM: 345.90  Unknown - Present        Facial droop ICD-10-CM: R29.810  ICD-9-CM: 781.94  12/10/2021 - Present        Left carotid stenosis ICD-10-CM: V81.40  ICD-9-CM: 433.10  12/10/2021 - Present        Simple partial seizures ([facility] ICD-10-CM: G40.109  ICD-9-CM: 345.50  10/30/2021 - Present        Left hemiparesis ([facility] ICD-10-CM: G81.94  ICD-9-CM: 342.90  10/23/2021 - Present        Subdural hematoma ([facility] ICD-10-CM: P99.8Y6J  ICD-9-CM: 432.1  10/22/2021 - Present              Medications reviewed  Current Facility-Administered Medications   Medication Dose Route Frequency    levETIRAcetam (KEPPRA) tablet 1,500 mg  1,500 mg Oral BID    atorvastatin (LIPITOR) tablet 40 mg  40 mg Oral DAILY    cholecalciferol (VITAMIN D3) (1000 Units /25 mcg) tablet 2,000 Units  2,000 Units Oral BID    isosorbide mononitrate ER (IMDUR) tablet 120 mg  120 mg Oral 7am    phenytoin ER (DILANTIN ER) ER capsule 100 mg  100 mg Oral QHS    niCARdipine (CARDENE) 25 mg in 0.9% sodium chloride 250 mL infusion  0-15 mg/hr IntraVENous TITRATE    ondansetron (ZOFRAN) injection 4 mg  4 mg IntraVENous Q6H PRN    naloxone (NARCAN) injection 0.4 mg  0.4 mg IntraVENous PRN    acetaminophen (TYLENOL) tablet 650 mg  650 mg Oral Q4H PRN    Or    acetaminophen (TYLENOL) solution 650 mg  650 mg Per NG tube Q4H PRN    Or    acetaminophen (TYLENOL) suppository 650 mg  650 mg Rectal Q4H PRN    lacosamide (VIMPAT) tablet 200 mg  200 mg Oral BID    metoprolol tartrate (LOPRESSOR) tablet 12.5 mg  12.5 mg Oral DAILY       Care Plan discussed with: Patient/Family and Nurse    Total time spent with patient: 40 minutes.     Efrain Starks MD

## 2022-01-13 NOTE — PROGRESS NOTES
Per night shift nurse, Pt pulled his IVs and resisted a new IV and labs. Day shift nurse will try again this morning.

## 2022-01-14 ENCOUNTER — APPOINTMENT (OUTPATIENT)
Dept: NON INVASIVE DIAGNOSTICS | Age: 87
DRG: 064 | End: 2022-01-14
Attending: PSYCHIATRY & NEUROLOGY
Payer: MEDICARE

## 2022-01-14 PROBLEM — I63.9 STROKE (CEREBRUM) (HCC): Status: ACTIVE | Noted: 2022-01-14

## 2022-01-14 LAB
ANION GAP SERPL CALC-SCNC: 5 MMOL/L (ref 5–15)
BUN SERPL-MCNC: 19 MG/DL (ref 6–20)
BUN/CREAT SERPL: 18 (ref 12–20)
CALCIUM SERPL-MCNC: 9.3 MG/DL (ref 8.5–10.1)
CHLORIDE SERPL-SCNC: 106 MMOL/L (ref 97–108)
CHOLEST SERPL-MCNC: 123 MG/DL
CO2 SERPL-SCNC: 29 MMOL/L (ref 21–32)
CREAT SERPL-MCNC: 1.04 MG/DL (ref 0.7–1.3)
ECHO AV AREA PEAK VELOCITY: 2.5 CM2
ECHO AV AREA PEAK VELOCITY: 2.5 CM2
ECHO AV PEAK GRADIENT: 11 MMHG
ECHO AV PEAK VELOCITY: 1.7 M/S
ECHO AV VELOCITY RATIO: 0.65
ECHO LA DIAMETER INDEX: 2.12 CM/M2
ECHO LA DIAMETER: 4.2 CM
ECHO LV E' SEPTAL VELOCITY: 5 CM/S
ECHO LV FRACTIONAL SHORTENING: 21 % (ref 28–44)
ECHO LV INTERNAL DIMENSION DIASTOLE INDEX: 1.67 CM/M2
ECHO LV INTERNAL DIMENSION DIASTOLIC: 3.3 CM (ref 4.2–5.9)
ECHO LV INTERNAL DIMENSION SYSTOLIC INDEX: 1.31 CM/M2
ECHO LV INTERNAL DIMENSION SYSTOLIC: 2.6 CM
ECHO LV IVSD: 1.2 CM (ref 0.6–1)
ECHO LV MASS 2D: 116.8 G (ref 88–224)
ECHO LV MASS INDEX 2D: 59 G/M2 (ref 49–115)
ECHO LV POSTERIOR WALL DIASTOLIC: 1.1 CM (ref 0.6–1)
ECHO LV RELATIVE WALL THICKNESS RATIO: 0.67
ECHO LVOT AREA: 3.8 CM2
ECHO LVOT DIAM: 2.2 CM
ECHO LVOT PEAK GRADIENT: 5 MMHG
ECHO LVOT PEAK VELOCITY: 1.1 M/S
ECHO MV A VELOCITY: 0.74 M/S
ECHO MV E VELOCITY: 0.66 M/S
ECHO MV E/A RATIO: 0.89
ECHO MV E/E' SEPTAL: 13.2
ECHO RV TAPSE: 1 CM (ref 1.5–2)
ERYTHROCYTE [DISTWIDTH] IN BLOOD BY AUTOMATED COUNT: 13.8 % (ref 11.5–14.5)
GLUCOSE SERPL-MCNC: 96 MG/DL (ref 65–100)
HCT VFR BLD AUTO: 40 % (ref 36.6–50.3)
HDLC SERPL-MCNC: 38 MG/DL
HDLC SERPL: 3.2 {RATIO} (ref 0–5)
HGB BLD-MCNC: 13.1 G/DL (ref 12.1–17)
LDLC SERPL CALC-MCNC: 61 MG/DL (ref 0–100)
MCH RBC QN AUTO: 30.3 PG (ref 26–34)
MCHC RBC AUTO-ENTMCNC: 32.8 G/DL (ref 30–36.5)
MCV RBC AUTO: 92.4 FL (ref 80–99)
NRBC # BLD: 0 K/UL (ref 0–0.01)
NRBC BLD-RTO: 0 PER 100 WBC
PLATELET # BLD AUTO: 199 K/UL (ref 150–400)
PMV BLD AUTO: 10.1 FL (ref 8.9–12.9)
POTASSIUM SERPL-SCNC: 4.2 MMOL/L (ref 3.5–5.1)
RBC # BLD AUTO: 4.33 M/UL (ref 4.1–5.7)
SODIUM SERPL-SCNC: 140 MMOL/L (ref 136–145)
TRIGL SERPL-MCNC: 120 MG/DL (ref ?–150)
VLDLC SERPL CALC-MCNC: 24 MG/DL
WBC # BLD AUTO: 6.8 K/UL (ref 4.1–11.1)

## 2022-01-14 PROCEDURE — 93306 TTE W/DOPPLER COMPLETE: CPT

## 2022-01-14 PROCEDURE — 74011250637 HC RX REV CODE- 250/637: Performed by: FAMILY MEDICINE

## 2022-01-14 PROCEDURE — 80048 BASIC METABOLIC PNL TOTAL CA: CPT

## 2022-01-14 PROCEDURE — 97535 SELF CARE MNGMENT TRAINING: CPT | Performed by: OCCUPATIONAL THERAPIST

## 2022-01-14 PROCEDURE — 36415 COLL VENOUS BLD VENIPUNCTURE: CPT

## 2022-01-14 PROCEDURE — 99233 SBSQ HOSP IP/OBS HIGH 50: CPT | Performed by: PSYCHIATRY & NEUROLOGY

## 2022-01-14 PROCEDURE — 65660000000 HC RM CCU STEPDOWN

## 2022-01-14 PROCEDURE — 85027 COMPLETE CBC AUTOMATED: CPT

## 2022-01-14 PROCEDURE — 92526 ORAL FUNCTION THERAPY: CPT

## 2022-01-14 PROCEDURE — 97530 THERAPEUTIC ACTIVITIES: CPT

## 2022-01-14 PROCEDURE — 97112 NEUROMUSCULAR REEDUCATION: CPT

## 2022-01-14 PROCEDURE — 97112 NEUROMUSCULAR REEDUCATION: CPT | Performed by: OCCUPATIONAL THERAPIST

## 2022-01-14 PROCEDURE — 74011250637 HC RX REV CODE- 250/637: Performed by: NURSE PRACTITIONER

## 2022-01-14 PROCEDURE — 80061 LIPID PANEL: CPT

## 2022-01-14 PROCEDURE — G0378 HOSPITAL OBSERVATION PER HR: HCPCS

## 2022-01-14 RX ADMIN — LACOSAMIDE 200 MG: 50 TABLET, FILM COATED ORAL at 08:48

## 2022-01-14 RX ADMIN — ATORVASTATIN CALCIUM 40 MG: 40 TABLET, FILM COATED ORAL at 08:48

## 2022-01-14 RX ADMIN — LACOSAMIDE 200 MG: 50 TABLET, FILM COATED ORAL at 18:39

## 2022-01-14 RX ADMIN — ACETAMINOPHEN 650 MG: 325 TABLET ORAL at 20:54

## 2022-01-14 RX ADMIN — LEVETIRACETAM 1500 MG: 500 TABLET, FILM COATED ORAL at 05:31

## 2022-01-14 RX ADMIN — PHENYTOIN SODIUM 100 MG: 100 CAPSULE ORAL at 20:54

## 2022-01-14 RX ADMIN — Medication 2000 UNITS: at 20:54

## 2022-01-14 RX ADMIN — LEVETIRACETAM 1500 MG: 500 TABLET, FILM COATED ORAL at 15:50

## 2022-01-14 RX ADMIN — ISOSORBIDE MONONITRATE 120 MG: 60 TABLET, EXTENDED RELEASE ORAL at 07:30

## 2022-01-14 RX ADMIN — METOPROLOL TARTRATE 12.5 MG: 25 TABLET, FILM COATED ORAL at 08:47

## 2022-01-14 RX ADMIN — Medication 2000 UNITS: at 08:47

## 2022-01-14 NOTE — PROGRESS NOTES
Problem: Dysphagia (Adult)  Goal: *Acute Goals and Plan of Care (Insert Text)  Description: Speech pathology goals  Initiated 1/12/2022  1. Patient will tolerate soft and bite sized/mildly thick liquid diet with no overt s/s aspiration within 7 days  Outcome: Progressing Towards Goal     SPEECH LANGUAGE PATHOLOGY DYSPHAGIA TREATMENT  Patient: Judith Molina (36 y.o. male)  Date: 1/14/2022  Diagnosis: Subdural bleeding (HCC) [I62.00]  Acute on chronic intracranial subdural hematoma (HCC) [I62.01, I62.03] <principal problem not specified>       Precautions:  Fall    ASSESSMENT:  Patient continues to tolerate soft and bite sized diet/mildly thick liquids with no difficulties or adverse effects. Bedside, patient with no difficulties or s/s of aspiration with any consistencies. Given tolerance and bedside presentation, recommend continue with current diet with precautions as outlined below. Suspect that swallow function is approaching baseline, therefore will reduce frequency to 1-2 x a week to follow for diet tolerance. PLAN:  Recommendations and Planned Interventions:  -- soft and bite sized diet/mildly thick liquids  -- upright for all PO   -- assistance during mealtimes as needed   Patient continues to benefit from skilled intervention to address the above impairments. Continue treatment per established plan of care. Discharge Recommendations:  None     SUBJECTIVE:   Patient stated I've been doing just fine. OBJECTIVE:   Cognitive and Communication Status:  Neurologic State: Alert  Orientation Level: Oriented to person  Cognition: Follows commands  Perception: Appears intact  Perseveration: No perseveration noted  Safety/Judgement: Awareness of environment  Dysphagia Treatment:  Oral Assessment:  Oral Assessment  Labial: No impairment  Dentition: Natural  Oral Hygiene: moist oral mucosa free of secretions  Lingual: No impairment  Velum: No impairment  Mandible: No impairment  P.O.  Trials:  Patient Position: upright in bed  Vocal quality prior to P.O.: No impairment  Consistency Presented: Nectar thick liquid  How Presented: Self-fed/presented     Bolus Acceptance: No impairment  Bolus Formation/Control: No impairment     Propulsion: No impairment  Oral Residue: None  Initiation of Swallow: No impairment  Laryngeal Elevation: Functional  Aspiration Signs/Symptoms: None  Pharyngeal Phase Characteristics: No impairment, issues, or problems   Effective Modifications: None                                                                                            Pain:  Pain Scale 1: Numeric (0 - 10)  Pain Intensity 1: 0       After treatment:   Patient left in no apparent distress in bed    COMMUNICATION/EDUCATION:     The patient's plan of care including recommendations, planned interventions, and recommended diet changes were discussed with: Registered nurse.      Justyn Ordonez M.S. CF-SLP   Speech Language Pathologist     Time Calculation: 15 mins

## 2022-01-14 NOTE — PROGRESS NOTES
Problem: Mobility Impaired (Adult and Pediatric)  Goal: *Acute Goals and Plan of Care (Insert Text)  Description: FUNCTIONAL STATUS PRIOR TO ADMISSION: Patient questionable historian. Initially pt verbalized mobilizing without a walker, but later with a walker and assistance of 1 person. HOME SUPPORT PRIOR TO ADMISSION: The patient lived with wife and son and has additional family nearby. Physical Therapy Goals  Initiated 1/12/2022  1. Patient will move from supine to sit and sit to supine and scoot up and down in bed with moderate assistance  within 7 day(s). 2.  Patient will transfer from bed to chair and chair to bed with moderate assistance x2 people using the least restrictive device within 7 day(s). 3.  Patient will perform sit to stand with moderate assistance x2 people  within 7 day(s). 4.  Patient will ambulate with maximal assistance x2 people for 20 feet with the least restrictive device within 7 day(s). Outcome: Progressing Towards Goal  PHYSICAL THERAPY TREATMENT  Patient: Tabitha Christine (76 y.o. male)  Date: 1/14/2022  Diagnosis: Subdural bleeding (HCC) [I62.00]  Acute on chronic intracranial subdural hematoma (HCC) [I62.01, I62.03] <principal problem not specified>       Precautions: Fall  Chart, physical therapy assessment, plan of care and goals were reviewed. ASSESSMENT  Patient continues with skilled PT services and is progressing towards goals - presents with L hemiparesis, diminished sensation L side, poor balance, decreased command following, decreased attention to task, and overall decline in functional mobility. Note code S called on 1/12/22 - MRI positive for small focus of acute ischemia in the right posterior corona radiata. Pt required maxA x2 for supine<>sit and required constant assistance for sitting balance. He transferred sit<>stand x4 reps with maxA x2 with facilitation of L knee/hip extension and upright posture.   Performed stand pivot transfer bed>chair with maxA x2 toward the R. Continuing to recommend IPR upon discharge    Current Level of Function Impacting Discharge (mobility/balance): maxA x2 for all mobility     Other factors to consider for discharge: fall risk, hx CVA         PLAN :  Patient continues to benefit from skilled intervention to address the above impairments. Continue treatment per established plan of care. to address goals. Recommendation for discharge: (in order for the patient to meet his/her long term goals)  Therapy 3 hours per day 5-7 days per week    This discharge recommendation:  Has not yet been discussed the attending provider and/or case management    IF patient discharges home will need the following DME: to be determined (TBD)       SUBJECTIVE:   Patient stated I'm ok.     OBJECTIVE DATA SUMMARY:   Critical Behavior:  Neurologic State: Alert,Confused  Orientation Level: Oriented to person,Disoriented to place,Disoriented to situation,Disoriented to time  Cognition: Decreased attention/concentration,Decreased command following,Poor safety awareness  Safety/Judgement: Decreased awareness of environment,Decreased awareness of need for assistance,Decreased awareness of need for safety,Decreased insight into deficits,Fall prevention  Functional Mobility Training:  Bed Mobility:  Supine to Sit: Maximum assistance;Assist x2  Sit to Supine: Maximum assistance;Assist x2  Scooting: Maximum assistance; Additional time;Assist x1    Transfers:  Sit to Stand: Maximum assistance; Additional time;Assist x2; Moderate assistance  Stand to Sit: Maximum assistance; Additional time;Assist x1  Bed to Chair: Maximum assistance;Assist x2    Balance:  Sitting: Impaired; With support;High guard  Sitting - Static: Poor (constant support)  Sitting - Dynamic: Poor (constant support)  Standing: Impaired; With support  Standing - Static: Poor;Constant support  Standing - Dynamic : Poor;Constant support    Activity Tolerance:   Poor    After treatment patient left in no apparent distress:   Sitting in chair, Call bell within reach, and Bed / chair alarm activated    COMMUNICATION/COLLABORATION:   The patients plan of care was discussed with: Occupational therapist and Registered nurse.      Mendoza Mendez PT, DPT   Time Calculation: 47 mins

## 2022-01-14 NOTE — PROGRESS NOTES
1915: Received call from  that wife of patient is very upset about pt's status being changed to observation and would like an explanation of the change from Dr. Vonnie Flaherty. I told CM that it was too late to message DR chahal but will follow up tomorrow.

## 2022-01-14 NOTE — PROGRESS NOTES
Neurosurgery    Pt's MRI brain findings of new right posterior corona radiata infarct noted. Discussed ASA 81 mg with Dr. Silke Ray. Recommend holding off ASA for 2 weeks since there was some acute hemorrhage in the chronic SDH likely due to oozing from the membranes in the SDH. Would like for patient to have an aspirin test when he follows up with neurology to make sure he is not supratherapeutic on it.     Hugo Ceron, NP

## 2022-01-14 NOTE — PROGRESS NOTES
Problem: Self Care Deficits Care Plan (Adult)  Goal: *Acute Goals and Plan of Care (Insert Text)  Description: FUNCTIONAL STATUS PRIOR TO ADMISSION: Patient was modified independent using a rolling walker for functional mobility. Per patient, recently has been performing BADLs at mod I - min assist level. Patient with recent admission 12/10-12/14 for L lacunar infarct w/ residual R side weakness and dysarthria. HOME SUPPORT: Patient reports living with his wife (also has mobility issues and uses cane / RW) and son (who works 10 hour shifts). Occupational Therapy Goals  Initiated 1/12/2022. Goals updated 1/14/22 following acute CVA on 1/12/22. 1.  Patient will perform EOB grooming with supervision/set-up within 7 day(s). Downgrade to mod A.  2.  Patient will perform upper body dressing with supervision/set-up within 7 day(s). Downgrade to mod A.  3.  Patient will perform lower body dressing with minimal assistance/contact guard assist using RW prn within 7 day(s). Downgrade to mod A bed level. 4.  Patient will perform toilet transfers to [facility] with minimal assistance x2 using RW within 7 day(s). Downgrade to max A.  5.  Patient will perform all aspects of toileting with minimal assistance/contact guard assist using RW prn within 7 day(s). Downgrade to max A.  6.  Patient will participate in upper extremity therapeutic exercise/activities with supervision/set-up within 7 day(s). Downgrade to mod A.  7.  Patient will utilize fall prevention techniques during functional activities with verbal cues within 7 day(s). 8.  Patient will improve their LUE Fugl Goff score by 5 points in prep for ADLs within 7 days.      Outcome: Not Progressing Towards Goal     OCCUPATIONAL THERAPY RE-EVALUATION NEURO POPULATION  Patient: Palmer Block (86 y.o. male)  Date: 1/14/2022  Diagnosis: Subdural bleeding (HCC) [I62.00]  Acute on chronic intracranial subdural hematoma (HCC) [I62.01, I62.03] <principal problem not specified>       Precautions: Fall  Chart, occupational therapy assessment, plan of care, and goals were reviewed. ASSESSMENT  Based on the objective data described below, code S called 1/12/22. MRI on 1/13/22 positive for small focus of acute ischemia in the right posterior corona radiata; Right-sided extra-axial fluid collection with acute, subacute, and chronic hemorrhagic components. There is suggestion of slight increase in size from the prior study. Pt demonstrated impaired cognition, attention to task and command following, L side hemiparesis with 2- to 3-/5 strength, decreased endurance, coordination, balance with significant LOB to L in sitting and standing and safety. He requires up to max A for UE ADLs, total A for LE ADLs and toileting and mod-max A x2 for functional mobility. Continue to recommend IP rehab at discharge. Current Level of Function Impacting Discharge (ADLs): max A for UE ADLs, total A for LE ADLs and toileting and mod-max A x2 for functional mobility    Functional Outcome Measure:  Unable to complete Fugl-Goff Assessment due to imp cognition and command following. Other factors to consider for discharge: see above         PLAN :  Recommendations and Planned Interventions: self care training, functional mobility training, therapeutic exercise, balance training, visual/perceptual training, therapeutic activities, cognitive retraining, endurance activities, neuromuscular re-education, patient education, home safety training, and family training/education    Frequency/Duration: Patient will be followed by occupational therapy 5 times a week to address goals.     Recommend with staff: max A x2 for OOB tranfers; assist with feeding    Recommend next OT session: ERICH Fugl-Goff, sitting balance, grooming    Recommendation for discharge: (in order for the patient to meet his/her long term goals)  Therapy 3 hours per day 5-7 days per week    This discharge recommendation:  Has been made in collaboration with the attending provider and/or case management    Equipment recommendations for successful discharge (if) home: TBD       SUBJECTIVE:   Patient with no verbalizations. OBJECTIVE DATA SUMMARY:   Hospital course since last seen and reason for reevaluation: new CVA    Cognitive/Behavioral Status:  Neurologic State: Alert;Confused  Orientation Level: Oriented to person;Disoriented to place; Disoriented to situation;Disoriented to time  Cognition: Decreased attention/concentration;Decreased command following;Poor safety awareness  Perception: Cues to maintain midline in sitting;Cues to maintain midline in standing; Tactile;Verbal;Visual (significant LOB to L)  Perseveration: No perseveration noted  Safety/Judgement: Decreased awareness of environment;Decreased awareness of need for assistance;Decreased awareness of need for safety;Decreased insight into deficits; Fall prevention    Strength:  LUE 2- to 3-/5       Tone & Sensation: IMP                            Balance:  Sitting: Impaired; With support;High guard  Sitting - Static: Poor (constant support)  Sitting - Dynamic: Poor (constant support)  Standing: Impaired; With support  Standing - Static: Poor;Constant support  Standing - Dynamic : Poor;Constant support    Functional Mobility and Transfers for ADLs:  Bed Mobility:  Scooting: Maximum assistance; Additional time;Assist x1    Transfers:  Sit to Stand: Maximum assistance; Additional time;Assist x2; Moderate assistance  Stand to Sit: Maximum assistance; Additional time;Assist x1  Bed to Chair: Moderate assistance;Maximum assistance; Additional time;Assist x2    ADL Intervention and task modifications:  Lower Body Dressing Assistance  Dressing Assistance: Total assistance(dependent)  Protective Undergarmet: Total assistance (dependent)  Socks: Total assistance (dependent)  Position Performed: Seated edge of bed  Cues: Don;Doff;Physical assistance; Tactile cues provided;Verbal cues provided;Visual cues provided    Toileting  Toileting Assistance: Total assistance(dependent) (pt incontinent of large amount of urine)  Bladder Hygiene: Total assistance (dependent)  Bowel Hygiene: Total assistance (dependent)  Clothing Management: Total assistance (dependent)  Cues: Tactile cues provided;Verbal cues provided;Visual cues provided;Physical assistance for pants down;Physical assistance for pants up    Cognitive Retraining  Orientation Retraining: Reorienting  Problem Solving: Identifying the task  Executive Functions: Executing cognitive plans  Organizing/Sequencing: Breaking task down  Attention to Task: Single task  Following Commands: Awareness of environment  Safety/Judgement: Decreased awareness of environment;Decreased awareness of need for assistance;Decreased awareness of need for safety;Decreased insight into deficits; Fall prevention  Cues: Tactile cues provided;Verbal cues provided;Visual cues provided      Functional Measure:  Fugl-Goff Assessment of Motor Recovery after Stroke:  Unable to perform due to impaired command following and poor balance EOB      Pain:  No signs of pain    Activity Tolerance:   Fair and requires frequent rest breaks    After treatment patient left in no apparent distress:   Sitting in chair, Call bell within reach, and Bed / chair alarm activated    COMMUNICATION/COLLABORATION:   The patients plan of care was discussed with: Physical therapist, Registered nurse, and Physician. Patient was educated regarding His deficit(s) of impaired cognition, L HP and poor balance as this relates to His diagnosis of CVA. He demonstrated Guarded understanding as evidenced by no awareness. Patient and/or family was verbally educated on the BE FAST acronym for signs/symptoms of CVA and TIA. BE FAST was written on patient's communication board  for visual education and reinforcement. All questions answered with patient indicating poor understanding.      Elis Griffin Bailey Orr, OT  Time Calculation: 26 mins

## 2022-01-14 NOTE — PROGRESS NOTES
Bedside and Verbal shift change report given to Marlon RN (oncoming nurse) by Lili Traylor (offgoing nurse). Report included the following information SBAR, Kardex, MAR, Cardiac Rhythm NSR-SB and Dual Neuro Assessment.

## 2022-01-14 NOTE — CONSULTS
NEUROLOGY PROGRESS NOTE    Name Olegario Rivera Age 80 y.o. MRN [id number]  1935     Consulting Physician: Michael Child MD      Chief Complaint: L-HP, numbness     Assessment:     Active Problems:    Left hemiparesis ([address] (10/23/2021)      Subdural bleeding ([address] (2022)      Acute on chronic intracranial subdural hematoma ([address] (2022)      80year old male with a h/o remote R pontine/L parietal/recent L thalamic infarction, CAD, HTN, HPL, chronic R SDH, subsequent seizures admitted with acute L-HP, L hemisensory deficit. This appears constant since admission per patient with some minimal improvement. Duration of symptoms does not appear consistent with seizure activity. MRI Brain was reviewed showing acute R corona radiata infarct likely explaining above presentation. Unfortunately, he has acute on chronic hemorrhage from known SDH as well. Recommendations:   ASA to resume after 2 weeks per NSGY  Continue statin therapy, obtain FLP  Telemetry/TTE ordered  Continue home AEDs  PT/OT/ST assessments  May initiate discharge planning    Thank you very much for this referral. I appreciate the opportunity to participate in this patient's care. History of Present Illness: This is a 80 y.o.   male, we were asked to see for left sided weakness, numbness. PMH notable for remote R pontine/L parietal/recent L thalamic infarction, CAD, HTN, HPL, chronic R SDH, subsequent seizures. He was admitted  with severe HTN, LUE/LLE weakness and numbness per patient which have persistent this admission with some minimal improvement. He also admits to residual dysarthria and R FD from his most recent stroke. CTH revealed acute on chronic R sided SDH which appears stable on repeat imaging . ASA currently held due to NSGY recommendations.      Allergies   Allergen Reactions    Morphine Unknown (comments)        Prior to Admission medications    Medication Sig Start Date End Date Taking? Authorizing Provider   metoprolol tartrate (LOPRESSOR) 25 mg tablet Take 0.5 Tablets by mouth daily for 30 days. 12/14/21 1/13/22 Yes Easton Maharaj DO   aspirin 81 mg chewable tablet Take 1 Tablet by mouth daily for 30 days. 12/14/21 1/13/22 Yes Ashley Murphy MD   phenytoin ER (DILANTIN ER) 100 mg ER capsule Take 100 mg by mouth nightly. On 12/8/21 the patient was reduced to 100 mg at night time by the [address] [place] [address] due to liver labs. Yes Provider, Shira   lacosamide (VIMPAT) 200 mg tab tablet Take 1 Tablet by mouth two (2) times a day. Max Daily Amount: 400 mg. Followup with neurologist 11/9/21  Yes Jose Grimm MD   levETIRAcetam (KEPPRA) 750 mg tablet Take 2 Tablets by mouth two (2) times a day. 11/5/21  Yes Julito Harmon MD   cholecalciferol (Vitamin D3) (1000 Units /25 mcg) tablet Take 2,000 Units by mouth two (2) times a day. Yes Tatyana Light MD   isosorbide mononitrate ER (IMDUR) 120 mg CR tablet Take 120 mg by mouth every morning. Yes Nadege, MD Tatyana       Past Medical History:   Diagnosis Date    CAD (coronary artery disease)     Hx of completed stroke     Hypercholesteremia     Hypertension     Seizure disorder ([facility]     Subdural hematoma (HCC)         Past Surgical History:   Procedure Laterality Date    HX CORONARY STENT PLACEMENT      SC CARDIAC SURG PROCEDURE UNLIST      bypass        Social History     Tobacco Use    Smoking status: Never Smoker    Smokeless tobacco: Never Used   Substance Use Topics    Alcohol use: Never        Family History   Problem Relation Age of Onset    Hypertension Father         Review of Systems:   Comprehensive review of systems performed and negative except for as listed above. Exam:     Visit Vitals  BP (!) 88/62   Pulse 61   Temp 97.2 °F (36.2 °C)   Resp 14   Ht 5' 11\" (1.803 m)   Wt 171 lb 8.3 oz (77.8 kg)   SpO2 91%   BMI 23.92 kg/m²        General: Well developed, well nourished.  Patient in no apparent distress Head: Normocephalic, atraumatic, anicteric sclera   Lungs:  Clear to auscultation bilaterally, No wheezes or rubs   Cardiac: Regular rate and rhythm with no murmurs. Abd: Bowel sounds were audible. No tenderness on palpation   Ext: No pedal edema   Skin: No overt signs of rash     Neurological Exam:  Mental Status: Alert and oriented to person place and time   Speech: Fluent no aphasia, moderate/severe dysarthria. Cranial Nerves:   Intact visual fields. Facial sensation is normal. Facial movement is asymmetric-mild R FD. Palate is midline. Normal sternocleidomastoid strength. Tongue is slightly deviated R. Hearing is intact bilaterally. Eyes: PERRL, EOM's full, no nystagmus, no ptosis. Motor:  LUE/LLE 4+/5, RUE/RLE 5/5. Normal bulk and tone. Reflexes:   Deep tendon reflexes 1+/4 and symmetrical.  Plantar response is downgoing b/l. Sensory:   Decreased LUE/LLE LT   Gait:  Gait is deferred. Tremor:   No tremor noted. Cerebellar:  Finger to nose and heel over shin to knee was demonstrated competently. Neurovascular: No carotid bruits. Imaging  CT Results (maximum last 3): Results from Hospital Encounter encounter on 01/11/22    CT HEAD WO CONT    Narrative  INDICATION: SDH    EXAM:  HEAD CT WITHOUT CONTRAST    COMPARISON: 1/11/2022    TECHNIQUE:  Routine noncontrast axial head CT was performed. Sagittal and  coronal reconstructions were generated. CT dose reduction was achieved through use of a standardized protocol tailored  for this examination and automatic exposure control for dose modulation. FINDINGS:    There has been no significant change in overall size of the right  holohemispheric subdural hemorrhage which measures up to 9 mm anteriorly, and 12  mm posteriorly, the density of the hemorrhage has decreased. Associated right to  left midline shift of 1-2 mm is unchanged. No hydrocephalus. Chronic white  matter hypodensities similar to prior study.     Impression  No significant change in size of right holohemispheric subdural hemorrhage,  though decreased in density. Minimal right to left midline shift, unchanged. Results from [facility] encounter on 01/11/22    CT HEAD WO CONT    Narrative  EXAM: CT HEAD WO CONT    INDICATION: repeat, follow-up subdural    COMPARISON: Earlier same day. CONTRAST: None. TECHNIQUE: Unenhanced CT of the head was performed using 5 mm images. Brain and  bone windows were generated. Coronal and sagittal reformats. CT dose reduction  was achieved through use of a standardized protocol tailored for this  examination and automatic exposure control for dose modulation. FINDINGS:  The ventricles and sulci are normal in size, shape and configuration. Shaun Power Extensive  low density in the periventricular white matter. . Acute on chronic right-sided  subdural hematoma is again noted. It has not significantly changed in size. . The  basilar cisterns are open. No CT evidence of acute infarct. The bone windows demonstrate no abnormalities. The visualized portions of the  paranasal sinuses and mastoid air cells are clear. Impression  Unchanged acute on chronic right-sided subdural hematoma      CTA CODE NEURO HEAD AND NECK W CONT    Narrative  EXAM:  CTA CODE NEURO HEAD AND NECK W CONT    INDICATION:   Code Stroke    COMPARISON:  CT head 1/11/2022. CONTRAST:  100 mL of Isovue-370. TECHNIQUE:  Unenhanced  images were obtained to localize the volume for  acquisition. Multislice helical axial CT angiography was performed from the  aortic arch to the top of the head during uneventful rapid bolus intravenous  contrast administration. Coronal and sagittal reformations and 3D post  processing was performed. CT dose reduction was achieved through use of a  standardized protocol tailored for this examination and automatic exposure  control for dose modulation. This study was analyzed by the [address]  algorithm.     FINDINGS:    CTA Head:  There is no evidence of large vessel occlusion or flow-limiting stenosis of the  intracranial internal carotid, anterior cerebral, and middle cerebral arteries. Calcification of the bilateral carotid siphons without significant stenosis. 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 The  anterior communicating artery is patent. There is no evidence of large vessel occlusion or flow-limiting stenosis of the  intracranial vertebral arteries, basilar artery, or posterior cerebral arteries. Multifocal mild stenoses of the bilateral posterior cerebral artery P2 segments,  unchanged. Calcification of the left V4 segment with mild stenosis, unchanged. The posterior communicating arteries are not well seen. There is no evidence of aneurysm or vascular malformation. The dural venous  sinuses and deep cerebral venous system are patent. No evidence of abnormal  parenchymal enhancement on delayed phase images. Redemonstrated acute on chronic  right cerebral convexity subdural hematoma, with mild local mass effect, but no  midline shift or herniation. CTA NECK:  NASCET method was utilized for calculating stenosis. Partially visualized postsurgical changes of CABG. The common carotid arteries  demonstrate no significant stenosis. Dense calcific atherosclerosis of the  proximal right internal carotid artery with mild (less than 50%) stenosis. Dense  calcific atherosclerosis of the proximal left internal carotid artery with mild  (less than 50%) stenosis. There is a left dominant vertebrobasilar arterial system. The cervical vertebral  arteries are normal in course, size and contour without significant stenosis. Visualized soft tissues of the neck are unremarkable. Visualized lung apices are  clear. No acute fracture or aggressive osseous lesion.  Multilevel degenerative  disc disease throughout the cervical spine, as well as multilevel facet  arthropathy, including severe left-sided facet arthropathy in the mid cervical  spine. Partially visualized median sternotomy. Impression  CTA Head:  1. No evidence of flow-limiting stenosis or aneurysm. Scattered atherosclerotic  disease as above, unchanged from prior exam.  2. Redemonstrated acute on chronic right cerebral convexity subdural hematoma,  with mild local mass effect, but no midline shift or herniation. CTA Neck:  1. No evidence of flow-limiting stenosis. 2. Mild stenosis (less than 50% by NASCET criteria) of the proximal bilateral  internal carotid arteries, unchanged. Lab Review  Lab Results   Component Value Date/Time    WBC 6.8 01/14/2022 04:39 AM    HCT 40.0 01/14/2022 04:39 AM    HGB 13.1 01/14/2022 04:39 AM    PLATELET 207 49/42/0878 04:39 AM     Lab Results   Component Value Date/Time    Sodium 140 01/14/2022 04:39 AM    Potassium 4.2 01/14/2022 04:39 AM    Chloride 106 01/14/2022 04:39 AM    CO2 29 01/14/2022 04:39 AM    Glucose 96 01/14/2022 04:39 AM    BUN 19 01/14/2022 04:39 AM    Creatinine 1.04 01/14/2022 04:39 AM    Calcium 9.3 01/14/2022 04:39 AM     No components found for: TROPQUANT  No results found for: NIRAV    Signed:  Rachele Weller,   1/14/2022  12:22 PM

## 2022-01-14 NOTE — PROGRESS NOTES
Bedside shift change report given to Saray Mercer RN (oncoming nurse) by Pedrito Waldron RN (offgoing nurse). Report included the following information SBAR, ED Summary, Intake/Output, Med Rec Status and Cardiac Rhythm NSR.

## 2022-01-14 NOTE — PROGRESS NOTES
Transition of Care Plan: [facility] vs SNF     RUR: 18% moderate     PCP F/U:Clary Bustos MD     Disposition: [facility] vs SNF     Transportation: S     Main Contact: Spouse: Brandon GAMEZ-[phone]    9000: Per recommendations with medical/therapy team, referrals sent to [facility] and [facility] in which spouse was agreeable. Did speak with patient who stated that he wanted to go home. However, this CM spoke with patient yesterday at the bedside with daughter. Agreeable to go to a facility. No acceptance from [facility] facilities as of yet. Daughter provided some SNF choices. Messaged attending that [facility] has not yet accepted and family would like referrals sent to SNFs of choice. MD agreeable. 0: Spoke with wife and daughter earlier today. Not agreeable to [facility] as they are not allowing visitors. DONY states they have reviewed chart, but need updated notes from PT/OT to consider. Daija Mohan [facility] said no. Family also wanted referral sent to [address] [place] [address]. Referral sent. Pending review.      Mckayla Amaro RN, CRM    Will continue to follow    Mckayla Amaro RN, CRM

## 2022-01-15 PROCEDURE — 65660000000 HC RM CCU STEPDOWN

## 2022-01-15 PROCEDURE — 74011250637 HC RX REV CODE- 250/637: Performed by: FAMILY MEDICINE

## 2022-01-15 PROCEDURE — 74011250637 HC RX REV CODE- 250/637: Performed by: NURSE PRACTITIONER

## 2022-01-15 RX ADMIN — PHENYTOIN SODIUM 100 MG: 100 CAPSULE ORAL at 20:56

## 2022-01-15 RX ADMIN — Medication 2000 UNITS: at 13:10

## 2022-01-15 RX ADMIN — ACETAMINOPHEN 650 MG: 325 TABLET ORAL at 20:56

## 2022-01-15 RX ADMIN — ATORVASTATIN CALCIUM 40 MG: 40 TABLET, FILM COATED ORAL at 10:15

## 2022-01-15 RX ADMIN — METOPROLOL TARTRATE 12.5 MG: 25 TABLET, FILM COATED ORAL at 10:15

## 2022-01-15 RX ADMIN — LACOSAMIDE 200 MG: 50 TABLET, FILM COATED ORAL at 10:15

## 2022-01-15 RX ADMIN — Medication 2000 UNITS: at 20:56

## 2022-01-15 RX ADMIN — LEVETIRACETAM 1500 MG: 500 TABLET, FILM COATED ORAL at 04:38

## 2022-01-15 RX ADMIN — LACOSAMIDE 200 MG: 50 TABLET, FILM COATED ORAL at 17:20

## 2022-01-15 RX ADMIN — LEVETIRACETAM 1500 MG: 500 TABLET, FILM COATED ORAL at 15:50

## 2022-01-15 RX ADMIN — ISOSORBIDE MONONITRATE 120 MG: 60 TABLET, EXTENDED RELEASE ORAL at 07:14

## 2022-01-15 NOTE — PROGRESS NOTES
Hospitalist Progress Note           Please call  and page for questions. Call physician on-call through the  7pm-7am    Daily Progress Note: 1/15/2022    Primary care provider:Neelima Bustos MD    Date of admission: 1/11/2022  9:22 PM    Admission summery and hospital course:  80 y.o. male with apmhx CAD, recent CVA with SDH, HTN, and seizure disorder who presents as a transfer from [facility] for acute on chronic SDH. His CC was numbness and weakness of RLE. He was recently admitted from 12/10-12/14 for left lacunar infarct with residual right sided weakness, facial droop, and dysarthria. He was discharged on ASA at this time. He presented to the ED again on 12/17 from rehab for syncope. CT head was stable, with normal w/u and he was discharged. He returns today with CC RLE weakness. In the ED, BP was initially 229/140. Labs were significant for INR 1.2. Initial CT head  1/11 at 1203 with chronic right convexity SDH that had an additional acute component when compared to  12/11/21 scan. CT head was repeated  On 1/11 at 2020 to evaluate for interval change per neurosurgery, and was found to be stable.    In the Ed, he was started on cardene gtt, and tx with ppx keppra. Subjective:     No acute complaints, denies pain, n/v/d         Assessment/Plan:   Acute on chronic subdural hematoma  At admission-Chronic right convexity subdural hematoma has not changed in size but now has an acute component  She had repeat CT head which was stable  -Per NSGY , no surgical intervention. -hold aspirin    #Acute stroke   -MRI brain-Small focus of acute ischemia in the right posterior corona radiata.   Hold aspirin atleast 2 weeks per neuro and NSGY  Echo normal LVEF w/ LA dilation    #Recent history of lacunar infarct in the left thalamus  History of rt pontine stroke  -aspirin held due to above, on statin    # HTN:  BP soft morning, repeat orthostatic vitals if possible      Hypertensive emergency-resolved      Coronary atherosclerosis  Continue statin, metoprolol and Imdur. Hold aspirin for now. Seizure disorder  On Vimpat , Keppra and  Dilantin ER at night. Liver functions stable. Dyslipidemia  Continue his Lipitor. See orders for other plans. VTE prophylaxis: SCDs while in bed. Code status: Full code. Discussed plan of care with Patient/Family and Nurse. Pre-admission lived at home. Discharge planning: pending SNF       Review of Systems:     Review of Systems:  Symptom  Y/N  Comments   Symptom  Y/N  Comments    Fever/Chills  n    Chest Pain  n    Poor Appetite  n    Edema  n     Cough  n   Abdominal Pain  n     Sputum  n   Joint Pain  n    SOB/FRANKLIN  n   Pruritis/Rash      Nausea/vomit  n   Tolerating PT/OT      Diarrhea  n   Tolerating Diet      Constipation     Other      Could not obtain due to:         Objective:   Physical Exam:     Visit Vitals  BP (!) 142/78   Pulse 66   Temp 97.6 °F (36.4 °C)   Resp 16   Ht 5' 11\" (1.803 m)   Wt 79.6 kg (175 lb 8 oz)   SpO2 94%   BMI 24.48 kg/m²      O2 Device: None (Room air)    Temp (24hrs), Av.2 °F (36.8 °C), Min:97.6 °F (36.4 °C), Max:98.8 °F (37.1 °C)    No intake/output data recorded. No intake/output data recorded. General:  Alert,  no distress, appears stated age. Lungs:   Clear to auscultation bilaterally. Chest wall:  No tenderness or deformity. Heart:  Regular rate and rhythm, S1, S2 normal, no murmur. Abdomen:   Soft, non-tender. Bowel sounds normal.    Extremities: Extremities normal, atraumatic, no cyanosis or edema. Neurologic:  Patient has slurred speech. Patient has weakness on his left upper extremity and lower extremity- 3-4/5, RUE 5/5. Patient follows  commands.        Data Review:       Recent Days:  Recent Labs     22  0439   WBC 6.8   HGB 13.1   HCT 40.0        Recent Labs     22  0439      K 4.2      CO2 29   GLU 96   BUN 19   CREA 1.04   CA 9.3     No results for input(s): PH, PCO2, PO2, HCO3, FIO2 in the last 72 hours.     24 Hour Results:  Recent Results (from the past 24 hour(s))   LIPID PANEL    Collection Time: 01/14/22  3:51 PM   Result Value Ref Range    Cholesterol, total 123 <200 MG/DL    Triglyceride 120 <150 MG/DL    HDL Cholesterol 38 MG/DL    LDL, calculated 61 0 - 100 MG/DL    VLDL, calculated 24 MG/DL    CHOL/HDL Ratio 3.2 0.0 - 5.0         Problem List:  Problem List as of 1/15/2022 Date Reviewed: 12/11/2021          Codes Class Noted - Resolved    Stroke (cerebrum) ([email or web address] ICD-10-CM: I63.9  ICD-9-CM: 434.91  1/14/2022 - Present        Acute on chronic intracranial subdural hematoma (HCC) ICD-10-CM: I62.01, I62.03  ICD-9-CM: 432.1  1/13/2022 - Present        Subdural bleeding (HCC) ICD-10-CM: I62.00  ICD-9-CM: 432.1  1/12/2022 - Present        CAD (coronary artery disease) ICD-10-CM: I25.10  ICD-9-CM: 414.00  Unknown - Present        Hypercholesteremia ICD-10-CM: E78.00  ICD-9-CM: 272.0  Unknown - Present        Hypertension ICD-10-CM: I10  ICD-9-CM: 401.9  Unknown - Present        Hx of completed stroke ICD-10-CM: Z86.73  ICD-9-CM: V12.54  Unknown - Present        Seizure disorder ([facility] [[email or web address] ICD-10-CM: G40.909  ICD-9-CM: 345.90  Unknown - Present        Facial droop ICD-10-CM: R29.810  ICD-9-CM: 781.94  12/10/2021 - Present        Left carotid stenosis ICD-10-CM: B29.68  ICD-9-CM: 433.10  12/10/2021 - Present        Simple partial seizures ([facility] ICD-10-CM: G40.109  ICD-9-CM: 345.50  10/30/2021 - Present        Left hemiparesis ([address] ICD-10-CM: G81.94  ICD-9-CM: 342.90  10/23/2021 - Present        Subdural hematoma (HCC) ICD-10-CM: F69.8A2M  ICD-9-CM: 432.1  10/22/2021 - Present              Medications reviewed  Current Facility-Administered Medications   Medication Dose Route Frequency    levETIRAcetam (KEPPRA) tablet 1,500 mg  1,500 mg Oral BID    atorvastatin (LIPITOR) tablet 40 mg  40 mg Oral DAILY    cholecalciferol (VITAMIN D3) (1000 Units /25 mcg) tablet 2,000 Units  2,000 Units Oral BID    isosorbide mononitrate ER (IMDUR) tablet 120 mg  120 mg Oral 7am    phenytoin ER (DILANTIN ER) ER capsule 100 mg  100 mg Oral QHS    ondansetron (ZOFRAN) injection 4 mg  4 mg IntraVENous Q6H PRN    naloxone (NARCAN) injection 0.4 mg  0.4 mg IntraVENous PRN    acetaminophen (TYLENOL) tablet 650 mg  650 mg Oral Q4H PRN    Or    acetaminophen (TYLENOL) solution 650 mg  650 mg Per NG tube Q4H PRN    Or    acetaminophen (TYLENOL) suppository 650 mg  650 mg Rectal Q4H PRN    lacosamide (VIMPAT) tablet 200 mg  200 mg Oral BID    metoprolol tartrate (LOPRESSOR) tablet 12.5 mg  12.5 mg Oral DAILY       Care Plan discussed with: Patient/Family and Nurse    Nasir Cochran MD

## 2022-01-15 NOTE — PROGRESS NOTES
Problem: Falls - Risk of  Goal: *Absence of Falls  Description: Document Estefani Pitts Fall Risk and appropriate interventions in the flowsheet.   Outcome: Progressing Towards Goal  Note: Fall Risk Interventions:  Mobility Interventions: Bed/chair exit alarm,Patient to call before getting OOB,PT Consult for mobility concerns,Strengthening exercises (ROM-active/passive),Utilize walker, cane, or other assistive device         Medication Interventions: Patient to call before getting OOB,Bed/chair exit alarm    Elimination Interventions: Call light in reach,Stay With Me (per policy)    History of Falls Interventions: Bed/chair exit alarm,Door open when patient unattended         Problem: Patient Education: Go to Patient Education Activity  Goal: Patient/Family Education  Outcome: Progressing Towards Goal     Problem: Seizure Disorder (Adult)  Goal: *STG: Remains free of seizure activity  Outcome: Progressing Towards Goal  Goal: *STG: Maintains lab values within therapeutic range  Outcome: Progressing Towards Goal  Goal: *STG/LTG: Complies with medication therapy  Outcome: Progressing Towards Goal  Goal: *STG: Remains free of injury during seizure activity  Outcome: Progressing Towards Goal  Goal: *STG: Remains safe in hospital  Outcome: Progressing Towards Goal  Goal: Interventions  Outcome: Progressing Towards Goal

## 2022-01-15 NOTE — PROGRESS NOTES
Hospitalist Progress Note           Please call  and page for questions. Call physician on-call through the  7pm-7am    Daily Progress Note: 1/15/2022    Primary care provider:Dutch Bustos MD    Date of admission: 1/11/2022  9:22 PM    Admission summery and hospital course:  80 y.o. male with apmhx CAD, recent CVA with SDH, HTN, and seizure disorder who presents as a transfer from [address] [place] [address] for acute on chronic SDH. His CC was numbness and weakness of RLE. He was recently admitted from 12/10-12/14 for left lacunar infarct with residual right sided weakness, facial droop, and dysarthria. He was discharged on ASA at this time. He presented to the ED again on 12/17 from rehab for syncope. CT head was stable, with normal w/u and he was discharged. He returns today with CC RLE weakness. In the ED, BP was initially 229/140. Labs were significant for INR 1.2. Initial CT head  1/11 at 1203 with chronic right convexity SDH that had an additional acute component when compared to  12/11/21 scan. CT head was repeated  On 1/11 at 2020 to evaluate for interval change per neurosurgery, and was found to be stable.    In the Ed, he was started on cardene gtt, and tx with ppx keppra. Subjective:     He was working with PT today, requriing moderate to max assistance. Assessment/Plan:   Acute on chronic subdural hematoma  At admission-Chronic right convexity subdural hematoma has not changed in size but now has an acute component  She had repeat CT head which was stable  -Per NSGY , no surgical intervention. -hold aspirin    #Acute stroke   -MRI brain-Small focus of acute ischemia in the right posterior corona radiata.   Hold aspirin atleast 2 weeks per neuro and NSGY  Echo pending    #Recent history of lacunar infarct in the left thalamus  History of rt pontine stroke  -aspirin held due to above, on statin    # HTN:  BP soft morning, repeat orthostatic vitals if possible      Hypertensive emergency-resolved      Coronary atherosclerosis  Continue statin, metoprolol and Imdur. Hold aspirin for now. Seizure disorder  On Vimpat , Keppra and  Dilantin ER at night. Liver functions stable. Dyslipidemia  Continue his Lipitor. See orders for other plans. VTE prophylaxis: SCDs while in bed. Code status: Full code. Discussed plan of care with Patient/Family and Nurse. Pre-admission lived at home. Discharge planning: pending. Updated wife        Review of Systems:     Review of Systems:  Symptom  Y/N  Comments   Symptom  Y/N  Comments    Fever/Chills  n    Chest Pain  n    Poor Appetite  n    Edema  n     Cough  n   Abdominal Pain  n     Sputum  n   Joint Pain  n    SOB/FRANKLIN  n   Pruritis/Rash      Nausea/vomit  n   Tolerating PT/OT      Diarrhea  n   Tolerating Diet      Constipation     Other      Could not obtain due to:         Objective:   Physical Exam:     Visit Vitals  BP (!) 140/71 (BP 1 Location: Left upper arm, BP Patient Position: At rest)   Pulse (!) 49   Temp 98.4 °F (36.9 °C)   Resp 19   Ht 5' 11\" (1.803 m)   Wt 77.8 kg (171 lb 8.3 oz)   SpO2 91%   BMI 23.92 kg/m²      O2 Device: None (Room air)    Temp (24hrs), Av.6 °F (36.4 °C), Min:97.1 °F (36.2 °C), Max:98.4 °F (36.9 °C)    No intake/output data recorded. No intake/output data recorded. General:  Alert,  no distress, appears stated age. Lungs:   Clear to auscultation bilaterally. Chest wall:  No tenderness or deformity. Heart:  Regular rate and rhythm, S1, S2 normal, no murmur. Abdomen:   Soft, non-tender. Bowel sounds normal.    Extremities: Extremities normal, atraumatic, no cyanosis or edema. Neurologic:  Patient has slurred speech. Patient has weakness on his left upper extremity and lower extremity- 3-4/5, RUE 5/5. Patient follows  commands.        Data Review:       Recent Days:  Recent Labs     22  0439   WBC 6.8   HGB 13.1   HCT 40.0    Recent Labs     01/14/22  0439      K 4.2      CO2 29   GLU 96   BUN 19   CREA 1.04   CA 9.3     No results for input(s): PH, PCO2, PO2, HCO3, FIO2 in the last 72 hours.     24 Hour Results:  Recent Results (from the past 24 hour(s))   CBC W/O DIFF    Collection Time: 01/14/22  4:39 AM   Result Value Ref Range    WBC 6.8 4.1 - 11.1 K/uL    RBC 4.33 4.10 - 5.70 M/uL    HGB 13.1 12.1 - 17.0 g/dL    HCT 40.0 36.6 - 50.3 %    MCV 92.4 80.0 - 99.0 FL    MCH 30.3 26.0 - 34.0 PG    MCHC 32.8 30.0 - 36.5 g/dL    RDW 13.8 11.5 - 14.5 %    PLATELET 100 795 - 635 K/uL    MPV 10.1 8.9 - 12.9 FL    NRBC 0.0 0  WBC    ABSOLUTE NRBC 0.00 0.00 - 6.32 K/uL   METABOLIC PANEL, BASIC    Collection Time: 01/14/22  4:39 AM   Result Value Ref Range    Sodium 140 136 - 145 mmol/L    Potassium 4.2 3.5 - 5.1 mmol/L    Chloride 106 97 - 108 mmol/L    CO2 29 21 - 32 mmol/L    Anion gap 5 5 - 15 mmol/L    Glucose 96 65 - 100 mg/dL    BUN 19 6 - 20 MG/DL    Creatinine 1.04 0.70 - 1.30 MG/DL    BUN/Creatinine ratio 18 12 - 20      GFR est AA >60 >60 ml/min/1.73m2    GFR est non-AA >60 >60 ml/min/1.73m2    Calcium 9.3 8.5 - 10.1 MG/DL   ECHO ADULT COMPLETE    Collection Time: 01/14/22 11:04 AM   Result Value Ref Range    IVSd 1.2 (A) 0.6 - 1.0 cm    LVIDd 3.3 (A) 4.2 - 5.9 cm    LVIDs 2.6 cm    LVOT Diameter 2.2 cm    LVPWd 1.1 (A) 0.6 - 1.0 cm    LVOT Peak Gradient 5 mmHg    LVOT Peak Velocity 1.1 m/s    LA Diameter 4.2 cm    AV Area by Peak Velocity 2.5 cm2    AV Area by Peak Velocity 2.5 cm2    AV Peak Gradient 11 mmHg    AV Peak Velocity 1.7 m/s    MV A Velocity 0.74 m/s    MV E Velocity 0.66 m/s    LV E' Septal Velocity 5 cm/s    TAPSE 1.0 (A) 1.5 - 2.0 cm    Fractional Shortening 2D 21 28 - 44 %    LVIDd Index 1.67 cm/m2    LVIDs Index 1.31 cm/m2    LV RWT Ratio 0.67     LV Mass 2D 116.8 88 - 224 g    LV Mass 2D Index 59.0 49 - 115 g/m2    MV E/A 0.89     E/E' Septal 13.20     LVOT Area 3.8 cm2    LA Size Index 2.12 cm/m2    AV Velocity Ratio 0.65    LIPID PANEL    Collection Time: 01/14/22  3:51 PM   Result Value Ref Range    Cholesterol, total 123 <200 MG/DL    Triglyceride 120 <150 MG/DL    HDL Cholesterol 38 MG/DL    LDL, calculated 61 0 - 100 MG/DL    VLDL, calculated 24 MG/DL    CHOL/HDL Ratio 3.2 0.0 - 5.0         Problem List:  Problem List as of 1/15/2022 Date Reviewed: 12/11/2021          Codes Class Noted - Resolved    Stroke (cerebrum) ([facility] ICD-10-CM: I63.9  ICD-9-CM: 434.91  1/14/2022 - Present        Acute on chronic intracranial subdural hematoma (HCC) ICD-10-CM: I62.01, I62.03  ICD-9-CM: 432.1  1/13/2022 - Present        Subdural bleeding (HCC) ICD-10-CM: I62.00  ICD-9-CM: 432.1  1/12/2022 - Present        CAD (coronary artery disease) ICD-10-CM: I25.10  ICD-9-CM: 414.00  Unknown - Present        Hypercholesteremia ICD-10-CM: E78.00  ICD-9-CM: 272.0  Unknown - Present        Hypertension ICD-10-CM: I10  ICD-9-CM: 401.9  Unknown - Present        Hx of completed stroke ICD-10-CM: Z86.73  ICD-9-CM: V12.54  Unknown - Present        Seizure disorder ([facility] ICD-10-CM: G40.909  ICD-9-CM: 345.90  Unknown - Present        Facial droop ICD-10-CM: R29.810  ICD-9-CM: 781.94  12/10/2021 - Present        Left carotid stenosis ICD-10-CM: C39.88  ICD-9-CM: 433.10  12/10/2021 - Present        Simple partial seizures ([facility] ICD-10-CM: G40.109  ICD-9-CM: 345.50  10/30/2021 - Present        Left hemiparesis ([facility] ICD-10-CM: G81.94  ICD-9-CM: 342.90  10/23/2021 - Present        Subdural hematoma (HCC) ICD-10-CM: P30.4U4Z  ICD-9-CM: 432.1  10/22/2021 - Present              Medications reviewed  Current Facility-Administered Medications   Medication Dose Route Frequency    levETIRAcetam (KEPPRA) tablet 1,500 mg  1,500 mg Oral BID    atorvastatin (LIPITOR) tablet 40 mg  40 mg Oral DAILY    cholecalciferol (VITAMIN D3) (1000 Units /25 mcg) tablet 2,000 Units  2,000 Units Oral BID    isosorbide mononitrate ER (IMDUR) tablet 120 mg 120 mg Oral 7am    phenytoin ER (DILANTIN ER) ER capsule 100 mg  100 mg Oral QHS    ondansetron (ZOFRAN) injection 4 mg  4 mg IntraVENous Q6H PRN    naloxone (NARCAN) injection 0.4 mg  0.4 mg IntraVENous PRN    acetaminophen (TYLENOL) tablet 650 mg  650 mg Oral Q4H PRN    Or    acetaminophen (TYLENOL) solution 650 mg  650 mg Per NG tube Q4H PRN    Or    acetaminophen (TYLENOL) suppository 650 mg  650 mg Rectal Q4H PRN    lacosamide (VIMPAT) tablet 200 mg  200 mg Oral BID    metoprolol tartrate (LOPRESSOR) tablet 12.5 mg  12.5 mg Oral DAILY       Care Plan discussed with: Patient/Family and Nurse    Total time spent with patient: 40 minutes.     Lovely Larkin MD

## 2022-01-15 NOTE — PROGRESS NOTES
Bedside and Verbal shift change report given to Marlon LA (oncoming nurse) by Kisha Francois (offgoing nurse). Report included the following information SBAR, Kardex, MAR, Cardiac Rhythm NSR-SB and Dual Neuro Assessment.

## 2022-01-16 PROCEDURE — 74011250637 HC RX REV CODE- 250/637: Performed by: FAMILY MEDICINE

## 2022-01-16 PROCEDURE — 65660000000 HC RM CCU STEPDOWN

## 2022-01-16 PROCEDURE — 74011250637 HC RX REV CODE- 250/637: Performed by: NURSE PRACTITIONER

## 2022-01-16 RX ADMIN — LACOSAMIDE 200 MG: 50 TABLET, FILM COATED ORAL at 08:59

## 2022-01-16 RX ADMIN — Medication 2000 UNITS: at 08:59

## 2022-01-16 RX ADMIN — LEVETIRACETAM 1500 MG: 500 TABLET, FILM COATED ORAL at 16:37

## 2022-01-16 RX ADMIN — LEVETIRACETAM 1500 MG: 500 TABLET, FILM COATED ORAL at 04:36

## 2022-01-16 RX ADMIN — ATORVASTATIN CALCIUM 40 MG: 40 TABLET, FILM COATED ORAL at 08:59

## 2022-01-16 RX ADMIN — PHENYTOIN SODIUM 100 MG: 100 CAPSULE ORAL at 21:53

## 2022-01-16 RX ADMIN — ISOSORBIDE MONONITRATE 120 MG: 60 TABLET, EXTENDED RELEASE ORAL at 06:26

## 2022-01-16 RX ADMIN — LACOSAMIDE 200 MG: 50 TABLET, FILM COATED ORAL at 18:12

## 2022-01-16 RX ADMIN — Medication 2000 UNITS: at 21:53

## 2022-01-16 RX ADMIN — METOPROLOL TARTRATE 12.5 MG: 25 TABLET, FILM COATED ORAL at 09:00

## 2022-01-16 NOTE — PROGRESS NOTES
Hospitalist Progress Note           Please call  and page for questions. Call physician on-call through the  7pm-7am    Daily Progress Note: 1/16/2022    Primary care Nahomy Winston MD    Date of admission: 1/11/2022  9:22 PM    Admission summery and hospital course:  80 y.o. male with apmhx CAD, recent CVA with SDH, HTN, and seizure disorder who presents as a transfer from [facility] for acute on chronic SDH. His CC was numbness and weakness of RLE. He was recently admitted from 12/10-12/14 for left lacunar infarct with residual right sided weakness, facial droop, and dysarthria. He was discharged on ASA at this time. He presented to the ED again on 12/17 from rehab for syncope. CT head was stable, with normal w/u and he was discharged. He returns today with CC RLE weakness. In the ED, BP was initially 229/140. Labs were significant for INR 1.2. Initial CT head  1/11 at 1203 with chronic right convexity SDH that had an additional acute component when compared to  12/11/21 scan. CT head was repeated  On 1/11 at 2020 to evaluate for interval change per neurosurgery, and was found to be stable.    In the Ed, he was started on cardene gtt, and tx with ppx keppra. Subjective:     No acute complaints, denies pain, n/v/d, frustrated with still being here         Assessment/Plan:   Acute on chronic subdural hematoma  At admission-Chronic right convexity subdural hematoma has not changed in size but now has an acute component  She had repeat CT head which was stable  -Per NSGY , no surgical intervention. -hold aspirin    #Acute stroke   -MRI brain-Small focus of acute ischemia in the right posterior corona radiata.   Hold aspirin atleast 2 weeks per neuro and NSGY  Echo normal LVEF w/ LA dilation    #Recent history of lacunar infarct in the left thalamus  History of rt pontine stroke  -aspirin held due to above, on statin    # HTN:  BP soft morning, repeat orthostatic vitals if possible      Hypertensive emergency-resolved      Coronary atherosclerosis  Continue statin, metoprolol and Imdur. Hold aspirin for now. Seizure disorder  On Vimpat , Keppra and  Dilantin ER at night. Dyslipidemia  Continue his Lipitor. See orders for other plans. VTE prophylaxis: SCDs while in bed. Code status: Full code. Discussed plan of care with Patient/Family and Nurse. Pre-admission lived at home. Discharge planning: pending SNF       Review of Systems:     Review of Systems:  Symptom  Y/N  Comments   Symptom  Y/N  Comments    Fever/Chills  n    Chest Pain  n    Poor Appetite  n    Edema  n     Cough  n   Abdominal Pain  n     Sputum  n   Joint Pain  n    SOB/FRANKLIN  n   Pruritis/Rash      Nausea/vomit  n   Tolerating PT/OT      Diarrhea  n   Tolerating Diet      Constipation     Other      Could not obtain due to:         Objective:   Physical Exam:     Visit Vitals  /66   Pulse 64   Temp 97.8 °F (36.6 °C)   Resp 15   Ht 5' 11\" (1.803 m)   Wt 79.6 kg (175 lb 8 oz)   SpO2 98%   BMI 24.48 kg/m²      O2 Device: None (Room air)    Temp (24hrs), Av.8 °F (36.6 °C), Min:97.6 °F (36.4 °C), Max:98.1 °F (36.7 °C)    No intake/output data recorded.  1901 -  0700  In: -   Out: 650 [Urine:650]      General:  Alert,  no distress, appears stated age. Lungs:   Clear to auscultation bilaterally. Chest wall:  No tenderness or deformity. Heart:  Regular rate and rhythm, S1, S2 normal, no murmur. Abdomen:   Soft, non-tender. Bowel sounds normal.    Extremities: Extremities normal, atraumatic, no cyanosis or edema. Neurologic:  Patient has slurred speech. Patient has weakness on his left upper extremity and lower extremity- 3-4/5, RUE 5/5. Patient follows  commands.        Data Review:       Recent Days:  Recent Labs     22  0439   WBC 6.8   HGB 13.1   HCT 40.0        Recent Labs     22  0439      K 4.2      CO2 29 GLU 96   BUN 19   CREA 1.04   CA 9.3     No results for input(s): PH, PCO2, PO2, HCO3, FIO2 in the last 72 hours. 24 Hour Results:  No results found for this or any previous visit (from the past 24 hour(s)).     Problem List:  Problem List as of 1/16/2022 Date Reviewed: 12/11/2021          Codes Class Noted - Resolved    Stroke (cerebrum) ([facility] ICD-10-CM: I63.9  ICD-9-CM: 434.91  1/14/2022 - Present        Acute on chronic intracranial subdural hematoma (HCC) ICD-10-CM: I62.01, I62.03  ICD-9-CM: 432.1  1/13/2022 - Present        Subdural bleeding (HCC) ICD-10-CM: I62.00  ICD-9-CM: 432.1  1/12/2022 - Present        CAD (coronary artery disease) ICD-10-CM: I25.10  ICD-9-CM: 414.00  Unknown - Present        Hypercholesteremia ICD-10-CM: E78.00  ICD-9-CM: 272.0  Unknown - Present        Hypertension ICD-10-CM: I10  ICD-9-CM: 401.9  Unknown - Present        Hx of completed stroke ICD-10-CM: Z86.73  ICD-9-CM: V12.54  Unknown - Present        Seizure disorder ([facility] ICD-10-CM: G40.909  ICD-9-CM: 345.90  Unknown - Present        Facial droop ICD-10-CM: R29.810  ICD-9-CM: 781.94  12/10/2021 - Present        Left carotid stenosis ICD-10-CM: I55.43  ICD-9-CM: 433.10  12/10/2021 - Present        Simple partial seizures ([facility] ICD-10-CM: G40.109  ICD-9-CM: 345.50  10/30/2021 - Present        Left hemiparesis ([facility] ICD-10-CM: G81.94  ICD-9-CM: 342.90  10/23/2021 - Present        Subdural hematoma ([facility] ICD-10-CM: L59.5J1O  ICD-9-CM: 432.1  10/22/2021 - Present              Medications reviewed  Current Facility-Administered Medications   Medication Dose Route Frequency    levETIRAcetam (KEPPRA) tablet 1,500 mg  1,500 mg Oral BID    atorvastatin (LIPITOR) tablet 40 mg  40 mg Oral DAILY    cholecalciferol (VITAMIN D3) (1000 Units /25 mcg) tablet 2,000 Units  2,000 Units Oral BID    isosorbide mononitrate ER (IMDUR) tablet 120 mg  120 mg Oral 7am    phenytoin ER (DILANTIN ER) ER capsule 100 mg  100 mg Oral QHS    ondansetron [facility]) injection 4 mg  4 mg IntraVENous Q6H PRN    naloxone (NARCAN) injection 0.4 mg  0.4 mg IntraVENous PRN    acetaminophen (TYLENOL) tablet 650 mg  650 mg Oral Q4H PRN    Or    acetaminophen (TYLENOL) solution 650 mg  650 mg Per NG tube Q4H PRN    Or    acetaminophen (TYLENOL) suppository 650 mg  650 mg Rectal Q4H PRN    lacosamide (VIMPAT) tablet 200 mg  200 mg Oral BID    metoprolol tartrate (LOPRESSOR) tablet 12.5 mg  12.5 mg Oral DAILY       Care Plan discussed with: Patient/Family    Miladys Lopez MD

## 2022-01-17 PROCEDURE — 74011250637 HC RX REV CODE- 250/637: Performed by: NURSE PRACTITIONER

## 2022-01-17 PROCEDURE — 74011250637 HC RX REV CODE- 250/637: Performed by: FAMILY MEDICINE

## 2022-01-17 PROCEDURE — 97112 NEUROMUSCULAR REEDUCATION: CPT

## 2022-01-17 PROCEDURE — 97530 THERAPEUTIC ACTIVITIES: CPT

## 2022-01-17 PROCEDURE — 65660000000 HC RM CCU STEPDOWN

## 2022-01-17 PROCEDURE — 97535 SELF CARE MNGMENT TRAINING: CPT

## 2022-01-17 RX ADMIN — ISOSORBIDE MONONITRATE 120 MG: 60 TABLET, EXTENDED RELEASE ORAL at 07:06

## 2022-01-17 RX ADMIN — Medication 2000 UNITS: at 10:05

## 2022-01-17 RX ADMIN — LACOSAMIDE 200 MG: 50 TABLET, FILM COATED ORAL at 18:10

## 2022-01-17 RX ADMIN — Medication 2000 UNITS: at 21:01

## 2022-01-17 RX ADMIN — LEVETIRACETAM 1500 MG: 500 TABLET, FILM COATED ORAL at 16:40

## 2022-01-17 RX ADMIN — METOPROLOL TARTRATE 12.5 MG: 25 TABLET, FILM COATED ORAL at 10:05

## 2022-01-17 RX ADMIN — LACOSAMIDE 200 MG: 50 TABLET, FILM COATED ORAL at 10:05

## 2022-01-17 RX ADMIN — PHENYTOIN SODIUM 100 MG: 100 CAPSULE ORAL at 21:01

## 2022-01-17 RX ADMIN — LEVETIRACETAM 1500 MG: 500 TABLET, FILM COATED ORAL at 04:05

## 2022-01-17 RX ADMIN — ATORVASTATIN CALCIUM 40 MG: 40 TABLET, FILM COATED ORAL at 10:05

## 2022-01-17 NOTE — PROGRESS NOTES
Hospitalist Progress Note           Please call  and page for questions. Call physician on-call through the  7pm-7am    Daily Progress Note: 1/17/2022    Primary care Kathy Agrawal MD    Date of admission: 1/11/2022  9:22 PM    Admission summery and hospital course:  80 y.o. male with apmhx CAD, recent CVA with SDH, HTN, and seizure disorder who presents as a transfer from [facility] for acute on chronic SDH. His CC was numbness and weakness of RLE. He was recently admitted from 12/10-12/14 for left lacunar infarct with residual right sided weakness, facial droop, and dysarthria. He was discharged on ASA at this time. He presented to the ED again on 12/17 from rehab for syncope. CT head was stable, with normal w/u and he was discharged. He returns today with CC RLE weakness. In the ED, BP was initially 229/140. Labs were significant for INR 1.2. Initial CT head  1/11 at 1203 with chronic right convexity SDH that had an additional acute component when compared to  12/11/21 scan. CT head was repeated  On 1/11 at 2020 to evaluate for interval change per neurosurgery, and was found to be stable.    In the Ed, he was started on cardene gtt, and tx with ppx keppra. Subjective:     No acute complaints, denies pain, n/v/d, frustrated with still being here and wants to get to rehab         Assessment/Plan:   Acute on chronic subdural hematoma  At admission-Chronic right convexity subdural hematoma has not changed in size but now has an acute component  She had repeat CT head which was stable  -Per NSGY , no surgical intervention. -holding aspirin    #Acute stroke   -MRI brain-Small focus of acute ischemia in the right posterior corona radiata.   Hold aspirin atleast 2 weeks per neuro and NSGY  Echo normal LVEF w/ LA dilation    #Recent history of lacunar infarct in the left thalamus  History of rt pontine stroke  -aspirin held due to above, on statin    # HTN    Hypertensive emergency-resolved    Coronary atherosclerosis  Continue statin, metoprolol and Imdur. Hold aspirin for now. Seizure disorder  On Vimpat , Keppra and  Dilantin ER at night. Dyslipidemia  Continue his Lipitor. See orders for other plans. VTE prophylaxis: SCDs while in bed. Code status: Full code. Discussed plan of care with Patient/Family and Nurse. Pre-admission lived at home. Discharge planning: pending SNF       Review of Systems:     Review of Systems:  Symptom  Y/N  Comments   Symptom  Y/N  Comments    Fever/Chills  n    Chest Pain  n    Poor Appetite  n    Edema  n     Cough  n   Abdominal Pain  n     Sputum  n   Joint Pain  n    SOB/FRANKLIN  n   Pruritis/Rash      Nausea/vomit  n   Tolerating PT/OT      Diarrhea  n   Tolerating Diet      Constipation     Other      Could not obtain due to:         Objective:   Physical Exam:     Visit Vitals  /61   Pulse 62   Temp 97.3 °F (36.3 °C)   Resp 19   Ht 5' 11\" (1.803 m)   Wt 79.6 kg (175 lb 8 oz)   SpO2 96%   BMI 24.48 kg/m²      O2 Device: None (Room air)    Temp (24hrs), Av.1 °F (36.7 °C), Min:97.3 °F (36.3 °C), Max:98.9 °F (37.2 °C)    No intake/output data recorded. 01/15 1901 -  0700  In: 240 [P.O.:240]  Out: 2250 [Urine:2250]      General:  Alert,  no distress, appears stated age. Lungs:   Clear to auscultation bilaterally. Chest wall:  No tenderness or deformity. Heart:  Regular rate and rhythm, S1, S2 normal, no murmur. Abdomen:   Soft, non-tender. Bowel sounds normal.    Extremities: Extremities normal, atraumatic, no cyanosis or edema. Neurologic:  Patient has slurred speech. Patient has weakness on his left upper extremity and lower extremity- 3-4/5, RUE 5/5. Data Review:       Recent Days:  No results for input(s): WBC, HGB, HCT, PLT, HGBEXT, HCTEXT, PLTEXT, HGBEXT, HCTEXT, PLTEXT in the last 72 hours.   No results for input(s): NA, K, CL, CO2, GLU, BUN, CREA, CA, MG, PHOS, ALB, TBIL, ALT, INR, INREXT, INREXT in the last 72 hours. No lab exists for component: SGOT  No results for input(s): PH, PCO2, PO2, HCO3, FIO2 in the last 72 hours. 24 Hour Results:  No results found for this or any previous visit (from the past 24 hour(s)).     Problem List:  Problem List as of 1/17/2022 Date Reviewed: 12/11/2021          Codes Class Noted - Resolved    Stroke (cerebrum) ([facility] ICD-10-CM: I63.9  ICD-9-CM: 434.91  1/14/2022 - Present        Acute on chronic intracranial subdural hematoma (HCC) ICD-10-CM: I62.01, I62.03  ICD-9-CM: 432.1  1/13/2022 - Present        Subdural bleeding ([facility] ICD-10-CM: I62.00  ICD-9-CM: 432.1  1/12/2022 - Present        CAD (coronary artery disease) ICD-10-CM: I25.10  ICD-9-CM: 414.00  Unknown - Present        Hypercholesteremia ICD-10-CM: E78.00  ICD-9-CM: 272.0  Unknown - Present        Hypertension ICD-10-CM: I10  ICD-9-CM: 401.9  Unknown - Present        Hx of completed stroke ICD-10-CM: Z86.73  ICD-9-CM: V12.54  Unknown - Present        Seizure disorder ([facility] ICD-10-CM: G40.909  ICD-9-CM: 345.90  Unknown - Present        Facial droop ICD-10-CM: R29.810  ICD-9-CM: 781.94  12/10/2021 - Present        Left carotid stenosis ICD-10-CM: P21.56  ICD-9-CM: 433.10  12/10/2021 - Present        Simple partial seizures ([facility] ICD-10-CM: G40.109  ICD-9-CM: 345.50  10/30/2021 - Present        Left hemiparesis ([facility] ICD-10-CM: G81.94  ICD-9-CM: 342.90  10/23/2021 - Present        Subdural hematoma ([facility] ICD-10-CM: G53.6T7C  ICD-9-CM: 432.1  10/22/2021 - Present              Medications reviewed  Current Facility-Administered Medications   Medication Dose Route Frequency    levETIRAcetam (KEPPRA) tablet 1,500 mg  1,500 mg Oral BID    atorvastatin (LIPITOR) tablet 40 mg  40 mg Oral DAILY    cholecalciferol (VITAMIN D3) (1000 Units /25 mcg) tablet 2,000 Units  2,000 Units Oral BID    isosorbide mononitrate ER (IMDUR) tablet 120 mg  120 mg Oral 7am    phenytoin ER (DILANTIN ER) ER capsule 100 mg  100 mg Oral QHS    ondansetron (ZOFRAN) injection 4 mg  4 mg IntraVENous Q6H PRN    naloxone (NARCAN) injection 0.4 mg  0.4 mg IntraVENous PRN    acetaminophen (TYLENOL) tablet 650 mg  650 mg Oral Q4H PRN    Or    acetaminophen (TYLENOL) solution 650 mg  650 mg Per NG tube Q4H PRN    Or    acetaminophen (TYLENOL) suppository 650 mg  650 mg Rectal Q4H PRN    lacosamide (VIMPAT) tablet 200 mg  200 mg Oral BID    metoprolol tartrate (LOPRESSOR) tablet 12.5 mg  12.5 mg Oral DAILY       Care Plan discussed with: Patient/Family    Ayse Gutiérrez MD

## 2022-01-17 NOTE — PROGRESS NOTES
Bedside shift change report given to Cruz Son RN (oncoming nurse) by Florencia Roth RN (offgoing nurse). Report included the following information SBAR, ED Summary, Procedure Summary, Intake/Output, Recent Results, Med Rec Status and Cardiac Rhythm NSR.

## 2022-01-17 NOTE — PROGRESS NOTES
Problem: Mobility Impaired (Adult and Pediatric)  Goal: *Acute Goals and Plan of Care (Insert Text)  Description: FUNCTIONAL STATUS PRIOR TO ADMISSION: Patient questionable historian. Initially pt verbalized mobilizing without a walker, but later with a walker and assistance of 1 person. HOME SUPPORT PRIOR TO ADMISSION: The patient lived with wife and son and has additional family nearby. Physical Therapy Goals  Initiated 1/12/2022  1. Patient will move from supine to sit and sit to supine and scoot up and down in bed with moderate assistance  within 7 day(s). 2.  Patient will transfer from bed to chair and chair to bed with moderate assistance x2 people using the least restrictive device within 7 day(s). 3.  Patient will perform sit to stand with moderate assistance x2 people  within 7 day(s). 4.  Patient will ambulate with maximal assistance x2 people for 20 feet with the least restrictive device within 7 day(s). Outcome: Progressing Towards Goal  PHYSICAL THERAPY TREATMENT  Patient: Ignacio Rios (81 y.o. 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 ASSESSMENT  Patient continues with skilled PT services - continues to present with left hemiparesis, impaired balance, poor activity tolerance, and overall decline in functional mobility. Pt transferred supine>sit with modA x2 and demonstrated fair sitting balance. Worked on sit<>stand transfers and static standing tolerance with maxA x2 - pt required max manual facilitation of hip extension, upright posture, and anterior weight shifting d/t strong retropulsion. Performed stand pivot transfer bed>chair toward the right with maxA x2.   Pt repositioned in the chair for safety and comfort    Current Level of Function Impacting Discharge (mobility/balance): maxA x2 for bed>chair transfer    Other factors to consider for discharge: fall risk, L HP, impaired speech, PMH         PLAN :  Patient continues to benefit from skilled intervention to address the above impairments. Continue treatment per established plan of care. to address goals. Recommendation for discharge: (in order for the patient to meet his/her long term goals)  Therapy 3 hours per day 5-7 days per week    This discharge recommendation:  Has not yet been discussed the attending provider and/or case management    IF patient discharges home will need the following DME: to be determined (TBD)       SUBJECTIVE:   Patient stated I haven't been out of bed in 25 days.  not a true statement      OBJECTIVE DATA SUMMARY:   Critical Behavior:  Neurologic State: Alert,Confused  Orientation Level: Oriented to place,Oriented to person,Oriented to situation,Disoriented to time  Cognition: Decreased attention/concentration,Decreased command following,Impaired decision making,Impulsive,Memory loss,Poor safety awareness  Safety/Judgement: Decreased awareness of environment,Decreased awareness of need for assistance,Decreased awareness of need for safety,Decreased insight into deficits  Functional Mobility Training:  Bed Mobility:  Rolling: Minimum assistance  Supine to Sit: Moderate assistance;Assist x2  Sit to Supine:  (in chair)  Scooting: Maximum assistance;Assist x1;Assist x2 (2 assist supine, 1 assist EOB)    Transfers:  Sit to Stand: Moderate assistance;Assist x2  Stand to Sit: Moderate assistance;Assist x2  Bed to Chair: Maximum assistance;Assist x2    Balance:  Sitting: Impaired; Without support  Sitting - Static: Fair (occasional)  Sitting - Dynamic: Fair (occasional)  Standing: Impaired; With support (BUE HHA)  Standing - Static: Poor;Constant support  Standing - Dynamic : Poor;Constant support    Activity Tolerance:   Poor    After treatment patient left in no apparent distress:   Sitting in chair, Call bell within reach, and Bed / chair alarm activated    COMMUNICATION/COLLABORATION:   The patients plan of care was discussed with: Occupational therapist and Registered nurse.      Nir Graham PT, DPT   Time Calculation: 33 mins

## 2022-01-17 NOTE — PROGRESS NOTES
Problem: Falls - Risk of  Goal: *Absence of Falls  Description: Document Moi Barber Fall Risk and appropriate interventions in the flowsheet.   Outcome: Progressing Towards Goal  Note: Fall Risk Interventions:  Mobility Interventions: Assess mobility with egress test,Bed/chair exit alarm,Communicate number of staff needed for ambulation/transfer,OT consult for ADLs,Patient to call before getting OOB,PT Consult for mobility concerns,PT Consult for assist device competence,Strengthening exercises (ROM-active/passive),Utilize walker, cane, or other assistive device         Medication Interventions: Assess postural VS orthostatic hypotension,Bed/chair exit alarm,Evaluate medications/consider consulting pharmacy,Patient to call before getting OOB,Teach patient to arise slowly    Elimination Interventions: Bed/chair exit alarm,Call light in reach,Patient to call for help with toileting needs,Stay With Me (per policy)    History of Falls Interventions: Bed/chair exit alarm,Consult care management for discharge planning,Door open when patient unattended,Investigate reason for fall,Room close to nurse's station         Problem: Seizure Disorder (Adult)  Goal: *STG: Remains free of seizure activity  Outcome: Progressing Towards Goal  Goal: *STG: Maintains lab values within therapeutic range  Outcome: Progressing Towards Goal  Goal: *STG/LTG: Complies with medication therapy  Outcome: Progressing Towards Goal  Goal: *STG: Remains free of injury during seizure activity  Outcome: Progressing Towards Goal  Goal: *STG: Remains safe in hospital  Outcome: Progressing Towards Goal  Goal: Interventions  Outcome: Progressing Towards Goal

## 2022-01-17 NOTE — PROGRESS NOTES
Problem: Seizure Disorder (Adult)  Goal: *STG: Remains free of seizure activity  Outcome: Progressing Towards Goal  Goal: *STG: Maintains lab values within therapeutic range  Outcome: Progressing Towards Goal  Goal: *STG/LTG: Complies with medication therapy  Outcome: Progressing Towards Goal  Goal: *STG: Remains free of injury during seizure activity  Outcome: Progressing Towards Goal  Goal: *STG: Remains safe in hospital  Outcome: Progressing Towards Goal     Problem: Falls - Risk of  Goal: *Absence of Falls  Description: Document Tasha Fall Risk and appropriate interventions in the flowsheet.   Outcome: Progressing Towards Goal  Note: Fall Risk Interventions:  Mobility Interventions: Assess mobility with egress test,Bed/chair exit alarm,Communicate number of staff needed for ambulation/transfer,OT consult for ADLs,Patient to call before getting OOB,PT Consult for mobility concerns,PT Consult for assist device competence,Strengthening exercises (ROM-active/passive),Utilize walker, cane, or other assistive device         Medication Interventions: Assess postural VS orthostatic hypotension,Bed/chair exit alarm,Evaluate medications/consider consulting pharmacy,Patient to call before getting OOB,Teach patient to arise slowly    Elimination Interventions: Bed/chair exit alarm,Call light in reach,Patient to call for help with toileting needs,Stay With Me (per policy)    History of Falls Interventions: Bed/chair exit alarm,Consult care management for discharge planning,Door open when patient unattended,Investigate reason for fall,Room close to nurse's station

## 2022-01-17 NOTE — PROGRESS NOTES
Bedside shift change report given to [address] [place] [address] Charis RN (oncoming nurse) by Aleksander Santos RN (offgoing nurse). Report included the following information SBAR, ED Summary, Procedure Summary, Recent Results and Cardiac Rhythm NSR.

## 2022-01-17 NOTE — PROGRESS NOTES
Problem: Self Care Deficits Care Plan (Adult)  Goal: *Acute Goals and Plan of Care (Insert Text)  Description: FUNCTIONAL STATUS PRIOR TO ADMISSION: Patient was modified independent using a rolling walker for functional mobility. Per patient, recently has been performing BADLs at mod I - min assist level. Patient with recent admission 12/10-12/14 for L lacunar infarct w/ residual R side weakness and dysarthria. HOME SUPPORT: Patient reports living with his wife (also has mobility issues and uses cane / RW) and son (who works 10 hour shifts). Occupational Therapy Goals  Initiated 1/12/2022. Goals updated 1/14/22 following acute CVA on 1/12/22. 1.  Patient will perform EOB grooming with supervision/set-up within 7 day(s). Downgrade to mod A.  2.  Patient will perform upper body dressing with supervision/set-up within 7 day(s). Downgrade to mod A.  3.  Patient will perform lower body dressing with minimal assistance/contact guard assist using RW prn within 7 day(s). Downgrade to mod A bed level. 4.  Patient will perform toilet transfers to [facility] with minimal assistance x2 using RW within 7 day(s). Downgrade to max A.  5.  Patient will perform all aspects of toileting with minimal assistance/contact guard assist using RW prn within 7 day(s). Downgrade to max A.  6.  Patient will participate in upper extremity therapeutic exercise/activities with supervision/set-up within 7 day(s). Downgrade to mod A.  7.  Patient will utilize fall prevention techniques during functional activities with verbal cues within 7 day(s). 8.  Patient will improve their LUE Fugl Goff score by 5 points in prep for ADLs within 7 days.        Outcome: Progressing Towards Goal     OCCUPATIONAL THERAPY TREATMENT  Patient: Ritu Deleon (50 y.o. male)  Date: 1/17/2022  Diagnosis: Subdural bleeding (HCC) [I62.00]  Acute on chronic intracranial subdural hematoma (HCC) [I62.01, I62.03]  Stroke (cerebrum) ([facility] [address] [I63.9] <principal problem not specified>       Precautions: Fall  Chart, occupational therapy assessment, plan of care, and goals were reviewed. ASSESSMENT  Patient continues with skilled OT services and is progressing towards goals however remains limited by global debility, as well as impaired cognition, attention to the L and to task, LUE/LLE function, activity tolerance, strength, midline orientation/awareness, and insight into deficits noted with dependent toileting, standing tolerance in prep for OOB ADLs, and functional transfers completed this session. Patient met with soiled brief, able to assist with rolling in supine with Min A however dependent for bowel hygiene. Progressed to EOB & standing with Mod A x2 and BUE HHA, decreased midline orientation with L and posterior leaning requiring mod-max facilitation & cueing to correct with limited carryover & quick fatigue. Transferred to the chair with Max A x2 via squat pivot, continues to require intermittent cues for L lateral lean and LUE awareness with all functional tasks, all needs met at session end. Continue to recommend d/c to IPR. Current Level of Function Impacting Discharge (ADLs): Min-Total A for ADLs, Mod-Max A x2 for limited OOB mobility    Other factors to consider for discharge: fall risk, PMH, PLOF, acute neuro deficits         PLAN :  Patient continues to benefit from skilled intervention to address the above impairments. Continue treatment per established plan of care to address goals. Recommend with staff: Recommend with nursing, ADLs with assist, OOB to chair 3x/day via transfer to the R side, and toileting via  [address] vs bedpan with 2 assist . Thank you for completing as able in order to maintain patient strength, endurance and independence.      Recommendation for discharge: (in order for the patient to meet his/her long term goals)  Therapy 3 hours per day 5-7 days per week  If unable, SNF      This discharge recommendation:  Has been made in collaboration with the attending provider and/or case management    IF patient discharges home will need the following DME: To be determined (TBD)         SUBJECTIVE:   Patient stated I'd like to get over to that chair.     OBJECTIVE DATA SUMMARY:   Cognitive/Behavioral Status:  Neurologic State: Alert;Confused  Orientation Level: Oriented to place;Oriented to person;Oriented to situation;Disoriented to time  Cognition: Decreased attention/concentration;Decreased command following; Impaired decision making; Impulsive;Memory loss;Poor safety awareness  Perception: Cues to attend to left side of body;Cues to maintain midline in sitting;Cues to maintain midline in standing  Perseveration: Perseverates during conversation (prior hospitalization)  Safety/Judgement: Decreased awareness of environment;Decreased awareness of need for assistance;Decreased awareness of need for safety;Decreased insight into deficits    Functional Mobility and Transfers for ADLs:  Bed Mobility:  Rolling: Minimum assistance  Supine to Sit: Moderate assistance;Assist x2  Sit to Supine:  (in chair)  Scooting: Maximum assistance;Assist x1;Assist x2 (2 assist supine, 1 assist EOB)    Transfers:  Sit to Stand: Moderate assistance;Assist x2  Functional Transfers  Toilet Transfer :  (Min A rolling in supine)  Bed to Chair: Maximum assistance;Assist x2    Balance:  Sitting: Impaired; Without support  Sitting - Static: Fair (occasional)  Sitting - Dynamic: Fair (occasional)  Standing: Impaired; With support (BUE HHA)  Standing - Static: Poor;Constant support  Standing - Dynamic : Poor;Constant support    ADL Intervention:   Lower Body Dressing Assistance  Dressing Assistance: Total assistance(dependent)  Socks: Total assistance (dependent)  Leg Crossed Method Used: No  Position Performed: Supine  Cues: Physical assistance; Tactile cues provided;Verbal cues provided;Visual cues provided    Toileting  Toileting Assistance:  Total assistance(dependent) (rolling in supine with Min A )  Bladder Hygiene: Total assistance (dependent)  Bowel Hygiene: Total assistance (dependent) (fluctuating awareness of continence)  Clothing Management: Total assistance (dependent)  Cues: Tactile cues provided;Verbal cues provided;Visual cues provided  Adaptive Equipment:  (bed rails)    Cognitive Retraining  Orientation Retraining: Awareness of environment; Reorienting  Executive Functions: Executing cognitive plans  Organizing/Sequencing: Breaking task down  Attention to Task: Single task;Distractibility  Following Commands: Follows one step commands/directions; Awareness of environment  Safety/Judgement: Decreased awareness of environment;Decreased awareness of need for assistance;Decreased awareness of need for safety;Decreased insight into deficits  Cues: Tactile cues provided;Verbal cues provided;Visual cues provided    Pain:  None reported    Activity Tolerance:   Good and requires rest breaks    After treatment patient left in no apparent distress:   Sitting in chair, Call bell within reach, and Bed / chair alarm activated    COMMUNICATION/COLLABORATION:   The patients plan of care was discussed with: Physical therapist and Registered nurse.      NICOLE Coleman, OTR/L  Time Calculation: 35 mins

## 2022-01-17 NOTE — PROGRESS NOTES
Transition of Care Plan: IPR ([facility] accepted)     RUR: 18% moderate     PCP F/U:Jony Bustos MD     Disposition: IPR ([facility] accepted)     Transportation: BLS     Main Contact: Spouse: Brandon DE LA GARZA-[phone]     0902: [facility]Y accepted. Spoke with family. They have agreed. [facility] started auth, but need therapy notes and PCR. Family is also looking at backup options in case IPR does not get approved. Referrals were sent to [id number] [facility], but daughter states it was not [facility] that they wanted a referral sent to, but [facility]. Referral sent as a backup option.      Crys Desai RN, CRM

## 2022-01-18 VITALS
OXYGEN SATURATION: 96 % | HEART RATE: 64 BPM | TEMPERATURE: 97.9 F | BODY MASS INDEX: 24.51 KG/M2 | HEIGHT: 71 IN | SYSTOLIC BLOOD PRESSURE: 153 MMHG | RESPIRATION RATE: 17 BRPM | WEIGHT: 175.04 LBS | DIASTOLIC BLOOD PRESSURE: 77 MMHG

## 2022-01-18 LAB
ANION GAP SERPL CALC-SCNC: 3 MMOL/L (ref 5–15)
B PERT DNA SPEC QL NAA+PROBE: NOT DETECTED
BASOPHILS # BLD: 0.1 K/UL (ref 0–0.1)
BASOPHILS NFR BLD: 1 % (ref 0–1)
BORDETELLA PARAPERTUSSIS PCR, BORPAR: NOT DETECTED
BUN SERPL-MCNC: 26 MG/DL (ref 6–20)
BUN/CREAT SERPL: 25 (ref 12–20)
C PNEUM DNA SPEC QL NAA+PROBE: NOT DETECTED
CALCIUM SERPL-MCNC: 9.3 MG/DL (ref 8.5–10.1)
CHLORIDE SERPL-SCNC: 105 MMOL/L (ref 97–108)
CO2 SERPL-SCNC: 30 MMOL/L (ref 21–32)
CREAT SERPL-MCNC: 1.06 MG/DL (ref 0.7–1.3)
DIFFERENTIAL METHOD BLD: NORMAL
EOSINOPHIL # BLD: 0.3 K/UL (ref 0–0.4)
EOSINOPHIL NFR BLD: 3 % (ref 0–7)
ERYTHROCYTE [DISTWIDTH] IN BLOOD BY AUTOMATED COUNT: 13.7 % (ref 11.5–14.5)
FLUAV H1 2009 PAND RNA SPEC QL NAA+PROBE: NOT DETECTED
FLUAV H1 RNA SPEC QL NAA+PROBE: NOT DETECTED
FLUAV H3 RNA SPEC QL NAA+PROBE: NOT DETECTED
FLUAV SUBTYP SPEC NAA+PROBE: NOT DETECTED
FLUBV RNA SPEC QL NAA+PROBE: NOT DETECTED
GLUCOSE SERPL-MCNC: 134 MG/DL (ref 65–100)
HADV DNA SPEC QL NAA+PROBE: NOT DETECTED
HCOV 229E RNA SPEC QL NAA+PROBE: NOT DETECTED
HCOV HKU1 RNA SPEC QL NAA+PROBE: NOT DETECTED
HCOV NL63 RNA SPEC QL NAA+PROBE: NOT DETECTED
HCOV OC43 RNA SPEC QL NAA+PROBE: NOT DETECTED
HCT VFR BLD AUTO: 41.9 % (ref 36.6–50.3)
HGB BLD-MCNC: 13.5 G/DL (ref 12.1–17)
HMPV RNA SPEC QL NAA+PROBE: NOT DETECTED
HPIV1 RNA SPEC QL NAA+PROBE: NOT DETECTED
HPIV2 RNA SPEC QL NAA+PROBE: NOT DETECTED
HPIV3 RNA SPEC QL NAA+PROBE: NOT DETECTED
HPIV4 RNA SPEC QL NAA+PROBE: NOT DETECTED
IMM GRANULOCYTES # BLD AUTO: 0 K/UL (ref 0–0.04)
IMM GRANULOCYTES NFR BLD AUTO: 0 % (ref 0–0.5)
LYMPHOCYTES # BLD: 1.8 K/UL (ref 0.8–3.5)
LYMPHOCYTES NFR BLD: 22 % (ref 12–49)
M PNEUMO DNA SPEC QL NAA+PROBE: NOT DETECTED
MCH RBC QN AUTO: 30.1 PG (ref 26–34)
MCHC RBC AUTO-ENTMCNC: 32.2 G/DL (ref 30–36.5)
MCV RBC AUTO: 93.3 FL (ref 80–99)
MONOCYTES # BLD: 0.6 K/UL (ref 0–1)
MONOCYTES NFR BLD: 7 % (ref 5–13)
NEUTS SEG # BLD: 5.7 K/UL (ref 1.8–8)
NEUTS SEG NFR BLD: 67 % (ref 32–75)
NRBC # BLD: 0 K/UL (ref 0–0.01)
NRBC BLD-RTO: 0 PER 100 WBC
PLATELET # BLD AUTO: 213 K/UL (ref 150–400)
PMV BLD AUTO: 10 FL (ref 8.9–12.9)
POTASSIUM SERPL-SCNC: 4.3 MMOL/L (ref 3.5–5.1)
RBC # BLD AUTO: 4.49 M/UL (ref 4.1–5.7)
RSV RNA SPEC QL NAA+PROBE: NOT DETECTED
RV+EV RNA SPEC QL NAA+PROBE: NOT DETECTED
SARS-COV-2 PCR, COVPCR: NOT DETECTED
SODIUM SERPL-SCNC: 138 MMOL/L (ref 136–145)
WBC # BLD AUTO: 8.6 K/UL (ref 4.1–11.1)

## 2022-01-18 PROCEDURE — 74011250637 HC RX REV CODE- 250/637: Performed by: FAMILY MEDICINE

## 2022-01-18 PROCEDURE — 74011250637 HC RX REV CODE- 250/637: Performed by: NURSE PRACTITIONER

## 2022-01-18 PROCEDURE — 36415 COLL VENOUS BLD VENIPUNCTURE: CPT

## 2022-01-18 PROCEDURE — 85025 COMPLETE CBC W/AUTO DIFF WBC: CPT

## 2022-01-18 PROCEDURE — 0202U NFCT DS 22 TRGT SARS-COV-2: CPT

## 2022-01-18 PROCEDURE — 80048 BASIC METABOLIC PNL TOTAL CA: CPT

## 2022-01-18 RX ORDER — HYDRALAZINE HYDROCHLORIDE 20 MG/ML
10 INJECTION INTRAMUSCULAR; INTRAVENOUS
Status: DISCONTINUED | OUTPATIENT
Start: 2022-01-18 | End: 2022-01-18 | Stop reason: HOSPADM

## 2022-01-18 RX ORDER — METOPROLOL TARTRATE 25 MG/1
12.5 TABLET, FILM COATED ORAL DAILY
Qty: 30 TABLET | Refills: 0 | Status: SHIPPED | OUTPATIENT
Start: 2022-01-19

## 2022-01-18 RX ORDER — ATORVASTATIN CALCIUM 40 MG/1
40 TABLET, FILM COATED ORAL DAILY
Qty: 30 TABLET | Refills: 0 | Status: SHIPPED | OUTPATIENT
Start: 2022-01-18 | End: 2022-02-17

## 2022-01-18 RX ADMIN — ATORVASTATIN CALCIUM 40 MG: 40 TABLET, FILM COATED ORAL at 08:32

## 2022-01-18 RX ADMIN — LEVETIRACETAM 1500 MG: 500 TABLET, FILM COATED ORAL at 15:28

## 2022-01-18 RX ADMIN — LACOSAMIDE 200 MG: 50 TABLET, FILM COATED ORAL at 17:25

## 2022-01-18 RX ADMIN — LEVETIRACETAM 1500 MG: 500 TABLET, FILM COATED ORAL at 06:15

## 2022-01-18 RX ADMIN — Medication 2000 UNITS: at 08:31

## 2022-01-18 RX ADMIN — METOPROLOL TARTRATE 12.5 MG: 25 TABLET, FILM COATED ORAL at 08:31

## 2022-01-18 RX ADMIN — ISOSORBIDE MONONITRATE 120 MG: 60 TABLET, EXTENDED RELEASE ORAL at 06:17

## 2022-01-18 RX ADMIN — LACOSAMIDE 200 MG: 50 TABLET, FILM COATED ORAL at 08:31

## 2022-01-18 NOTE — PROGRESS NOTES
Spiritual Care Partner Volunteer visited patient at . [place] [address] in [address] [place] [address] on 1/18/2022   Documented by:  Ayse Brandon  [phone]

## 2022-01-18 NOTE — DISCHARGE SUMMARY
Discharge Summary       PATIENT ID: Joan Davis  MRN: [id number]   YOB: 1935    DATE OF ADMISSION: 1/11/2022  9:22 PM    DATE OF DISCHARGE: 1/18/2022  PRIMARY CARE PROVIDER: Valentina Perez MD     ATTENDING PHYSICIAN: Yesenia Saucedo MD   DISCHARGING PROVIDER: ASHLEY Taveras    To contact this individual call [phone] and ask the  to page. If unavailable ask to be transferred the Adult Hospitalist Department. CONSULTATIONS: IP CONSULT TO NEUROLOGY    PROCEDURES/SURGERIES: * No surgery found *    ADMITTING DIAGNOSES & HOSPITAL COURSE:   From H&P \"86 y. o. male with apmhx CAD, recent CVA with SDH, HTN, and seizure disorder who presents as a transfer from [facility] for acute on chronic SDH.  His CC was numbness and weakness of RLE.  He was recently admitted from 12/10-12/14 for left lacunar infarct with residual right sided weakness, facial droop, and dysarthria. He was discharged on ASA at this time. [facility] presented to the ED again on 12/17 from rehab for syncope. CT head was stable, with normal w/u and he was discharged. [facility] returns today with CC RLE weakness. \"     Patient evaluated by  Neurology while in patient recommendations to hold ASA for at least 2 weeks. Patient will D/C to [facility] for Rehab. DISCHARGE DIAGNOSES / PLAN:      #Acute on chronic subdural hematoma  At admission-Chronic right convexity subdural hematoma has not changed in size but now has an acute component  -Repeat CT head:  stable  -Per NSGY , no surgical intervention.  -Hold ASA X 2 weeks per Neuro surgery and Neurology recommendations   - F/U with PCP      #Acute stroke   -MRI brain-Small focus of acute ischemia in the right posterior corona radiata.   - Hold aspirin at least 2 weeks per Neurology and neurosurgery   -Echo normal LVEF w/ LA dilation     #Recent history of lacunar infarct in the left thalamus  # History of rt pontine stroke  -Hold ASA at least 2 weeks per Neurology and Neurosurgery      # HTN  - Continue IMDUR  - Continue Metoprolol     # Hypertensive emergency-resolved     # Coronary atherosclerosis  # Dyslipidemia  - Continue Atorvastatin     # Seizure disorder  - Continue Vimpat   - Continue Keppra   - Continue Dilantin ER HS       PENDING TEST RESULTS:   At the time of discharge the following test results are still pending: None     FOLLOW UP APPOINTMENTS:    Follow-up Information     Follow up With Specialties Details Why Contact Jake Fisher MD Family Medicine Schedule an appointment as soon as possible for a visit  [address] [place] [address]  [phone]             ADDITIONAL CARE RECOMMENDATIONS:  Please make sure to follow up with your primary care physician within 1-2 weeks of discharge for hospital follow up. You should also follow-up your cardiologist.   - Please make sure to continue to monitor for: Chest pain, shortness of breath, high fevers, or sudden weakness or numbness and return to the Emergency Department with any of these symptoms. - If your symptoms return/worsen seek medical attention immediately. - Please hold ASA for at least 2 weeks per recommendations from Neurology and Neurosurgery. - Please get up slowly from a seated or laying position, avoid falls. - Avoid tobacco, alcohol and other illicit drug use. - Take your medications exactly as outlined in your discharge paperwork. Do not take any other medications unless specifically prescribed after your hospital stay. - Activity restrictions: none       DIET: Heart Healthy  Oral Nutritional Supplements: None None     ACTIVITY:As tolerated    WOUND CARE: None     EQUIPMENT needed: Per facility       DISCHARGE MEDICATIONS:  Current Discharge Medication List      CONTINUE these medications which have CHANGED    Details   atorvastatin (LIPITOR) 40 mg tablet Take 1 Tablet by mouth daily for 30 days.   Qty: 30 Tablet, Refills: 0  Start date: 1/18/2022, End date: 2/17/2022 metoprolol tartrate (LOPRESSOR) 25 mg tablet Take 0.5 Tablets by mouth daily. Qty: 30 Tablet, Refills: 0  Start date: 1/19/2022         CONTINUE these medications which have NOT CHANGED    Details   phenytoin ER (DILANTIN ER) 100 mg ER capsule Take 100 mg by mouth nightly. On 12/8/21 the patient was reduced to 100 mg at night time by the [facility] due to liver labs. lacosamide (VIMPAT) 200 mg tab tablet Take 1 Tablet by mouth two (2) times a day. Max Daily Amount: 400 mg. Followup with neurologist  Qty: 60 Tablet, Refills: 1    Associated Diagnoses: Seizure ([address]      levETIRAcetam (KEPPRA) 750 mg tablet Take 2 Tablets by mouth two (2) times a day. Qty: 120 Tablet, Refills: 0      cholecalciferol (Vitamin D3) (1000 Units /25 mcg) tablet Take 2,000 Units by mouth two (2) times a day. isosorbide mononitrate ER (IMDUR) 120 mg CR tablet Take 120 mg by mouth every morning. STOP taking these medications       aspirin 81 mg chewable tablet Comments:   Reason for Stopping:                 NOTIFY YOUR PHYSICIAN FOR ANY OF THE FOLLOWING:   Fever over 101 degrees for 24 hours. Chest pain, shortness of breath, fever, chills, nausea, vomiting, diarrhea, change in mentation, falling, weakness, bleeding. Severe pain or pain not relieved by medications. Or, any other signs or symptoms that you may have questions about. DISPOSITION:    Home With:   OT  PT  HH  RN      X Long term SNF/Inpatient Rehab    Independent/assisted living    Hospice    Other:       PATIENT CONDITION AT DISCHARGE:     Functional status    Poor    X Deconditioned     Independent      Cognition    X Lucid     Forgetful     Dementia      Catheters/lines (plus indication)    Bernstein     PICC     PEG    X None      Code status     Full code     DNR      PHYSICAL EXAMINATION AT DISCHARGE:  General:  A/A/O X 3. NAD. HEENT:  Normocephalic. Sclera anicteric. EOMI.   Mucous membranes moist.    Chest:  Resps even/unlabored with symmetrical CWE. Air entry full. Lungs CTA. No use of accessory muscles. CV:  RRR. Normal S1/S2. No M/C/R appreciated. No JVD. No peripheral edema. Cap refill < 3 sec. Peripheral pulses 2+. GI:  Abdomen soft/NT/ND. No organomegaly. No hernia. ABT X 4.    :  Voiding. Neurologic:  Nonfocal.   Face symmetrical.  Speech   Psych:  Cooperative. No anxiety or agitation. No suicidal/homicidal ideation. Skin:  Warm and color appropriate. No rashes or jaundice. Turgor elastic.        CHRONIC MEDICAL DIAGNOSES:  Problem List as of 1/18/2022 Date Reviewed: 12/11/2021          Codes Class Noted - Resolved    Stroke (cerebrum) ([facility] ICD-10-CM: I63.9  ICD-9-CM: 434.91  1/14/2022 - Present        Acute on chronic intracranial subdural hematoma (HCC) ICD-10-CM: I62.01, I62.03  ICD-9-CM: 432.1  1/13/2022 - Present        Subdural bleeding (HCC) ICD-10-CM: I62.00  ICD-9-CM: 432.1  1/12/2022 - Present        CAD (coronary artery disease) ICD-10-CM: I25.10  ICD-9-CM: 414.00  Unknown - Present        Hypercholesteremia ICD-10-CM: E78.00  ICD-9-CM: 272.0  Unknown - Present        Hypertension ICD-10-CM: I10  ICD-9-CM: 401.9  Unknown - Present        Hx of completed stroke ICD-10-CM: Z86.73  ICD-9-CM: V12.54  Unknown - Present        Seizure disorder ([facility] ICD-10-CM: G40.909  ICD-9-CM: 345.90  Unknown - Present        Facial droop ICD-10-CM: R29.810  ICD-9-CM: 781.94  12/10/2021 - Present        Left carotid stenosis ICD-10-CM: A67.74  ICD-9-CM: 433.10  12/10/2021 - Present        Simple partial seizures ([facility] ICD-10-CM: G40.109  ICD-9-CM: 345.50  10/30/2021 - Present        Left hemiparesis ([address] ICD-10-CM: G81.94  ICD-9-CM: 342.90  10/23/2021 - Present        Subdural hematoma ([facility] [address] ICD-10-CM: L35.1C3B  ICD-9-CM: 432.1  10/22/2021 - Present              Greater than 30 minutes were spent with the patient on counseling and coordination of care    Signed:   ASHLEY Ramírez  1/18/2022  2:24 PM

## 2022-01-18 NOTE — DISCHARGE INSTRUCTIONS
Discharge Instructions       PATIENT ID: Ritu Deleon  MRN: [id number]   YOB: 1935    DATE OF ADMISSION: 1/11/2022  9:22 PM    DATE OF DISCHARGE: 1/18/2022    PRIMARY CARE PROVIDER: Zaynab Mejia MD     ATTENDING PHYSICIAN: Dayo Menjivar MD  DISCHARGING PROVIDER: ASHLEY Ramírez    To contact this individual call [phone] and ask the  to page. If unavailable ask to be transferred the Adult Hospitalist Department. DISCHARGE DIAGNOSES CVA, Bilateral leg weakness    CONSULTATIONS: IP CONSULT TO NEUROLOGY    PROCEDURES/SURGERIES: * No surgery found *    PENDING TEST RESULTS:   At the time of discharge the following test results are still pending: None     FOLLOW UP APPOINTMENTS:   Follow-up Information     Follow up With Specialties Details Why Contact Info    Zaynab Mejia MD Family Medicine Schedule an appointment as soon as possible for a visit  [address] [place] [address]  [phone]             ADDITIONAL CARE RECOMMENDATIONS:   Please make sure to follow up with your primary care physician within 1-2 weeks of discharge for hospital follow up. You should also follow-up your cardiologist.   - Please make sure to continue to monitor for: Chest pain, shortness of breath, high fevers, or sudden weakness or numbness and return to the Emergency Department with any of these symptoms. - If your symptoms return/worsen seek medical attention immediately. - Please hold ASA for at least 2 weeks per recommendations from Neurology and Neurosurgery. - Please get up slowly from a seated or laying position, avoid falls. - Avoid tobacco, alcohol and other illicit drug use. - Take your medications exactly as outlined in your discharge paperwork. Do not take any other medications unless specifically prescribed after your hospital stay.   - Activity restrictions: none     DIET: Heart Healthy  Oral Nutritional Supplements: None  None      ACTIVITY: As tolerated WOUND CARE: None     EQUIPMENT needed: Per Facility       DISCHARGE MEDICATIONS:   See Medication Reconciliation Form    · It is important that you take the medication exactly as they are prescribed. · Keep your medication in the bottles provided by the pharmacist and keep a list of the medication names, dosages, and times to be taken in your wallet. · Do not take other medications without consulting your doctor. NOTIFY YOUR PHYSICIAN FOR ANY OF THE FOLLOWING:   Fever over 101 degrees for 24 hours. Chest pain, shortness of breath, fever, chills, nausea, vomiting, diarrhea, change in mentation, falling, weakness, bleeding. Severe pain or pain not relieved by medications. Or, any other signs or symptoms that you may have questions about.       DISPOSITION:    Home With:   OT  PT  HH  RN      X SNF/Inpatient Rehab/LTAC    Independent/assisted living    Hospice    Other:     CDMP Checked:   Yes ***     PROBLEM LIST Updated:  Yes ***       Signed:   ASHLEY Mccoy  1/18/2022  3:01 PM

## 2022-01-18 NOTE — PROGRESS NOTES
Transition of Care Plan: [facility] ([facility] accepted)  RN to call report to [phone], EMTALA transport.      RUR: 16% moderate     PCP F/U:Genny Bustos MD     Disposition: [facility]-bed 3144     Transportation: [id number]     Main Contact: Spouse: Brandon BUNCH-[phone]  Kyiixdiv-Ptzcztym-[phone]     0943: Spoke with Tracey Blanchard at [facility]. States patient needs a COVID PCR. MD notified.      9834 1142: Respiratory PCR ordered. [facility] can accept today as well. NP Notified. EMTALA pending. 1045: [facility] ETA 1700    1134: [facility] information provided. Dr. Josefina Lee is the accepting physician. RN notified of [facility] time. 1521: Spoke with daughter Atiya Mcclendon. Aware and agreeable to discharge    Cedric Hoffmann RN, Pending sale to [facility]    Medicare pt has received, reviewed, and signed 2nd IM letter informing them of their right to appeal the discharge. Signed copy has been placed on pt bedside chart.

## 2022-01-18 NOTE — PROGRESS NOTES
Bedside and Verbal shift change report given to Meghana Winchester (oncoming nurse) by Antonio Abdul (offgoing nurse). Report included the following information SBAR, Kardex, Procedure Summary, Intake/Output, MAR, Recent Results, Cardiac Rhythm NSR, Quality Measures and Dual Neuro Assessment.

## 2022-01-19 NOTE — PROGRESS NOTES
Problem: Falls - Risk of  Goal: *Absence of Falls  Description: Document Tracee Candelario Fall Risk and appropriate interventions in the flowsheet.   Outcome: Resolved/Not Met     Problem: Patient Education: Go to Patient Education Activity  Goal: Patient/Family Education  Outcome: Resolved/Not Met     Problem: Seizure Disorder (Adult)  Goal: *STG: Remains free of seizure activity  Outcome: Resolved/Not Met  Goal: *STG: Maintains lab values within therapeutic range  Outcome: Resolved/Not Met  Goal: *STG/LTG: Complies with medication therapy  Outcome: Resolved/Not Met  Goal: *STG: Remains free of injury during seizure activity  Outcome: Resolved/Not Met  Goal: *STG: Remains safe in hospital  Outcome: Resolved/Not Met  Goal: Interventions  Outcome: Resolved/Not Met

## 2022-03-18 PROBLEM — I65.22 LEFT CAROTID STENOSIS: Status: ACTIVE | Noted: 2021-12-10

## 2022-03-18 PROBLEM — G81.94 LEFT HEMIPARESIS (HCC): Status: ACTIVE | Noted: 2021-10-23

## 2022-03-18 PROBLEM — G40.109 SIMPLE PARTIAL SEIZURES (HCC): Status: ACTIVE | Noted: 2021-10-30

## 2022-03-18 PROBLEM — I63.9 STROKE (CEREBRUM) (HCC): Status: ACTIVE | Noted: 2022-01-14

## 2022-03-19 PROBLEM — R29.810 FACIAL DROOP: Status: ACTIVE | Noted: 2021-12-10

## 2022-03-19 PROBLEM — S06.5XAA SUBDURAL HEMATOMA (HCC): Status: ACTIVE | Noted: 2021-10-22

## 2022-03-19 PROBLEM — I62.01 ACUTE ON CHRONIC INTRACRANIAL SUBDURAL HEMATOMA (HCC): Status: ACTIVE | Noted: 2022-01-13

## 2022-03-19 PROBLEM — I62.00 SUBDURAL BLEEDING (HCC): Status: ACTIVE | Noted: 2022-01-12

## 2022-03-19 PROBLEM — I62.03 ACUTE ON CHRONIC INTRACRANIAL SUBDURAL HEMATOMA (HCC): Status: ACTIVE | Noted: 2022-01-13

## 2023-07-13 ENCOUNTER — TELEPHONE (OUTPATIENT)
Age: 88
End: 2023-07-13

## 2023-07-20 NOTE — TELEPHONE ENCOUNTER
Called the Pt's wife found however had to leave her a voice mail. Made an appt. With NP [facility] 7/25/2023 for a follow up. Asked her to call us and cancel if her  is unable to keep this appt.

## 2024-02-26 ENCOUNTER — HOSPITAL ENCOUNTER (INPATIENT)
Facility: HOSPITAL | Age: 89
LOS: 5 days | Discharge: NURSING, REHAB OR LONG-TERM CARE | End: 2024-03-02
Attending: INTERNAL MEDICINE | Admitting: HOSPITALIST
Payer: MEDICARE

## 2024-02-26 DIAGNOSIS — G40.909 SEIZURE DISORDER (HCC): ICD-10-CM

## 2024-02-26 DIAGNOSIS — S06.5XAA SUBDURAL HEMATOMA (HCC): ICD-10-CM

## 2024-02-26 DIAGNOSIS — G81.94 LEFT HEMIPARESIS (HCC): Primary | ICD-10-CM

## 2024-02-26 DIAGNOSIS — Z87.828 HISTORY OF TRAUMATIC SUBDURAL HEMATOMA: ICD-10-CM

## 2024-02-26 DIAGNOSIS — I69.354 HEMIPARESIS AFFECTING LEFT SIDE AS LATE EFFECT OF STROKE (HCC): ICD-10-CM

## 2024-02-26 DIAGNOSIS — R56.9 SEIZURE (HCC): ICD-10-CM

## 2024-02-26 DIAGNOSIS — Z86.73 HX OF COMPLETED STROKE: ICD-10-CM

## 2024-02-26 LAB
ALBUMIN SERPL-MCNC: 3.3 G/DL (ref 3.5–5)
ALBUMIN/GLOB SERPL: 1 (ref 1.1–2.2)
ALP SERPL-CCNC: 116 U/L (ref 45–117)
ALT SERPL-CCNC: 49 U/L (ref 12–78)
ANION GAP SERPL CALC-SCNC: 6 MMOL/L (ref 5–15)
ARTERIAL PATENCY WRIST A: YES
AST SERPL-CCNC: 33 U/L (ref 15–37)
BASE EXCESS BLDA CALC-SCNC: 1.8 MMOL/L
BASOPHILS # BLD: 0.1 K/UL (ref 0–0.1)
BASOPHILS NFR BLD: 0 % (ref 0–1)
BDY SITE: ABNORMAL
BILIRUB SERPL-MCNC: 0.8 MG/DL (ref 0.2–1)
BUN SERPL-MCNC: 16 MG/DL (ref 6–20)
BUN/CREAT SERPL: 16 (ref 12–20)
CALCIUM SERPL-MCNC: 9.1 MG/DL (ref 8.5–10.1)
CHLORIDE SERPL-SCNC: 109 MMOL/L (ref 97–108)
CO2 SERPL-SCNC: 28 MMOL/L (ref 21–32)
CREAT SERPL-MCNC: 1.03 MG/DL (ref 0.7–1.3)
DIFFERENTIAL METHOD BLD: ABNORMAL
EOSINOPHIL # BLD: 0.2 K/UL (ref 0–0.4)
EOSINOPHIL NFR BLD: 2 % (ref 0–7)
ERYTHROCYTE [DISTWIDTH] IN BLOOD BY AUTOMATED COUNT: 14.2 % (ref 11.5–14.5)
GAS FLOW.O2 O2 DELIVERY SYS: 2 L/MIN
GLOBULIN SER CALC-MCNC: 3.4 G/DL (ref 2–4)
GLUCOSE BLD STRIP.AUTO-MCNC: 123 MG/DL (ref 65–117)
GLUCOSE SERPL-MCNC: 118 MG/DL (ref 65–100)
HCO3 BLDA-SCNC: 27 MMOL/L (ref 22–26)
HCT VFR BLD AUTO: 43 % (ref 36.6–50.3)
HGB BLD-MCNC: 14.7 G/DL (ref 12.1–17)
IMM GRANULOCYTES # BLD AUTO: 0 K/UL (ref 0–0.04)
IMM GRANULOCYTES NFR BLD AUTO: 0 % (ref 0–0.5)
LYMPHOCYTES # BLD: 2.4 K/UL (ref 0.8–3.5)
LYMPHOCYTES NFR BLD: 20 % (ref 12–49)
MCH RBC QN AUTO: 32 PG (ref 26–34)
MCHC RBC AUTO-ENTMCNC: 34.2 G/DL (ref 30–36.5)
MCV RBC AUTO: 93.5 FL (ref 80–99)
MONOCYTES # BLD: 0.9 K/UL (ref 0–1)
MONOCYTES NFR BLD: 8 % (ref 5–13)
NEUTS SEG # BLD: 8.6 K/UL (ref 1.8–8)
NEUTS SEG NFR BLD: 70 % (ref 32–75)
NRBC # BLD: 0 K/UL (ref 0–0.01)
NRBC BLD-RTO: 0 PER 100 WBC
PCO2 BLDA: 42 MMHG (ref 35–45)
PH BLDA: 7.42 (ref 7.35–7.45)
PLATELET # BLD AUTO: 220 K/UL (ref 150–400)
PMV BLD AUTO: 9.7 FL (ref 8.9–12.9)
PO2 BLDA: 108 MMHG (ref 80–100)
POTASSIUM SERPL-SCNC: 3.8 MMOL/L (ref 3.5–5.1)
PROT SERPL-MCNC: 6.7 G/DL (ref 6.4–8.2)
RBC # BLD AUTO: 4.6 M/UL (ref 4.1–5.7)
SAO2 % BLD: 98 % (ref 92–97)
SAO2% DEVICE SAO2% SENSOR NAME: ABNORMAL
SERVICE CMNT-IMP: ABNORMAL
SODIUM SERPL-SCNC: 143 MMOL/L (ref 136–145)
SPECIMEN SITE: ABNORMAL
WBC # BLD AUTO: 12.2 K/UL (ref 4.1–11.1)

## 2024-02-26 PROCEDURE — 6370000000 HC RX 637 (ALT 250 FOR IP): Performed by: HOSPITALIST

## 2024-02-26 PROCEDURE — 1100000000 HC RM PRIVATE

## 2024-02-26 PROCEDURE — 80053 COMPREHEN METABOLIC PANEL: CPT

## 2024-02-26 PROCEDURE — 82803 BLOOD GASES ANY COMBINATION: CPT

## 2024-02-26 PROCEDURE — 36600 WITHDRAWAL OF ARTERIAL BLOOD: CPT

## 2024-02-26 PROCEDURE — 2580000003 HC RX 258: Performed by: HOSPITALIST

## 2024-02-26 PROCEDURE — 85025 COMPLETE CBC W/AUTO DIFF WBC: CPT

## 2024-02-26 PROCEDURE — 93005 ELECTROCARDIOGRAM TRACING: CPT | Performed by: INTERNAL MEDICINE

## 2024-02-26 PROCEDURE — 84146 ASSAY OF PROLACTIN: CPT

## 2024-02-26 PROCEDURE — 99223 1ST HOSP IP/OBS HIGH 75: CPT | Performed by: PSYCHIATRY & NEUROLOGY

## 2024-02-26 PROCEDURE — 82962 GLUCOSE BLOOD TEST: CPT

## 2024-02-26 PROCEDURE — 36415 COLL VENOUS BLD VENIPUNCTURE: CPT

## 2024-02-26 RX ORDER — POTASSIUM CHLORIDE 750 MG/1
40 TABLET, FILM COATED, EXTENDED RELEASE ORAL PRN
Status: DISCONTINUED | OUTPATIENT
Start: 2024-02-26 | End: 2024-03-02 | Stop reason: HOSPADM

## 2024-02-26 RX ORDER — ATORVASTATIN CALCIUM 20 MG/1
80 TABLET, FILM COATED ORAL DAILY
Status: DISCONTINUED | OUTPATIENT
Start: 2024-02-27 | End: 2024-02-26

## 2024-02-26 RX ORDER — FAMOTIDINE 20 MG/1
20 TABLET, FILM COATED ORAL
Status: DISCONTINUED | OUTPATIENT
Start: 2024-02-26 | End: 2024-03-02 | Stop reason: HOSPADM

## 2024-02-26 RX ORDER — ONDANSETRON 4 MG/1
4 TABLET, ORALLY DISINTEGRATING ORAL EVERY 8 HOURS PRN
Status: DISCONTINUED | OUTPATIENT
Start: 2024-02-26 | End: 2024-02-26

## 2024-02-26 RX ORDER — METOPROLOL SUCCINATE 25 MG/1
25 TABLET, EXTENDED RELEASE ORAL DAILY
Status: DISCONTINUED | OUTPATIENT
Start: 2024-02-27 | End: 2024-03-02 | Stop reason: HOSPADM

## 2024-02-26 RX ORDER — ONDANSETRON 2 MG/ML
4 INJECTION INTRAMUSCULAR; INTRAVENOUS EVERY 6 HOURS PRN
Status: DISCONTINUED | OUTPATIENT
Start: 2024-02-26 | End: 2024-02-26

## 2024-02-26 RX ORDER — DIAZEPAM 5 MG/ML
5 INJECTION, SOLUTION INTRAMUSCULAR; INTRAVENOUS EVERY 4 HOURS PRN
Status: DISCONTINUED | OUTPATIENT
Start: 2024-02-26 | End: 2024-03-02 | Stop reason: HOSPADM

## 2024-02-26 RX ORDER — LACOSAMIDE 100 MG/1
200 TABLET ORAL 2 TIMES DAILY
Status: DISCONTINUED | OUTPATIENT
Start: 2024-02-26 | End: 2024-02-26

## 2024-02-26 RX ORDER — ACETAMINOPHEN 650 MG/1
650 SUPPOSITORY RECTAL EVERY 6 HOURS PRN
Status: DISCONTINUED | OUTPATIENT
Start: 2024-02-26 | End: 2024-03-02 | Stop reason: HOSPADM

## 2024-02-26 RX ORDER — ISOSORBIDE MONONITRATE 30 MG/1
60 TABLET, EXTENDED RELEASE ORAL DAILY
Status: DISCONTINUED | OUTPATIENT
Start: 2024-02-27 | End: 2024-03-02 | Stop reason: HOSPADM

## 2024-02-26 RX ORDER — LEVETIRACETAM 500 MG/1
1500 TABLET ORAL 2 TIMES DAILY
Status: DISCONTINUED | OUTPATIENT
Start: 2024-02-26 | End: 2024-03-02 | Stop reason: HOSPADM

## 2024-02-26 RX ORDER — MAGNESIUM SULFATE IN WATER 40 MG/ML
2000 INJECTION, SOLUTION INTRAVENOUS PRN
Status: DISCONTINUED | OUTPATIENT
Start: 2024-02-26 | End: 2024-03-02 | Stop reason: HOSPADM

## 2024-02-26 RX ORDER — SODIUM CHLORIDE 0.9 % (FLUSH) 0.9 %
5-40 SYRINGE (ML) INJECTION PRN
Status: DISCONTINUED | OUTPATIENT
Start: 2024-02-26 | End: 2024-03-02 | Stop reason: HOSPADM

## 2024-02-26 RX ORDER — POLYETHYLENE GLYCOL 3350 17 G/17G
17 POWDER, FOR SOLUTION ORAL DAILY PRN
Status: DISCONTINUED | OUTPATIENT
Start: 2024-02-26 | End: 2024-03-02 | Stop reason: HOSPADM

## 2024-02-26 RX ORDER — POTASSIUM CHLORIDE 7.45 MG/ML
10 INJECTION INTRAVENOUS PRN
Status: DISCONTINUED | OUTPATIENT
Start: 2024-02-26 | End: 2024-03-02 | Stop reason: HOSPADM

## 2024-02-26 RX ORDER — ISOSORBIDE MONONITRATE 30 MG/1
120 TABLET, EXTENDED RELEASE ORAL DAILY
Status: DISCONTINUED | OUTPATIENT
Start: 2024-02-27 | End: 2024-02-26

## 2024-02-26 RX ORDER — LISINOPRIL 10 MG/1
10 TABLET ORAL DAILY
COMMUNITY

## 2024-02-26 RX ORDER — SODIUM CHLORIDE 9 MG/ML
INJECTION, SOLUTION INTRAVENOUS CONTINUOUS
Status: DISPENSED | OUTPATIENT
Start: 2024-02-26 | End: 2024-02-28

## 2024-02-26 RX ORDER — PANTOPRAZOLE SODIUM 40 MG/1
40 TABLET, DELAYED RELEASE ORAL DAILY
Status: DISCONTINUED | OUTPATIENT
Start: 2024-02-27 | End: 2024-03-02 | Stop reason: HOSPADM

## 2024-02-26 RX ORDER — ATORVASTATIN CALCIUM 20 MG/1
80 TABLET, FILM COATED ORAL NIGHTLY
Status: DISCONTINUED | OUTPATIENT
Start: 2024-02-26 | End: 2024-03-02 | Stop reason: HOSPADM

## 2024-02-26 RX ORDER — FAMOTIDINE 20 MG/1
40 TABLET, FILM COATED ORAL
Status: DISCONTINUED | OUTPATIENT
Start: 2024-02-26 | End: 2024-02-26

## 2024-02-26 RX ORDER — SODIUM CHLORIDE 0.9 % (FLUSH) 0.9 %
5-40 SYRINGE (ML) INJECTION EVERY 12 HOURS SCHEDULED
Status: DISCONTINUED | OUTPATIENT
Start: 2024-02-26 | End: 2024-03-02 | Stop reason: HOSPADM

## 2024-02-26 RX ORDER — PANTOPRAZOLE SODIUM 40 MG/1
40 TABLET, DELAYED RELEASE ORAL DAILY
COMMUNITY

## 2024-02-26 RX ORDER — FAMOTIDINE 40 MG/1
40 TABLET, FILM COATED ORAL
COMMUNITY

## 2024-02-26 RX ORDER — ACETAMINOPHEN 325 MG/1
650 TABLET ORAL EVERY 6 HOURS PRN
Status: DISCONTINUED | OUTPATIENT
Start: 2024-02-26 | End: 2024-03-02 | Stop reason: HOSPADM

## 2024-02-26 RX ORDER — ATORVASTATIN CALCIUM 80 MG/1
80 TABLET, FILM COATED ORAL DAILY
COMMUNITY

## 2024-02-26 RX ORDER — SODIUM CHLORIDE 9 MG/ML
INJECTION, SOLUTION INTRAVENOUS PRN
Status: DISCONTINUED | OUTPATIENT
Start: 2024-02-26 | End: 2024-03-02 | Stop reason: HOSPADM

## 2024-02-26 RX ORDER — LACOSAMIDE 100 MG/1
100 TABLET ORAL 2 TIMES DAILY
Status: DISCONTINUED | OUTPATIENT
Start: 2024-02-26 | End: 2024-02-26

## 2024-02-26 RX ORDER — LACOSAMIDE 100 MG/1
200 TABLET ORAL 2 TIMES DAILY
Status: DISCONTINUED | OUTPATIENT
Start: 2024-02-27 | End: 2024-03-02 | Stop reason: HOSPADM

## 2024-02-26 RX ADMIN — SODIUM CHLORIDE: 9 INJECTION, SOLUTION INTRAVENOUS at 19:24

## 2024-02-26 RX ADMIN — LEVETIRACETAM 1500 MG: 500 TABLET, FILM COATED ORAL at 22:11

## 2024-02-26 RX ADMIN — LACOSAMIDE 100 MG: 100 TABLET, FILM COATED ORAL at 22:13

## 2024-02-26 RX ADMIN — FAMOTIDINE 20 MG: 20 TABLET, FILM COATED ORAL at 22:11

## 2024-02-26 RX ADMIN — SODIUM CHLORIDE, PRESERVATIVE FREE 10 ML: 5 INJECTION INTRAVENOUS at 22:12

## 2024-02-26 NOTE — H&P
Hospitalist Admission Note      NAME:  Jean-Paul Feldman   :  1935   MRN:  [id number]     Date/Time:  2024 6:19 PM    Patient PCP: Kristin Gutierrez MD    ________________________________________________________________________    Given the patient's current clinical presentation, I have a high level of concern for decompensation if discharged from the emergency department.  Complex decision making was performed, which includes reviewing the patient's available past medical records, laboratory results, and x-ray films.       My assessment of this patient's clinical condition and my plan of care is as follows.    Assessment / Plan:  Patient is a 88-year-old male lives at home is a direct admit from former with seizure activity.  Patient has a history of seizure disorder, coronary artery disease, recent subdural hematoma traumatic, CKD stage III, history of CVA.  Patient had seizure activity yesterday and was admitted to [facility] hospital and he was transferred here for neurology evaluation.  When the patient arrived to the floor he had another episode of seizure activity and rapid response was called.  Patient was found to be postictal and had a bowel incontinence.    1.  Seizure activity  Ativan IV as needed  Stat CT scan for recurrent seizures.  We may need to do MRI of the brain.  Patient has a history of CVA in the past.  Neurology consult.  EEG.     2.  Coronary artery disease  Patient had a CABG done in .  Continue metoprolol, Lipitor 80 mg nightly    3.  Traumatic subdural hematoma  Stat CT scan  Patient had a traumatic subdural hematoma almost 2 yrs back.  The CT scan done at [place] today did not show any subdural hematoma.  Patient also had a CTA head and neck done over there.    4.  CKD stage III  Avoid nephrotoxic medications.  Monitor kidney function closely.    5.  History of CVA  Hx of afib, was on plavix in past but now on Eliquis.     6.AFIB  On metoprolol, eliquis.   Holding for now  visit spent in counseling and coordination of care  Chart reviewed  Discussion with patient and/or family and questions answered     ________________________________________________________________________  Signed: Mindi Oconnell MD        Procedures: see electronic medical records for all procedures/Xrays/labs and details which were not copied into this note but were reviewed prior to creation of Plan.

## 2024-02-27 ENCOUNTER — APPOINTMENT (OUTPATIENT)
Facility: HOSPITAL | Age: 89
End: 2024-02-27
Attending: INTERNAL MEDICINE
Payer: MEDICARE

## 2024-02-27 PROBLEM — Z87.828 HISTORY OF TRAUMATIC SUBDURAL HEMATOMA: Status: ACTIVE | Noted: 2024-02-27

## 2024-02-27 LAB
ANION GAP SERPL CALC-SCNC: 7 MMOL/L (ref 5–15)
APPEARANCE UR: ABNORMAL
B PERT DNA SPEC QL NAA+PROBE: NOT DETECTED
BACTERIA URNS QL MICRO: ABNORMAL /HPF
BASOPHILS # BLD: 0.1 K/UL (ref 0–0.1)
BASOPHILS NFR BLD: 1 % (ref 0–1)
BILIRUB UR QL: NEGATIVE
BORDETELLA PARAPERTUSSIS BY PCR: NOT DETECTED
BUN SERPL-MCNC: 14 MG/DL (ref 6–20)
BUN/CREAT SERPL: 14 (ref 12–20)
C PNEUM DNA SPEC QL NAA+PROBE: NOT DETECTED
CALCIUM SERPL-MCNC: 8.1 MG/DL (ref 8.5–10.1)
CHLORIDE SERPL-SCNC: 111 MMOL/L (ref 97–108)
CO2 SERPL-SCNC: 25 MMOL/L (ref 21–32)
COLOR UR: YELLOW
COMMENT:: NORMAL
CREAT SERPL-MCNC: 1 MG/DL (ref 0.7–1.3)
DIFFERENTIAL METHOD BLD: ABNORMAL
EKG ATRIAL RATE: 241 BPM
EKG DIAGNOSIS: NORMAL
EKG Q-T INTERVAL: 386 MS
EKG QRS DURATION: 108 MS
EKG QTC CALCULATION (BAZETT): 464 MS
EKG R AXIS: -32 DEGREES
EKG T AXIS: -11 DEGREES
EKG VENTRICULAR RATE: 87 BPM
EOSINOPHIL # BLD: 0.2 K/UL (ref 0–0.4)
EOSINOPHIL NFR BLD: 2 % (ref 0–7)
EPITH CASTS URNS QL MICRO: ABNORMAL /LPF
ERYTHROCYTE [DISTWIDTH] IN BLOOD BY AUTOMATED COUNT: 14.1 % (ref 11.5–14.5)
FLUAV SUBTYP SPEC NAA+PROBE: NOT DETECTED
FLUBV RNA SPEC QL NAA+PROBE: NOT DETECTED
GLUCOSE SERPL-MCNC: 120 MG/DL (ref 65–100)
GLUCOSE UR STRIP.AUTO-MCNC: NEGATIVE MG/DL
HADV DNA SPEC QL NAA+PROBE: NOT DETECTED
HCOV 229E RNA SPEC QL NAA+PROBE: NOT DETECTED
HCOV HKU1 RNA SPEC QL NAA+PROBE: NOT DETECTED
HCOV NL63 RNA SPEC QL NAA+PROBE: NOT DETECTED
HCOV OC43 RNA SPEC QL NAA+PROBE: NOT DETECTED
HCT VFR BLD AUTO: 42.7 % (ref 36.6–50.3)
HGB BLD-MCNC: 14.4 G/DL (ref 12.1–17)
HGB UR QL STRIP: NEGATIVE
HMPV RNA SPEC QL NAA+PROBE: NOT DETECTED
HPIV1 RNA SPEC QL NAA+PROBE: NOT DETECTED
HPIV2 RNA SPEC QL NAA+PROBE: NOT DETECTED
HPIV3 RNA SPEC QL NAA+PROBE: NOT DETECTED
HPIV4 RNA SPEC QL NAA+PROBE: NOT DETECTED
HYALINE CASTS URNS QL MICRO: ABNORMAL /LPF (ref 0–2)
IMM GRANULOCYTES # BLD AUTO: 0 K/UL (ref 0–0.04)
IMM GRANULOCYTES NFR BLD AUTO: 0 % (ref 0–0.5)
KETONES UR QL STRIP.AUTO: NEGATIVE MG/DL
LEUKOCYTE ESTERASE UR QL STRIP.AUTO: ABNORMAL
LYMPHOCYTES # BLD: 1.9 K/UL (ref 0.8–3.5)
LYMPHOCYTES NFR BLD: 13 % (ref 12–49)
M PNEUMO DNA SPEC QL NAA+PROBE: NOT DETECTED
MCH RBC QN AUTO: 31.9 PG (ref 26–34)
MCHC RBC AUTO-ENTMCNC: 33.7 G/DL (ref 30–36.5)
MCV RBC AUTO: 94.5 FL (ref 80–99)
MONOCYTES # BLD: 0.6 K/UL (ref 0–1)
MONOCYTES NFR BLD: 4 % (ref 5–13)
NEUTS SEG # BLD: 11.6 K/UL (ref 1.8–8)
NEUTS SEG NFR BLD: 80 % (ref 32–75)
NITRITE UR QL STRIP.AUTO: NEGATIVE
NRBC # BLD: 0 K/UL (ref 0–0.01)
NRBC BLD-RTO: 0 PER 100 WBC
PH UR STRIP: 8.5 (ref 5–8)
PLATELET # BLD AUTO: 264 K/UL (ref 150–400)
PMV BLD AUTO: 10.2 FL (ref 8.9–12.9)
POTASSIUM SERPL-SCNC: 3.9 MMOL/L (ref 3.5–5.1)
PROLACTIN SERPL-MCNC: 4.4 NG/ML
PROT UR STRIP-MCNC: 30 MG/DL
RBC # BLD AUTO: 4.52 M/UL (ref 4.1–5.7)
RBC #/AREA URNS HPF: ABNORMAL /HPF (ref 0–5)
RSV RNA SPEC QL NAA+PROBE: NOT DETECTED
RV+EV RNA SPEC QL NAA+PROBE: NOT DETECTED
SARS-COV-2 RNA RESP QL NAA+PROBE: NOT DETECTED
SODIUM SERPL-SCNC: 143 MMOL/L (ref 136–145)
SP GR UR REFRACTOMETRY: 1.03 (ref 1–1.03)
SPECIMEN HOLD: NORMAL
URINE CULTURE IF INDICATED: ABNORMAL
UROBILINOGEN UR QL STRIP.AUTO: 1 EU/DL (ref 0.2–1)
WBC # BLD AUTO: 14.4 K/UL (ref 4.1–11.1)
WBC URNS QL MICRO: ABNORMAL /HPF (ref 0–4)

## 2024-02-27 PROCEDURE — 81001 URINALYSIS AUTO W/SCOPE: CPT

## 2024-02-27 PROCEDURE — 0202U NFCT DS 22 TRGT SARS-COV-2: CPT

## 2024-02-27 PROCEDURE — 4A00X4Z MEASUREMENT OF CENTRAL NERVOUS ELECTRICAL ACTIVITY, EXTERNAL APPROACH: ICD-10-PCS | Performed by: HOSPITALIST

## 2024-02-27 PROCEDURE — 92610 EVALUATE SWALLOWING FUNCTION: CPT | Performed by: SPEECH-LANGUAGE PATHOLOGIST

## 2024-02-27 PROCEDURE — 80185 ASSAY OF PHENYTOIN TOTAL: CPT

## 2024-02-27 PROCEDURE — 80186 ASSAY OF PHENYTOIN FREE: CPT

## 2024-02-27 PROCEDURE — 4A03X5D MEASUREMENT OF ARTERIAL FLOW, INTRACRANIAL, EXTERNAL APPROACH: ICD-10-PCS | Performed by: HOSPITALIST

## 2024-02-27 PROCEDURE — 71045 X-RAY EXAM CHEST 1 VIEW: CPT

## 2024-02-27 PROCEDURE — 1100000000 HC RM PRIVATE

## 2024-02-27 PROCEDURE — 2580000003 HC RX 258: Performed by: HOSPITALIST

## 2024-02-27 PROCEDURE — 94761 N-INVAS EAR/PLS OXIMETRY MLT: CPT

## 2024-02-27 PROCEDURE — 80177 DRUG SCRN QUAN LEVETIRACETAM: CPT

## 2024-02-27 PROCEDURE — 87186 SC STD MICRODIL/AGAR DIL: CPT

## 2024-02-27 PROCEDURE — 87086 URINE CULTURE/COLONY COUNT: CPT

## 2024-02-27 PROCEDURE — 95816 EEG AWAKE AND DROWSY: CPT | Performed by: PSYCHIATRY & NEUROLOGY

## 2024-02-27 PROCEDURE — 80235 DRUG ASSAY LACOSAMIDE: CPT

## 2024-02-27 PROCEDURE — 85025 COMPLETE CBC W/AUTO DIFF WBC: CPT

## 2024-02-27 PROCEDURE — 80048 BASIC METABOLIC PNL TOTAL CA: CPT

## 2024-02-27 PROCEDURE — 70551 MRI BRAIN STEM W/O DYE: CPT

## 2024-02-27 PROCEDURE — 70450 CT HEAD/BRAIN W/O DYE: CPT

## 2024-02-27 PROCEDURE — 6360000002 HC RX W HCPCS: Performed by: HOSPITALIST

## 2024-02-27 PROCEDURE — 6370000000 HC RX 637 (ALT 250 FOR IP): Performed by: HOSPITALIST

## 2024-02-27 PROCEDURE — 87077 CULTURE AEROBIC IDENTIFY: CPT

## 2024-02-27 PROCEDURE — 2700000000 HC OXYGEN THERAPY PER DAY

## 2024-02-27 PROCEDURE — 99232 SBSQ HOSP IP/OBS MODERATE 35: CPT | Performed by: PSYCHIATRY & NEUROLOGY

## 2024-02-27 PROCEDURE — 36415 COLL VENOUS BLD VENIPUNCTURE: CPT

## 2024-02-27 RX ORDER — LACOSAMIDE 50 MG/1
150 TABLET ORAL 2 TIMES DAILY
COMMUNITY

## 2024-02-27 RX ORDER — METOPROLOL SUCCINATE 25 MG/1
25 TABLET, EXTENDED RELEASE ORAL DAILY
COMMUNITY

## 2024-02-27 RX ORDER — MIRTAZAPINE 15 MG/1
7.5 TABLET, FILM COATED ORAL NIGHTLY
Status: DISCONTINUED | OUTPATIENT
Start: 2024-02-27 | End: 2024-03-02 | Stop reason: HOSPADM

## 2024-02-27 RX ORDER — CETIRIZINE HYDROCHLORIDE 10 MG/1
10 TABLET ORAL DAILY
COMMUNITY

## 2024-02-27 RX ORDER — MIRTAZAPINE 15 MG/1
7.5 TABLET, FILM COATED ORAL NIGHTLY
COMMUNITY

## 2024-02-27 RX ADMIN — METOPROLOL SUCCINATE 25 MG: 25 TABLET, EXTENDED RELEASE ORAL at 00:45

## 2024-02-27 RX ADMIN — DIAZEPAM 5 MG: 5 INJECTION, SOLUTION INTRAMUSCULAR; INTRAVENOUS at 00:04

## 2024-02-27 RX ADMIN — SODIUM CHLORIDE, PRESERVATIVE FREE 10 ML: 5 INJECTION INTRAVENOUS at 10:44

## 2024-02-27 RX ADMIN — ATORVASTATIN CALCIUM 80 MG: 20 TABLET, FILM COATED ORAL at 00:45

## 2024-02-27 RX ADMIN — SODIUM CHLORIDE: 9 INJECTION, SOLUTION INTRAVENOUS at 12:55

## 2024-02-27 NOTE — CONSULTS
INPATIENT NEUROLOGY CONSULT NOTE    NAME:     Jean-Paul Feldman  MRN:     [id number]    CONSULT DATE:  02/26/24    REQUESTING PROVIDER: Mindi Oconnell MD    PMHx:  has a past medical history of CAD (coronary artery disease), Hx of completed stroke, Hypercholesteremia, Hypertension, Seizure disorder (HCC), and Subdural hematoma (HCC).    HPI: 88 y.o. male was admitted on 2/26/2024  5:55 PM for evaluation/ treatment of seizure activity    Spoke with Son and Wife in room.  Son reports that pt had a fall bathtub in 2021, hit his head, causing SDH.  Was treated at [facility] for this (1-11-22 to 1-18-22). Looking at that discharge summary, he didn't have surgery for it.  While admitted he had a small acute stroke in right posterior corona radiata.  That corresponds to Wife's description of him having left sided weakness at baseline. Son says pt has had gradual decline over last 2 years as medical issues have increased.  He says pt was recently dx with A Fib and started on Eliquis.   Pt also started having seizures after the SDH, and is currently on 3 AEDs (Lacosamide 200 mg BID, Keppra 750 mg TWO tabs twice a day, and Dilantin 100 mg ER one cap QHS (?). Family tells me [facility]  His PCP manages all his conditions.  Family also describes him as having significant daytime fatigue, falls asleep easily (never evaluated for ETHEL). They suspect he has dementia, and Son describes him as having sundowning.     Regarding current admission.  Wife/ Son say that pt seemed more tired than normal on 2-, slept most of the day.  Yesterday they were at home and wife says that he just looked out of it, pale, slumped, profusely sweating.  Son says patient was making odd grunting sounds and had erratic breathing. They didn't witness a seizure but Son says the breathing pattern reminded him of one of pt's seizures in past. Pt was taken to [facility].  Per Admission H&P here: Patient had a blood

## 2024-02-27 NOTE — PROGRESS NOTES
INPATIENT NEUROLOGY FOLLOW-UP NOTE    NAME:  Jean-Paul Feldman    REASON FOR FOLLOW UP:  AMS, suspected seizure at home; witnessed seizure-like activity here on 2- shortly after arrival (transferred from [facility])    INTERVAL HX (02/27/24):     CT head done early this AM.  FINDINGS: The ventricles and sulci are age-appropriate without hydrocephalus.  There is no mass effect or midline shift. There is no intracranial hemorrhage.  There is a chronic right subdural collection measuring 2 to 3 mm in thickness, decreased compared to 1/12/2022. There is stable chronic microvascular ischemic change in the periventricular white matter. There is stable chronic lacunar infarcts in the basal ganglia and chronic encephalomalacia in the left parietal lobe. There is no new abnormal parenchymal attenuation. The basal cisterns are patent.  The osseous structures are intact. The visualized paranasal sinuses and mastoid air cells are clear. IMPRESSION: No acute intracranial abnormality.    Pt resting in bed.  Family (Daughter, Granddaughter) in room with him  They say pt is normally more alert than he is currently  Daughter says pt has sundowning    He is on way to EEG    AM Labs:   BMP with normal Cr/ GFR, Na and K  CBC with mild elevated WBC 14.4  Respiratory Viruses panel (multiple PCRs): pending      IMPRESSION/ PLAN:     88 y.o. male with PMHx Subdural Hematoma (2022), small right posterior corona radiata stroke (2022), left sided weakness since stroke, seizures since subdural hematoma/ stroke, CAD, A Fib     Family suspects he had seizure at home on 2-  Pt had seizure-like episode here on 2-26-24 PM soon after arrival    CT head w/o any acute abnormality (improvement/ reduction in chronic SDH compared to last scan on file 1-)    Dilantin, Keppra, Lacosamide levels are in process     Continue Vimpat 200 mg one tab twice a day, Keppra 1500 mg twice a day (Home AED doses)     Not clear if pt is taking

## 2024-02-27 NOTE — PROGRESS NOTES
Rapid Called at 1803    Responded to RRT at 1804 for Altered mental status    Provider at bedside: YES  Interventions ordered: Labs, EKG, and Other (Comment) ABG, remote tele added, 2L O2NC applied.  Sepsis Suspected: No  Transfer to Higher Level of Care: new admit transfer, new orders  Blood Glucose: 123     Vitals:    02/26/24 1800   BP: (!) 150/95   Pulse: 83   Resp: 24   Temp: 98.4 °F (36.9 °C)   SpO2: 95%      Rapid called for AMS and lethargy after seizure like activity, pt was new transfer admit. Pt with hx of seizures.   Pt noted with lethargy, responds to tactile stimuli but garbled speech, EKG done, labs drawn, ABG collected by RT, pt placed on remote tele.    Rapid Ended at 1845  RRT RN assisted with transport to accepting unit PARRIS Alfaro RN

## 2024-02-27 NOTE — PLAN OF CARE
Problem: Chronic Conditions and Co-morbidities  Goal: Patient's chronic conditions and co-morbidity symptoms are monitored and maintained or improved  Outcome: Progressing     Problem: Skin/Tissue Integrity  Goal: Absence of new skin breakdown  Description: 1.  Monitor for areas of redness and/or skin breakdown  2.  Assess vascular access sites hourly  3.  Every 4-6 hours minimum:  Change oxygen saturation probe site  4.  Every 4-6 hours:  If on nasal continuous positive airway pressure, respiratory therapy assess nares and determine need for appliance change or resting period.  Outcome: Progressing

## 2024-02-27 NOTE — PROGRESS NOTES
Patient oriented to self and family.Wife,son came to visit.Patient talking to wife in slow low voice.Able to obtain home med record.As soon family left pt.started pulling iv,telemetry,02,short desturation.Maintain o2.Given valium iv prn and sent for head CT scan.Sitter needed patient climbing out bed when valium wear off.

## 2024-02-27 NOTE — PROCEDURES
[place], [place]        [phone] (Main)  [phone] (Medical Records)     Routine EEG (16-channel)  EEG Technologist:  Ezekiel Dutton  Date of Study:   2-  Date of Interpretation: 2/27/24    Indication:  88 y.o. male with PMHx Subdural Hematoma (2022), small right posterior corona radiata stroke (2022), left sided weakness since stroke, seizures since subdural hematoma/ stroke, CAD, A Fib.   Family suspects he had seizure at home on 2-.  Pt had seizure-like episode here on 2-26-24 PM soon after arrival    History of Cranial Surgery: No  Sleep Deprived for this study:  No    Impression:  Abnormal.  Moderate-severe generalized slowing suggestive of an encephalopathic process, not specific as to cause.  No epileptiform discharges were seen during this recording.  Clinical and Neuro-Imaging correlation is necessary    =================================  Technical: 16-channel, multiple montages, digital EEG, 10-20 international placement system format.   Simultaneous video recording is performed.  The technical quality of the study was adequate.  Single lead EKG was recorded.     Interpretation:      At the beginning of the recording, the patient is described as: eyes closed, opens eyes to voice    With eyes closed, the background is:  symmetric, very low amplitude   The PDR is 3-4 Hz on both sides    Photic stimulation: No driving response seen on either side    Hyperventilation and Post-HV: not done due to pt condition    Is Drowsiness recorded: No   Is Sleep recorded: no    Areas of focal slowing: none   Epileptiform discharges: none    Single lead EKG: sinus tachycardia    Events:  none    =================================    Home Meds    Medications Prior to Admission: apixaban (ELIQUIS) 5 MG TABS tablet, Take 1 tablet by mouth 2 times daily  atorvastatin (LIPITOR) 80 MG tablet, Take 1 tablet by mouth daily  lisinopril (PRINIVIL;ZESTRIL) 10 MG tablet, Take by

## 2024-02-27 NOTE — PLAN OF CARE
Speech LAnguage Pathology EVALUATION    Patient: Jean-Paul Feldman (88 y.o. male)  Date: 2/27/2024  Primary Diagnosis: Seizure (HCC) [R56.9]       Precautions:  seizure                    ASSESSMENT :  Based on the objective data described below, the patient presents with overt s/s of aspiration with sips of thin liquids. Patient sleeping upon SLP arrival, but roused easily. He attempted to follow commands, though this was inconsistent. Speech severely dysarthric and nearly unintelligible. Patient tolerated single ice chips with adequate bolus manipulation and no overt s/s of aspiration. Adequate bolus acceptance from straw. Patient with significant coughing after sips of water. At this juncture, based on chart review, suspect patient's speech and mentation are not consistent with his baseline level of function.   Of note, he has been followed by SLP on prior admissions, with MBS completed at that time. Patient was eating a soft and bite sized diet with mildly thick liquids as recently as January 1/12/22. From the chart, at that time, dysarthria and dysphagia were new and did not correlate with the timing of his SDH.   His current altered mentation and alertness preclude safe swallowing aside from a few trials of ice chips for oral integrity. Once more alertness is no longer a barrier, it would be beneficial to speak with family to determine his most recent baseline with respect to swallowing, and to guide further recommendations with respect to diet and imaging of swallow function.     Patient will benefit from skilled intervention to address the above impairments.     PLAN :  Recommendations and Planned Interventions:  Diet: NPO and ice chips  Will follow for further assessment as appropriate     Acute SLP Services: Yes, patient will be followed by speech-language pathology 3x/week to address goals. Patient's rehabilitation potential is considered to be Fair.    Discharge Recommendations: Continue to assess pending

## 2024-02-27 NOTE — PROGRESS NOTES
[facility]  [address] [place] [address] [phone]    [facility] Adult  Hospitalist Group                                                                                          Hospitalist Progress Note  Mirian Gresham MD        Date of Service:  2024  NAME:  Jean-Paul Feldman  :  1935  MRN:  [id number]      Admission Summary:   87 yo male with a PMHx of subdural hematoma was a transfer from an outside hospital due to seizures     Interval history / Subjective:     Drowsy this am, does not arouse. Sitter at bedside      Assessment & Plan:     Seizure activity  -seizure precautions  -IV ativan prn  -no epileptiform discharges on EEG  -neurology evaluated, awaiting AED levels, cont current meds for now     Hx CVA in the past  -cont lipitor  -was on plavix in the past but is now on eliquis whic his currently held     CAD  -hx CABG in   -cont metoprolol, lipitor    Hx traumatic subdural hematoma  -was treated at [facility]   -no new changs or concerns on repeat CT    CKD III  -stable  -cont to monitor     Afib  -on metoprolol and eliquis which is currently held       Outisde Records, prior notes, labs, radiology, and medications reviewed     Code status: full  DVT prophylaxis: [facility]       Hospital Problems             Last Modified POA    * (Principal) Seizure (HCC) 2024 Yes          Review of Systems:   Pertinent items are noted in HPI.       Vital Signs:    Last 24hrs VS reviewed since prior progress note. Most recent are:  Vitals:    24 0714   BP: (!) 152/96   Pulse: (!) 103   Resp: 16   Temp: 99.9 °F (37.7 °C)   SpO2: 94%       No intake or output data in the 24 hours ending 24 0735     Physical Examination:             Constitutional:  No acute distress, cooperative, pleasant    ENT:  Oral mucosa moist, oropharynx benign.    Resp:  CTA bilaterally. No wheezing/rhonchi/rales. No accessory muscle use   CV:  Regular rhythm,

## 2024-02-27 NOTE — PROGRESS NOTES
NIHSS performed per protocol. Pt with intangible speech. Unable to answer orientation questions. Pt able to raise arms and legs and hold without drift. Unable to follow additional commands to adequately complete NIHSS.

## 2024-02-28 PROCEDURE — 97530 THERAPEUTIC ACTIVITIES: CPT

## 2024-02-28 PROCEDURE — 6370000000 HC RX 637 (ALT 250 FOR IP): Performed by: PSYCHIATRY & NEUROLOGY

## 2024-02-28 PROCEDURE — 97162 PT EVAL MOD COMPLEX 30 MIN: CPT

## 2024-02-28 PROCEDURE — 92526 ORAL FUNCTION THERAPY: CPT | Performed by: SPEECH-LANGUAGE PATHOLOGIST

## 2024-02-28 PROCEDURE — 6370000000 HC RX 637 (ALT 250 FOR IP): Performed by: HOSPITALIST

## 2024-02-28 PROCEDURE — 97530 THERAPEUTIC ACTIVITIES: CPT | Performed by: OCCUPATIONAL THERAPIST

## 2024-02-28 PROCEDURE — 6370000000 HC RX 637 (ALT 250 FOR IP): Performed by: FAMILY MEDICINE

## 2024-02-28 PROCEDURE — 1100000000 HC RM PRIVATE

## 2024-02-28 PROCEDURE — 97165 OT EVAL LOW COMPLEX 30 MIN: CPT | Performed by: OCCUPATIONAL THERAPIST

## 2024-02-28 PROCEDURE — 2580000003 HC RX 258: Performed by: HOSPITALIST

## 2024-02-28 PROCEDURE — 6360000002 HC RX W HCPCS: Performed by: FAMILY MEDICINE

## 2024-02-28 PROCEDURE — 2580000003 HC RX 258: Performed by: FAMILY MEDICINE

## 2024-02-28 RX ADMIN — FAMOTIDINE 20 MG: 20 TABLET, FILM COATED ORAL at 20:23

## 2024-02-28 RX ADMIN — LEVETIRACETAM 1500 MG: 500 TABLET, FILM COATED ORAL at 20:23

## 2024-02-28 RX ADMIN — ATORVASTATIN CALCIUM 80 MG: 20 TABLET, FILM COATED ORAL at 20:23

## 2024-02-28 RX ADMIN — METOPROLOL SUCCINATE 25 MG: 25 TABLET, EXTENDED RELEASE ORAL at 20:25

## 2024-02-28 RX ADMIN — MIRTAZAPINE 7.5 MG: 15 TABLET, FILM COATED ORAL at 20:22

## 2024-02-28 RX ADMIN — LACOSAMIDE 200 MG: 100 TABLET, FILM COATED ORAL at 20:23

## 2024-02-28 RX ADMIN — SODIUM CHLORIDE, PRESERVATIVE FREE 10 ML: 5 INJECTION INTRAVENOUS at 20:23

## 2024-02-28 RX ADMIN — WATER 1000 MG: 1 INJECTION INTRAMUSCULAR; INTRAVENOUS; SUBCUTANEOUS at 11:19

## 2024-02-28 RX ADMIN — SODIUM CHLORIDE, PRESERVATIVE FREE 10 ML: 5 INJECTION INTRAVENOUS at 01:16

## 2024-02-28 RX ADMIN — APIXABAN 5 MG: 5 TABLET, FILM COATED ORAL at 20:22

## 2024-02-28 RX ADMIN — SODIUM CHLORIDE: 9 INJECTION, SOLUTION INTRAVENOUS at 12:10

## 2024-02-28 NOTE — PLAN OF CARE
Speech LAnguage Pathology TREATMENT    Patient: Jean-Paul Feldman (88 y.o. male)  Date: 2/28/2024  Primary Diagnosis: Seizure (HCC) [R56.9]       Precautions: fall                    ASSESSMENT :  Based on the objective data described below, the patient presents with improved speech intelligibility and alertness this date. No overt s/s of aspiration noted with puree or mildly thick liquids. Suspect, based on chart review that he drinks mildly thick liquids at home. Will initiate PO diet.     Patient will benefit from skilled intervention to address the above impairments.     PLAN :  Recommendations and Planned Interventions:  Diet: Puree and mildly thick liquids  Feed only when awake alert and actively accepting     Acute SLP Services: Yes, SLP will continue to follow per plan of care.    Discharge Recommendations: Continue to assess pending progress     SUBJECTIVE:   Patient stated, “I gotta get up, its nearly 10:00.”    OBJECTIVE:     Past Medical History:   Diagnosis Date    CAD (coronary artery disease)     Hx of completed stroke     Hypercholesteremia     Hypertension     Seizure disorder (HCC)     Subdural hematoma (HCC)      Past Surgical History:   Procedure Laterality Date    CORONARY ANGIOPLASTY WITH STENT PLACEMENT      OH CARDIAC SURG PROCEDURE UNLIST      bypass     Prior Level of Function/Home Situation:                  Cognitive and Communication Status:  Neurologic State: Alert  Orientation Level: Unable to assess  Cognition: Decreased command following    Dysphagia:  Oral Assessment:  Oral Motor   Dentition: Natural  Oral Hygiene: Moist  Lingual: No impairment  Mandible: No impairment  P.O. Trials:  PO Trials  Assessment Method(s): Observation  Patient Position: up in bed  Vocal Quality: No Impairment  Consistency Presented: Pureed;Mildly Thick  How Presented: SLP-fed/Presented;Straw;Spoon  Bolus Acceptance: No impairment  Bolus Formation/Control: No impairment  Oral Residue: None  Aspiration

## 2024-02-28 NOTE — PROGRESS NOTES
Ultrasound IV by Emelia Hutchison RN, [facility] :  Procedure Note    Ultrasound IV education provided to patient. Opportunities for questions given.     Ultrasound used for PIV placement:  20gauge Nexiva 1.75inch  right forearm location.  1 X Attempt(s).    Flushed with ease; vigorous blood return.     Procedure tolerated well. Primary RN aware of IV placement and added to LDA.      Emelia Hutchison RN

## 2024-02-28 NOTE — CARE COORDINATION
02/28/24  4:40 PM  Based on progress with PT/OT, recommendations are for SNF. Referral has been sent to [facility] rehab in [facility]. CM to follow.        ___________________________________________________________          Initial Case Management Assessment       02/28/24 1623   Service Assessment   Patient Orientation Person   History Provided By Child/Family   Primary Caregiver Family   Accompanied By/Relationship son Jean-Paul Feldman Jr   Support Systems Spouse/Significant Other;Children;Home Care Staff   Patient's Healthcare Decision Maker is: Legal Next of Kin  (wife Franco [phone])   PCP Verified by CM Yes  (Dr. Levi red [facility])   Last Visit to PCP Within last 3 months   Prior Functional Level Assistance with the following:;Bathing;Dressing;Toileting;Feeding;Cooking;Housework;Shopping;Mobility   Can patient return to prior living arrangement Other (see comment)  (TBD pending Pt recs)   Ability to make needs known: Fair   Family able to assist with home care needs: Yes   Would you like for me to discuss the discharge plan with any other family members/significant others, and if so, who? Yes  (daughter Pina and son Jean-Paul Rice)   Financial Resources Other (Comment)  ([employer] medicare)   Community Resources None   Social/Functional History   Lives With Spouse;Son   Type of Home House   Home Layout One level   Home Access Stairs to enter with rails   Entrance Stairs - Number of Steps 4   Bathroom Equipment Shower chair   Home Equipment Walker, rolling;Wheelchair-manual  (gait belt)   Receives Help From Family;Home health;Personal care attendant   ADL Assistance Needs assistance   Toileting Needs assistance   Homemaking Assistance Needs assistance   Homemaking Responsibilities No   Ambulation Assistance Needs assistance   Transfer Assistance Needs assistance   Active  No   Patient's  Info family   Occupation Retired   Discharge Planning   Type of Residence Other (Comment)  (TBD

## 2024-02-28 NOTE — PROGRESS NOTES
[facility]  [address] [place] [address] [phone]    [facility] Adult  Hospitalist Group                                                                                          Hospitalist Progress Note  Mirian Gresham MD        Date of Service:  2024  NAME:  Jean-Paul Feldman  :  1935  MRN:  [id number]      Admission Summary:   89 yo male with a PMHx of subdural hematoma was a transfer from an outside hospital due to seizures     Interval history / Subjective:     Patient more alert this morning but with slurred speech and hard to understand. Doesn't have any complaints.      Assessment & Plan:     Seizure activity  -seizure precautions  -IV ativan prn  -no epileptiform discharges on EEG  -neurology evaluated, awaiting AED levels, cont current meds for now  -repeat MRI brain with subacute infarcts in the right frontal periventricular white matter nd left cerebellum. Consider embolic source.    Hx CVA in the past  -cont lipitor  -was on plavix in the past but is now on eliquis .     CAD  -hx CABG in   -cont metoprolol, lipitor    Hx traumatic subdural hematoma  -was treated at [facility]   -no new changes or concerns on repeat CT. MRI findings as above     CKD III  -stable  -cont to monitor     Afib  -on metoprolol and eliquis       Outisde Records, prior notes, labs, radiology, and medications reviewed     Code status: full  DVT prophylaxis: SCDs       Hospital Problems             Last Modified POA    * (Principal) Seizure (HCC) 2024 Yes    History of traumatic subdural hematoma 2024 Yes          Review of Systems:   Pertinent items are noted in HPI.       Vital Signs:    Last 24hrs VS reviewed since prior progress note. Most recent are:  Vitals:    24 1133   BP: (!) 109/58   Pulse: 98   Resp: 16   Temp: 97.9 °F (36.6 °C)   SpO2: 93%       No intake or output data in the 24 hours ending 24 1423     Physical  1,500 mg Oral BID    sodium chloride flush 0.9 % injection 5-40 mL  5-40 mL IntraVENous 2 times per day    sodium chloride flush 0.9 % injection 5-40 mL  5-40 mL IntraVENous PRN    0.9 % sodium chloride infusion   IntraVENous PRN    potassium chloride (KLOR-CON) extended release tablet 40 mEq  40 mEq Oral PRN    Or    potassium bicarb-citric acid (EFFER-K) effervescent tablet 40 mEq  40 mEq Oral PRN    Or    potassium chloride 10 mEq/100 mL IVPB (Peripheral Line)  10 mEq IntraVENous PRN    magnesium sulfate 2000 mg in 50 mL IVPB premix  2,000 mg IntraVENous PRN    polyethylene glycol (GLYCOLAX) packet 17 g  17 g Oral Daily PRN    acetaminophen (TYLENOL) tablet 650 mg  650 mg Oral Q6H PRN    Or    acetaminophen (TYLENOL) suppository 650 mg  650 mg Rectal Q6H PRN    0.9 % sodium chloride infusion   IntraVENous Continuous    diazePAM (VALIUM) injection 5 mg  5 mg IntraVENous Q4H PRN    atorvastatin (LIPITOR) tablet 80 mg  80 mg Oral Nightly    metoprolol succinate (TOPROL XL) extended release tablet 25 mg  25 mg Oral Daily    isosorbide mononitrate (IMDUR) extended release tablet 60 mg  60 mg Oral Daily    pantoprazole (PROTONIX) tablet 40 mg  40 mg Oral Daily    famotidine (PEPCID) tablet 20 mg  20 mg Oral QHS    lacosamide (VIMPAT) tablet 200 mg  200 mg Oral BID     ______________________________________________________________________  EXPECTED LENGTH OF STAY:    ACTUAL LENGTH OF STAY:          2                 Mirian Gresham MD

## 2024-02-28 NOTE — PLAN OF CARE
Problem: Chronic Conditions and Co-morbidities  Goal: Patient's chronic conditions and co-morbidity symptoms are monitored and maintained or improved  Outcome: Progressing     Problem: Skin/Tissue Integrity  Goal: Absence of new skin breakdown  Description: 1.  Monitor for areas of redness and/or skin breakdown  2.  Assess vascular access sites hourly  3.  Every 4-6 hours minimum:  Change oxygen saturation probe site  4.  Every 4-6 hours:  If on nasal continuous positive airway pressure, respiratory therapy assess nares and determine need for appliance change or resting period.  Outcome: Progressing     Problem: Safety - Adult  Goal: Free from fall injury  Outcome: Progressing     Problem: Pain  Goal: Verbalizes/displays adequate comfort level or baseline comfort level  Outcome: Progressing     Problem: SLP Adult - Impaired Swallowing  Goal: By Discharge: Advance to least restrictive diet without signs or symptoms of aspiration for planned discharge setting.  See evaluation for individualized goals.  Description: Initiated 2/27/2024  1. Patient will tolerate ice chips free of sequelae of aspiration within 7 days.   2. Patient will tolerate baseline diet free of sequelae of aspiration within 7 days.   2/27/2024 1412 by Ilene Neves, SLP  Outcome: Progressing

## 2024-02-28 NOTE — PROGRESS NOTES
Discussed with wife regarding MRI findings and new embolic strokes. She says that he has been on eliquis for about a year and has a pillbox that he keeps to organize all his medications and he has been taking the eliquis twice a day as prescribed and as far as she knows he hasn't missed a dose.

## 2024-02-28 NOTE — PLAN OF CARE
Neurology, Neurosurgery, and Psychiatry, 66(4), 343-890.  -VALERIA Marshall, JELLY Parker, & MARKUS Cuba (2004) Assessment of post-stroke quality of life in cost-effectiveness studies: The usefulness of the Barthel Index and the EuroQoL-5D. Quality of Life Research, 13, 427-43                                                                                                                                                                                                                                 Pain Ratin/10     Activity Tolerance:   Fair  and requires frequent rest breaks    After treatment:   Patient left in no apparent distress in bed, Call bell within reach, Bed/ chair alarm activated, and Caregiver / family present    COMMUNICATION/EDUCATION:   The patient's plan of care was discussed with: physical therapist, speech therapist, and registered nurse    Patient Education  Education Given To: Patient  Education Provided: Role of Therapy;Plan of Care;ADL Adaptive Strategies;Transfer Training;Fall Prevention Strategies  Education Method: Demonstration;Verbal  Barriers to Learning: Cognition  Education Outcome: Verbalized understanding;Unable to demonstrate understanding;Continued education needed    Thank you for this referral.  Mila Delaney OT  Minutes: 24    Occupational Therapy Evaluation Charge Determination   History Examination Decision-Making   LOW Complexity : Brief history review  HIGH Complexity: 5 Performance deficits relating to physical, cognitive, or psychosocial skills that result in activity limitations and/or participation restrictions  HIGH Complexity: Patient presents with comorbidities that affect occupational performance.  Significant modifications of tasks or assistance (eg. physical or verbal) with assessment (s) is necessary to enable pt to complete evaluation   Based on the above components, the patient evaluation is determined to be of the following

## 2024-02-28 NOTE — PLAN OF CARE
and pericare. Pt is functioning below reported baseline at this time. Recommend SNF placement pending progress.    Patient will benefit from skilled intervention to address the above impairments.    Functional Outcome Measure:  The patient scored 11 on the [facility] mobility outcome measure which is indicative of needing additional therapy.           PLAN :  Recommendations and Planned Interventions:   bed mobility training, transfer training, gait training, therapeutic exercises, neuromuscular re-education, patient and family training/education, and therapeutic activities    Frequency/Duration: Patient will be followed by physical therapy to address goals, PT Plan of Care: 5 times/week to address goals.    Recommendation for discharge: (in order for the patient to meet his/her long term goals): Therapy up to 5 days/week in Skilled nursing facility    Other factors to consider for discharge: patient's current support system is unable to meet their requirements for physical assistance, impaired cognition, high risk for falls, not safe to be alone, and concern for safely navigating or managing the home environment    IF patient discharges home will need the following DME: continuing to assess with progress         SUBJECTIVE:   Patient stated “I didn't go to the bathroom.”  pt unaware of bowel incontinence    OBJECTIVE DATA SUMMARY:       Past Medical History:   Diagnosis Date    CAD (coronary artery disease)     Hx of completed stroke     Hypercholesteremia     Hypertension     Seizure disorder (HCC)     Subdural hematoma (HCC)      Past Surgical History:   Procedure Laterality Date    CORONARY ANGIOPLASTY WITH STENT PLACEMENT      GA CARDIAC SURG PROCEDURE UNLIST      bypass       Home Situation:  Social/Functional History  Lives With: Spouse  Type of Home: House  Home Layout: One level  Home Access: Stairs to enter with rails  Entrance Stairs - Number of Steps: 6  Entrance Stairs - Rails: Left  Bathroom Shower/Tub:  Provided: Role of Therapy;Plan of Care  Education Method: Verbal  Barriers to Learning: Cognition;Hearing  Education Outcome: Verbalized understanding    Thank you for this referral.  Niharika Easton, PT, DPT  Minutes: 25

## 2024-02-28 NOTE — WOUND CARE
Wound Consult:  new consult Visit. Chart reviewed.  Consulted for scattered abrasion and red sacrum.  Spoke with patients nurse,  Bere OH.  Patient is resting on a viki bed with EDIL mattress.  Heels off loaded with pillows.  Patient is awake, alert, cooperative; requires 2 assists to move side to side in bed.  Gennaro score 13.  Assessment:ALL WOUNDS POA  Right posterior lower arm- abrasion- 3x2x0.1cm ,moist, red, pink.surrounding erythema, bloody drainage.    Left arm-red raised rash, no pain,no open areas, dry, unknown etiology    Bilateral legs- scattered brown dry scabs including knees, slight pink surrounding.    Bilateral heels- no redness  Sacrum- blanching redness, no open areas  Scattered area of dry brown scabs on arms with ecchymosis. Family stated patient falls at home.  Treatment:  Right arm- cleansed with wound cleanser, xeroform foam dressing.  Wound Recommendations:  Right arm- cleanse with wound cleanser, xeroform foam dressing.change every 3 days  Skin Care / PI Prevention Recommendations:  1. Minimize friction/shear: minimize layers of linen/pads under patient.  2. Off load pressure/reposition:  turn and reposition approximately every 2 hours; float heels with pillows or use off loading heel boots; waffle cushion for sitting; position wedge.  3. Manage Moisture - keep skin folds dry; incontinence skin care with incontinence wipes; zinc guard barrier ointment; appropriate sized briefs ; purewick in use to help contain urine.  4. Continue to monitor nutrition, pain, and skin risk scale, and skin assessment.  Plan:  Spoke with Dr. Gresham regarding findings and proposed orders for treatment.  Please re-consult should concerns arise despite continued skin/PI prevention measures.  Leyla Jo RN  [facility], Wound / Ostomy Department  Wound Healing Office [phone]

## 2024-02-28 NOTE — PROGRESS NOTES
INPATIENT NEUROLOGY FOLLOW-UP NOTE    NAME:  Jean-Paul Feldman    REASON FOR FOLLOW UP:  AMS, suspected seizure at home; witnessed seizure-like activity here on 2- shortly after arrival (transferred from [facility])    INTERVAL HX (02/29/24):     EEG done 2-:  Impression:  Abnormal.  Moderate-severe generalized slowing suggestive of an encephalopathic process, not specific as to cause.  No epileptiform discharges were seen during this recording.  Clinical and Neuro-Imaging correlation is necessary     Brain MRI done yesterday.  Shows evidence of subacute infarcts in right frontal periventricular WM, and left cerebellum, suggestive of emboli.  Home meds include Eliquis 5 mg BID and Lipitor 80 mg QHS    Spoke with Daughter/ Granddaughter at bedside  Reviewed the Brain MRI images with them (tiny left cerebellar stroke, small right frontal stroke)  Discussed that the strokes are suspected to be due to his underlying A Fib  Daughter relayed that Dilantin was stopped when pt got on Eliquis (early Jan) due to potential interactions    Pt opens eyes to voice, mumbles when speaking but seems to identify Daughter  Follows commands to lift hands, open/ close hands  Tells/ motions for great-granddaughter / toddler to give him five    Labs reviewed:    Levetiracetam level 2-27-24: 33.6 (rr 10-40)  Lacosamide level 2-27-24 pending  Total Phenytoin level 2-27-24 < 0.8 (rr 10.0 to 20.0)  Free Phenytoin level 2-27-24 pending      IMPRESSION/ PLAN:     88 y.o. male with PMHx Subdural Hematoma (2022), small right posterior corona radiata stroke (2022), left sided weakness since stroke, seizures since subdural hematoma/ stroke, CAD, A Fib    Family suspects he had seizure at home on 2-  Pt had seizure-like episode here on 2-26-24 PM soon after arrival    CT head w/o any acute abnormality (improvement/ reduction in chronic SDH compared to last scan on file 1-)  Brain MRI shows new embolic strokes (right PVM,

## 2024-02-28 NOTE — PROGRESS NOTES
Spiritual Care Partner Volunteer visited patient at [facility] in [facility] on 2/28/2024    Documented by:  Chaplain Jesika Gonzalez MS, MDiv, [facility]  Spiritual Health Services  Paging service: [phone] (LEXI)

## 2024-02-29 ENCOUNTER — APPOINTMENT (OUTPATIENT)
Dept: VASCULAR SURGERY | Facility: HOSPITAL | Age: 89
End: 2024-02-29
Attending: STUDENT IN AN ORGANIZED HEALTH CARE EDUCATION/TRAINING PROGRAM
Payer: MEDICARE

## 2024-02-29 ENCOUNTER — APPOINTMENT (OUTPATIENT)
Facility: HOSPITAL | Age: 89
End: 2024-02-29
Attending: STUDENT IN AN ORGANIZED HEALTH CARE EDUCATION/TRAINING PROGRAM
Payer: MEDICARE

## 2024-02-29 PROBLEM — I48.91 ATRIAL FIBRILLATION (HCC): Status: ACTIVE | Noted: 2024-02-29

## 2024-02-29 LAB
ECHO AO ASC DIAM: 3.3 CM
ECHO AO ASCENDING AORTA INDEX: 1.5 CM/M2
ECHO AV AREA PEAK VELOCITY: 1.9 CM2
ECHO AV AREA VTI: 1.9 CM2
ECHO AV AREA/BSA PEAK VELOCITY: 0.9 CM2/M2
ECHO AV AREA/BSA VTI: 0.9 CM2/M2
ECHO AV MEAN GRADIENT: 4 MMHG
ECHO AV MEAN VELOCITY: 1 M/S
ECHO AV PEAK GRADIENT: 7 MMHG
ECHO AV PEAK VELOCITY: 1.3 M/S
ECHO AV VELOCITY RATIO: 0.54
ECHO AV VTI: 22.4 CM
ECHO BSA: 2.25 M2
ECHO BSA: 2.25 M2
ECHO LA DIAMETER INDEX: 2.18 CM/M2
ECHO LA DIAMETER: 4.8 CM
ECHO LA VOL A-L A2C: 84 ML (ref 18–58)
ECHO LA VOL A-L A4C: 73 ML (ref 18–58)
ECHO LA VOL BP: 75 ML (ref 18–58)
ECHO LA VOL MOD A2C: 76 ML (ref 18–58)
ECHO LA VOL MOD A4C: 71 ML (ref 18–58)
ECHO LA VOL/BSA BIPLANE: 34 ML/M2 (ref 16–34)
ECHO LA VOLUME AREA LENGTH: 80 ML
ECHO LA VOLUME INDEX A-L A2C: 38 ML/M2 (ref 16–34)
ECHO LA VOLUME INDEX A-L A4C: 33 ML/M2 (ref 16–34)
ECHO LA VOLUME INDEX AREA LENGTH: 36 ML/M2 (ref 16–34)
ECHO LA VOLUME INDEX MOD A2C: 35 ML/M2 (ref 16–34)
ECHO LA VOLUME INDEX MOD A4C: 32 ML/M2 (ref 16–34)
ECHO LV E' LATERAL VELOCITY: 9 CM/S
ECHO LV E' SEPTAL VELOCITY: 7 CM/S
ECHO LV EDV A2C: 64 ML
ECHO LV EDV A4C: 69 ML
ECHO LV EDV BP: 72 ML (ref 67–155)
ECHO LV EDV INDEX A4C: 31 ML/M2
ECHO LV EDV INDEX BP: 33 ML/M2
ECHO LV EDV NDEX A2C: 29 ML/M2
ECHO LV EJECTION FRACTION A2C: 58 %
ECHO LV EJECTION FRACTION A4C: 60 %
ECHO LV EJECTION FRACTION BIPLANE: 60 % (ref 55–100)
ECHO LV ESV A2C: 27 ML
ECHO LV ESV A4C: 28 ML
ECHO LV ESV BP: 28 ML (ref 22–58)
ECHO LV ESV INDEX A2C: 12 ML/M2
ECHO LV ESV INDEX A4C: 13 ML/M2
ECHO LV ESV INDEX BP: 13 ML/M2
ECHO LV FRACTIONAL SHORTENING: 21 % (ref 28–44)
ECHO LV INTERNAL DIMENSION DIASTOLE INDEX: 1.77 CM/M2
ECHO LV INTERNAL DIMENSION DIASTOLIC: 3.9 CM (ref 4.2–5.9)
ECHO LV INTERNAL DIMENSION SYSTOLIC INDEX: 1.41 CM/M2
ECHO LV INTERNAL DIMENSION SYSTOLIC: 3.1 CM
ECHO LV IVSD: 1.5 CM (ref 0.6–1)
ECHO LV MASS 2D: 261.8 G (ref 88–224)
ECHO LV MASS INDEX 2D: 119 G/M2 (ref 49–115)
ECHO LV POSTERIOR WALL DIASTOLIC: 1.8 CM (ref 0.6–1)
ECHO LV RELATIVE WALL THICKNESS RATIO: 0.92
ECHO LVOT AREA: 3.8 CM2
ECHO LVOT AV VTI INDEX: 0.49
ECHO LVOT DIAM: 2.2 CM
ECHO LVOT MEAN GRADIENT: 1 MMHG
ECHO LVOT PEAK GRADIENT: 2 MMHG
ECHO LVOT PEAK VELOCITY: 0.7 M/S
ECHO LVOT STROKE VOLUME INDEX: 18.8 ML/M2
ECHO LVOT SV: 41.4 ML
ECHO LVOT VTI: 10.9 CM
ECHO MV A VELOCITY: 0.01 M/S
ECHO MV AREA VTI: 2.7 CM2
ECHO MV E DECELERATION TIME (DT): 169.1 MS
ECHO MV E VELOCITY: 0.72 M/S
ECHO MV E/A RATIO: 72
ECHO MV E/E' LATERAL: 8
ECHO MV E/E' RATIO (AVERAGED): 9.14
ECHO MV LVOT VTI INDEX: 1.4
ECHO MV MAX VELOCITY: 0.8 M/S
ECHO MV MEAN GRADIENT: 1 MMHG
ECHO MV MEAN VELOCITY: 0.5 M/S
ECHO MV PEAK GRADIENT: 3 MMHG
ECHO MV VTI: 15.3 CM
ECHO PV MAX VELOCITY: 0.9 M/S
ECHO PV PEAK GRADIENT: 3 MMHG
ECHO TV REGURGITANT MAX VELOCITY: 1.99 M/S
ECHO TV REGURGITANT PEAK GRADIENT: 16 MMHG
ERYTHROCYTE [DISTWIDTH] IN BLOOD BY AUTOMATED COUNT: 13.8 % (ref 11.5–14.5)
HCT VFR BLD AUTO: 45.6 % (ref 36.6–50.3)
HGB BLD-MCNC: 15.4 G/DL (ref 12.1–17)
LEVETIRACETAM SERPL-MCNC: 33.6 UG/ML (ref 10–40)
MCH RBC QN AUTO: 31.8 PG (ref 26–34)
MCHC RBC AUTO-ENTMCNC: 33.8 G/DL (ref 30–36.5)
MCV RBC AUTO: 94 FL (ref 80–99)
NRBC # BLD: 0 K/UL (ref 0–0.01)
NRBC BLD-RTO: 0 PER 100 WBC
PHENYTOIN FREE SERPL-MCNC: ABNORMAL UG/ML (ref 1–2)
PHENYTOIN SERPL-MCNC: <0.8 UG/ML (ref 10–20)
PLATELET # BLD AUTO: 226 K/UL (ref 150–400)
PMV BLD AUTO: 9.6 FL (ref 8.9–12.9)
RBC # BLD AUTO: 4.85 M/UL (ref 4.1–5.7)
VAS LEFT CCA DIST EDV: 9.4 CM/S
VAS LEFT CCA DIST PSV: 65.5 CM/S
VAS LEFT CCA PROX EDV: 8.4 CM/S
VAS LEFT CCA PROX PSV: 62.2 CM/S
VAS LEFT ECA EDV: 0 CM/S
VAS LEFT ECA PSV: 99.7 CM/S
VAS LEFT ICA DIST EDV: 16.7 CM/S
VAS LEFT ICA DIST PSV: 74.4 CM/S
VAS LEFT ICA MID EDV: 19.2 CM/S
VAS LEFT ICA MID PSV: 91.6 CM/S
VAS LEFT ICA PROX EDV: 17.1 CM/S
VAS LEFT ICA PROX PSV: 78.7 CM/S
VAS LEFT ICA/CCA PSV: 1.4 NO UNITS
VAS LEFT SUBCLAVIAN PROX EDV: 3.5 CM/S
VAS LEFT SUBCLAVIAN PROX PSV: 150.6 CM/S
VAS LEFT VERTEBRAL EDV: 11.8 CM/S
VAS LEFT VERTEBRAL PSV: 52.3 CM/S
VAS RIGHT CCA DIST EDV: 5.6 CM/S
VAS RIGHT CCA DIST PSV: 49.8 CM/S
VAS RIGHT CCA PROX EDV: 6.9 CM/S
VAS RIGHT CCA PROX PSV: 64.6 CM/S
VAS RIGHT ECA EDV: 3.2 CM/S
VAS RIGHT ECA PSV: 87.9 CM/S
VAS RIGHT ICA DIST EDV: 11 CM/S
VAS RIGHT ICA DIST PSV: 39.3 CM/S
VAS RIGHT ICA MID EDV: 12.8 CM/S
VAS RIGHT ICA MID PSV: 51.6 CM/S
VAS RIGHT ICA PROX EDV: 10 CM/S
VAS RIGHT ICA PROX PSV: 44 CM/S
VAS RIGHT ICA/CCA PSV: 1 NO UNITS
VAS RIGHT SUBCLAVIAN PROX EDV: 10.1 CM/S
VAS RIGHT SUBCLAVIAN PROX PSV: 155 CM/S
VAS RIGHT VERTEBRAL EDV: 5.7 CM/S
VAS RIGHT VERTEBRAL PSV: 20.7 CM/S
WBC # BLD AUTO: 10.3 K/UL (ref 4.1–11.1)

## 2024-02-29 PROCEDURE — 36415 COLL VENOUS BLD VENIPUNCTURE: CPT

## 2024-02-29 PROCEDURE — 94761 N-INVAS EAR/PLS OXIMETRY MLT: CPT

## 2024-02-29 PROCEDURE — 2580000003 HC RX 258: Performed by: FAMILY MEDICINE

## 2024-02-29 PROCEDURE — 92526 ORAL FUNCTION THERAPY: CPT

## 2024-02-29 PROCEDURE — 85027 COMPLETE CBC AUTOMATED: CPT

## 2024-02-29 PROCEDURE — 93306 TTE W/DOPPLER COMPLETE: CPT

## 2024-02-29 PROCEDURE — 2580000003 HC RX 258: Performed by: HOSPITALIST

## 2024-02-29 PROCEDURE — 99232 SBSQ HOSP IP/OBS MODERATE 35: CPT | Performed by: PSYCHIATRY & NEUROLOGY

## 2024-02-29 PROCEDURE — 6370000000 HC RX 637 (ALT 250 FOR IP): Performed by: PSYCHIATRY & NEUROLOGY

## 2024-02-29 PROCEDURE — 99223 1ST HOSP IP/OBS HIGH 75: CPT | Performed by: INTERNAL MEDICINE

## 2024-02-29 PROCEDURE — 93880 EXTRACRANIAL BILAT STUDY: CPT

## 2024-02-29 PROCEDURE — 6370000000 HC RX 637 (ALT 250 FOR IP): Performed by: FAMILY MEDICINE

## 2024-02-29 PROCEDURE — 93306 TTE W/DOPPLER COMPLETE: CPT | Performed by: SPECIALIST

## 2024-02-29 PROCEDURE — 6360000002 HC RX W HCPCS: Performed by: FAMILY MEDICINE

## 2024-02-29 PROCEDURE — 1100000000 HC RM PRIVATE

## 2024-02-29 PROCEDURE — 6370000000 HC RX 637 (ALT 250 FOR IP): Performed by: HOSPITALIST

## 2024-02-29 RX ADMIN — ISOSORBIDE MONONITRATE 60 MG: 30 TABLET, EXTENDED RELEASE ORAL at 09:58

## 2024-02-29 RX ADMIN — PANTOPRAZOLE SODIUM 40 MG: 40 TABLET, DELAYED RELEASE ORAL at 09:57

## 2024-02-29 RX ADMIN — APIXABAN 5 MG: 5 TABLET, FILM COATED ORAL at 21:14

## 2024-02-29 RX ADMIN — SODIUM CHLORIDE, PRESERVATIVE FREE 10 ML: 5 INJECTION INTRAVENOUS at 21:14

## 2024-02-29 RX ADMIN — APIXABAN 5 MG: 5 TABLET, FILM COATED ORAL at 09:57

## 2024-02-29 RX ADMIN — LEVETIRACETAM 1500 MG: 500 TABLET, FILM COATED ORAL at 10:01

## 2024-02-29 RX ADMIN — ATORVASTATIN CALCIUM 80 MG: 20 TABLET, FILM COATED ORAL at 21:14

## 2024-02-29 RX ADMIN — MIRTAZAPINE 7.5 MG: 15 TABLET, FILM COATED ORAL at 21:13

## 2024-02-29 RX ADMIN — LACOSAMIDE 200 MG: 100 TABLET, FILM COATED ORAL at 21:13

## 2024-02-29 RX ADMIN — LACOSAMIDE 200 MG: 100 TABLET, FILM COATED ORAL at 09:59

## 2024-02-29 RX ADMIN — LEVETIRACETAM 1500 MG: 500 TABLET, FILM COATED ORAL at 21:13

## 2024-02-29 RX ADMIN — SODIUM CHLORIDE, PRESERVATIVE FREE 10 ML: 5 INJECTION INTRAVENOUS at 10:05

## 2024-02-29 RX ADMIN — FAMOTIDINE 20 MG: 20 TABLET, FILM COATED ORAL at 21:13

## 2024-02-29 RX ADMIN — METOPROLOL SUCCINATE 25 MG: 25 TABLET, EXTENDED RELEASE ORAL at 21:23

## 2024-02-29 RX ADMIN — WATER 1000 MG: 1 INJECTION INTRAMUSCULAR; INTRAVENOUS; SUBCUTANEOUS at 10:05

## 2024-02-29 NOTE — CARE COORDINATION
Transition of Care Plan:     RUR: 14%  Disposition: SNF: [facility] (pending)  Transportation at Discharge: S     Medicare Letter:   Caregiver Contact:Franco Feldman (Spouse)  [phone]       1:26pm: [facility] has accepted Pt for SNF. Facility to start auth today 2/29.        11:40am:CM contacted [facility] admissions to follow-up on SNF referral. CM was informed that they are currently reviewing and would follow-up with a response.      CM will continue to follow for discharge planning needs.       DARI Arthur, MARICRUZ  [facility] Care Manager  [phone]

## 2024-02-29 NOTE — PLAN OF CARE
Speech LAnguage Pathology TREATMENT    Patient: Jean-Paul Feldman (88 y.o. male)  Date: 2/29/2024  Primary Diagnosis: Seizure (HCC) [R56.9]       Precautions: Fall Risk, Aspiration Risk                  ASSESSMENT :    Patient repositioned upright in bed for safety with PO intake; provided total assist with pureed solids tsp trials applesauce with noted prolonged oral prep, delayed perceived transit and trigger of swallow, multiple swallows suggestive of decreased pharyngeal clearance, however no overt s/sx aspiration or penetration all trials. Patient handed off mildly thickened liquids in cup for self-feeding; patient initially with small single sips, moderately delayed perceived transit and trigger of swallow, and no overt s/sx aspiration however as self-feeding progressed, patient with poor calibration of appropriate sip size and exhibited immediate cough followed delayed perceived swallow larger bolus. Patient with no overt s/sx aspiration or penetration with assisted edge of cup sips. Pureed solids also with prolonged oral prep, delayed perceived transit and trigger of swallow, multiple swallows, however no overt s/sx aspiration or penetration. Patient with no significant oral residue between bites/sips noted.     Based on the objective data described below, the patient presents with persistent dysphagia symptoms, likely consistent with baseline and impacting safety with further diet advancement. Patient continues to benefit from total assist with feeding to max safety with appropriate bolus size, especially with edge of cup sips, to minimize risk of aspiration and/or aspiration/related illness. Pureed dysphagia diet and mildly thickened liquids - no straws - remain appropriate at this time and with total assist with all meals.    Patient will benefit from skilled intervention to address the above impairments.     PLAN :  Recommendations and Planned Interventions:  Diet: Puree and mildly thick liquids  No

## 2024-02-29 NOTE — PROGRESS NOTES
PT treatment attempted. However, currently pt undergoing vascular duplex testing. PT will follow and attempt for PT treatment as able.Thank you.

## 2024-02-29 NOTE — PLAN OF CARE
Problem: Chronic Conditions and Co-morbidities  Goal: Patient's chronic conditions and co-morbidity symptoms are monitored and maintained or improved  Outcome: Progressing     Problem: Skin/Tissue Integrity  Goal: Absence of new skin breakdown  Description: 1.  Monitor for areas of redness and/or skin breakdown  2.  Assess vascular access sites hourly  3.  Every 4-6 hours minimum:  Change oxygen saturation probe site  4.  Every 4-6 hours:  If on nasal continuous positive airway pressure, respiratory therapy assess nares and determine need for appliance change or resting period.  Outcome: Progressing     Problem: Safety - Adult  Goal: Free from fall injury  Outcome: Progressing     Problem: Pain  Goal: Verbalizes/displays adequate comfort level or baseline comfort level  Outcome: Progressing     Problem: Physical Therapy - Adult  Goal: By Discharge: Performs mobility at highest level of function for planned discharge setting.  See evaluation for individualized goals.  Description: FUNCTIONAL STATUS PRIOR TO ADMISSION: pt required assistance for all transfers, gait, and stair negotiation with RW and gait belt.    HOME SUPPORT PRIOR TO ADMISSION: The patient lived with spouse and either son or niece stays with them at night to assist. Pt has caregiver from 8-5pm. .    Physical Therapy Goals  Initiated 2/28/2024  1.  Patient will move from supine to sit and sit to supine and roll side to side in bed with minimal assistance within 7 day(s).    2.  Patient will perform sit to stand with minimal assistance within 7 day(s).  3.  Patient will transfer from bed to chair and chair to bed with minimal assistance using the least restrictive device within 7 day(s).  4.  Patient will ambulate with minimal assistance for 25 feet with the least restrictive device within 7 day(s).   5.  Patient will ascend/descend 6 stairs with L handrail(s) with moderate assistance within 7 day(s).    2/28/2024 1522 by Niharika Easton,  PT  Outcome: Progressing     Problem: Occupational Therapy - Adult  Goal: By Discharge: Performs self-care activities at highest level of function for planned discharge setting.  See evaluation for individualized goals.  Description: FUNCTIONAL STATUS PRIOR TO ADMISSION:  Per pt's son who was present at end of session, pt requires assist with all ADLs and functional mobility.  He is able to feed self after set-up and amb with RW, gait belt and physical assistance.  Pt has an aide 7 days a week 8/9a-5p and then family from 5p-8/9a.     Receives Help From: Family, ADL Assistance: Needs assistance, Toileting: Needs assistance,  Ambulation Assistance: Needs assistance (gait belt, RW and min A), Transfer Assistance: Needs assistance, Active : No          HOME SUPPORT: Patient lived with wife.  Son and cousin stay at night to assist pt.  Has aide 7 days a week 8/9a-5p and then family from 5p-8/9a.     Occupational Therapy Goals  Initiated 2/28/2024   1.  Patient will perform grooming seated unsupported with Contact Guard Assist within 7 day(s).  2.  Patient will perform upper body dressing with Contact Guard Assist within 7 day(s).  3.  Patient will perform toilet transfers with Moderate Assist within 7 day(s).  4.  Patient will participate in upper extremity therapeutic exercise/activities with Contact Guard Assist within 7 day(s).    5.  Patient will utilize energy conservation techniques during functional activities with verbal and visual cues within 7 day(s).  6.  Patient will perform their Fugl Logan in prep for ADLs within 7 days.   2/28/2024 1528 by Mila Boyd OT  Outcome: Progressing

## 2024-02-29 NOTE — PROGRESS NOTES
Occupational Therapy Note:    OT treatment attempted. However, currently pt undergoing vascular duplex testing. OT will follow and attempt treatment as able.Thank you.

## 2024-02-29 NOTE — CONSULTS
input(s): \"TROPHS\", \"CKMB\" in the last 72 hours.    ECG:   Encounter Date: 02/26/24   EKG 12 Lead   Result Value    Ventricular Rate 87    Atrial Rate 241    QRS Duration 108    Q-T Interval 386    QTc Calculation (Bazett) 464    R Axis -32    T Axis -11    Diagnosis      Atrial flutter with variable AV block with premature ventricular or   aberrantly conducted complexes  Left axis deviation  Nonspecific ST and T wave abnormality  Prolonged QT  Abnormal ECG  Confirmed by Glen Killian ([id number]) on 2/27/2024 8:42:16 PM       Review of Systems:    []All other systems reviewed and all negative except as written in HPI    [x] Patient unable to provide secondary to condition    Past Medical History:   Diagnosis Date    CAD (coronary artery disease)     Hx of completed stroke     Hypercholesteremia     Hypertension     Seizure disorder (HCC)     Subdural hematoma (HCC)      Past Surgical History:   Procedure Laterality Date    CORONARY ANGIOPLASTY WITH STENT PLACEMENT      HI CARDIAC SURG PROCEDURE UNLIST      bypass     Social Hx:  reports that he has never smoked. He has never used smokeless tobacco. He reports that he does not drink alcohol and does not use drugs.  Family Hx: family history includes Hypertension in his father.  Allergies   Allergen Reactions    Morphine      Other reaction(s): Unknown (comments)          OBJECTIVE:  Wt Readings from Last 3 Encounters:   02/29/24 100.7 kg (222 lb)     I/O last 3 completed shifts:  In: 2845.3 [P.O.:60; I.V.:2785.3]  Out: 500 [Urine:500]  No intake/output data recorded.    Physical Exam:    Vitals:   Vitals:    02/29/24 0630 02/29/24 0739 02/29/24 1058 02/29/24 1125   BP: (!) 159/70 (!) 150/94 (!) 150/94 121/88   Pulse: 98 94  95   Resp: 18 16  16   Temp: 98.2 °F (36.8 °C) 97.5 °F (36.4 °C)  97.9 °F (36.6 °C)   TempSrc: Axillary Oral  Oral   SpO2: 95% 95%  96%   Weight:   100.7 kg (222 lb)    Height:   1.803 m (5' 11\")      Telemetry: not on tele  (EFFER-K) effervescent tablet 40 mEq, 40 mEq, Oral, PRN **OR** potassium chloride 10 mEq/100 mL IVPB (Peripheral Line), 10 mEq, IntraVENous, PRN, Mindi Oconnell MD    magnesium sulfate 2000 mg in 50 mL IVPB premix, 2,000 mg, IntraVENous, PRN, Mindi Oconnell MD    polyethylene glycol (GLYCOLAX) packet 17 g, 17 g, Oral, Daily PRN, Mindi Oconnell MD    acetaminophen (TYLENOL) tablet 650 mg, 650 mg, Oral, Q6H PRN **OR** acetaminophen (TYLENOL) suppository 650 mg, 650 mg, Rectal, Q6H PRN, Mindi Oconnell MD    diazePAM (VALIUM) injection 5 mg, 5 mg, IntraVENous, Q4H PRN, Mindi Oconnell MD, 5 mg at 02/27/24 0004    atorvastatin (LIPITOR) tablet 80 mg, 80 mg, Oral, Nightly, Mindi Oconnell MD, 80 mg at 02/28/24 2023    metoprolol succinate (TOPROL XL) extended release tablet 25 mg, 25 mg, Oral, Daily, Mindi Oconnell MD, 25 mg at 02/28/24 2025    isosorbide mononitrate (IMDUR) extended release tablet 60 mg, 60 mg, Oral, Daily, Mindi Oconnell MD, 60 mg at 02/29/24 0958    pantoprazole (PROTONIX) tablet 40 mg, 40 mg, Oral, Daily, Mindi Oconnell MD, 40 mg at 02/29/24 0957    famotidine (PEPCID) tablet 20 mg, 20 mg, Oral, QHS, Mindi Oconnell MD, 20 mg at 02/28/24 2023    lacosamide (VIMPAT) tablet 200 mg, 200 mg, Oral, BID, Umair Bullard MD, 200 mg at 02/29/24 0959    GM Bee - NP    [facility]  Call center: (P) [phone]  (F) [phone]      CC:Marlen Levi MD

## 2024-02-29 NOTE — PROGRESS NOTES
Physician Progress Note      PATIENT:               GRICEL LOERA  [facility] #:                  [id number]  :                       1935  ADMIT DATE:       2024 5:55 PM  DISCH DATE:  RESPONDING  PROVIDER #:        Sylvester Burroughs MD          QUERY TEXT:    Patient admitted with seizure activity and subacute infarct, noted to have   atrial fibrillation and is maintained on Eliquis. If possible, please document   in progress notes and discharge summary if you are evaluating and/or treating   any of the following:    The medical record reflects the following:  Risk Factors: CVA, HTN, 88-year-old male  Clinical Indicators: pt admitted with seizure activity and subacute infarct  - noted to have Afib and maintained on Eliquis  Treatment: Eliquis 5 mg 2 times daily    Thank you,    Sandra Aaron RN  CDI  Options provided:  -- Secondary hypercoagulable state in a patient with atrial fibrillation  -- Other - I will add my own diagnosis  -- Disagree - Not applicable / Not valid  -- Disagree - Clinically unable to determine / Unknown  -- Refer to Clinical Documentation Reviewer    PROVIDER RESPONSE TEXT:    This patient has secondary hypercoagulable state in a patient with atrial   fibrillation.    Query created by: Sandra Aaron on 2024 11:42 AM      QUERY TEXT:    Pt admitted with seizure activity and subacute infarct, noted to have prior   CVA. If possible, please document in progress notes and discharge summary if   you are evaluating and/or treating any of the following:    The medical record reflects the following:  Risk Factors: seizure, CVA  Clinical Indicators: admitted with seizure activity and subacute infarct  - noted to have h/o CVA  -  Neuro consult notes: seizures since subdural hematoma/ stroke  Treatment: Neuro consult, EEG, seizure precautions, monitoring    Thank you,    Sandra Aaron RN  CDI  Options provided:  -- Seizure is a sequela of prior CVA  -- Seizure is not a sequela of prior  CVA  -- Other - I will add my own diagnosis  -- Disagree - Not applicable / Not valid  -- Disagree - Clinically unable to determine / Unknown  -- Refer to Clinical Documentation Reviewer    PROVIDER RESPONSE TEXT:    Seizure is not a sequela of prior CVA.    Query created by: Sandra Aaron on 2/29/2024 11:54 AM      Electronically signed by:  Sylvester Burroughs MD 2/29/2024 1:19 PM

## 2024-02-29 NOTE — PLAN OF CARE
Problem: Chronic Conditions and Co-morbidities  Goal: Patient's chronic conditions and co-morbidity symptoms are monitored and maintained or improved  2/29/2024 1035 by Amaya Peralta RN  Outcome: Progressing  2/29/2024 0109 by Karyn Martini RN  Outcome: Progressing     Problem: Skin/Tissue Integrity  Goal: Absence of new skin breakdown  Description: 1.  Monitor for areas of redness and/or skin breakdown  2.  Assess vascular access sites hourly  3.  Every 4-6 hours minimum:  Change oxygen saturation probe site  4.  Every 4-6 hours:  If on nasal continuous positive airway pressure, respiratory therapy assess nares and determine need for appliance change or resting period.  2/29/2024 1035 by Amaya Peralta RN  Outcome: Progressing  2/29/2024 0109 by Karyn Martini RN  Outcome: Progressing     Problem: Safety - Adult  Goal: Free from fall injury  2/29/2024 1035 by Amaya Peralta RN  Outcome: Progressing  2/29/2024 0109 by Karyn Martini RN  Outcome: Progressing     Problem: Pain  Goal: Verbalizes/displays adequate comfort level or baseline comfort level  2/29/2024 1035 by Amaya Peralta RN  Outcome: Progressing  2/29/2024 0109 by Karyn Martini RN  Outcome: Progressing     Problem: SLP Adult - Impaired Swallowing  Goal: By Discharge: Advance to least restrictive diet without signs or symptoms of aspiration for planned discharge setting.  See evaluation for individualized goals.  Description: Initiated 2/27/2024  1. Patient will tolerate ice chips free of sequelae of aspiration within 7 days.   2. Patient will tolerate baseline diet free of sequelae of aspiration within 7 days.   2/29/2024 1014 by Kati Cornell, SLP  Outcome: Progressing     Problem: ABCDS Injury Assessment  Goal: Absence of physical injury  Outcome: Progressing

## 2024-02-29 NOTE — PROGRESS NOTES
Hospitalist Progress Note      NAME:  Jean-Paul Feldman   :  1935  MRM:  [id number]    Date/Time: 2024  9:59 AM           Assessment / Plan:   89 yo male with a PMHx of subdural hematoma was a transfer from an outside hospital due to seizures, MRI was positive for subacute infarcts in the right frontal periventricular white matter nd left cerebellum.    ##subacute infarcts on MRI  -repeat MRI brain with subacute infarcts in the right frontal periventricular white matter nd left cerebellum.   Plan  Rule out embolalic source   - TTE  - Carotid doppler   - Continue pt on Eliquis/Atorvastatin   -FU with neurology   -Consulted cardiology if any further work up      ##Seizure activity  -seizure precautions  -IV ativan prn  -no epileptiform discharges on EEG  -neurology evaluated, awaiting AED levels, cont current meds for now    ##Hx CVA in the past  -cont lipitor  -was on plavix in the past but is now on eliquis .      ##CAD  -hx CABG in   -cont metoprolol, lipitor     ##Hx traumatic subdural hematoma  -was treated at [facility]   -no new changes or concerns on repeat CT. MRI findings as above      ##CKD III  -stable  -cont to monitor      ##Afib  -on metoprolol and eliquis      ##Delirium   Pt has been reported to have some    Will continue with delirium precautions   - Continue frequent redirection and reorientation, lights on during day, minimize disruptions at night  - Avoid offending medications including benzodiazepines and opioids as able, though appropriate pain management as uncontrolled pain can exacerbate delirium  - Avoid restraints  - Medication only if needed for severe agitation when patient is at risk of harming self or others      I have personally reviewed the radiographs, laboratory data in Epic and decisions and statements above are based partially on this personal interpretation.                 Care Plan discussed with: Patient    Discussed:  Care Plan    Prophylaxis:

## 2024-03-01 LAB
BACTERIA SPEC CULT: ABNORMAL
CC UR VC: ABNORMAL
ERYTHROCYTE [DISTWIDTH] IN BLOOD BY AUTOMATED COUNT: 14 % (ref 11.5–14.5)
HCT VFR BLD AUTO: 40.2 % (ref 36.6–50.3)
HGB BLD-MCNC: 13.6 G/DL (ref 12.1–17)
LACOSAMIDE SERPL-MCNC: 8.8 UG/ML (ref 5–10)
MCH RBC QN AUTO: 31.9 PG (ref 26–34)
MCHC RBC AUTO-ENTMCNC: 33.8 G/DL (ref 30–36.5)
MCV RBC AUTO: 94.1 FL (ref 80–99)
NRBC # BLD: 0 K/UL (ref 0–0.01)
NRBC BLD-RTO: 0 PER 100 WBC
PLATELET # BLD AUTO: 224 K/UL (ref 150–400)
PMV BLD AUTO: 9.7 FL (ref 8.9–12.9)
RBC # BLD AUTO: 4.27 M/UL (ref 4.1–5.7)
SERVICE CMNT-IMP: ABNORMAL
WBC # BLD AUTO: 10.5 K/UL (ref 4.1–11.1)

## 2024-03-01 PROCEDURE — 97110 THERAPEUTIC EXERCISES: CPT

## 2024-03-01 PROCEDURE — 6370000000 HC RX 637 (ALT 250 FOR IP): Performed by: FAMILY MEDICINE

## 2024-03-01 PROCEDURE — 97530 THERAPEUTIC ACTIVITIES: CPT

## 2024-03-01 PROCEDURE — 6360000002 HC RX W HCPCS: Performed by: FAMILY MEDICINE

## 2024-03-01 PROCEDURE — 2580000003 HC RX 258: Performed by: HOSPITALIST

## 2024-03-01 PROCEDURE — 36415 COLL VENOUS BLD VENIPUNCTURE: CPT

## 2024-03-01 PROCEDURE — APPSS30 APP SPLIT SHARED TIME 16-30 MINUTES: Performed by: NURSE PRACTITIONER

## 2024-03-01 PROCEDURE — 6370000000 HC RX 637 (ALT 250 FOR IP): Performed by: HOSPITALIST

## 2024-03-01 PROCEDURE — 85027 COMPLETE CBC AUTOMATED: CPT

## 2024-03-01 PROCEDURE — 6370000000 HC RX 637 (ALT 250 FOR IP): Performed by: PSYCHIATRY & NEUROLOGY

## 2024-03-01 PROCEDURE — 94761 N-INVAS EAR/PLS OXIMETRY MLT: CPT

## 2024-03-01 PROCEDURE — 1100000000 HC RM PRIVATE

## 2024-03-01 PROCEDURE — 2580000003 HC RX 258: Performed by: FAMILY MEDICINE

## 2024-03-01 PROCEDURE — 99233 SBSQ HOSP IP/OBS HIGH 50: CPT | Performed by: INTERNAL MEDICINE

## 2024-03-01 RX ADMIN — FAMOTIDINE 20 MG: 20 TABLET, FILM COATED ORAL at 22:45

## 2024-03-01 RX ADMIN — SODIUM CHLORIDE, PRESERVATIVE FREE 10 ML: 5 INJECTION INTRAVENOUS at 22:00

## 2024-03-01 RX ADMIN — LACOSAMIDE 200 MG: 100 TABLET, FILM COATED ORAL at 22:45

## 2024-03-01 RX ADMIN — METOPROLOL SUCCINATE 25 MG: 25 TABLET, EXTENDED RELEASE ORAL at 22:45

## 2024-03-01 RX ADMIN — LACOSAMIDE 200 MG: 100 TABLET, FILM COATED ORAL at 09:22

## 2024-03-01 RX ADMIN — ACETAMINOPHEN 650 MG: 325 TABLET ORAL at 22:45

## 2024-03-01 RX ADMIN — ATORVASTATIN CALCIUM 80 MG: 20 TABLET, FILM COATED ORAL at 22:45

## 2024-03-01 RX ADMIN — ISOSORBIDE MONONITRATE 60 MG: 30 TABLET, EXTENDED RELEASE ORAL at 09:22

## 2024-03-01 RX ADMIN — SODIUM CHLORIDE, PRESERVATIVE FREE 10 ML: 5 INJECTION INTRAVENOUS at 09:23

## 2024-03-01 RX ADMIN — WATER 1000 MG: 1 INJECTION INTRAMUSCULAR; INTRAVENOUS; SUBCUTANEOUS at 09:23

## 2024-03-01 RX ADMIN — LEVETIRACETAM 1500 MG: 500 TABLET, FILM COATED ORAL at 22:45

## 2024-03-01 RX ADMIN — APIXABAN 5 MG: 5 TABLET, FILM COATED ORAL at 09:22

## 2024-03-01 RX ADMIN — LEVETIRACETAM 1500 MG: 500 TABLET, FILM COATED ORAL at 09:22

## 2024-03-01 RX ADMIN — MIRTAZAPINE 7.5 MG: 15 TABLET, FILM COATED ORAL at 22:45

## 2024-03-01 RX ADMIN — APIXABAN 5 MG: 5 TABLET, FILM COATED ORAL at 22:44

## 2024-03-01 RX ADMIN — PANTOPRAZOLE SODIUM 40 MG: 40 TABLET, DELAYED RELEASE ORAL at 09:22

## 2024-03-01 ASSESSMENT — PAIN SCALES - GENERAL: PAINLEVEL_OUTOF10: 3

## 2024-03-01 NOTE — PROGRESS NOTES
Hospitalist Progress Note      NAME:  Jean-Paul Feldman   :  1935  MRM:  [id number]    Date/Time: 3/1/2024  10:25 AM           Assessment / Plan:   87 yo male with a PMHx of subdural hematoma was a transfer from an outside hospital due to seizures, MRI was positive for subacute infarcts in the right frontal periventricular white matter nd left cerebellum.    ##subacute infarcts on MRI  -repeat MRI brain with subacute infarcts in the right frontal periventricular white matter nd left cerebellum.   Plan  Rule out embolalic source   - TTE: Left Ventricle: Normal left ventricular systolic function with a visually estimated EF of 60 - 65%. Left ventricle size is normal. LVIDd is 3.9 cm. Moderately increased wall thickness. IVSd is 1.5 cm. LVPWd is 1.8 cm. Normal wall motion.    Aortic Valve: Trileaflet valve. Mild sclerosis of the aortic valve cusp.  - Carotid doppler: Findings are consistent with 0-49% stenosis of the right internal carotid and 0-49% stenosis of the left internal carotid. Velocities might be undervalued due to acoustic shadowing. Vertebrals are patent with antegrade flow.     plan  - Continue pt on Eliquis/Atorvastatin   -Cards/nuero on board      ##Seizure activity  -seizure precautions  -IV ativan prn  -no epileptiform discharges on EEG  -neurology evaluated, awaiting AED levels, cont current meds for now    ##Hx CVA in the past  -cont lipitor  -was on plavix in the past but is now on eliquis .      ##CAD  -hx CABG in   -cont metoprolol, lipitor     ##Hx traumatic subdural hematoma  -was treated at [facility]   -no new changes or concerns on repeat CT. MRI findings as above      ##CKD III  -stable  -cont to monitor      ##Afib  -on metoprolol and eliquis      ##Delirium   Pt has been reported to have some ing   Will continue with delirium precautions   - Continue frequent redirection and reorientation, lights on during day, minimize disruptions at night  - Avoid offending medications

## 2024-03-01 NOTE — PLAN OF CARE
Problem: Chronic Conditions and Co-morbidities  Goal: Patient's chronic conditions and co-morbidity symptoms are monitored and maintained or improved  3/1/2024 1040 by Amaya Peralta RN  Outcome: Progressing  2/29/2024 2344 by Kris Burnett RN  Outcome: Progressing     Problem: Skin/Tissue Integrity  Goal: Absence of new skin breakdown  Description: 1.  Monitor for areas of redness and/or skin breakdown  2.  Assess vascular access sites hourly  3.  Every 4-6 hours minimum:  Change oxygen saturation probe site  4.  Every 4-6 hours:  If on nasal continuous positive airway pressure, respiratory therapy assess nares and determine need for appliance change or resting period.  3/1/2024 1040 by Amaya Peralta RN  Outcome: Progressing  2/29/2024 2344 by Kris Burnett RN  Outcome: Progressing     Problem: Safety - Adult  Goal: Free from fall injury  3/1/2024 1040 by Amaya Peralta RN  Outcome: Progressing  2/29/2024 2344 by rKis Burnett RN  Outcome: Progressing     Problem: Pain  Goal: Verbalizes/displays adequate comfort level or baseline comfort level  Outcome: Progressing     Problem: ABCDS Injury Assessment  Goal: Absence of physical injury  3/1/2024 1040 by Amaya Peralta RN  Outcome: Progressing  2/29/2024 2344 by Kris Burnett RN  Outcome: Progressing

## 2024-03-01 NOTE — PROGRESS NOTES
a-fib and is currently on Eliquis as an OP. There was some questions as to whether he was on coumadin at one point but his daughter and wife state they do NOT think he was ever on coumadin. He had been on plavix in the past for his CVA hx per their report. Pt unable to give any meaningful information, obtained from records and pt's family.     Subjective:      Jean-Paul Feldman reports none, difficult to understand/garbled speech.      Assessment and Plan     Chronic a-fib/a-flutter since July 2023, rate controlled  - on Eliquis as OP, family reports compliance   - TTE w/ EF 60-65%, no wall motion    - per pt's daughter and wife, he was never on coumadin (stated in H&P HPI)? No coumadin noted on prior admissions within our records or cardiology notes   - per neuro, studies reveal no benefit to switching AC to coumadin and could actually worsen outcome  - follow up w/ his primary cardiologist to discuss Watchman device but may not be candidate    2. New CVA, prior CVA, SDH in 2022, seizure disorder   - per neuro   - ASA/plavix stopped after SDH per family  - per family, recently taken off Dilantin d/t interaction with Eliquis     3. CAD s/p CABG in 1996, s/p multiple PCI's since (2002, 2017, 2018), occluded SVG to RCA graft   - appears stable  - not on ASA since on Eliquis and hx of SDH  - cont statin     4. HTN    5. CKD stage III  - Cr stable     Discussed with pt's daughter and wife over phone. Will see prn, pt should follow up with his primary cardiologist to evaluate if he is a Watchman candidate or not       ____________________________________________________________    Cardiac testing  02/26/24    ECHO (TTE) COMPLETE (PRN CONTRAST/BUBBLE/STRAIN/3D) 02/29/2024  2:34 PM (Final)    Interpretation Summary    Left Ventricle: Normal left ventricular systolic function with a visually estimated EF of 60 - 65%. Left ventricle size is normal. LVIDd is 3.9 cm. Moderately increased wall thickness. IVSd is 1.5 cm. LVPWd is  Mindi SEALS MD, 20 mg at 02/29/24 2113    lacosamide (VIMPAT) tablet 200 mg, 200 mg, Oral, BID, Umair Bullard MD, 200 mg at 03/01/24 0922    GM Bee - NP    [facility] Cardiology  Call center: (P) [phone]  (F) [phone]      CC:Marlen Levi MD

## 2024-03-01 NOTE — CARE COORDINATION
Auth has been obtained for [facility].  Facility can accept over the weekend once cleared. CM to Fax discharge summary, MAR, and hardscripts to: [phone]    Nurse to call report: [phone]  Room 220W      DARI Arthur, [facility] Care Manager  [phone]

## 2024-03-01 NOTE — PLAN OF CARE
Problem: Physical Therapy - Adult  Goal: By Discharge: Performs mobility at highest level of function for planned discharge setting.  See evaluation for individualized goals.  Description: FUNCTIONAL STATUS PRIOR TO ADMISSION: pt required assistance for all transfers, gait, and stair negotiation with RW and gait belt.    HOME SUPPORT PRIOR TO ADMISSION: The patient lived with spouse and either son or niece stays with them at night to assist. Pt has caregiver from 8-5pm. .    Physical Therapy Goals  Initiated 2/28/2024  1.  Patient will move from supine to sit and sit to supine and roll side to side in bed with minimal assistance within 7 day(s).    2.  Patient will perform sit to stand with minimal assistance within 7 day(s).  3.  Patient will transfer from bed to chair and chair to bed with minimal assistance using the least restrictive device within 7 day(s).  4.  Patient will ambulate with minimal assistance for 25 feet with the least restrictive device within 7 day(s).   5.  Patient will ascend/descend 6 stairs with L handrail(s) with moderate assistance within 7 day(s).    Outcome: Progressing   PHYSICAL THERAPY TREATMENT    Patient: Jean-Paul Feldman (88 y.o. male)  Date: 3/1/2024  Diagnosis: Seizure (HCC) [R56.9] Seizure (HCC)      Precautions: Fall Risk, Aspiration Risk                      ASSESSMENT:  Patient continues to benefit from skilled PT services and is progressing towards goals. Patient participated in transfer training rolling to left and sidelying to sit with max assist of one.  Initial sitting leaning left but corrects with verbal and visual cues.  Participated in scooting in sitting to the edge of the bed and transfer training of sit to stand with mod A x 1 and hand held assist.  Performed pivot to the bedside chair to the left, patient reaching with his left hand for the armrest of the chair.  Performed active range of motion exercises for bilateral lower extremities in sitting for

## 2024-03-01 NOTE — PLAN OF CARE
Problem: Chronic Conditions and Co-morbidities  Goal: Patient's chronic conditions and co-morbidity symptoms are monitored and maintained or improved  2/29/2024 2344 by Kris Burnett RN  Outcome: Progressing  2/29/2024 1035 by Amaya Peralta RN  Outcome: Progressing     Problem: Skin/Tissue Integrity  Goal: Absence of new skin breakdown  Description: 1.  Monitor for areas of redness and/or skin breakdown  2.  Assess vascular access sites hourly  3.  Every 4-6 hours minimum:  Change oxygen saturation probe site  4.  Every 4-6 hours:  If on nasal continuous positive airway pressure, respiratory therapy assess nares and determine need for appliance change or resting period.  2/29/2024 2344 by Kris Burnett RN  Outcome: Progressing  2/29/2024 1035 by Amaya Peralta RN  Outcome: Progressing     Problem: Safety - Adult  Goal: Free from fall injury  2/29/2024 2344 by Kris Burnett, RN  Outcome: Progressing  2/29/2024 1035 by Amaya Peralta RN  Outcome: Progressing

## 2024-03-02 VITALS
BODY MASS INDEX: 31.08 KG/M2 | OXYGEN SATURATION: 95 % | DIASTOLIC BLOOD PRESSURE: 74 MMHG | HEART RATE: 65 BPM | SYSTOLIC BLOOD PRESSURE: 139 MMHG | RESPIRATION RATE: 20 BRPM | HEIGHT: 71 IN | WEIGHT: 222 LBS | TEMPERATURE: 97.7 F

## 2024-03-02 PROBLEM — R56.9 SEIZURE (HCC): Status: RESOLVED | Noted: 2024-02-26 | Resolved: 2024-03-02

## 2024-03-02 LAB
ERYTHROCYTE [DISTWIDTH] IN BLOOD BY AUTOMATED COUNT: 13.9 % (ref 11.5–14.5)
HCT VFR BLD AUTO: 40.4 % (ref 36.6–50.3)
HGB BLD-MCNC: 13.6 G/DL (ref 12.1–17)
MCH RBC QN AUTO: 31.6 PG (ref 26–34)
MCHC RBC AUTO-ENTMCNC: 33.7 G/DL (ref 30–36.5)
MCV RBC AUTO: 94 FL (ref 80–99)
NRBC # BLD: 0 K/UL (ref 0–0.01)
NRBC BLD-RTO: 0 PER 100 WBC
PLATELET # BLD AUTO: 238 K/UL (ref 150–400)
PMV BLD AUTO: 9.7 FL (ref 8.9–12.9)
RBC # BLD AUTO: 4.3 M/UL (ref 4.1–5.7)
WBC # BLD AUTO: 8.5 K/UL (ref 4.1–11.1)

## 2024-03-02 PROCEDURE — 94761 N-INVAS EAR/PLS OXIMETRY MLT: CPT

## 2024-03-02 PROCEDURE — 6370000000 HC RX 637 (ALT 250 FOR IP): Performed by: HOSPITALIST

## 2024-03-02 PROCEDURE — 2580000003 HC RX 258: Performed by: FAMILY MEDICINE

## 2024-03-02 PROCEDURE — 6370000000 HC RX 637 (ALT 250 FOR IP): Performed by: PSYCHIATRY & NEUROLOGY

## 2024-03-02 PROCEDURE — 2580000003 HC RX 258: Performed by: HOSPITALIST

## 2024-03-02 PROCEDURE — 6370000000 HC RX 637 (ALT 250 FOR IP): Performed by: FAMILY MEDICINE

## 2024-03-02 PROCEDURE — 36415 COLL VENOUS BLD VENIPUNCTURE: CPT

## 2024-03-02 PROCEDURE — 6360000002 HC RX W HCPCS: Performed by: FAMILY MEDICINE

## 2024-03-02 PROCEDURE — 85027 COMPLETE CBC AUTOMATED: CPT

## 2024-03-02 RX ORDER — CLINDAMYCIN HYDROCHLORIDE 300 MG/1
300 CAPSULE ORAL 2 TIMES DAILY
Qty: 14 CAPSULE | Refills: 0 | Status: SHIPPED | OUTPATIENT
Start: 2024-03-02 | End: 2024-03-09

## 2024-03-02 RX ADMIN — PANTOPRAZOLE SODIUM 40 MG: 40 TABLET, DELAYED RELEASE ORAL at 08:15

## 2024-03-02 RX ADMIN — LEVETIRACETAM 1500 MG: 500 TABLET, FILM COATED ORAL at 08:15

## 2024-03-02 RX ADMIN — WATER 1000 MG: 1 INJECTION INTRAMUSCULAR; INTRAVENOUS; SUBCUTANEOUS at 09:41

## 2024-03-02 RX ADMIN — LACOSAMIDE 200 MG: 100 TABLET, FILM COATED ORAL at 08:14

## 2024-03-02 RX ADMIN — APIXABAN 5 MG: 5 TABLET, FILM COATED ORAL at 08:15

## 2024-03-02 RX ADMIN — SODIUM CHLORIDE, PRESERVATIVE FREE 10 ML: 5 INJECTION INTRAVENOUS at 08:14

## 2024-03-02 NOTE — CARE COORDINATION
Transition of Care Plan to SNF/Rehab    Communication to Patient/Family:  Met with patient and family and they are agreeable to the transition plan. The Plan for Transition of Care is related to the following treatment goals: rehab to highest level of funciton    The Patient and/or patient representative was provided with a choice of provider and agrees  with the discharge plan.      Yes [x] No []    A Freedom of choice list was provided with basic dialogue that supports the patient's individualized plan of care/goals and shares the quality data associated with the providers.       Yes [x] No []    SNF/Rehab Transition:  Patient has been accepted to [facility] SNF/Rehab and meets criteria for admission.   Patient will transported by [facility] and expected to leave at 2:30pm.    Communication to SNF/Rehab:  Bedside RN, Josh, has been notified to update the transition plan to the facility and call report [phone] room 220W  Discharge information has been updated on the AVS. And communicated to facility via [employer]), or CC link.     Discharge instructions to be fax'd to facility at [phone]      Nursing Please include all hard scripts for controlled substances, med rec and dc summary, and AVS in packet.     Reviewed and confirmed with facility,[facility], can manage the patient care needs for the following:     Uriel with (X) only those applicable:  Medication:  [x]Medications are available at the facility  []IV Antibiotics    [x]Controlled Substance - hard copies available sent.  [x]Weekly Labs    Equipment:  []CPAP/BiPAP  []Wound Vacuum  []Myers or Urinary Device  []PICC/Central Line  []Nebulizer  []Ventilator    Treatment:  []Isolation (for MRSA, VRE, etc.)  []Surgical Drain Management  []Tracheostomy Care  []Dressing Changes  []Dialysis with transportation  []PEG Care  []Oxygen  []Daily Weights for Heart Failure    Dietary:  [x]Any diet limitations  []Tube Feedings

## 2024-03-02 NOTE — DISCHARGE SUMMARY
##Delirium   Pt has been reported to have some sundowning   Will continue with delirium precautions   - Continue frequent redirection and reorientation, lights on during day, minimize disruptions at night  - Avoid offending medications including benzodiazepines and opioids as able, though appropriate pain management as uncontrolled pain can exacerbate delirium  - Avoid restraints  - Medication only if needed for severe agitation when patient is at risk of harming self or others       Imaging  MRI BRAIN WO CONTRAST    Result Date: 2/27/2024  1. Subacute infarcts of the right frontal periventricular white matter and left cerebellum. Consider embolic source. 2. Extensive chronic microvascular ischemic disease and severe cerebral volume loss.    XR CHEST PORTABLE    Result Date: 2/27/2024  Mild interstitial opacities with small left effusion and left basilar airspace opacity.    CT HEAD WO CONTRAST    Result Date: 2/27/2024  No acute intracranial abnormality.        PCP: Marlen Levi MD     Consults: cardiology and neurology    Condition of patient at discharge: Stable    Discharge Exam:    Physical Exam:    Gen:  Calm, in no acute distress  HEENT:  hard hearing, moist mucous membranes  Neck:  Supple, without masses, thyroid non-tender  Resp:  No accessory muscle use, clear breath sounds without wheezes rales or rhonchi  Card:  No murmurs, normal S1, S2 without thrills, bruits or peripheral edema  Abd:  Soft, non-tender, non-distended, normoactive bowel sounds are present  Musc:  No cyanosis or clubbing  Neuro:  Slurred speech, Cranial nerves 3-12 are grossly intact,  strength is 5/5 bilaterally and dorsi / plantarflexion is 5/5 bilaterally, follows commands appropriately  Psych:  Fair  insight, oriented to person, place and time, alert          Disposition: SNF    Patient Instructions:   Current Discharge Medication List        CONTINUE these medications which have NOT CHANGED    Details   cetirizine

## 2024-03-02 NOTE — DISCHARGE INSTRUCTIONS
HOSPITALIST DISCHARGE INSTRUCTIONS  NAME:  Jean-Paul Feldman   :  1935   MRN:  [id number]     Date/Time:  3/2/2024 10:42 AM    ADMIT DATE: 2024     DISCHARGE DATE: 3/2/2024     DISCHARGE DIAGNOSIS:  Seizures     DISCHARGE INSTRUCTIONS:  Thank you for allowing us to participate in your care. Your discharging Hospitalist is Sylvester Burroughs MD. You were admitted for evaluation and treatment of the above.       MEDICATIONS:    It is important that you take the medication exactly as they are prescribed.   Keep your medication in the bottles provided by the pharmacist and keep a list of the medication names, dosages, and times to be taken in your wallet.   Do not take other medications without consulting your doctor.             If you experience any of the following symptoms then please call your primary care physician or return to the emergency room if you cannot get hold of your doctor:  Fever, chills, nausea, vomiting, diarrhea, change in mentation, falling, bleeding, shortness of breath    Follow Up:  Please call the below provider to arrange hospital follow up appointment      Marlen Levi MD  [facility] [address] [phone]    Follow up in 1 week(s)        For questions regarding your Hospitalization or to contact the Hospital Medicine team, please call [phone].      Information obtained by :  I understand that if any problems occur once I am at home I am to contact my physician.    I understand and acknowledge receipt of the instructions indicated above.                                                                                                                                           Physician's or R.N.'s Signature                                                                  Date/Time                                                                                                                                              Patient or Representative  Signature                                                          Date/Time

## 2024-03-02 NOTE — PLAN OF CARE
Problem: Chronic Conditions and Co-morbidities  Goal: Patient's chronic conditions and co-morbidity symptoms are monitored and maintained or improved  Outcome: Progressing     Problem: Skin/Tissue Integrity  Goal: Absence of new skin breakdown  Description: 1.  Monitor for areas of redness and/or skin breakdown  2.  Assess vascular access sites hourly  3.  Every 4-6 hours minimum:  Change oxygen saturation probe site  4.  Every 4-6 hours:  If on nasal continuous positive airway pressure, respiratory therapy assess nares and determine need for appliance change or resting period.  Outcome: Progressing     Problem: Safety - Adult  Goal: Free from fall injury  Outcome: Progressing     Problem: Pain  Goal: Verbalizes/displays adequate comfort level or baseline comfort level  Outcome: Progressing     Problem: ABCDS Injury Assessment  Goal: Absence of physical injury  Outcome: Progressing

## 2024-04-17 ENCOUNTER — TELEPHONE (OUTPATIENT)
Age: 89
End: 2024-04-17

## 2024-04-17 NOTE — TELEPHONE ENCOUNTER
Dr. Walden is requesting a phone call to discuss patient medication and would like to know if patient is still takimg seizure medication?    Contact # [phone]

## 2024-04-19 NOTE — TELEPHONE ENCOUNTER
Returned his call. Left detailed msg in regards to not having seen the patient in the office since his initial consult in the hosp. Patient has been made a couple of appts and he was a no show for all of them thus far.   Any additional questions he can call the office back.